# Patient Record
Sex: FEMALE | Race: BLACK OR AFRICAN AMERICAN | NOT HISPANIC OR LATINO
[De-identification: names, ages, dates, MRNs, and addresses within clinical notes are randomized per-mention and may not be internally consistent; named-entity substitution may affect disease eponyms.]

---

## 2017-05-07 ENCOUNTER — FORM ENCOUNTER (OUTPATIENT)
Age: 82
End: 2017-05-07

## 2017-05-08 ENCOUNTER — APPOINTMENT (OUTPATIENT)
Dept: ULTRASOUND IMAGING | Facility: IMAGING CENTER | Age: 82
End: 2017-05-08

## 2017-05-08 ENCOUNTER — OUTPATIENT (OUTPATIENT)
Dept: OUTPATIENT SERVICES | Facility: HOSPITAL | Age: 82
LOS: 1 days | End: 2017-05-08
Payer: MEDICARE

## 2017-05-08 DIAGNOSIS — Z00.8 ENCOUNTER FOR OTHER GENERAL EXAMINATION: ICD-10-CM

## 2017-05-08 DIAGNOSIS — I70.209 UNSPECIFIED ATHEROSCLEROSIS OF NATIVE ARTERIES OF EXTREMITIES, UNSPECIFIED EXTREMITY: ICD-10-CM

## 2017-05-08 DIAGNOSIS — M19.90 UNSPECIFIED OSTEOARTHRITIS, UNSPECIFIED SITE: ICD-10-CM

## 2017-05-08 PROCEDURE — 93975 VASCULAR STUDY: CPT

## 2017-05-23 ENCOUNTER — APPOINTMENT (OUTPATIENT)
Dept: NEPHROLOGY | Facility: CLINIC | Age: 82
End: 2017-05-23

## 2017-05-23 ENCOUNTER — LABORATORY RESULT (OUTPATIENT)
Age: 82
End: 2017-05-23

## 2017-05-23 VITALS
HEIGHT: 65 IN | DIASTOLIC BLOOD PRESSURE: 89 MMHG | HEART RATE: 73 BPM | SYSTOLIC BLOOD PRESSURE: 165 MMHG | WEIGHT: 121.25 LBS | OXYGEN SATURATION: 99 % | BODY MASS INDEX: 20.2 KG/M2

## 2017-05-23 VITALS — SYSTOLIC BLOOD PRESSURE: 148 MMHG | DIASTOLIC BLOOD PRESSURE: 80 MMHG

## 2017-05-24 LAB
25(OH)D3 SERPL-MCNC: 62.8 NG/ML
ALBUMIN SERPL ELPH-MCNC: 4.2 G/DL
ALP BLD-CCNC: 116 U/L
ALT SERPL-CCNC: 12 U/L
ANION GAP SERPL CALC-SCNC: 12 MMOL/L
APPEARANCE: CLEAR
AST SERPL-CCNC: 15 U/L
BACTERIA: NEGATIVE
BASOPHILS # BLD AUTO: 0.05 K/UL
BASOPHILS NFR BLD AUTO: 0.9 %
BILIRUB SERPL-MCNC: 0.7 MG/DL
BILIRUBIN URINE: NEGATIVE
BLOOD URINE: NEGATIVE
BUN SERPL-MCNC: 17 MG/DL
CALCIUM SERPL-MCNC: 9.6 MG/DL
CHLORIDE SERPL-SCNC: 101 MMOL/L
CO2 SERPL-SCNC: 23 MMOL/L
COLOR: YELLOW
CREAT SERPL-MCNC: 1.06 MG/DL
CREAT SPEC-SCNC: 55 MG/DL
EOSINOPHIL # BLD AUTO: 0.05 K/UL
EOSINOPHIL NFR BLD AUTO: 0.9 %
GLUCOSE QUALITATIVE U: NORMAL MG/DL
GLUCOSE SERPL-MCNC: 87 MG/DL
HCT VFR BLD CALC: 39.2 %
HGB BLD-MCNC: 12.7 G/DL
HYALINE CASTS: 2 /LPF
KETONES URINE: NEGATIVE
LEUKOCYTE ESTERASE URINE: NEGATIVE
LYMPHOCYTES # BLD AUTO: 2.27 K/UL
LYMPHOCYTES NFR BLD AUTO: 44.6 %
MAGNESIUM SERPL-MCNC: 2.3 MG/DL
MAN DIFF?: NORMAL
MCHC RBC-ENTMCNC: 23.4 PG
MCHC RBC-ENTMCNC: 32.4 GM/DL
MCV RBC AUTO: 72.2 FL
MICROALBUMIN 24H UR DL<=1MG/L-MCNC: 0.3 MG/DL
MICROALBUMIN/CREAT 24H UR-RTO: 5
MICROSCOPIC-UA: NORMAL
MONOCYTES # BLD AUTO: 0.18 K/UL
MONOCYTES NFR BLD AUTO: 3.6 %
NEUTROPHILS # BLD AUTO: 2.37 K/UL
NEUTROPHILS NFR BLD AUTO: 46.4 %
NITRITE URINE: NEGATIVE
PH URINE: 6.5
PHOSPHATE SERPL-MCNC: 3.2 MG/DL
PLATELET # BLD AUTO: 202 K/UL
POTASSIUM SERPL-SCNC: 3.9 MMOL/L
PROT SERPL-MCNC: 7.3 G/DL
PROTEIN URINE: NEGATIVE MG/DL
RBC # BLD: 5.43 M/UL
RBC # FLD: 16 %
RED BLOOD CELLS URINE: 2 /HPF
SODIUM SERPL-SCNC: 136 MMOL/L
SPECIFIC GRAVITY URINE: 1.01
SQUAMOUS EPITHELIAL CELLS: 1 /HPF
TSH SERPL-ACNC: 1.59 UIU/ML
URATE SERPL-MCNC: 4.3 MG/DL
UROBILINOGEN URINE: NORMAL MG/DL
WBC # FLD AUTO: 5.1 K/UL
WHITE BLOOD CELLS URINE: 0 /HPF

## 2017-07-04 ENCOUNTER — EMERGENCY (EMERGENCY)
Facility: HOSPITAL | Age: 82
LOS: 1 days | Discharge: HOME | End: 2017-07-04
Attending: EMERGENCY MEDICINE
Payer: MEDICARE

## 2017-07-04 VITALS
HEART RATE: 87 BPM | SYSTOLIC BLOOD PRESSURE: 126 MMHG | DIASTOLIC BLOOD PRESSURE: 69 MMHG | WEIGHT: 123.9 LBS | TEMPERATURE: 208 F | HEIGHT: 63 IN | OXYGEN SATURATION: 97 % | RESPIRATION RATE: 16 BRPM

## 2017-07-04 VITALS
TEMPERATURE: 98 F | RESPIRATION RATE: 18 BRPM | OXYGEN SATURATION: 98 % | HEART RATE: 78 BPM | DIASTOLIC BLOOD PRESSURE: 87 MMHG | SYSTOLIC BLOOD PRESSURE: 142 MMHG

## 2017-07-04 DIAGNOSIS — R07.9 CHEST PAIN, UNSPECIFIED: ICD-10-CM

## 2017-07-04 DIAGNOSIS — R53.1 WEAKNESS: ICD-10-CM

## 2017-07-04 DIAGNOSIS — N18.9 CHRONIC KIDNEY DISEASE, UNSPECIFIED: ICD-10-CM

## 2017-07-04 DIAGNOSIS — Z88.2 ALLERGY STATUS TO SULFONAMIDES: ICD-10-CM

## 2017-07-04 DIAGNOSIS — I12.9 HYPERTENSIVE CHRONIC KIDNEY DISEASE WITH STAGE 1 THROUGH STAGE 4 CHRONIC KIDNEY DISEASE, OR UNSPECIFIED CHRONIC KIDNEY DISEASE: ICD-10-CM

## 2017-07-04 LAB
ALBUMIN SERPL ELPH-MCNC: 3.2 G/DL — LOW (ref 3.5–5)
ALP SERPL-CCNC: 122 U/L — HIGH (ref 40–120)
ALT FLD-CCNC: 17 U/L DA — SIGNIFICANT CHANGE UP (ref 10–60)
ANION GAP SERPL CALC-SCNC: 7 MMOL/L — SIGNIFICANT CHANGE UP (ref 5–17)
AST SERPL-CCNC: 16 U/L — SIGNIFICANT CHANGE UP (ref 10–40)
BASOPHILS # BLD AUTO: 0.1 K/UL — SIGNIFICANT CHANGE UP (ref 0–0.2)
BASOPHILS NFR BLD AUTO: 1.3 % — SIGNIFICANT CHANGE UP (ref 0–2)
BILIRUB SERPL-MCNC: 0.7 MG/DL — SIGNIFICANT CHANGE UP (ref 0.2–1.2)
BUN SERPL-MCNC: 16 MG/DL — SIGNIFICANT CHANGE UP (ref 7–18)
CALCIUM SERPL-MCNC: 8.5 MG/DL — SIGNIFICANT CHANGE UP (ref 8.4–10.5)
CHLORIDE SERPL-SCNC: 108 MMOL/L — SIGNIFICANT CHANGE UP (ref 96–108)
CO2 SERPL-SCNC: 25 MMOL/L — SIGNIFICANT CHANGE UP (ref 22–31)
CREAT SERPL-MCNC: 0.91 MG/DL — SIGNIFICANT CHANGE UP (ref 0.5–1.3)
EOSINOPHIL # BLD AUTO: 0.1 K/UL — SIGNIFICANT CHANGE UP (ref 0–0.5)
EOSINOPHIL NFR BLD AUTO: 1.6 % — SIGNIFICANT CHANGE UP (ref 0–6)
GLUCOSE SERPL-MCNC: 90 MG/DL — SIGNIFICANT CHANGE UP (ref 70–99)
HCT VFR BLD CALC: 43.1 % — SIGNIFICANT CHANGE UP (ref 34.5–45)
HGB BLD-MCNC: 13.3 G/DL — SIGNIFICANT CHANGE UP (ref 11.5–15.5)
LACTATE SERPL-SCNC: 0.9 MMOL/L — SIGNIFICANT CHANGE UP (ref 0.7–2)
LYMPHOCYTES # BLD AUTO: 1.5 K/UL — SIGNIFICANT CHANGE UP (ref 1–3.3)
LYMPHOCYTES # BLD AUTO: 31.2 % — SIGNIFICANT CHANGE UP (ref 13–44)
MCHC RBC-ENTMCNC: 23.5 PG — LOW (ref 27–34)
MCHC RBC-ENTMCNC: 30.9 GM/DL — LOW (ref 32–36)
MCV RBC AUTO: 76 FL — LOW (ref 80–100)
MONOCYTES # BLD AUTO: 0.5 K/UL — SIGNIFICANT CHANGE UP (ref 0–0.9)
MONOCYTES NFR BLD AUTO: 9.7 % — SIGNIFICANT CHANGE UP (ref 2–14)
NEUTROPHILS # BLD AUTO: 2.7 K/UL — SIGNIFICANT CHANGE UP (ref 1.8–7.4)
NEUTROPHILS NFR BLD AUTO: 56.2 % — SIGNIFICANT CHANGE UP (ref 43–77)
NT-PROBNP SERPL-SCNC: 211 PG/ML — SIGNIFICANT CHANGE UP (ref 0–450)
PLATELET # BLD AUTO: 173 K/UL — SIGNIFICANT CHANGE UP (ref 150–400)
POTASSIUM SERPL-MCNC: 4 MMOL/L — SIGNIFICANT CHANGE UP (ref 3.5–5.3)
POTASSIUM SERPL-SCNC: 4 MMOL/L — SIGNIFICANT CHANGE UP (ref 3.5–5.3)
PROT SERPL-MCNC: 7.8 G/DL — SIGNIFICANT CHANGE UP (ref 6–8.3)
RBC # BLD: 5.67 M/UL — HIGH (ref 3.8–5.2)
RBC # FLD: 15 % — HIGH (ref 10.3–14.5)
SODIUM SERPL-SCNC: 140 MMOL/L — SIGNIFICANT CHANGE UP (ref 135–145)
TROPONIN I SERPL-MCNC: <0.015 NG/ML — SIGNIFICANT CHANGE UP (ref 0–0.04)
WBC # BLD: 4.9 K/UL — SIGNIFICANT CHANGE UP (ref 3.8–10.5)
WBC # FLD AUTO: 4.9 K/UL — SIGNIFICANT CHANGE UP (ref 3.8–10.5)

## 2017-07-04 PROCEDURE — 80053 COMPREHEN METABOLIC PANEL: CPT

## 2017-07-04 PROCEDURE — 84484 ASSAY OF TROPONIN QUANT: CPT

## 2017-07-04 PROCEDURE — 83880 ASSAY OF NATRIURETIC PEPTIDE: CPT

## 2017-07-04 PROCEDURE — 71046 X-RAY EXAM CHEST 2 VIEWS: CPT

## 2017-07-04 PROCEDURE — 36000 PLACE NEEDLE IN VEIN: CPT

## 2017-07-04 PROCEDURE — 71020: CPT | Mod: 26

## 2017-07-04 PROCEDURE — 99283 EMERGENCY DEPT VISIT LOW MDM: CPT

## 2017-07-04 PROCEDURE — 93005 ELECTROCARDIOGRAM TRACING: CPT

## 2017-07-04 PROCEDURE — 85027 COMPLETE CBC AUTOMATED: CPT

## 2017-07-04 PROCEDURE — 99285 EMERGENCY DEPT VISIT HI MDM: CPT

## 2017-07-04 PROCEDURE — 83605 ASSAY OF LACTIC ACID: CPT

## 2017-07-04 RX ORDER — SODIUM CHLORIDE 9 MG/ML
1000 INJECTION INTRAMUSCULAR; INTRAVENOUS; SUBCUTANEOUS ONCE
Qty: 0 | Refills: 0 | Status: COMPLETED | OUTPATIENT
Start: 2017-07-04 | End: 2017-07-04

## 2017-07-04 RX ORDER — SODIUM CHLORIDE 9 MG/ML
3 INJECTION INTRAMUSCULAR; INTRAVENOUS; SUBCUTANEOUS ONCE
Qty: 0 | Refills: 0 | Status: COMPLETED | OUTPATIENT
Start: 2017-07-04 | End: 2017-07-04

## 2017-07-04 RX ADMIN — SODIUM CHLORIDE 4000 MILLILITER(S): 9 INJECTION INTRAMUSCULAR; INTRAVENOUS; SUBCUTANEOUS at 14:03

## 2017-07-04 RX ADMIN — SODIUM CHLORIDE 3 MILLILITER(S): 9 INJECTION INTRAMUSCULAR; INTRAVENOUS; SUBCUTANEOUS at 12:31

## 2017-07-04 NOTE — ED PROVIDER NOTE - MEDICAL DECISION MAKING DETAILS
chest pain onset 9 hrs ago, pt feels asymptomatic. normal exam. labs, cxr and ecg reassuring. discussed anticipatory guidance. gave copy of results. pt wishes to follow up as outpt and not be admitted. has cardiologist who can see her this week

## 2017-07-04 NOTE — ED PROVIDER NOTE - PMH
CKD (chronic kidney disease)    HTN (hypertension)    Osteoporosis    Poor circulation    RA (rheumatoid arthritis)

## 2017-07-04 NOTE — ED PROVIDER NOTE - OBJECTIVE STATEMENT
82 y/o F pt with PMHx of HTN, CKD, RA, presents to ED c/o generalized weakness x today. Pt reports developing intermittent L sided CP this morning upon awaking up (0600). In the ED, pt states being asymptomatic of her chest pain, however, feels generalized weakness. Pt denies fever, chills, nausea, vomiting, diarrhea, abd pain, SOB, cough, or any other complaints. ALLERGIES: Sulfa 10% (Rash)

## 2017-10-30 ENCOUNTER — EMERGENCY (EMERGENCY)
Facility: HOSPITAL | Age: 82
LOS: 1 days | Discharge: HOME | End: 2017-10-30
Attending: EMERGENCY MEDICINE
Payer: MEDICARE

## 2017-10-30 VITALS
HEART RATE: 94 BPM | HEIGHT: 63 IN | WEIGHT: 119.93 LBS | SYSTOLIC BLOOD PRESSURE: 220 MMHG | RESPIRATION RATE: 18 BRPM | TEMPERATURE: 98 F | OXYGEN SATURATION: 96 % | DIASTOLIC BLOOD PRESSURE: 120 MMHG

## 2017-10-30 VITALS
HEART RATE: 72 BPM | OXYGEN SATURATION: 100 % | SYSTOLIC BLOOD PRESSURE: 148 MMHG | RESPIRATION RATE: 18 BRPM | DIASTOLIC BLOOD PRESSURE: 94 MMHG | TEMPERATURE: 98 F

## 2017-10-30 PROCEDURE — 99284 EMERGENCY DEPT VISIT MOD MDM: CPT

## 2017-10-30 PROCEDURE — 93005 ELECTROCARDIOGRAM TRACING: CPT

## 2017-10-30 RX ORDER — ACETAMINOPHEN 500 MG
650 TABLET ORAL ONCE
Qty: 0 | Refills: 0 | Status: COMPLETED | OUTPATIENT
Start: 2017-10-30 | End: 2017-10-30

## 2017-10-30 RX ORDER — VALSARTAN 80 MG/1
160 TABLET ORAL DAILY
Qty: 0 | Refills: 0 | Status: DISCONTINUED | OUTPATIENT
Start: 2017-10-30 | End: 2017-11-11

## 2017-10-30 RX ORDER — METOPROLOL TARTRATE 50 MG
1 TABLET ORAL
Qty: 30 | Refills: 0 | OUTPATIENT
Start: 2017-10-30 | End: 2017-11-29

## 2017-10-30 RX ORDER — METOPROLOL TARTRATE 50 MG
50 TABLET ORAL DAILY
Qty: 0 | Refills: 0 | Status: DISCONTINUED | OUTPATIENT
Start: 2017-10-30 | End: 2017-11-11

## 2017-10-30 RX ADMIN — Medication 50 MILLIGRAM(S): at 18:50

## 2017-10-30 RX ADMIN — Medication 0.2 MILLIGRAM(S): at 20:48

## 2017-10-30 RX ADMIN — VALSARTAN 160 MILLIGRAM(S): 80 TABLET ORAL at 18:49

## 2017-10-30 RX ADMIN — Medication 650 MILLIGRAM(S): at 19:01

## 2017-10-30 RX ADMIN — Medication 650 MILLIGRAM(S): at 20:00

## 2017-10-30 NOTE — ED ADULT NURSE NOTE - OBJECTIVE STATEMENT
Pt AOx3, ambulatory, c/o elevated blood pressure, headache and dizziness. Pt denies N/V. Denies changes in vision . No neuro deficit noted.

## 2017-10-30 NOTE — ED PROVIDER NOTE - OBJECTIVE STATEMENT
Dr. Fields Note: 81F with intermittent elevated BP for past 1-2 weeks, recent adjustment of BP meds from metoprolol to hydralazine, here for eval.  no vision changes, cp, sob, ab pain, vomiting, urinary sxs.  Had recent blood work by PCP last week that was normal.  Does not want to take hydralazine anymore.

## 2017-10-30 NOTE — ED PROVIDER NOTE - MEDICAL DECISION MAKING DETAILS
Dr. Fields Note: pt with recent blood work with intermittent HTN to 200s, will adjust meds and outpt.

## 2017-10-30 NOTE — ED PROVIDER NOTE - PROGRESS NOTE DETAILS
Dr. Fields Note: pt forgot to report, but after further review of records, pt has renal artery stenosis; will have to avoid ARBs/ACEi.  will resume metoprolol and add clonidine instead.

## 2017-11-03 ENCOUNTER — APPOINTMENT (OUTPATIENT)
Dept: ULTRASOUND IMAGING | Facility: IMAGING CENTER | Age: 82
End: 2017-11-03

## 2017-11-04 ENCOUNTER — INPATIENT (INPATIENT)
Facility: HOSPITAL | Age: 82
LOS: 3 days | Discharge: ANOTHER HOSPITAL | DRG: 305 | End: 2017-11-08
Attending: INTERNAL MEDICINE | Admitting: INTERNAL MEDICINE
Payer: MEDICARE

## 2017-11-04 VITALS
RESPIRATION RATE: 16 BRPM | HEIGHT: 63 IN | DIASTOLIC BLOOD PRESSURE: 86 MMHG | SYSTOLIC BLOOD PRESSURE: 200 MMHG | OXYGEN SATURATION: 100 % | WEIGHT: 125 LBS | TEMPERATURE: 98 F | HEART RATE: 62 BPM

## 2017-11-04 DIAGNOSIS — I16.0 HYPERTENSIVE URGENCY: ICD-10-CM

## 2017-11-04 DIAGNOSIS — Z29.9 ENCOUNTER FOR PROPHYLACTIC MEASURES, UNSPECIFIED: ICD-10-CM

## 2017-11-04 DIAGNOSIS — I21.4 NON-ST ELEVATION (NSTEMI) MYOCARDIAL INFARCTION: ICD-10-CM

## 2017-11-04 DIAGNOSIS — N39.0 URINARY TRACT INFECTION, SITE NOT SPECIFIED: ICD-10-CM

## 2017-11-04 LAB
ALBUMIN SERPL ELPH-MCNC: 3.6 G/DL — SIGNIFICANT CHANGE UP (ref 3.5–5)
ALP SERPL-CCNC: 135 U/L — HIGH (ref 40–120)
ALT FLD-CCNC: 54 U/L DA — SIGNIFICANT CHANGE UP (ref 10–60)
ANION GAP SERPL CALC-SCNC: 5 MMOL/L — SIGNIFICANT CHANGE UP (ref 5–17)
APPEARANCE UR: CLEAR — SIGNIFICANT CHANGE UP
APTT BLD: 33.5 SEC — SIGNIFICANT CHANGE UP (ref 27.5–37.4)
AST SERPL-CCNC: 45 U/L — HIGH (ref 10–40)
BILIRUB SERPL-MCNC: 0.5 MG/DL — SIGNIFICANT CHANGE UP (ref 0.2–1.2)
BILIRUB UR-MCNC: NEGATIVE — SIGNIFICANT CHANGE UP
BUN SERPL-MCNC: 16 MG/DL — SIGNIFICANT CHANGE UP (ref 7–18)
CALCIUM SERPL-MCNC: 9.3 MG/DL — SIGNIFICANT CHANGE UP (ref 8.4–10.5)
CHLORIDE SERPL-SCNC: 107 MMOL/L — SIGNIFICANT CHANGE UP (ref 96–108)
CK MB BLD-MCNC: 7 % — HIGH (ref 0–3.5)
CK MB BLD-MCNC: 8.6 % — HIGH (ref 0–3.5)
CK MB BLD-MCNC: <1.9 % — SIGNIFICANT CHANGE UP (ref 0–3.5)
CK MB CFR SERPL CALC: 10.7 NG/ML — HIGH (ref 0–3.6)
CK MB CFR SERPL CALC: 8.7 NG/ML — HIGH (ref 0–3.6)
CK MB CFR SERPL CALC: <1 NG/ML — SIGNIFICANT CHANGE UP (ref 0–3.6)
CK SERPL-CCNC: 124 U/L — SIGNIFICANT CHANGE UP (ref 21–215)
CK SERPL-CCNC: 124 U/L — SIGNIFICANT CHANGE UP (ref 21–215)
CK SERPL-CCNC: 54 U/L — SIGNIFICANT CHANGE UP (ref 21–215)
CO2 SERPL-SCNC: 30 MMOL/L — SIGNIFICANT CHANGE UP (ref 22–31)
COLOR SPEC: YELLOW — SIGNIFICANT CHANGE UP
CREAT SERPL-MCNC: 1.03 MG/DL — SIGNIFICANT CHANGE UP (ref 0.5–1.3)
DIFF PNL FLD: NEGATIVE — SIGNIFICANT CHANGE UP
GLUCOSE SERPL-MCNC: 112 MG/DL — HIGH (ref 70–99)
GLUCOSE UR QL: NEGATIVE — SIGNIFICANT CHANGE UP
HCT VFR BLD CALC: 43.8 % — SIGNIFICANT CHANGE UP (ref 34.5–45)
HGB BLD-MCNC: 13.5 G/DL — SIGNIFICANT CHANGE UP (ref 11.5–15.5)
INR BLD: 1.01 RATIO — SIGNIFICANT CHANGE UP (ref 0.88–1.16)
KETONES UR-MCNC: NEGATIVE — SIGNIFICANT CHANGE UP
LEUKOCYTE ESTERASE UR-ACNC: ABNORMAL
MCHC RBC-ENTMCNC: 24 PG — LOW (ref 27–34)
MCHC RBC-ENTMCNC: 30.8 GM/DL — LOW (ref 32–36)
MCV RBC AUTO: 77.7 FL — LOW (ref 80–100)
NITRITE UR-MCNC: NEGATIVE — SIGNIFICANT CHANGE UP
PH UR: 6.5 — SIGNIFICANT CHANGE UP (ref 5–8)
PLATELET # BLD AUTO: 172 K/UL — SIGNIFICANT CHANGE UP (ref 150–400)
POTASSIUM SERPL-MCNC: 4.1 MMOL/L — SIGNIFICANT CHANGE UP (ref 3.5–5.3)
POTASSIUM SERPL-SCNC: 4.1 MMOL/L — SIGNIFICANT CHANGE UP (ref 3.5–5.3)
PROT SERPL-MCNC: 8.1 G/DL — SIGNIFICANT CHANGE UP (ref 6–8.3)
PROT UR-MCNC: NEGATIVE — SIGNIFICANT CHANGE UP
PROTHROM AB SERPL-ACNC: 11 SEC — SIGNIFICANT CHANGE UP (ref 9.8–12.7)
RBC # BLD: 5.64 M/UL — HIGH (ref 3.8–5.2)
RBC # FLD: 14.3 % — SIGNIFICANT CHANGE UP (ref 10.3–14.5)
SODIUM SERPL-SCNC: 142 MMOL/L — SIGNIFICANT CHANGE UP (ref 135–145)
SP GR SPEC: 1.01 — SIGNIFICANT CHANGE UP (ref 1.01–1.02)
TROPONIN I SERPL-MCNC: 2.71 NG/ML — HIGH (ref 0–0.04)
TROPONIN I SERPL-MCNC: 3.01 NG/ML — HIGH (ref 0–0.04)
TROPONIN I SERPL-MCNC: <0.015 NG/ML — SIGNIFICANT CHANGE UP (ref 0–0.04)
UROBILINOGEN FLD QL: NEGATIVE — SIGNIFICANT CHANGE UP
WBC # BLD: 4 K/UL — SIGNIFICANT CHANGE UP (ref 3.8–10.5)
WBC # FLD AUTO: 4 K/UL — SIGNIFICANT CHANGE UP (ref 3.8–10.5)

## 2017-11-04 PROCEDURE — 99285 EMERGENCY DEPT VISIT HI MDM: CPT | Mod: 25

## 2017-11-04 PROCEDURE — 70450 CT HEAD/BRAIN W/O DYE: CPT | Mod: 26

## 2017-11-04 RX ORDER — CEFTRIAXONE 500 MG/1
INJECTION, POWDER, FOR SOLUTION INTRAMUSCULAR; INTRAVENOUS
Qty: 0 | Refills: 0 | Status: DISCONTINUED | OUTPATIENT
Start: 2017-11-04 | End: 2017-11-04

## 2017-11-04 RX ORDER — ASPIRIN/CALCIUM CARB/MAGNESIUM 324 MG
325 TABLET ORAL DAILY
Qty: 0 | Refills: 0 | Status: DISCONTINUED | OUTPATIENT
Start: 2017-11-04 | End: 2017-11-08

## 2017-11-04 RX ORDER — ATORVASTATIN CALCIUM 80 MG/1
40 TABLET, FILM COATED ORAL AT BEDTIME
Qty: 0 | Refills: 0 | Status: DISCONTINUED | OUTPATIENT
Start: 2017-11-04 | End: 2017-11-05

## 2017-11-04 RX ORDER — LABETALOL HCL 100 MG
10 TABLET ORAL ONCE
Qty: 0 | Refills: 0 | Status: COMPLETED | OUTPATIENT
Start: 2017-11-04 | End: 2017-11-04

## 2017-11-04 RX ORDER — PANTOPRAZOLE SODIUM 20 MG/1
40 TABLET, DELAYED RELEASE ORAL
Qty: 0 | Refills: 0 | Status: DISCONTINUED | OUTPATIENT
Start: 2017-11-04 | End: 2017-11-05

## 2017-11-04 RX ORDER — METOPROLOL TARTRATE 50 MG
25 TABLET ORAL
Qty: 0 | Refills: 0 | Status: DISCONTINUED | OUTPATIENT
Start: 2017-11-04 | End: 2017-11-08

## 2017-11-04 RX ORDER — NITROGLYCERIN 6.5 MG
0.4 CAPSULE, EXTENDED RELEASE ORAL ONCE
Qty: 0 | Refills: 0 | Status: COMPLETED | OUTPATIENT
Start: 2017-11-04 | End: 2017-11-04

## 2017-11-04 RX ORDER — CEFTRIAXONE 500 MG/1
1 INJECTION, POWDER, FOR SOLUTION INTRAMUSCULAR; INTRAVENOUS ONCE
Qty: 0 | Refills: 0 | Status: COMPLETED | OUTPATIENT
Start: 2017-11-04 | End: 2017-11-04

## 2017-11-04 RX ORDER — NIFEDIPINE 30 MG
30 TABLET, EXTENDED RELEASE 24 HR ORAL DAILY
Qty: 0 | Refills: 0 | Status: DISCONTINUED | OUTPATIENT
Start: 2017-11-04 | End: 2017-11-08

## 2017-11-04 RX ADMIN — Medication 0.4 MILLIGRAM(S): at 09:03

## 2017-11-04 RX ADMIN — Medication 25 MILLIGRAM(S): at 18:01

## 2017-11-04 RX ADMIN — PANTOPRAZOLE SODIUM 40 MILLIGRAM(S): 20 TABLET, DELAYED RELEASE ORAL at 18:01

## 2017-11-04 RX ADMIN — Medication 10 MILLIGRAM(S): at 08:38

## 2017-11-04 RX ADMIN — Medication 0.1 MILLIGRAM(S): at 18:01

## 2017-11-04 RX ADMIN — CEFTRIAXONE 100 GRAM(S): 500 INJECTION, POWDER, FOR SOLUTION INTRAMUSCULAR; INTRAVENOUS at 13:34

## 2017-11-04 RX ADMIN — Medication 30 MILLIGRAM(S): at 14:34

## 2017-11-04 NOTE — ED ADULT TRIAGE NOTE - CHIEF COMPLAINT QUOTE
c/o does not feel good when started to take a new blood pressure medicine of clonidine and metoprolol as per patient

## 2017-11-04 NOTE — H&P ADULT - NSHPREVIEWOFSYSTEMS_GEN_ALL_CORE
REVIEW OF SYSTEMS:    CONSTITUTIONAL: No weakness, fevers or chills    RESPIRATORY: No cough, wheezing, hemoptysis; No shortness of breath    CARDIOVASCULAR: No chest pain or palpitations    GASTROINTESTINAL: No abdominal or epigastric pain.   No nausea, vomiting, or hematemesis; No diarrhea or constipation. No melena or hematochezia.    NEUROLOGICAL: No numbness or weakness    All other review of systems is negative unless indicated above.

## 2017-11-04 NOTE — H&P ADULT - PROBLEM SELECTOR PLAN 2
Started on Rocephin and f/u UCx.   WBC WNL and no fever. Troponin elevated from 0.015 to 3.   EKG repeated with no acute changes.   No need to start anticoagulation for now as per cardio.   Will trend cardiac enzyme 3 sets.   Started on aspirin 325mg daily and statin and metoprolol.   f/u Echo and stress test.

## 2017-11-04 NOTE — ED PROVIDER NOTE - MEDICAL DECISION MAKING DETAILS
82 y/o f pt w/ HTN. Admit for hypertensive urgency requiring immediate bp control. Will check labs, CT head, EKG, urine, admit.

## 2017-11-04 NOTE — H&P ADULT - NSHPPHYSICALEXAM_GEN_ALL_CORE
Physical Exam    General: WN/WD NAD    Neurology: A&Ox3, no focal neurological deficit.     Respiratory: CTA B/L, no wheezes, no rhonchi, no rales    CV: RRR, S1S2, no murmur    Abdominal: Soft, NT, ND no palpable mass, bowel sounds positive    MSK: No edema, + peripheral pulses    No other pertinent positive finding except mentioned above.

## 2017-11-04 NOTE — ED ADULT NURSE NOTE - ED STAT RN HANDOFF DETAILS
Patient endorsed to KYLAH Thornton ,patient transferred to [facility]

## 2017-11-04 NOTE — H&P ADULT - ASSESSMENT
81 year old female from home ambulate independently and AAO x3 with PMH of HTN on hydralazine, then switched to metoprolol and clonidine which she is not complaint again presented with high blood pressure.

## 2017-11-04 NOTE — H&P ADULT - PROBLEM SELECTOR PLAN 3
Improve score 1. No need for DVT PPx as patient is very active and walking around in ED.   Reassess as needed. Started on Rocephin and f/u UCx.   WBC WNL and no fever. asymptomatic UTI  WBC WNL and no fever.  No need to tx

## 2017-11-04 NOTE — H&P ADULT - PROBLEM SELECTOR PLAN 1
-Clonidine slow taper as patient won't be compliance anyway. 0.1mg BID then 0.1mg daily and then stop.   -Will start on amlodipine 30mg daily.   Monitor BP closely. -Clonidine slow taper as patient won't be compliance anyway. 0.1mg BID then 0.1mg daily and then stop.   -Will start on amlodipine 30mg daily.   Monitor BP closely. Cardio Dr Ellis -Clonidine slow taper as patient won't be compliance anyway. 0.1mg BID then 0.1mg daily and then stop.   -Will start on Nifedipine EF 30mg daily.   Monitor BP closely. Cardio Dr Ellis

## 2017-11-04 NOTE — ED PROVIDER NOTE - OBJECTIVE STATEMENT
80 y/o F pt w/ PMHx HTN on various meds for the last month which she takes intermittently, noncompliant due to side effects such as lightheadedness or tiredness, presents to ED for HTN. Pt seen 10/31 and given metoprolol and clonidine which again she didn't like due to side effects so she is only taking sporadically; pt doesn't have PMD appointment until 11/22. Pt denies pain, SOB, or any other complaints. /120 here. Pt is allergic to Sulfac 10% (Rash).

## 2017-11-04 NOTE — H&P ADULT - ATTENDING COMMENTS
Patient seen and examined. Agree with plan above.  Note edited as necessary.  80yo F with Rt LUIS FERNANDO a/w Hypertensive urgency (/120) due to medication non-compliance/ rebounding. Pt c/o lethargy with Clonidine and dizziness with Metoprolol. CT Head neg. Discussed risk of CVA/ MI with elevated BP. Pt understands.   1. HTN urgency- Will avoid rapidly declining BP. SBP goal 180-200s. Will avoid Clonidine in the elderly and due to medication non-compliance. Pt refusing Metoprolol.   Will plan to taper Clonidine and start Labetalol. Will start Nifedipine ER 30mg PO daily and titrate as needed. Pt refusing diuretic due to polyuria.   Pt with h/o Rt LUIS FERNANDO but refusing any admission during this hospitalization. Will try to obtain previous imaging results from PMD on Monday  Avoid ACEi/ARB for now.   2. Elevated troponins- pt denies any c/p with no EKG changes. Will trend. Cardiology (Dr. Navas) following. Pt for TTE.   3. Asymptomatic UTI- no need to treat.

## 2017-11-04 NOTE — H&P ADULT - NSHPLABSRESULTS_GEN_ALL_CORE
Vital Signs Last 24 Hrs      T(C): 36.4 (04 Nov 2017 10:58), Max: 36.6 (04 Nov 2017 05:01)  T(F): 97.5 (04 Nov 2017 10:58), Max: 97.8 (04 Nov 2017 05:01)  HR: 66 (04 Nov 2017 10:58) (62 - 66)  BP: 197/101 (04 Nov 2017 10:58) (197/101 - 238/120)  BP(mean): --  RR: 16 (04 Nov 2017 10:58) (16 - 16)  SpO2: 97% (04 Nov 2017 10:58) (97% - 100%)

## 2017-11-04 NOTE — CHART NOTE - NSCHARTNOTEFT_GEN_A_CORE
Pt had elevated Troponin of 3  repeat EKG insignificant  f/u T3  Eloise aware-recommends telemetry, aspirin 325, echo and stress test, no heparin for now

## 2017-11-04 NOTE — H&P ADULT - PROBLEM SELECTOR PLAN 4
Improve score 1. No need for DVT PPx as patient is very active and walking around in ED.   Reassess as needed.

## 2017-11-04 NOTE — CONSULT NOTE ADULT - SUBJECTIVE AND OBJECTIVE BOX
CARDIOLOGY ATTENDING      HISTORY OF PRESENT ILLNESS: She is a pleasant 80 y/o female PMH HTN who has a diagnosis of "renal artery stenosis." She is now admitted with hypertensive urgency. She denies syncope nor angina. No previous cardiac workup on file.    PAST MEDICAL & SURGICAL HISTORY:  RA (rheumatoid arthritis)  Osteoporosis  HTN (hypertension)    No significant past surgical history    MEDICATIONS  (STANDING):  cefTRIAXone   IVPB      cefTRIAXone   IVPB 1 Gram(s) IV Intermittent once  cloNIDine 0.1 milliGRAM(s) Oral every 12 hours  NIFEdipine XL 30 milliGRAM(s) Oral daily    Allergies  Sulfac 10% (Rash)    FAMILY HISTORY:  No pertinent family history in first degree relatives  Non-contributary for premature coronary disease or sudden cardiac death    SOCIAL HISTORY:    [x ] Non-smoker  [ ] Smoker  [ ] Alcohol      REVIEW OF SYSTEMS:  [ ]chest pain  [  ]shortness of breath  [  ]palpitations  [  ]syncope  [ ]near syncope [ ]upper extremity weakness   [ ] lower extremity weakness  [  ]diplopia  [  ]altered mental status   [  ]fevers  [ ]chills [ ]nausea  [ ]vomitting  [  ]dysphagia    [ ]abdominal pain  [ ]melena  [ ]BRBPR    [  ]epistaxis  [  ]rash    [ ]lower extremity edema        [x ] All others negative	  [ ] Unable to obtain    PHYSICAL EXAM:  T(C): 36.4 (11-04-17 @ 10:58), Max: 36.6 (11-04-17 @ 05:01)  HR: 66 (11-04-17 @ 10:58) (62 - 66)  BP: 197/101 (11-04-17 @ 10:58) (197/101 - 238/120)  RR: 16 (11-04-17 @ 10:58) (16 - 16)  SpO2: 97% (11-04-17 @ 10:58) (97% - 100%)  Wt(kg): --    no JVD  RRR, no murmurs  CTAB  soft nt/nd  no c/c/e    TELEMETRY: 	  n/a  ECG:  	NSR, normal EKG    Echo: pending  NST: n/a  Cath: n/a  	  	  LABS:	 	                          13.5   4.0   )-----------( 172      ( 04 Nov 2017 08:37 )             43.8     11-04    142  |  107  |  16  ----------------------------<  112<H>  4.1   |  30  |  1.03    Ca    9.3      04 Nov 2017 08:37    TPro  8.1  /  Alb  3.6  /  TBili  0.5  /  DBili  x   /  AST  45<H>  /  ALT  54  /  AlkPhos  135<H>  11-04    proBNP:   Lipid Profile:   HgA1c:   TSH:     ASSESSMENT/PLAN: She is a pleasant 80 y/o female PMH HTN who has a diagnosis of "renal artery stenosis." She is now admitted with hypertensive urgency. She denies syncope nor angina. No previous cardiac workup on file.    -continue current medical therapy of clonidine and hydralazine  -hold on ACEi until diagnosis of "renal artery stenosis" is clarified  -get baseline echo given HTN  -renal imaging as per renal  -final cardiology reccs pending echo and renal imaging r/o LUIS FERNANDO Navas M.D., [facility]  [phone] CARDIOLOGY ATTENDING      HISTORY OF PRESENT ILLNESS: She is a pleasant 80 y/o female PMH HTN who has a diagnosis of "renal artery stenosis." She is now admitted with hypertensive urgency. She denies syncope nor angina. No previous cardiac workup on file. EKG normal, but has asymptomatic troponin elevation with normal CPK.    PAST MEDICAL & SURGICAL HISTORY:  RA (rheumatoid arthritis)  Osteoporosis  HTN (hypertension)    No significant past surgical history    MEDICATIONS  (STANDING):  cefTRIAXone   IVPB      cefTRIAXone   IVPB 1 Gram(s) IV Intermittent once  cloNIDine 0.1 milliGRAM(s) Oral every 12 hours  NIFEdipine XL 30 milliGRAM(s) Oral daily    Allergies  Sulfac 10% (Rash)    FAMILY HISTORY:  No pertinent family history in first degree relatives  Non-contributary for premature coronary disease or sudden cardiac death    SOCIAL HISTORY:    [x ] Non-smoker  [ ] Smoker  [ ] Alcohol      REVIEW OF SYSTEMS:  [ ]chest pain  [  ]shortness of breath  [  ]palpitations  [  ]syncope  [ ]near syncope [ ]upper extremity weakness   [ ] lower extremity weakness  [  ]diplopia  [  ]altered mental status   [  ]fevers  [ ]chills [ ]nausea  [ ]vomitting  [  ]dysphagia    [ ]abdominal pain  [ ]melena  [ ]BRBPR    [  ]epistaxis  [  ]rash    [ ]lower extremity edema        [x ] All others negative	  [ ] Unable to obtain    PHYSICAL EXAM:  T(C): 36.4 (11-04-17 @ 10:58), Max: 36.6 (11-04-17 @ 05:01)  HR: 66 (11-04-17 @ 10:58) (62 - 66)  BP: 197/101 (11-04-17 @ 10:58) (197/101 - 238/120)  RR: 16 (11-04-17 @ 10:58) (16 - 16)  SpO2: 97% (11-04-17 @ 10:58) (97% - 100%)  Wt(kg): --    no JVD  RRR, no murmurs  CTAB  soft nt/nd  no c/c/e    TELEMETRY: 	  n/a  ECG:  	NSR, normal EKG    Echo: pending  NST: n/a  Cath: n/a  	  	  LABS:	 	                          13.5   4.0   )-----------( 172      ( 04 Nov 2017 08:37 )             43.8     11-04    142  |  107  |  16  ----------------------------<  112<H>  4.1   |  30  |  1.03    Ca    9.3      04 Nov 2017 08:37    TPro  8.1  /  Alb  3.6  /  TBili  0.5  /  DBili  x   /  AST  45<H>  /  ALT  54  /  AlkPhos  135<H>  11-04    proBNP:   Lipid Profile:   HgA1c:   TSH:     ASSESSMENT/PLAN: She is a pleasant 80 y/o female PMH HTN who has a diagnosis of "renal artery stenosis." She is now admitted with hypertensive urgency. She denies syncope nor angina. No previous cardiac workup on file. EKG normal, but has asymptomatic troponin elevation with normal CPK.    -continue current medical therapy of clonidine and hydralazine  -hold on ACEi until diagnosis of "renal artery stenosis" is clarified  -get baseline echo   -start ASA 325mg daily  -no need for heparin drip given no chest pain and normal EKG  -renal imaging as per renal  -will require NST if echo normal  -no indication for urgent cath      Elli Navas M.D., [facility]  [phone]

## 2017-11-04 NOTE — H&P ADULT - HISTORY OF PRESENT ILLNESS
81 year old female from home ambulate independently and AAO x3 with PMH of RA, osteoporosis and renal artery stenosis, HTN on hydralazine, then switched to metoprolol and clonidine which she is not complaint again presented with high blood pressure. Patient  stated that she had mild headache which is resolved when seen by admitting resident. Patient  believed that hydralazine makes her pee more often and metoprolol and clonidine cause vertigo. Counselling was done explaining her that she was having UTI which might be causing her frequency, not the medication side effect. Patient has her home BP diary with frequent uncontrolled BP episode from medication non compliance. Denied current headache, vision problem, tingling, numbness chest pain, sob and burning sensation during urination. Patient  admit urinary frequency though.

## 2017-11-04 NOTE — ED ADULT NURSE NOTE - OBJECTIVE STATEMENT
pt. is aox3 came to er accompanied by her daughter c/o "not feeling well" after taking new blood pressure medication. Pt appears calm not in any distress, ambulates. skin is intact

## 2017-11-05 ENCOUNTER — TRANSCRIPTION ENCOUNTER (OUTPATIENT)
Age: 82
End: 2017-11-05

## 2017-11-05 DIAGNOSIS — E78.4 OTHER HYPERLIPIDEMIA: ICD-10-CM

## 2017-11-05 DIAGNOSIS — I70.1 ATHEROSCLEROSIS OF RENAL ARTERY: ICD-10-CM

## 2017-11-05 DIAGNOSIS — N39.0 URINARY TRACT INFECTION, SITE NOT SPECIFIED: ICD-10-CM

## 2017-11-05 DIAGNOSIS — R74.8 ABNORMAL LEVELS OF OTHER SERUM ENZYMES: ICD-10-CM

## 2017-11-05 LAB
ANION GAP SERPL CALC-SCNC: 6 MMOL/L — SIGNIFICANT CHANGE UP (ref 5–17)
BUN SERPL-MCNC: 13 MG/DL — SIGNIFICANT CHANGE UP (ref 7–18)
CALCIUM SERPL-MCNC: 9.6 MG/DL — SIGNIFICANT CHANGE UP (ref 8.4–10.5)
CHLORIDE SERPL-SCNC: 104 MMOL/L — SIGNIFICANT CHANGE UP (ref 96–108)
CHOLEST SERPL-MCNC: 180 MG/DL — SIGNIFICANT CHANGE UP (ref 10–199)
CO2 SERPL-SCNC: 29 MMOL/L — SIGNIFICANT CHANGE UP (ref 22–31)
CREAT SERPL-MCNC: 0.95 MG/DL — SIGNIFICANT CHANGE UP (ref 0.5–1.3)
CULTURE RESULTS: SIGNIFICANT CHANGE UP
GLUCOSE SERPL-MCNC: 86 MG/DL — SIGNIFICANT CHANGE UP (ref 70–99)
HBA1C BLD-MCNC: 5.6 % — SIGNIFICANT CHANGE UP (ref 4–5.6)
HDLC SERPL-MCNC: 79 MG/DL — SIGNIFICANT CHANGE UP (ref 40–125)
LIPID PNL WITH DIRECT LDL SERPL: 93 MG/DL — SIGNIFICANT CHANGE UP
MAGNESIUM SERPL-MCNC: 2.3 MG/DL — SIGNIFICANT CHANGE UP (ref 1.6–2.6)
PHOSPHATE SERPL-MCNC: 2.7 MG/DL — SIGNIFICANT CHANGE UP (ref 2.5–4.5)
POTASSIUM SERPL-MCNC: 3.8 MMOL/L — SIGNIFICANT CHANGE UP (ref 3.5–5.3)
POTASSIUM SERPL-SCNC: 3.8 MMOL/L — SIGNIFICANT CHANGE UP (ref 3.5–5.3)
SODIUM SERPL-SCNC: 139 MMOL/L — SIGNIFICANT CHANGE UP (ref 135–145)
SPECIMEN SOURCE: SIGNIFICANT CHANGE UP
TOTAL CHOLESTEROL/HDL RATIO MEASUREMENT: 2.3 RATIO — LOW (ref 3.3–7.1)
TRIGL SERPL-MCNC: 38 MG/DL — SIGNIFICANT CHANGE UP (ref 10–149)
TSH SERPL-MCNC: 3.34 UU/ML — SIGNIFICANT CHANGE UP (ref 0.34–4.82)

## 2017-11-05 RX ORDER — ROSUVASTATIN CALCIUM 5 MG/1
10 TABLET ORAL AT BEDTIME
Qty: 0 | Refills: 0 | Status: DISCONTINUED | OUTPATIENT
Start: 2017-11-05 | End: 2017-11-08

## 2017-11-05 RX ORDER — ACETAMINOPHEN 500 MG
325 TABLET ORAL EVERY 4 HOURS
Qty: 0 | Refills: 0 | Status: DISCONTINUED | OUTPATIENT
Start: 2017-11-05 | End: 2017-11-08

## 2017-11-05 RX ORDER — INFLUENZA VIRUS VACCINE 15; 15; 15; 15 UG/.5ML; UG/.5ML; UG/.5ML; UG/.5ML
0.5 SUSPENSION INTRAMUSCULAR ONCE
Qty: 0 | Refills: 0 | Status: COMPLETED | OUTPATIENT
Start: 2017-11-05 | End: 2017-11-05

## 2017-11-05 RX ADMIN — Medication 0.1 MILLIGRAM(S): at 06:42

## 2017-11-05 RX ADMIN — PANTOPRAZOLE SODIUM 40 MILLIGRAM(S): 20 TABLET, DELAYED RELEASE ORAL at 06:42

## 2017-11-05 RX ADMIN — Medication 25 MILLIGRAM(S): at 17:27

## 2017-11-05 RX ADMIN — ROSUVASTATIN CALCIUM 10 MILLIGRAM(S): 5 TABLET ORAL at 21:30

## 2017-11-05 RX ADMIN — Medication 30 MILLIGRAM(S): at 06:44

## 2017-11-05 RX ADMIN — Medication 25 MILLIGRAM(S): at 06:42

## 2017-11-05 RX ADMIN — Medication 0.1 MILLIGRAM(S): at 17:27

## 2017-11-05 RX ADMIN — Medication 325 MILLIGRAM(S): at 12:45

## 2017-11-05 NOTE — PROGRESS NOTE ADULT - PROBLEM SELECTOR PLAN 2
No EKG changes. Denies any chest pain.   ?Demand ischemia due to elevated BP  Pt for TTE and possible NST depending on TTE results.   c/w ASA & BB & statin  Cardiology Dr. Navas following.

## 2017-11-05 NOTE — DISCHARGE NOTE ADULT - PATIENT PORTAL LINK FT
“You can access the [employer] Patient Portal, offered by [facility], by registering with the following website: [email or web address]”

## 2017-11-05 NOTE — DISCHARGE NOTE ADULT - HOSPITAL COURSE
81 year old female from home ambulate independently and AAO x3 with PMH of RA, osteoporosis and renal artery stenosis, HTN on hydralazine, then switched to metoprolol and clonidine which she is not complaint again presented with high blood pressure. Patient  stated that she had mild headache which is resolved when seen by admitting resident. Patient  believed that hydralazine makes her pee more often and metoprolol and clonidine cause vertigo. Counselling was done explaining her that she was having UTI which might be causing her frequency, not the medication side effect. Patient has her home BP daily with frequent uncontrolled BP episode from medication non compliance. Denied current headache, vision problem, tingling, numbness chest pain, sob and burning sensation during urination.    On ED vitals were: T:97.7 F, HR: 64 bpm, BP: 238/120 mmHg, RR: 16 rpm, SpO2: 100% on room air.  EKG: NSP VR 68 BPM, no acute ST-T wave changes. Troponin 1: negative  Patient was admitted for NSTEMI and hypertensive urgency, further workup was done. CT of the head was unremarkable. Patient was started on ASA, Lopressor, Procardia and Crestor. Cardiac enzymes: T2: 3.010   T3: 2.710  TTE revealed: G2DD pEF, NST revealed  medium sized, moderate defect in the mid to  distal anterior wall that is predominantly reversible suggestive of ischemia  with a small area of scar.    Patient will be transferred today to [place] for cardiac cath, arranged by Dr Ellis.  Patient stable and ready to be discharged as per attending. 81 year old female from home ambulate independently and AAO x3 with PMH of RA, osteoporosis and renal artery stenosis, HTN on hydralazine, then switched to metoprolol and clonidine which she is not complaint again presented with high blood pressure. Patient  stated that she had mild headache which is resolved when seen by admitting resident. Patient  believed that hydralazine makes her pee more often and metoprolol and clonidine cause vertigo. Counselling was done explaining her that she was having UTI which might be causing her frequency, not the medication side effect. Patient has her home BP daily with frequent uncontrolled BP episode from medication non compliance. Denied current headache, vision problem, tingling, numbness chest pain, sob and burning sensation during urination.    On ED vitals were: T:97.7 F, HR: 64 bpm, BP: 238/120 mmHg, RR: 16 rpm, SpO2: 100% on room air.  EKG: NSP VR 68 BPM, no acute ST-T wave changes. Troponin 1: negative  Patient was admitted for NSTEMI and hypertensive urgency, further workup was done. CT of the head was unremarkable. Tapered Clonidine off. Patient was started on ASA, Lopressor, Procardia and Crestor. Cardiac enzymes: T2: 3.010   T3: 2.710  TTE revealed: G2DD pEF, NST revealed  medium sized, moderate defect in the mid to  distal anterior wall that is predominantly reversible suggestive of ischemia  with a small area of scar.    Patient will be transferred today to [place] for cardiac cath, arranged by Dr Ellis.  Patient stable and ready to be discharged as per attending.

## 2017-11-05 NOTE — DISCHARGE NOTE ADULT - CARE PLAN
Principal Discharge DX:	NSTEMI (non-ST elevated myocardial infarction)  Goal:	cardiac cath  Instructions for follow-up, activity and diet:	You were diagnosed with non-ST elevation myocardial infarction, you will be transferred to [facility] for cardiac cath.  Continue with medications as prescribed  Secondary Diagnosis:	CKD (chronic kidney disease)  Goal:	BP <130/90  Instructions for follow-up, activity and diet:	Continue with medications as prescribed  Monitor BP  Secondary Diagnosis:	HTN (hypertension)  Goal:	BP <130/90  Instructions for follow-up, activity and diet:	You were admitted with hypertensive urgency  Continue with medications as prescribed  Secondary Diagnosis:	RA (rheumatoid arthritis)  Goal:	Control of symptoms  Instructions for follow-up, activity and diet:	Continue with home medications

## 2017-11-05 NOTE — DISCHARGE NOTE ADULT - PLAN OF CARE
cardiac cath You were diagnosed with non-ST elevation myocardial infarction, you will be transferred to [facility] for cardiac cath.  Continue with medications as prescribed BP <130/90 Continue with medications as prescribed  Monitor BP You were admitted with hypertensive urgency  Continue with medications as prescribed Control of symptoms Continue with home medications

## 2017-11-05 NOTE — DISCHARGE NOTE ADULT - MEDICATION SUMMARY - MEDICATIONS TO STOP TAKING
I will STOP taking the medications listed below when I get home from the hospital:  None I will STOP taking the medications listed below when I get home from the hospital:    Catapres 0.2 mg oral tablet  -- 1 tab(s) by mouth 2 times a day   -- It is very important that you take or use this exactly as directed.  Do not skip doses or discontinue unless directed by your doctor.  May cause drowsiness.  Alcohol may intensify this effect.  Use care when operating dangerous machinery.  Some non-prescription drugs may aggravate your condition.  Read all labels carefully.  If a warning appears, check with your doctor before taking.

## 2017-11-06 LAB
ANION GAP SERPL CALC-SCNC: 7 MMOL/L — SIGNIFICANT CHANGE UP (ref 5–17)
BUN SERPL-MCNC: 14 MG/DL — SIGNIFICANT CHANGE UP (ref 7–18)
CALCIUM SERPL-MCNC: 8.9 MG/DL — SIGNIFICANT CHANGE UP (ref 8.4–10.5)
CHLORIDE SERPL-SCNC: 106 MMOL/L — SIGNIFICANT CHANGE UP (ref 96–108)
CO2 SERPL-SCNC: 27 MMOL/L — SIGNIFICANT CHANGE UP (ref 22–31)
CREAT SERPL-MCNC: 1.02 MG/DL — SIGNIFICANT CHANGE UP (ref 0.5–1.3)
FOLATE SERPL-MCNC: 8.8 NG/ML — SIGNIFICANT CHANGE UP (ref 4.8–24.2)
GLUCOSE SERPL-MCNC: 87 MG/DL — SIGNIFICANT CHANGE UP (ref 70–99)
MAGNESIUM SERPL-MCNC: 2.4 MG/DL — SIGNIFICANT CHANGE UP (ref 1.6–2.6)
PHOSPHATE SERPL-MCNC: 3 MG/DL — SIGNIFICANT CHANGE UP (ref 2.5–4.5)
POTASSIUM SERPL-MCNC: 4 MMOL/L — SIGNIFICANT CHANGE UP (ref 3.5–5.3)
POTASSIUM SERPL-SCNC: 4 MMOL/L — SIGNIFICANT CHANGE UP (ref 3.5–5.3)
SODIUM SERPL-SCNC: 140 MMOL/L — SIGNIFICANT CHANGE UP (ref 135–145)
VIT B12 SERPL-MCNC: 446 PG/ML — SIGNIFICANT CHANGE UP (ref 243–894)

## 2017-11-06 RX ADMIN — Medication 0.1 MILLIGRAM(S): at 06:38

## 2017-11-06 RX ADMIN — Medication 25 MILLIGRAM(S): at 18:11

## 2017-11-06 RX ADMIN — Medication 25 MILLIGRAM(S): at 06:38

## 2017-11-06 RX ADMIN — Medication 30 MILLIGRAM(S): at 06:38

## 2017-11-06 RX ADMIN — Medication 325 MILLIGRAM(S): at 11:57

## 2017-11-06 NOTE — PROGRESS NOTE ADULT - PROBLEM SELECTOR PLAN 4
No leukocytosis or fevers. Denies dysuria.   Asymptomatic UTI- no need to tx. No leukocytosis or fevers. Denies dysuria.   Asymptomatic UTI- no need to tx.  UC- <10K normal katie

## 2017-11-06 NOTE — PROGRESS NOTE ADULT - PROBLEM SELECTOR PLAN 2
No EKG changes. Denies any chest pain.   Likely 2/2 demand ischemia due to elevated BP  NST: positive; medium sized, moderate defect in the mid to  distal anterior wall that is predominantly reversible  suggestive of ischemia with a small area of scar.  c/w ASA & BB & statin  Cardiology Dr. Ellis. No EKG changes. Denies any chest pain.   Likely 2/2 demand ischemia due to elevated BP  NST: positive; medium sized, moderate defect in the mid to  distal anterior wall that is predominantly reversible  suggestive of ischemia with a small area of scar.  TTE: G2DD pEF  c/w ASA & BB & statin  Cardiology Dr. Ellis.

## 2017-11-07 DIAGNOSIS — I12.9 HYPERTENSIVE CHRONIC KIDNEY DISEASE WITH STAGE 1 THROUGH STAGE 4 CHRONIC KIDNEY DISEASE, OR UNSPECIFIED CHRONIC KIDNEY DISEASE: ICD-10-CM

## 2017-11-07 DIAGNOSIS — N18.9 CHRONIC KIDNEY DISEASE, UNSPECIFIED: ICD-10-CM

## 2017-11-07 DIAGNOSIS — Z88.2 ALLERGY STATUS TO SULFONAMIDES: ICD-10-CM

## 2017-11-07 RX ADMIN — Medication 325 MILLIGRAM(S): at 12:46

## 2017-11-07 RX ADMIN — Medication 30 MILLIGRAM(S): at 05:24

## 2017-11-07 RX ADMIN — Medication 25 MILLIGRAM(S): at 05:24

## 2017-11-07 RX ADMIN — Medication 0.1 MILLIGRAM(S): at 05:24

## 2017-11-07 RX ADMIN — ROSUVASTATIN CALCIUM 10 MILLIGRAM(S): 5 TABLET ORAL at 22:14

## 2017-11-07 RX ADMIN — Medication 25 MILLIGRAM(S): at 17:00

## 2017-11-07 RX ADMIN — ROSUVASTATIN CALCIUM 10 MILLIGRAM(S): 5 TABLET ORAL at 00:16

## 2017-11-07 NOTE — ACUTE INTERFACILITY TRANSFER NOTE - PLAN OF CARE
cardiac catheterization patient will be transferred for cardiac catheterization Continue with blood pressure medication. Maintain a healthy diet that consist of low sugar, low fat, low sodium diet. Exercise frequently if possible.  Follow up with primary care physician in one week after discharge. Keep patient euvolemic and renal diet   Avoid Nephrotoxic Meds/ Agents such as (NSAIDs, IV contrast, Aminoglycosides such as gentamicin, Gadolinium contrast, Phosphate containing enemas, etc...)  Adjust Medications according to eGFR. continue with home medications

## 2017-11-07 NOTE — PROGRESS NOTE ADULT - PROBLEM SELECTOR PLAN 2
+ stress test.  Plan  for transfer to [facility] or [facility] for cardiac cath.    Likely 2/2 demand ischemia due to elevated BP  NST: positive; medium sized, moderate defect in the mid to  distal anterior wall that is predominantly reversible  suggestive of ischemia with a small area of scar.  TTE: G2DD pEF  c/w ASA & BB & statin  Cardiology Dr. Ellis.

## 2017-11-07 NOTE — PROGRESS NOTE ADULT - PROBLEM SELECTOR PLAN 4
Pt with h/o rt LUIS FERNANDO- refusing imaging during hospitalization.  Renal Doppler US results faxed from Dr Fay office, attached in chart (Rt renal artery stenosis)  Avoid ACEi/ ARB use.

## 2017-11-07 NOTE — PROGRESS NOTE ADULT - PROBLEM SELECTOR PLAN 2
+ stress test.  Plan  for transfer to [facility] or [facility] for cardiac cath.    Likely 2/2 demand ischemia due to elevated BP  NST: positive; medium sized, moderate defect in the mid to  distal anterior wall that is predominantly reversible  suggestive of ischemia with a small area of scar.  TTE: G2DD pEF  c/w ASA & BB & statin  Cardiology Dr. Ellis.

## 2017-11-07 NOTE — ACUTE INTERFACILITY TRANSFER NOTE - HOSPITAL COURSE
81 year old female from home ambulate independently and AAO x3 with PMH of RA, osteoporosis and renal artery stenosis, HTN on hydralazine, then switched to metoprolol and clonidine which she is not complaint again presented with high blood pressure. Patient  stated that she had mild headache which is resolved when seen by admitting resident. Patient  believed that hydralazine makes her pee more often and metoprolol and clonidine cause vertigo. Counselling was done explaining her that she was having UTI which might be causing her frequency, not the medication side effect. Patient has her home BP diary with frequent uncontrolled BP episode from medication non compliance. Denied current headache, vision problem, tingling, numbness chest pain, sob and burning sensation during urination. Patient  admit urinary frequency though. 81 year old female from home ambulate independently and AAO x3 with PMH of RA, osteoporosis and renal artery stenosis, HTN on hydralazine, then switched to metoprolol and clonidine which she is not complaint again presented with high blood pressure. Patient  stated that she had mild headache which is resolved when seen by admitting resident. Patient  believed that hydralazine makes her pee more often and metoprolol and clonidine cause vertigo. Counselling was done explaining her that she was having UTI which might be causing her frequency, not the medication side effect. Patient has her home BP diary with frequent uncontrolled BP episode from medication non compliance. Denied current headache, vision problem, tingling, numbness chest pain, sob and burning sensation during urination. 81 year old female from home ambulate independently and AAO x3 with PMH of RA, osteoporosis and renal artery stenosis, HTN on hydralazine, then switched to metoprolol and clonidine which she is not complaint again presented with high blood pressure. Patient  stated that she had mild headache which is resolved when seen by admitting resident. Patient  believed that hydralazine makes her pee more often and metoprolol and clonidine cause vertigo. Counselling was done explaining her that she was having UTI which might be causing her frequency, not the medication side effect. Patient has her home BP diary with frequent uncontrolled BP episode from medication non compliance. Denied current headache, vision problem, tingling, numbness chest pain, sob and burning sensation during urination.     Admitted for  Hypertensive urgency.  Plan: Due to medication non-compliance and rebound from missing Clonidine.   CT Head neg for acute CVA.    Managed with Lopressor 25mg PO bid and Nifedipine ER 30mg PO daily and titrated as needed. Pt refusing diuretic therapy due to polyuria.       Patient found to have Elevated troponin level. However No EKG changes. Denied any chest pain.   Likely 2/2 Demand ischemia due to elevated BP   TTE - grade 2 diastolic dysfunction, mild TR  +NST. Patient will be transferred to [facility] for cardiac cath  Treated with ASA & BB & statin    Given patient's improved clinical status and current hemodynamic stability, decision was made to discharge.  Please refer to patient's complete medical chart with documents for a full hospital course, for this is only a brief summary.

## 2017-11-08 ENCOUNTER — INPATIENT (INPATIENT)
Facility: HOSPITAL | Age: 82
LOS: 0 days | Discharge: HOME | DRG: 287 | End: 2017-11-09
Attending: INTERNAL MEDICINE | Admitting: INTERNAL MEDICINE
Payer: MEDICARE

## 2017-11-08 ENCOUNTER — TRANSCRIPTION ENCOUNTER (OUTPATIENT)
Age: 82
End: 2017-11-08

## 2017-11-08 VITALS
DIASTOLIC BLOOD PRESSURE: 88 MMHG | HEIGHT: 68 IN | OXYGEN SATURATION: 98 % | WEIGHT: 123.02 LBS | SYSTOLIC BLOOD PRESSURE: 170 MMHG | TEMPERATURE: 98 F | RESPIRATION RATE: 18 BRPM | HEART RATE: 66 BPM

## 2017-11-08 VITALS
HEART RATE: 63 BPM | OXYGEN SATURATION: 100 % | DIASTOLIC BLOOD PRESSURE: 80 MMHG | TEMPERATURE: 98 F | SYSTOLIC BLOOD PRESSURE: 146 MMHG | RESPIRATION RATE: 15 BRPM

## 2017-11-08 DIAGNOSIS — I16.0 HYPERTENSIVE URGENCY: ICD-10-CM

## 2017-11-08 LAB
ANION GAP SERPL CALC-SCNC: 6 MMOL/L — SIGNIFICANT CHANGE UP (ref 5–17)
APTT BLD: 31.7 SEC — SIGNIFICANT CHANGE UP (ref 27.5–37.4)
BUN SERPL-MCNC: 11 MG/DL — SIGNIFICANT CHANGE UP (ref 7–18)
CALCIUM SERPL-MCNC: 8.7 MG/DL — SIGNIFICANT CHANGE UP (ref 8.4–10.5)
CHLORIDE SERPL-SCNC: 107 MMOL/L — SIGNIFICANT CHANGE UP (ref 96–108)
CO2 SERPL-SCNC: 27 MMOL/L — SIGNIFICANT CHANGE UP (ref 22–31)
CREAT SERPL-MCNC: 0.96 MG/DL — SIGNIFICANT CHANGE UP (ref 0.5–1.3)
GLUCOSE SERPL-MCNC: 93 MG/DL — SIGNIFICANT CHANGE UP (ref 70–99)
HCT VFR BLD CALC: 43.4 % — SIGNIFICANT CHANGE UP (ref 34.5–45)
HGB BLD-MCNC: 13.4 G/DL — SIGNIFICANT CHANGE UP (ref 11.5–15.5)
INR BLD: 1.03 RATIO — SIGNIFICANT CHANGE UP (ref 0.88–1.16)
MAGNESIUM SERPL-MCNC: 2.3 MG/DL — SIGNIFICANT CHANGE UP (ref 1.6–2.6)
MCHC RBC-ENTMCNC: 24.3 PG — LOW (ref 27–34)
MCHC RBC-ENTMCNC: 30.8 GM/DL — LOW (ref 32–36)
MCV RBC AUTO: 78.8 FL — LOW (ref 80–100)
PHOSPHATE SERPL-MCNC: 2.4 MG/DL — LOW (ref 2.5–4.5)
PLATELET # BLD AUTO: 172 K/UL — SIGNIFICANT CHANGE UP (ref 150–400)
POTASSIUM SERPL-MCNC: 4.1 MMOL/L — SIGNIFICANT CHANGE UP (ref 3.5–5.3)
POTASSIUM SERPL-SCNC: 4.1 MMOL/L — SIGNIFICANT CHANGE UP (ref 3.5–5.3)
PROTHROM AB SERPL-ACNC: 11.2 SEC — SIGNIFICANT CHANGE UP (ref 9.8–12.7)
RBC # BLD: 5.51 M/UL — HIGH (ref 3.8–5.2)
RBC # FLD: 14.2 % — SIGNIFICANT CHANGE UP (ref 10.3–14.5)
SODIUM SERPL-SCNC: 140 MMOL/L — SIGNIFICANT CHANGE UP (ref 135–145)
WBC # BLD: 4.2 K/UL — SIGNIFICANT CHANGE UP (ref 3.8–10.5)
WBC # FLD AUTO: 4.2 K/UL — SIGNIFICANT CHANGE UP (ref 3.8–10.5)

## 2017-11-08 PROCEDURE — 93458 L HRT ARTERY/VENTRICLE ANGIO: CPT | Mod: 26

## 2017-11-08 PROCEDURE — 82550 ASSAY OF CK (CPK): CPT

## 2017-11-08 PROCEDURE — 99152 MOD SED SAME PHYS/QHP 5/>YRS: CPT

## 2017-11-08 PROCEDURE — 85610 PROTHROMBIN TIME: CPT

## 2017-11-08 PROCEDURE — 85730 THROMBOPLASTIN TIME PARTIAL: CPT

## 2017-11-08 PROCEDURE — 84484 ASSAY OF TROPONIN QUANT: CPT

## 2017-11-08 PROCEDURE — 70450 CT HEAD/BRAIN W/O DYE: CPT

## 2017-11-08 PROCEDURE — 84443 ASSAY THYROID STIM HORMONE: CPT

## 2017-11-08 PROCEDURE — 93017 CV STRESS TEST TRACING ONLY: CPT

## 2017-11-08 PROCEDURE — 83036 HEMOGLOBIN GLYCOSYLATED A1C: CPT

## 2017-11-08 PROCEDURE — 82746 ASSAY OF FOLIC ACID SERUM: CPT

## 2017-11-08 PROCEDURE — 84100 ASSAY OF PHOSPHORUS: CPT

## 2017-11-08 PROCEDURE — 82553 CREATINE MB FRACTION: CPT

## 2017-11-08 PROCEDURE — 93306 TTE W/DOPPLER COMPLETE: CPT

## 2017-11-08 PROCEDURE — 83735 ASSAY OF MAGNESIUM: CPT

## 2017-11-08 PROCEDURE — A9502: CPT

## 2017-11-08 PROCEDURE — 85027 COMPLETE CBC AUTOMATED: CPT

## 2017-11-08 PROCEDURE — 99285 EMERGENCY DEPT VISIT HI MDM: CPT | Mod: 25

## 2017-11-08 PROCEDURE — 80048 BASIC METABOLIC PNL TOTAL CA: CPT

## 2017-11-08 PROCEDURE — 81001 URINALYSIS AUTO W/SCOPE: CPT

## 2017-11-08 PROCEDURE — 93010 ELECTROCARDIOGRAM REPORT: CPT

## 2017-11-08 PROCEDURE — 80053 COMPREHEN METABOLIC PANEL: CPT

## 2017-11-08 PROCEDURE — 82607 VITAMIN B-12: CPT

## 2017-11-08 PROCEDURE — 93005 ELECTROCARDIOGRAM TRACING: CPT

## 2017-11-08 PROCEDURE — 87086 URINE CULTURE/COLONY COUNT: CPT

## 2017-11-08 PROCEDURE — 80061 LIPID PANEL: CPT

## 2017-11-08 PROCEDURE — 78452 HT MUSCLE IMAGE SPECT MULT: CPT

## 2017-11-08 RX ORDER — NIFEDIPINE 30 MG
30 TABLET, EXTENDED RELEASE 24 HR ORAL ONCE
Qty: 0 | Refills: 0 | Status: COMPLETED | OUTPATIENT
Start: 2017-11-08 | End: 2017-11-08

## 2017-11-08 RX ORDER — ACETAMINOPHEN 500 MG
1 TABLET ORAL
Qty: 0 | Refills: 0 | COMMUNITY
Start: 2017-11-08

## 2017-11-08 RX ORDER — ATORVASTATIN CALCIUM 80 MG/1
40 TABLET, FILM COATED ORAL AT BEDTIME
Qty: 0 | Refills: 0 | Status: DISCONTINUED | OUTPATIENT
Start: 2017-11-08 | End: 2017-11-09

## 2017-11-08 RX ORDER — METOPROLOL TARTRATE 50 MG
1 TABLET ORAL
Qty: 0 | Refills: 0 | COMMUNITY

## 2017-11-08 RX ORDER — NIFEDIPINE 30 MG
30 TABLET, EXTENDED RELEASE 24 HR ORAL
Qty: 0 | Refills: 0 | COMMUNITY

## 2017-11-08 RX ORDER — METOPROLOL TARTRATE 50 MG
25 TABLET ORAL
Qty: 0 | Refills: 0 | Status: DISCONTINUED | OUTPATIENT
Start: 2017-11-08 | End: 2017-11-09

## 2017-11-08 RX ORDER — NIFEDIPINE 30 MG
1 TABLET, EXTENDED RELEASE 24 HR ORAL
Qty: 0 | Refills: 0 | COMMUNITY
Start: 2017-11-08

## 2017-11-08 RX ORDER — NIFEDIPINE 30 MG
60 TABLET, EXTENDED RELEASE 24 HR ORAL DAILY
Qty: 0 | Refills: 0 | Status: DISCONTINUED | OUTPATIENT
Start: 2017-11-08 | End: 2017-11-09

## 2017-11-08 RX ORDER — ASPIRIN/CALCIUM CARB/MAGNESIUM 324 MG
325 TABLET ORAL DAILY
Qty: 0 | Refills: 0 | Status: DISCONTINUED | OUTPATIENT
Start: 2017-11-08 | End: 2017-11-09

## 2017-11-08 RX ORDER — ASPIRIN/CALCIUM CARB/MAGNESIUM 324 MG
1 TABLET ORAL
Qty: 0 | Refills: 0 | COMMUNITY
Start: 2017-11-08

## 2017-11-08 RX ADMIN — Medication 25 MILLIGRAM(S): at 18:33

## 2017-11-08 RX ADMIN — Medication 325 MILLIGRAM(S): at 18:33

## 2017-11-08 RX ADMIN — Medication 0.1 MILLIGRAM(S): at 18:35

## 2017-11-08 RX ADMIN — Medication 25 MILLIGRAM(S): at 05:54

## 2017-11-08 RX ADMIN — Medication 30 MILLIGRAM(S): at 22:00

## 2017-11-08 RX ADMIN — Medication 30 MILLIGRAM(S): at 05:54

## 2017-11-08 NOTE — H&P CARDIOLOGY - HISTORY OF PRESENT ILLNESS
81 year old female PMH of RA, osteoporosis and renal artery stenosis, HTN presents [facility] on 11/7 c/o mild headache and hypertensive urgency. Patient has her home BP diary with frequent uncontrolled BP episode from medication and non compliance. + Cardiac biomarkers, NST on 11/6 reveals a medium sized, moderate defect in the mid to distal anterior wall that is predominantly reversible, suggestive of ischemia with a small area of scar. Patient transferred to [facility] today for cardiac cath, NSTEMI/ r/o obstructive CAD.     off note: patient was on hydralazine, then switched to metoprolol and clonidine which she is not complaint. Patient  believed that hydralazine makes her pee more often and metoprolol and clonidine cause vertigo. Counselling was done explaining her that she was having UTI which might be causing her frequency, not the medication side effect. 81 year old female PMH of RA, osteoporosis and renal artery stenosis, HTN presents to [facility] ER on 11/7 c/o mild headache, hypertensive urgency and asymptomatic UTI. Patient has her home BP diary with frequent uncontrolled BP episode from medication and non compliance. + Cardiac biomarkers, NST on 11/6 reveals a medium sized, moderate defect in the mid to distal anterior wall that is predominantly reversible, suggestive of ischemia with a small area of scar. Patient transferred to [facility] today for cardiac cath, NSTEMI/ r/o obstructive CAD.     off note: patient was on hydralazine, then switched to metoprolol and clonidine which she is not complaint. Patient  believed that hydralazine makes her pee more often and metoprolol and clonidine cause vertigo. Counselling was done explaining her that she was having UTI which might be causing her frequency, not the medication side effect.   crds consult (Rhonda): Clonidine tapered and now off. c/w Lopressor 25mg PO bid and Nifedipine ER 30mg PO daily and titrate as needed. Pt refusing diuretic therapy due to polyuria. 81 year old female PMH of RA, osteoporosis and renal artery stenosis (managed by Dr. Stearns), HTN presents to [facility] ER on 11/7 c/o mild headache found to be in hypertensive urgency with asymptomatic UTI. Patient has her home BP diary with frequent uncontrolled BP episode from medication and non compliance. Negative head CT.  + Cardiac biomarkers, NST on 11/6 reveals a medium sized, moderate defect in the mid to distal anterior wall that is predominantly reversible, suggestive of ischemia with a small area of scar. Patient transferred to [facility] today for cardiac cath, NSTEMI/ r/o obstructive CAD. Upon arrival patient denies chest pain, palpations, SOB, PND, orthopnea    off note: patient was on hydralazine, then switched to metoprolol and clonidine which she is not complaint. Patient  believed that hydralazine makes her pee more often and metoprolol and clonidine cause vertigo. Counselling was done explaining her that she was having UTI which might be causing her frequency, not the medication side effect.   crds consult (Rhonda): Clonidine tapered and now off. c/w Lopressor 25mg PO bid and Nifedipine ER 30mg PO daily and titrate as needed. Pt refusing diuretic therapy due to polyuria. 81 year old female PMH of RA, osteoporosis and renal artery stenosis (managed by Dr. Stearns), HTN presents to [facility] ER on 11/7 c/o mild headache found to be in hypertensive urgency. Patient has her home BP diary with frequent uncontrolled BP episode from medication and non compliance. Negative head CT.  + Cardiac biomarkers, NST on 11/6 reveals a medium sized, moderate defect in the mid to distal anterior wall that is predominantly reversible, suggestive of ischemia with a small area of scar. Patient transferred to [facility] today for cardiac cath, NSTEMI/ r/o obstructive CAD. Upon arrival patient denies chest pain, palpations, SOB, PND, orthopnea    off note: patient was on hydralazine, then switched to metoprolol and clonidine which she is not complaint. Patient  believed that hydralazine makes her pee more often and metoprolol and clonidine cause vertigo.   cards consult (Rhonda): Clonidine tapered and now off. c/w Lopressor 25mg PO bid and Nifedipine ER 30mg PO daily and titrate as needed. Pt refusing diuretic therapy due to polyuria. 81 year old female PMH RA, osteoporosis, former smoker and renal artery stenosis (managed by Dr. Stearns), HTN presents to [facility] ER on 11/7 c/o mild headache found to be in hypertensive urgency. Patient has BP diary with frequent uncontrolled BP episode from medication and non-compliance. Negative head CT.  + Cardiac biomarkers, NST on 11/6 reveals a medium sized, moderate defect in the mid to distal anterior wall that is predominantly reversible, suggestive of ischemia with a small area of scar. Patient transferred to [facility] today for cardiac cath, NSTEMI/ r/o obstructive CAD. Upon arrival patient denies chest pain, palpations, SOB, PND, orthopnea    off note: patient was on hydralazine, then switched to metoprolol and clonidine which she is not complaint. Patient  believed that hydralazine makes her pee more often and metoprolol and clonidine cause vertigo.   cards consult (Rhonda): Clonidine tapered and now off. c/w Lopressor 25mg PO bid and Nifedipine ER 30mg PO daily and titrate as needed. Pt refusing diuretic therapy due to polyuria.

## 2017-11-08 NOTE — DISCHARGE NOTE ADULT - HOSPITAL COURSE
81 year old female PMH RA, osteoporosis, former smoker and renal artery stenosis (managed by Dr. Stearns), HTN presents to [facility] ER on 11/7 c/o mild headache found to be in hypertensive urgency. Patient has BP diary with frequent uncontrolled BP episode from medication and non-compliance. Negative head CT.  + Cardiac biomarkers, NST on 11/6 reveals a medium sized, moderate defect in the mid to distal anterior wall that is predominantly reversible, suggestive of ischemia with a small area of scar. Patient transferred to [facility] today for cardiac cath, NSTEMI/ r/o obstructive CAD. Upon arrival patient denies chest pain, palpations, SOB, PND, orthopnea    off note: patient was on hydralazine, then switched to metoprolol and clonidine which she is not complaint. Patient  believed that hydralazine makes her pee more often and metoprolol and clonidine cause vertigo.   cards consult (Rhonda): Clonidine tapered and now off. c/w Lopressor 25mg PO bid and Nifedipine ER 30mg PO daily and titrate as needed. Pt refusing diuretic therapy due to polyuria. (08 Nov 2017 13:06).    11/8 diagnostic cardiac cath, no intervention. Right radial access site without swelling, bleeding. 81 year old female PMH RA, osteoporosis, former smoker and renal artery stenosis (managed by Dr. Stearns), HTN presents to [facility] ER on 11/7 c/o mild headache found to be in hypertensive urgency. Patient has BP diary with frequent uncontrolled BP episode from medication and non-compliance. Negative head CT.  + Cardiac biomarkers, NST on 11/6 reveals a medium sized, moderate defect in the mid to distal anterior wall that is predominantly reversible, suggestive of ischemia with a small area of scar. Patient transferred to [facility] today for cardiac cath, NSTEMI/ r/o obstructive CAD. Upon arrival patient denies chest pain, palpations, SOB, PND, orthopnea    off note: patient was on hydralazine, then switched to metoprolol and clonidine which she is not complaint. Patient  believed that hydralazine makes her pee more often and metoprolol and clonidine cause vertigo.   cards consult (Rhonda): Clonidine tapered and now off. c/w Lopressor 25mg PO bid and Nifedipine ER 30mg PO daily and titrate as needed. Pt refusing diuretic therapy due to polyuria. (08 Nov 2017 13:06).    11/8 diagnostic cardiac cath, no intervention. Right radial access site without swelling, bleeding.  11/9 RANDY Villasenor evaluated patient in am, and conferred with Dr. Ellis. Patient can go home on lopressor 25 BID, and nifedipine 60 mg daily. Clonidine will be discontinued

## 2017-11-08 NOTE — PROGRESS NOTE ADULT - PROBLEM SELECTOR PROBLEM 3
Other hyperlipidemia

## 2017-11-08 NOTE — DISCHARGE NOTE ADULT - ADDITIONAL INSTRUCTIONS
Follow up with your cardiologist and primary care provider in 1-2 weeks. Follow up with your cardiologist Dr. Bustamante on  Nov 16th, 2017. Dr. Bustamante will call you with an exact time. Please follow up with your primary care provider in 1-2 weeks. Follow up with your cardiologist Dr. Bustamante on  Nov 16th, 2017. Dr. Butsamante's office will call you with an exact time. Please follow up with your primary care provider in 1-2 weeks.

## 2017-11-08 NOTE — DISCHARGE NOTE ADULT - CARE PLAN
Principal Discharge DX:	S/P cardiac catheterization  Goal:	Pt remains chest pain free and understands post cath discharge instructions  Instructions for follow-up, activity and diet:	No heavy lifting or pushing/pulling with procedure arm for 2 weeks. No driving for 2 days. You may shower 24 hours following the procedure but avoid baths/swimming for 1 week. Check your wrist site for bleeding and/or swelling daily following procedure and call your doctor immediately if it occurs or if you experience increased pain at the site. Follow up with your cardiologist in 1-2 weeks. You may call [facility] if you have any questions/concerns regarding your procedure [phone].  Secondary Diagnosis:	Hypertension, unspecified type  Goal:	Your blood pressure will be controlled.  Instructions for follow-up, activity and diet:	Continue with your blood pressure medications; eat a heart healthy diet with low salt diet; exercise regularly (consult with your physician or cardiologist first); maintain a heart healthy weight; if you smoke - quit (A resource to help you stop smoking is the [facility] – phone number 572-510-9249.); include healthy ways to manage stress. Continue to follow with your primary care physician or cardiologist.

## 2017-11-08 NOTE — PROGRESS NOTE ADULT - PROBLEM SELECTOR PLAN 3
Pt refusing Lipitor. Pt can use her own medication  Rosuvastatin 10mg PO qd.
Pt refusing Lipitor. Pt can use her own medication  c.w Rosuvastatin 10mg PO qd.
Pt refusing Lipitor. Pt can use her own medication  Rosuvastatin 10mg PO qd.

## 2017-11-08 NOTE — PROGRESS NOTE ADULT - ATTENDING COMMENTS
Agree with above. Admitted with hypertensive emergency. In this setting had elevated troponin with normal CPK. EKG normal. This is inconsistent with acute MI. No angina and BP now controlled.    Plan  -get echo  -get NST if echo normal  -continue asa, beta blockers  -no need for heparin nor plavix as presentation inconsistent with ACS
Patient seen and examined, agree with above assessment and plan as transcribed above.    - Pt agrees to cath.  Plan for cath at [place] today    Holland Ellis MD, [facility]  [facility], [facility]  [address] [place] [address]  PHONE:  [phone]  BEEPER : [phone]
[facility] NEPHROLOGY  Chris Gee M.D.  William Encarnacion D.O.  Christine Gutierrez M.D.  Claudia Piña, MSN, ANP-C  [phone]    [address] [place] [address]
Patient seen and examined. Agree with plan above.  Note edited as necessary.  BP improving. Will avoid Clonidine use in the elderly. c/w Clonidine taper, will d/c after dose tomorrow.   +NST will f/u Dr. Ellis regarding possible need for cath.  [facility] NEPHROLOGY  Chris Gee M.D.  William Encarnacion D.O.  Christine Gutierrez M.D.  Claudia Piña, MSN, ANP-C  [phone]    [address] [place], [place] [id number]
[facility] NEPHROLOGY  Chris Gee M.D.  William Encarnacion D.O.  Christine Gutierrez M.D.  Claudia Piña, MSN, ANP-C  [phone]    [address] [place] [address]
Patient seen and examined. Agree with plan above.  Note edited as necessary.  BP improving. Will avoid Clonidine use in the elderly. c/w Clonidine taper, will d/c after dose tomorrow.   +NST will f/u Dr. Ellis regarding possible need for cath.  [facility] NEPHROLOGY  Chris Gee M.D.  William Encarnacion D.O.  Christine Gutierrez M.D.  Claudia Piña, MSN, ANP-C  [phone]    [address] [place], [place] [id number]

## 2017-11-08 NOTE — PROGRESS NOTE ADULT - PROBLEM SELECTOR PROBLEM 4
Renal artery stenosis
Renal artery stenosis
Urinary tract infection without hematuria, site unspecified

## 2017-11-08 NOTE — DISCHARGE NOTE ADULT - PATIENT PORTAL LINK FT
“You can access the Follow[facility] Patient Portal, offered by [facility], by registering with the following website: [email or web address]”

## 2017-11-08 NOTE — DISCHARGE NOTE ADULT - CARE PROVIDER_API CALL
Holland Ellis (MD), Cardiovascular Disease  [address] [facility] [address]  [place] [address]  Phone: [phone]  Fax: [phone] Holland Ellis), Cardiovascular Disease  [address] [place] [address]  Phone: [phone]  Fax: [phone]    Cierra Bustamante), Cardiovascular Disease; Internal Medicine  [address] [address]  Phone: [phone]  Fax: [phone]

## 2017-11-08 NOTE — DISCHARGE NOTE ADULT - PLAN OF CARE
Your blood pressure will be controlled. Continue with your blood pressure medications; eat a heart healthy diet with low salt diet; exercise regularly (consult with your physician or cardiologist first); maintain a heart healthy weight; if you smoke - quit (A resource to help you stop smoking is the [facility] – phone number 376-921-1453.); include healthy ways to manage stress. Continue to follow with your primary care physician or cardiologist. Pt remains chest pain free and understands post cath discharge instructions No heavy lifting or pushing/pulling with procedure arm for 2 weeks. No driving for 2 days. You may shower 24 hours following the procedure but avoid baths/swimming for 1 week. Check your wrist site for bleeding and/or swelling daily following procedure and call your doctor immediately if it occurs or if you experience increased pain at the site. Follow up with your cardiologist in 1-2 weeks. You may call [facility] if you have any questions/concerns regarding your procedure [phone].

## 2017-11-08 NOTE — DISCHARGE NOTE ADULT - MEDICATION SUMMARY - MEDICATIONS TO STOP TAKING
I will STOP taking the medications listed below when I get home from the hospital:    Metoprolol Succinate ER 50 mg oral tablet, extended release  -- 1 tab(s) by mouth once a day  home I will STOP taking the medications listed below when I get home from the hospital:    aspirin 325 mg oral tablet  -- 1 tab(s) by mouth once a day  hospital    Metoprolol Succinate ER 50 mg oral tablet, extended release  -- 1 tab(s) by mouth once a day  home I will STOP taking the medications listed below when I get home from the hospital:    aspirin 325 mg oral tablet  -- 1 tab(s) by mouth once a day  hospital    Catapres 0.1 mg oral tablet  -- 1 tab(s) by mouth 2 times a day  [facility]    Metoprolol Succinate ER 50 mg oral tablet, extended release  -- 1 tab(s) by mouth once a day  home I will STOP taking the medications listed below when I get home from the hospital:    aspirin 325 mg oral tablet  -- 1 tab(s) by mouth once a day  hospital    Catapres 0.1 mg oral tablet  -- 1 tab(s) by mouth 2 times a day  hospital    cloNIDine 0.2 mg oral tablet  -- 1 tab(s) by mouth 2 times a day  home    Metoprolol Succinate ER 50 mg oral tablet, extended release  -- 1 tab(s) by mouth once a day  home

## 2017-11-08 NOTE — PROGRESS NOTE ADULT - PROBLEM SELECTOR PROBLEM 1
Hypertensive urgency

## 2017-11-08 NOTE — PROGRESS NOTE ADULT - PROBLEM SELECTOR PROBLEM 2
Elevated troponin level

## 2017-11-08 NOTE — PROGRESS NOTE ADULT - ASSESSMENT
80 yo AA F with RA, osteoporosis, Rt renal artery stenosis, uncontrolled HTN due to medication non-compliance a/w Hypertensive urgency, elevated troponins and asymptomatic UTI. CT Head neg.  Nuclear stress test positive and suggestive of coronary lesion.
80 yo AA F with RA, osteoporosis, Rt renal artery stenosis, uncontrolled HTN due to medication non-compliance a/w Hypertensive urgency, elevated troponins and asymptomatic UTI. CT Head neg.  Nuclear stress test positive and suggestive of coronary lesion. Patient to be transferred for cardiac cath.
82 yo AA F with RA, osteoporosis, Rt renal artery stenosis, uncontrolled HTN due to medication non-compliance a/w Hypertensive urgency, elevated troponins and asymptomatic UTI. CT Head neg.

## 2017-11-08 NOTE — PROGRESS NOTE ADULT - PROBLEM SELECTOR PLAN 1
Due to medication non-compliance and rebound from missing Clonidine.   CT Head neg for acute CVA.   BP acceptable. Will avoid Clonidine in the elderly and landry due to medication non-compliance.  Clonidine tapered and now off.     c/w Lopressor 25mg PO bid and Nifedipine ER 30mg PO daily and titrate as needed. Pt refusing diuretic therapy due to polyuria.   Monitor BP.
Due to medication non-compliance and rebound from missing Clonidine.   CT Head neg for acute CVA.   BP improving.  Will avoid declining BP rapidly .   D/c clonidine. Will titrate up other medicaitons as necessary   c/w Lopressor   c/w Nifedipine ER 30mg PO daily and titrate as needed.   Pt refusing diuretic therapy due to polyuria.   Monitor BP.
Due to medication non-compliance and rebound from missing Clonidine.   CT Head neg for acute CVA.   BP improving.  Will avoid declining BP rapidly .   D/c clonidine. Will titrate up other medications as necessary   c/w Lopressor   c/w Nifedipine ER 30mg PO daily and titrate as needed.   Pt refusing diuretic therapy due to polyuria.   Monitor BP.
Due to medication non-compliance and rebound from missing Clonidine.   CT Head neg for acute CVA.   BP improving.  Will avoid declining BP rapidly .   c/w Clonidine 0.1mg PO bid, will be dc after tomorrow    c/w Lopressor   c/w Nifedipine ER 30mg PO daily and titrate as needed.   Pt refusing diuretic therapy due to polyuria.   Monitor BP.
Due to medication non-compliance and rebound from missing Clonidine.   CT Head neg for acute CVA.   BP improving.  Will avoid declining BP rapidly .   Will avoid Clonidine in the elderly and landry due to medication non-compliance. c/w Clonidine 0.1mg PO bid, will change to once a day 11/6 and then taper off.    c/w Lopressor   c/w Nifedipine ER 30mg PO daily and titrate as needed. Pt refusing diuretic therapy due to polyuria.   Monitor BP.

## 2017-11-08 NOTE — PROGRESS NOTE ADULT - SUBJECTIVE AND OBJECTIVE BOX
INTERNAL MEDICINE- PROGRESS NOTE    80 yo AA F with RA, osteoporosis, Rt renal artery stenosis, uncontrolled HTN due to medication non-compliance a/w Hypertensive urgency, elevated troponins and asymptomatic UTI. CT Head neg. +NST  Plan to transfer to [facility] for cardiac cath    Hospital Medications: Medications reviewed.  REVIEW OF SYSTEMS:   CONSTITUTIONAL: No fevers or chills. No dizziness  RESPIRATORY: No shortness of breath  CARDIOVASCULAR: No chest pain.  GASTROINTESTINAL: No nausea, vomiting, diarrhea or abdominal pain.   VASCULAR: No bilateral lower extremity edema.     VITALS:  T(F): 98.2 (17 @ 08:20), Max: 98.6 (17 @ 15:11)  HR: 57 (17 @ 08:20)  BP: 155/77 (17 @ 08:20)  RR: 17 (17 @ 08:20)  SpO2: 100% (17 @ 08:20)  Wt(kg): --     @ 07:01  -   @ 07:00  --------------------------------------------------------  IN: 426 mL / OUT: 0 mL / NET: 426 mL     @ 07:01  -   @ 11:08  --------------------------------------------------------  IN: 118 mL / OUT: 0 mL / NET: 118 mL      PHYSICAL EXAM:  Constitutional: NAD  Neurological: Awake and Alert  HEENT: anicteric sclera,   Respiratory: CTAB, no wheezes, rales or rhonchi  Cardiovascular: S1, S2, RRR  Gastrointestinal: BS+, soft, NT/ND  : No amaral.  Extremities: No peripheral edema      LABS:      140  |  107  |  11  ----------------------------<  93  4.1   |  27  |  0.96    Ca    8.7      2017 10:16  Phos  2.4     11-  Mg     2.3     11-08      Creatinine Trend: 0.96 <--, 1.02 <--, 0.95 <--, 1.03 <--                        13.4   4.2   )-----------( 172      ( 2017 10:16 )             43.4     Urine Studies:  Urinalysis Basic - ( 2017 08:37 )    Color: Yellow / Appearance: Clear / S.010 / pH:   Gluc:  / Ketone: Negative  / Bili: Negative / Urobili: Negative   Blood:  / Protein: Negative / Nitrite: Negative   Leuk Esterase: Moderate / RBC: 0-2 /HPF / WBC 11-25 /HPF   Sq Epi:  / Non Sq Epi: Occasional /HPF / Bacteria: Few /HPF
PGY 1 Note discussed with supervising resident and primary attending    Patient is a 81y old  Female who presents with a chief complaint of high blood pressure (05 Nov 2017 15:12)      INTERVAL HPI/OVERNIGHT EVENTS: Patient seen and examined at bedside    MEDICATIONS  (STANDING):  aspirin 325 milliGRAM(s) Oral daily  metoprolol     tartrate 25 milliGRAM(s) Oral two times a day  NIFEdipine XL 30 milliGRAM(s) Oral daily  rosuvastatin 10 milliGRAM(s) Oral at bedtime    MEDICATIONS  (PRN):  acetaminophen   Tablet. 325 milliGRAM(s) Oral every 4 hours PRN Moderate Pain (4 - 6)      __________________________________________________  REVIEW OF SYSTEMS:    CONSTITUTIONAL: No fever,   EYES: no acute visual disturbances  NECK: No pain or stiffness  RESPIRATORY: No cough; No shortness of breath  CARDIOVASCULAR: No chest pain, no palpitations  GASTROINTESTINAL: No pain. No nausea or vomiting; No diarrhea   NEUROLOGICAL: No headache or numbness, no tremors  MUSCULOSKELETAL: No joint pain, no muscle pain  GENITOURINARY: no dysuria, no frequency, no hesitancy  PSYCHIATRY: no depression , no anxiety  ALL OTHER  ROS negative        Vital Signs Last 24 Hrs  T(C): 36.4 (06 Nov 2017 11:51), Max: 36.7 (06 Nov 2017 00:19)  T(F): 97.6 (06 Nov 2017 11:51), Max: 98 (06 Nov 2017 00:19)  HR: 68 (06 Nov 2017 11:51) (54 - 72)  BP: 126/67 (06 Nov 2017 11:51) (97/73 - 147/79)  BP(mean): --  RR: 16 (06 Nov 2017 11:51) (16 - 17)  SpO2: 99% (06 Nov 2017 11:51) (98% - 100%)    ________________________________________________  PHYSICAL EXAM:  GENERAL: NAD  HEENT: Normocephalic;  conjunctivae and sclerae clear; moist mucous membranes;   NECK : supple  CHEST/LUNG: Clear to auscultation bilaterally with good air entry   HEART: S1 S2  regular; no murmurs, gallops or rubs  ABDOMEN: Soft, Nontender, Nondistended; Bowel sounds present  EXTREMITIES: no cyanosis; no edema; no calf tenderness  SKIN: warm and dry; no rash  NERVOUS SYSTEM:  Awake and alert; Oriented  to place, person and time ; no new deficits    _________________________________________________  LABS:    11-06    140  |  106  |  14  ----------------------------<  87  4.0   |  27  |  1.02    Ca    8.9      06 Nov 2017 07:17  Phos  3.0     11-06  Mg     2.4     11-06          CAPILLARY BLOOD GLUCOSE            RADIOLOGY & ADDITIONAL TESTS:    Imaging Personally Reviewed:  YES/NO    Consultant(s) Notes Reviewed:   YES/ No    Care Discussed with Consultants :     Plan of care was discussed with patient and /or primary care giver; all questions and concerns were addressed and care was aligned with patient's wishes.
PGY 1 Note discussed with supervising resident and primary attending    Patient is a 81y old  Female who presents with a chief complaint of high blood pressure (05 Nov 2017 15:12)      INTERVAL HPI/OVERNIGHT EVENTS: Patient seen and examined at bedside, no overnight events. No complaints.  Called Dr Fay (nephrologist) office for Renal Doppler US results to be faxed. Patient is refusing MRA to be performed in the hospital.  Will discontinue Clonidine from tomorrow on. Stress test was positive. Will discuss with Dr Ellis for possible catheterization.      MEDICATIONS  (STANDING):  aspirin 325 milliGRAM(s) Oral daily  metoprolol     tartrate 25 milliGRAM(s) Oral two times a day  NIFEdipine XL 30 milliGRAM(s) Oral daily  rosuvastatin 10 milliGRAM(s) Oral at bedtime    MEDICATIONS  (PRN):  acetaminophen   Tablet. 325 milliGRAM(s) Oral every 4 hours PRN Moderate Pain (4 - 6)      __________________________________________________  REVIEW OF SYSTEMS:    CONSTITUTIONAL: No fever,   EYES: no acute visual disturbances  NECK: No pain or stiffness  RESPIRATORY: No cough; No shortness of breath  CARDIOVASCULAR: No chest pain, no palpitations  GASTROINTESTINAL: No pain. No nausea or vomiting; No diarrhea   NEUROLOGICAL: No headache or numbness, no tremors  MUSCULOSKELETAL: No joint pain, no muscle pain  GENITOURINARY: no dysuria, no frequency, no hesitancy  PSYCHIATRY: no depression , no anxiety  ALL OTHER  ROS negative        Vital Signs Last 24 Hrs  T(C): 36.4 (06 Nov 2017 11:51), Max: 36.7 (06 Nov 2017 00:19)  T(F): 97.6 (06 Nov 2017 11:51), Max: 98 (06 Nov 2017 00:19)  HR: 68 (06 Nov 2017 11:51) (54 - 72)  BP: 126/67 (06 Nov 2017 11:51) (97/73 - 147/79)  BP(mean): --  RR: 16 (06 Nov 2017 11:51) (16 - 17)  SpO2: 99% (06 Nov 2017 11:51) (98% - 100%)    ________________________________________________  PHYSICAL EXAM:  GENERAL: NAD  HEENT: Normocephalic;  conjunctivae and sclerae clear; moist mucous membranes;   NECK : supple  CHEST/LUNG: Clear to auscultation bilaterally with good air entry   HEART: S1 S2  regular; no murmurs, gallops or rubs  ABDOMEN: Soft, Nontender, Nondistended; Bowel sounds present  EXTREMITIES: no cyanosis; no edema; no calf tenderness  SKIN: warm and dry; no rash  NERVOUS SYSTEM:  Awake and alert; Oriented  to place, person and time.    _________________________________________________  LABS:    11-06    140  |  106  |  14  ----------------------------<  87  4.0   |  27  |  1.02    Ca    8.9      06 Nov 2017 07:17  Phos  3.0     11-06  Mg     2.4     11-06          CAPILLARY BLOOD GLUCOSE            RADIOLOGY & ADDITIONAL TESTS:    Imaging Personally Reviewed:  YES    Consultant(s) Notes Reviewed:   YES    Care Discussed with Consultants : YES    Plan of care was discussed with patient and /or primary care giver; all questions and concerns were addressed and care was aligned with patient's wishes. YES
PGY 1 Note discussed with supervising resident and primary attending    Patient is a 81y old  Female who presents with a chief complaint of high blood pressure (2017 15:12)      INTERVAL HPI/OVERNIGHT EVENTS: Patient seen and examined at bedside, no overnight events. No complaints.  Called Dr Fay (nephrologist) office for Renal Doppler US results to be faxed. Patient is refusing MRA to be performed in the hospital.  Will discontinue Clonidine from tomorrow on. Stress test was positive, pt to go for cardiac catheterization.      MEDICATIONS:  Reviewed    REVIEW OF SYSTEMS:  CONSTITUTIONAL: No fever,   EYES: no acute visual disturbances  NECK: No pain or stiffness  RESPIRATORY: No cough; No shortness of breath  CARDIOVASCULAR: No chest pain, no palpitations  GASTROINTESTINAL: No pain. No nausea or vomiting; No diarrhea   NEUROLOGICAL: No headache or numbness, no tremors  MUSCULOSKELETAL: No joint pain, no muscle pain  GENITOURINARY: no dysuria, no frequency, no hesitancy  PSYCHIATRY: no depression , no anxiety  ALL OTHER  ROS negative        VITALS:  T(F): 97.6 (17 @ 07:34), Max: 98.1 (17 @ 04:30)  HR: 51 (17 @ 07:34)  BP: 121/64 (17 @ 07:34)  RR: 16 (17 @ 07:34)  SpO2: 99% (17 @ 07:34)  Wt(kg): --     @ 07:01  -   @ 07:00  --------------------------------------------------------  IN: 180 mL / OUT: 0 mL / NET: 180 mL      _______________________________________________  PHYSICAL EXAM:  GENERAL: NAD  HEENT: Normocephalic;  conjunctivae and sclerae clear; moist mucous membranes;   NECK : supple  CHEST/LUNG: Clear to auscultation bilaterally with good air entry   HEART: S1 S2  regular; no murmurs, gallops or rubs  ABDOMEN: Soft, Nontender, Nondistended; Bowel sounds present  EXTREMITIES: no cyanosis; no edema; no calf tenderness  SKIN: warm and dry; no rash  NERVOUS SYSTEM:  Awake and alert; Oriented  to place, person and time.    __      LABS:      140  |  106  |  14  ----------------------------<  87  4.0   |  27  |  1.02    Ca    8.9      2017 07:17  Phos  3.0       Mg     2.4           Creatinine Trend: 1.02 <--, 0.95 <--, 1.03 <--    Urine Studies:  Urinalysis Basic - ( 2017 08:37 )    Color: Yellow / Appearance: Clear / S.010 / pH:   Gluc:  / Ketone: Negative  / Bili: Negative / Urobili: Negative   Blood:  / Protein: Negative / Nitrite: Negative   Leuk Esterase: Moderate / RBC: 0-2 /HPF / WBC 11-25 /HPF   Sq Epi:  / Non Sq Epi: Occasional /HPF / Bacteria: Few /HPF      < from: Nuclear Stress Test-Pharmacologic (17 @ 09:04) >    PATIENT: PHYLLIS GALLOWAY  : 1935   AGE: 81 (F)   MR#: [id number]  STUDY DATE: 2017  LOCATION: [facility]. PHYSICIAN(S):  KAREN JUÁREZ MD  FELLOW:  ------------------------------------------------------------------------    TYPE OF TEST: Rest/Stress Pharmacologic  INDICATION: Non-ST elevation (NSTEMI) myocardial  infarction (I21.4)  ------------------------------------------------------------------------  HISTORY:  CARDIAC HISTORY: 81 years old female with  OTHER HISTORY: RA,CKD,HTN  RISK FACTORS: Hypertension, Post Menopausal  MEDICATIONS: Aspirin,Catapres,Crestor,Lopressor,Procardia  XL,  ------------------------------------------------------------------------    BASELINE ELECTROCARDIOGRAM:  Rhythm: Normal Sinus Rhythm - 59 BPM  ST: Nonspecific ST-T wave abnormality.    ------------------------------------------------------------------------    HEMODYNAMIC PARAMETERS:                                      HR      BP  Baseline  Pre-Injection              0  00:00     Inject Regadenoson        59  124/75  00:30     Post Injection             0  01:00     Post Injection            75  154/68  02:00     Post Injection            79  123/69  05:00     Recovery                  73  117/72  06:00     Recovery      75  111/52  07:00     Recovery                  74  116/58  10:00     Recovery                  71  122/54  ------------------------------------------------------------------------    Agent: Regadenoson 0.4 mg/5 ml NS. injected over 10 sec.  HR: Baseline HR: 0 bpm   Peak HR: 79 bpm (57% of MPHR)  MPHR: 139 bpm   85% of MPHR: 118 bpm  BP: Baseline BP:  mmHg   Peak BP: 154/68 mmHg   Peak RPP:  91521 (Rate Pressure Product)  HR Isotope Injected: 59 bpm (Tc 99m Tetrofosmin)  59 bpm (Tc 99m Tetrofosmin)  Last Caffeine intake: 12 hrs  Terminated: Completion of protocol  ------------------------------------------------------------------------    SYMPTOMS/FINDINGS:  Symptoms: Flushed  Chest pain: No chest pain with administration of  Regadenoson  ------------------------------------------------------------------------    ECG ABNORMALITIES DURING/AFTER STRESS:   Abnormalities: None  ------------------------------------------------------------------------    NEW ARRHYTHMIAS DEVELOPED DURING/AFTER STRESS:  None  ------------------------------------------------------------------------    STRESS TEST IMPRESSIONS:  Negative ECG evidence of ischemia after IV administartion  of Regadenoson.  ------------------------------------------------------------------------    PROCEDURE:  10.2 mCi of Tc 99m Tetrofosmin were injected intravenously  at rest. Approximately 45 minutes later, tomographic  images were obtained in a 180 degree arc from right  anterior oblique to left anterior oblique with 64stops.  At a separate time, 30.7 mCi of Tc 99m Tetrofosmin were  injected intravenously during stress protocol.  Approximately 45 minute(s) later, tomographic images were  obtained in a 180 degree arc from right anterior oblique  to left anterior oblique with 64 stops. The tomographic  slices were reconstructed in 3 orthogonal planes (short  axis, horizontal long axis and vertical long axis).  Interpretation was performed both by visual and  quantitative analysis.    ------------------------------------------------------------------------    NUCLEAR FINDINGS:  Review of raw data shows: The study is of good technical  quality.  The left ventricle was normal in size. There is a medium  sized, moderate defect in the mid to distal anterior wall  that is predominantly reversible, suggestive of ischemia  with a small area of scar.  ------------------------------------------------------------------------      GATED ANALYSIS:  Post-stress resting myocardial perfusion gated SPECT  imaging was performed (LVEF > 70%).  with no regional wall  motion abnormalities.  ------------------------------------------------------------------------    IMPRESSIONS:Abnormal Study  * Negative ECG evidence of ischemia after IV  administartion of Regadenoson.  * Myocardial Perfusion SPECT results are abnormal.  * There is a medium sized, moderate defect in the mid to  distal anterior wall that is predominantly reversible,  suggestive of ischemia with a small area of scar.  * Post-stress resting myocardial perfusion gated SPECT  imaging was performed (LVEF > 70%).  with no regional wall  motion abnormalities.    ------------------------------------------------------------------------      ------------------------------------------------------------------------    Confirmed on  2017 - 12:06:02 at [place] by  Holland Ellis MD  ------------------------------------------------------------------------    < end of copied text >
PGY 1 Note discussed with supervising resident and primary attending    Patient is a 81y old  Female who presents with a chief complaint of high blood pressure (2017 15:12)      INTERVAL HPI/OVERNIGHT EVENTS: Patient seen and examined at bedside. No overnight events. Patient will be transferred for cardiac cath anytime soon. Family aware as well as patient.    MEDICATIONS  (STANDING):  aspirin 325 milliGRAM(s) Oral daily  metoprolol     tartrate 25 milliGRAM(s) Oral two times a day  NIFEdipine XL 30 milliGRAM(s) Oral daily  rosuvastatin 10 milliGRAM(s) Oral at bedtime    MEDICATIONS  (PRN):  acetaminophen   Tablet. 325 milliGRAM(s) Oral every 4 hours PRN Moderate Pain (4 - 6)      __________________________________________________  REVIEW OF SYSTEMS:    CONSTITUTIONAL: No fever,   EYES: no acute visual disturbances  NECK: No pain or stiffness  RESPIRATORY: No cough; No shortness of breath  CARDIOVASCULAR: No chest pain, no palpitations  GASTROINTESTINAL: No pain. No nausea or vomiting; No diarrhea   NEUROLOGICAL: No headache or numbness, no tremors  MUSCULOSKELETAL: No joint pain, no muscle pain  GENITOURINARY: no dysuria, no frequency, no hesitancy  PSYCHIATRY: no depression , no anxiety  ALL OTHER  ROS negative        Vital Signs Last 24 Hrs  T(C): 36.5 (2017 11:50), Max: 36.7 (2017 23:38)  T(F): 97.7 (2017 11:50), Max: 98.1 (2017 04:30)  HR: 64 (2017 11:50) (51 - 76)  BP: 146/65 (2017 11:50) (106/83 - 146/65)  BP(mean): --  RR: 18 (2017 11:50) (16 - 18)  SpO2: 100% (2017 11:50) (99% - 100%)    ________________________________________________  PHYSICAL EXAM:  GENERAL: NAD  HEENT: Normocephalic;  conjunctivae and sclerae clear; moist mucous membranes;   NECK : supple  CHEST/LUNG: Clear to auscultation bilaterally with good air entry   HEART: S1 S2  regular; no murmurs, gallops or rubs  ABDOMEN: Soft, Nontender, Nondistended; Bowel sounds present  EXTREMITIES: no cyanosis; no edema; no calf tenderness  SKIN: warm and dry; no rash  NERVOUS SYSTEM:  Awake and alert; Oriented  to place, person and time ; no new deficits    _________________________________________________  LABS:        140  |  106  |  14  ----------------------------<  87  4.0   |  27  |  1.02    Ca    8.9      2017 07:17  Phos  3.0       Mg     2.4             RADIOLOGY & ADDITIONAL TESTS:    < from: Nuclear Stress Test-Pharmacologic (17 @ 09:04) >  PATIENT: PHYLLIS GALLOWAY  : 1935   AGE: 81 (F)   MR#: [id number]  STUDY DATE: 2017  LOCATION: [facility]. PHYSICIAN(S):  KAREN JUÁREZ MD  FELLOW:  ------------------------------------------------------------------------    TYPE OF TEST: Rest/Stress Pharmacologic  INDICATION: Non-ST elevation (NSTEMI) myocardial  infarction (I21.4)  ------------------------------------------------------------------------  HISTORY:  CARDIAC HISTORY: 81 years old female with  OTHER HISTORY: RA,CKD,HTN  RISK FACTORS: Hypertension, Post Menopausal  MEDICATIONS: Aspirin,Catapres,Crestor,Lopressor,Procardia  XL,  ------------------------------------------------------------------------    BASELINE ELECTROCARDIOGRAM:  Rhythm: Normal Sinus Rhythm - 59 BPM  ST: Nonspecific ST-T wave abnormality.    ------------------------------------------------------------------------    HEMODYNAMIC PARAMETERS:                                      HR      BP  Baseline  Pre-Injection              0  00:00     Inject Regadenoson        59  124/75  00:30     Post Injection             0  01:00     Post Injection            75  154/68  02:00     Post Injection            79  123/69  05:00     Recovery                  73  117/72  06:00     Recovery      75  111/52  07:00     Recovery                  74  116/58  10:00     Recovery                  71  122/54      ------------------------------------------------------------------------    Agent: Regadenoson 0.4 mg/5 ml NS. injected over 10 sec.  HR: Baseline HR: 0 bpm   Peak HR: 79 bpm (57% of MPHR)  MPHR: 139 bpm   85% of MPHR: 118 bpm  BP: Baseline BP:  mmHg   Peak BP: 154/68 mmHg   Peak RPP:  37029 (Rate Pressure Product)  HR Isotope Injected: 59 bpm (Tc 99m Tetrofosmin)  59 bpm (Tc 99m Tetrofosmin)  Last Caffeine intake: 12 hrs  Terminated: Completion of protocol  ------------------------------------------------------------------------    SYMPTOMS/FINDINGS:  Symptoms: Flushed  Chest pain: No chest pain with administration of  Regadenoson  ------------------------------------------------------------------------    ECG ABNORMALITIES DURING/AFTER STRESS:   Abnormalities: None  ------------------------------------------------------------------------    NEW ARRHYTHMIAS DEVELOPED DURING/AFTER STRESS:    < end of copied text >  < from: Nuclear Stress Test-Pharmacologic (17 @ 09:04) >  None  ------------------------------------------------------------------------    STRESS TEST IMPRESSIONS:  Negative ECG evidence of ischemia after IV administartion  of Regadenoson.  ------------------------------------------------------------------------    PROCEDURE:  10.2 mCi of Tc 99m Tetrofosmin were injected intravenously  at rest. Approximately 45 minutes later, tomographic  images were obtained in a 180 degree arc from right  anterior oblique to left anterior oblique with 64stops.  At a separate time, 30.7 mCi of Tc 99m Tetrofosmin were  injected intravenously during stress protocol.  Approximately 45 minute(s) later, tomographic images were  obtained in a 180 degree arc from right anterior oblique  to left anterior oblique with 64 stops. The tomographic  slices were reconstructed in 3 orthogonal planes (short  axis, horizontal long axis and vertical long axis).  Interpretation was performed both by visual and  quantitative analysis.    ------------------------------------------------------------------------    NUCLEAR FINDINGS:  Review of raw data shows: The study is of good technical  quality.  The left ventricle was normal in size. There is a medium  sized, moderate defect in the mid to distal anterior wall  that is predominantly reversible, suggestive of ischemia  with a small area of scar.  ------------------------------------------------------------------------      GATED ANALYSIS:  Post-stress resting myocardial perfusion gated SPECT  imaging was performed (LVEF > 70%).  with no regional wall    motion abnormalities.  ------------------------------------------------------------------------    IMPRESSIONS:Abnormal Study  * Negative ECG evidence of ischemia after IV  administartion of Regadenoson.  * Myocardial Perfusion SPECT results are abnormal.  * There is a medium sized, moderate defect in the mid to  distal anterior wall that is predominantly reversible,  suggestive of ischemia with a small area of scar.  * Post-stress resting myocardial perfusion gated SPECT  imaging was performed (LVEF > 70%).  with no regional wall  motion abnormalities.    ------------------------------------------------------------------------    Imaging Personally Reviewed:  YES    Consultant(s) Notes Reviewed:   YES    Care Discussed with Consultants : YES    Plan of care was discussed with patient and /or primary care giver; all questions and concerns were addressed and care was aligned with patient's wishes. YES
Patient denies cp, sob,. ros (-)    acetaminophen   Tablet. 325 milliGRAM(s) Oral every 4 hours PRN  aspirin 325 milliGRAM(s) Oral daily  metoprolol     tartrate 25 milliGRAM(s) Oral two times a day  NIFEdipine XL 30 milliGRAM(s) Oral daily  rosuvastatin 10 milliGRAM(s) Oral at bedtime          Hemoglobin: 13.5 g/dL (11-04 @ 08:37)      11-06    140  |  106  |  14  ----------------------------<  87  4.0   |  27  |  1.02    Ca    8.9      06 Nov 2017 07:17  Phos  3.0     11-06  Mg     2.4     11-06      Creatinine Trend: 1.02<--, 0.95<--, 1.03<--    COAGS:     CARDIAC MARKERS ( 04 Nov 2017 22:19 )  2.710 ng/mL / x     / 124 U/L / x     / 10.7 ng/mL  CARDIAC MARKERS ( 04 Nov 2017 15:05 )  3.010 ng/mL / x     / 124 U/L / x     / 8.7 ng/mL  CARDIAC MARKERS ( 04 Nov 2017 08:37 )  <0.015 ng/mL / x     / 54 U/L / x     / <1.0 ng/mL        T(C): 36.4 (11-06-17 @ 09:27), Max: 36.7 (11-06-17 @ 00:19)  HR: 61 (11-06-17 @ 09:27) (54 - 72)  BP: 126/77 (11-06-17 @ 09:27) (97/73 - 147/79)  RR: 16 (11-06-17 @ 09:27) (16 - 18)  SpO2: 100% (11-06-17 @ 09:27) (98% - 100%)  Wt(kg): --    I&O's Summary    05 Nov 2017 07:01  -  06 Nov 2017 07:00  --------------------------------------------------------  IN: 400 mL / OUT: 0 mL / NET: 400 mL        Appearance: Normal	  HEENT:   Normal oral mucosa, PERRL, EOMI	  Lymphatic: No lymphadenopathy , no edema  Cardiovascular: Normal S1 S2, No JVD, No murmurs , Peripheral pulses palpable 2+ bilaterally  Respiratory: Lungs clear to auscultation, normal effort 	  Gastrointestinal:  Soft, Non-tender, + BS	      TELEMETRY: 	  NSR   ekg : < from: 12 Lead ECG (10.30.17 @ 19:13) >   Normal sinus rhythm    < end of copied text >    -    ASSESSMENT/PLAN: 	81y Female: HX of htn, chol, renal artery stenosis, now with HTN crisis, elevated troponin.    - Abnormal nuc stress  - Needs Cath, d/w pt in detail, wants to discuss with daughter first  - Plan for cath wed, if pt agrees  - Cont asa, statin BB    Holland Ellis MD, FACC  [facility], [facility]  [address] [place] [address]  PHONE:  [phone]  BEEPER : [phone]
Patient denies cp, sob,. ros (-)    acetaminophen   Tablet. 325 milliGRAM(s) Oral every 4 hours PRN  aspirin 325 milliGRAM(s) Oral daily  metoprolol     tartrate 25 milliGRAM(s) Oral two times a day  NIFEdipine XL 30 milliGRAM(s) Oral daily  rosuvastatin 10 milliGRAM(s) Oral at bedtime          Hemoglobin: 13.5 g/dL (11-04 @ 08:37)      11-06    140  |  106  |  14  ----------------------------<  87  4.0   |  27  |  1.02    Ca    8.9      06 Nov 2017 07:17  Phos  3.0     11-06  Mg     2.4     11-06      Creatinine Trend: 1.02<--, 0.95<--, 1.03<--    COAGS:     CARDIAC MARKERS ( 04 Nov 2017 22:19 )  2.710 ng/mL / x     / 124 U/L / x     / 10.7 ng/mL  CARDIAC MARKERS ( 04 Nov 2017 15:05 )  3.010 ng/mL / x     / 124 U/L / x     / 8.7 ng/mL  CARDIAC MARKERS ( 04 Nov 2017 08:37 )  <0.015 ng/mL / x     / 54 U/L / x     / <1.0 ng/mL        T(C): 36.4 (11-06-17 @ 09:27), Max: 36.7 (11-06-17 @ 00:19)  HR: 61 (11-06-17 @ 09:27) (54 - 72)  BP: 126/77 (11-06-17 @ 09:27) (97/73 - 147/79)  RR: 16 (11-06-17 @ 09:27) (16 - 18)  SpO2: 100% (11-06-17 @ 09:27) (98% - 100%)  Wt(kg): --    I&O's Summary    05 Nov 2017 07:01  -  06 Nov 2017 07:00  --------------------------------------------------------  IN: 400 mL / OUT: 0 mL / NET: 400 mL        Appearance: Normal	  HEENT:   Normal oral mucosa, PERRL, EOMI	  Lymphatic: No lymphadenopathy , no edema  Cardiovascular: Normal S1 S2, No JVD, No murmurs , Peripheral pulses palpable 2+ bilaterally  Respiratory: Lungs clear to auscultation, normal effort 	  Gastrointestinal:  Soft, Non-tender, + BS	      TELEMETRY: 	  NSR   ekg : < from: 12 Lead ECG (10.30.17 @ 19:13) >   Normal sinus rhythm    < end of copied text >    -    ASSESSMENT/PLAN: 	81y Female: HX of htn, chol, renal artery stenosis, now with HTN crisis, elevated troponin.    - No segmental wall motion abnormalities on echo'  - F/u nuclear stress test  - Close BP monitoring..    I once again thank you for allowing me to participate in the care of your patient.  If you have any questions or concerns please do not hesitate to contact me.    Holland Ellis MD, FACC  [facility], [facility]  [address] [place] [address]  PHONE:  [phone]  BEEPER : [phone]
Subjective: pt seen and examined, no complaints on exam.   no chest pain or palpitation on exam,      acetaminophen   Tablet. 325 milliGRAM(s) Oral every 4 hours PRN  aspirin 325 milliGRAM(s) Oral daily  metoprolol     tartrate 25 milliGRAM(s) Oral two times a day  NIFEdipine XL 30 milliGRAM(s) Oral daily  rosuvastatin 10 milliGRAM(s) Oral at bedtime          Hemoglobin: 13.5 g/dL (11-04 @ 08:37)      11-06    140  |  106  |  14  ----------------------------<  87  4.0   |  27  |  1.02    Ca    8.9      06 Nov 2017 07:17  Phos  3.0     11-06  Mg     2.4     11-06      Creatinine Trend: 1.02<--, 0.95<--, 1.03<--    COAGS:     T(C): 36.5 (11-07-17 @ 23:36), Max: 37 (11-07-17 @ 15:11)  HR: 60 (11-07-17 @ 23:36) (51 - 70)  BP: 146/72 (11-07-17 @ 23:36) (121/64 - 146/72)  RR: 18 (11-07-17 @ 23:36) (16 - 19)  SpO2: 97% (11-07-17 @ 23:36) (97% - 100%)  Wt(kg): --    I&O's Summary    06 Nov 2017 07:01  -  07 Nov 2017 07:00  --------------------------------------------------------  IN: 180 mL / OUT: 0 mL / NET: 180 mL    07 Nov 2017 07:01  -  08 Nov 2017 05:13  --------------------------------------------------------  IN: 426 mL / OUT: 0 mL / NET: 426 mL      Appearance: Normal	  HEENT:   Normal oral mucosa, PERRL, EOMI	  Lymphatic: No lymphadenopathy , no edema  Cardiovascular: Normal S1 S2, No JVD, No murmurs , Peripheral pulses palpable 2+ bilaterally  Respiratory: Lungs clear to auscultation, normal effort 	  Gastrointestinal:  Soft, Non-tender, + BS	      TELEMETRY: 	  NSR     DIAGNOSTIC TESTING:  [ ] Echocardiogram:  < from: Transthoracic Echocardiogram (11.05.17 @ 13:14) >  ------------------------------------------------------------------------  CONCLUSIONS:  1. Normal mitral valve. Trace mitral regurgitation.  2. Normal trileaflet aortic valve.  3. Aortic Root: 3.1 cm.  4. Normal left atrium.  5. Normal left ventricular internal dimensions and wall  thicknesses.  6. Normal Left Ventricular Systolic Function,  (EF = 55 to  60%)  7. Grade II diastolic dysfunction.  8. Normal right atrium.  9. Normal right ventricular size and function.  10. RV systolic pressure is 39 mm Hg.  11. There is mild tricuspid regurgitation.  12. Normal pulmonic valve.  13. Normal pericardium with no pericardial effusion.    < end of copied text >    [ ]  Catheterization:  [ ] Stress Test:  < from: Nuclear Stress Test-Pharmacologic (11.06.17 @ 09:04) >  IMPRESSIONS:Abnormal Study  * Negative ECG evidence of ischemia after IV  administartion of Regadenoson.  * Myocardial Perfusion SPECT results are abnormal.  * There is a medium sized, moderate defect in the mid to  distal anterior wall that is predominantly reversible,  suggestive of ischemia with a small area of scar.  * Post-stress resting myocardial perfusion gated SPECT  imaging was performed (LVEF > 70%).  with no regional wall  motion abnormalities.    < end of copied text >    OTHER: 	    ASSESSMENT/PLAN: 	81y Female: HX of htn, chol, renal artery stenosis, now with HTN crisis, elevated troponin.  now with + ischemic on NST, for cardiac cath    BP is controlled with , BB, Procardia   pt for cardiac cath today .   ASA, statin   tele stable  renal follow up   GI / DVT prophylaxis.   keep K>4, mag >2.0   D/W Dr Ellis
Subjective: pt seen and examined, no complaints on exam.   no chest pain or palpitation on exam,  NAD noted. ROS -     acetaminophen   Tablet. 325 milliGRAM(s) Oral every 4 hours PRN  aspirin 325 milliGRAM(s) Oral daily  cloNIDine 0.1 milliGRAM(s) Oral every 12 hours  metoprolol     tartrate 25 milliGRAM(s) Oral two times a day  NIFEdipine XL 30 milliGRAM(s) Oral daily  pantoprazole    Tablet 40 milliGRAM(s) Oral before breakfast                            13.5   4.0   )-----------( 172      ( 04 Nov 2017 08:37 )             43.8       Hemoglobin: 13.5 g/dL (11-04 @ 08:37)      11-04    142  |  107  |  16  ----------------------------<  112<H>  4.1   |  30  |  1.03    Ca    9.3      04 Nov 2017 08:37    TPro  8.1  /  Alb  3.6  /  TBili  0.5  /  DBili  x   /  AST  45<H>  /  ALT  54  /  AlkPhos  135<H>  11-04    Creatinine Trend: 1.03<--    COAGS:     CARDIAC MARKERS ( 04 Nov 2017 22:19 )  2.710 ng/mL / x     / 124 U/L / x     / 10.7 ng/mL  CARDIAC MARKERS ( 04 Nov 2017 15:05 )  3.010 ng/mL / x     / 124 U/L / x     / 8.7 ng/mL  CARDIAC MARKERS ( 04 Nov 2017 08:37 )  <0.015 ng/mL / x     / 54 U/L / x     / <1.0 ng/mL        T(C): 36.7 (11-05-17 @ 05:57), Max: 36.8 (11-04-17 @ 23:33)  HR: 66 (11-05-17 @ 05:57) (64 - 84)  BP: 139/65 (11-05-17 @ 05:57) (117/78 - 238/120)  RR: 16 (11-05-17 @ 05:57) (16 - 20)  SpO2: 99% (11-05-17 @ 05:57) (96% - 100%)  Wt(kg): --    I&O's Summary    Appearance: Normal	  HEENT:   Normal oral mucosa, PERRL, EOMI	  Lymphatic: No lymphadenopathy , no edema  Cardiovascular: Normal S1 S2, No JVD, No murmurs , Peripheral pulses palpable 2+ bilaterally  Respiratory: Lungs clear to auscultation, normal effort 	  Gastrointestinal:  Soft, Non-tender, + BS	      TELEMETRY: 	  NSR   ekg : < from: 12 Lead ECG (10.30.17 @ 19:13) >   Normal sinus rhythm    < end of copied text >    DIAGNOSTIC TESTING:  [ ] Echocardiogram:   [ ]  Catheterization:  [ ] Stress Test:    OTHER: 	    ASSESSMENT/PLAN: 	81y Female: HX of htn, chol, renal artery stenosis, now with HTN crisis, elevated troponin.    BP is controlled this AM,- cont clonidine, BB, Procardia   ASA, statin   tele stable  Echo : pending- check LV fx  NST if echo is normal.  renal follow up , renal ultrasound pending   GI / DVT prophylaxis.   keep K>4, mag >2.0   no anginal s/s overnight   D/W Dr Navas
INTERNAL MEDICINE- PROGRESS NOTE    80 yo AA F with RA, osteoporosis, Rt renal artery stenosis, uncontrolled HTN due to medication non-compliance a/w Hypertensive urgency, elevated troponins and asymptomatic UTI. CT Head neg.     Hospital Medications: Medications reviewed.  REVIEW OF SYSTEMS: Pt c/o mild HA this am which resolved on its own.   CONSTITUTIONAL: No fevers or chills. No dizziness  RESPIRATORY: No shortness of breath  CARDIOVASCULAR: No chest pain.  GASTROINTESTINAL: No nausea, vomiting, diarrhea or abdominal pain.   VASCULAR: No bilateral lower extremity edema.     VITALS:  T(F): 97 (17 @ 07:49), Max: 98.3 (17 @ 01:23)  HR: 67 (17:49)  BP: 144/101 (17:49)  RR: 17 (17:49)  SpO2: 100% (17:49)  Wt(kg): --  Height (cm): 160.02 (:), 160.02 (10-30 @ 17:29)  Weight (kg): 56.7 (:), 54.4 (10-30 @ 17:29)  BMI (kg/m2): 22.1 (:), 21.2 (10-30 @ 17:29)  BSA (m2): 1.58 (:), 1.56 (10-30 @ 17:29)    PHYSICAL EXAM:  Constitutional: NAD  Neurological: Awake and Alert  HEENT: anicteric sclera,   Respiratory: CTAB, no wheezes, rales or rhonchi  Cardiovascular: S1, S2, RRR  Gastrointestinal: BS+, soft, NT/ND  : No amaral.  Extremities: No peripheral edema    LABS:      139  |  104  |  13  ----------------------------<  86  3.8   |  29  |  0.95    Ca    9.6      2017 07:53  Phos  2.7       Mg     2.3         TPro  8.1  /  Alb  3.6  /  TBili  0.5  /  DBili      /  AST  45<H>  /  ALT  54  /  AlkPhos  135<H>  11-04    Creatinine Trend: 0.95 <--, 1.03 <--                        13.5   4.0   )-----------( 172      ( 2017 08:37 )             43.8     Urine Studies:  Urinalysis Basic - ( 2017 08:37 )    Color: Yellow / Appearance: Clear / S.010 / pH:   Gluc:  / Ketone: Negative  / Bili: Negative / Urobili: Negative   Blood:  / Protein: Negative / Nitrite: Negative   Leuk Esterase: Moderate / RBC: 0-2 /HPF / WBC 11-25 /HPF   Sq Epi:  / Non Sq Epi: Occasional /HPF / Bacteria: Few /HPF        RADIOLOGY & ADDITIONAL STUDIES:

## 2017-11-08 NOTE — PROGRESS NOTE ADULT - PROVIDER SPECIALTY LIST ADULT
Cardiology
Internal Medicine

## 2017-11-08 NOTE — PROGRESS NOTE ADULT - PROBLEM SELECTOR PLAN 5
Pt with h/o rt LUIS FERNANDO- refusing imaging during hospitalization.  Will attempt to obtain records/ imaging from Dr. Stearns (pt's Nephrologist) on 11/6.   Avoid ACEi/ ARB use.
Pt with h/o rt LUIS FERNANDO- refusing imaging during hospitalization.  Records obtains from pt's Nephrologist, Dr. Stearns.    Avoid ACEi/ ARB use.
Pt with h/o rt LUIS FERNANDO- refusing imaging during hospitalization.  Renal Doppler US results faxed from Dr Fay office, attached in chart (Rt renal artery stenosis)  Avoid ACEi/ ARB use.

## 2017-11-08 NOTE — DISCHARGE NOTE ADULT - MEDICATION SUMMARY - MEDICATIONS TO TAKE
I will START or STAY ON the medications listed below when I get home from the hospital:    acetaminophen 325 mg oral tablet  -- 1 tab(s) by mouth every 4 hours, As needed, Moderate Pain  -- Indication: For mild pain    Catapres 0.1 mg oral tablet  -- 1 tab(s) by mouth 2 times a day  hospital  -- Indication: For Hypertension    Crestor 10 mg oral tablet  -- 1 tab(s) by mouth once a day (at bedtime)  hospital and home  -- Indication: For Hyperlipidemia    Metoprolol Tartrate 25 mg oral tablet  -- 1 tab(s) by mouth 2 times a day  hospital  -- Indication: For Hypertension    NIFEdipine 60 mg oral tablet, extended release  -- 1 tab(s) by mouth once a day  -- Indication: For Hypertension I will START or STAY ON the medications listed below when I get home from the hospital:    acetaminophen 325 mg oral tablet  -- 1 tab(s) by mouth every 4 hours, As needed, Moderate Pain  -- Indication: For mild pain    Aspirin Enteric Coated 81 mg oral delayed release tablet  -- 1 tab(s) by mouth once a day  -- Indication: For Heart protection    Catapres 0.1 mg oral tablet  -- 1 tab(s) by mouth 2 times a day  hospital  -- Indication: For Hypertension    Crestor 10 mg oral tablet  -- 1 tab(s) by mouth once a day (at bedtime)  hospital and home  -- Indication: For Hyperlipidemia    Metoprolol Tartrate 25 mg oral tablet  -- 1 tab(s) by mouth 2 times a day  hospital  -- Indication: For Hypertension    NIFEdipine 60 mg oral tablet, extended release  -- 1 tab(s) by mouth once a day  -- Indication: For Hypertension I will START or STAY ON the medications listed below when I get home from the hospital:    acetaminophen 325 mg oral tablet  -- 1 tab(s) by mouth every 4 hours, As needed, Moderate Pain  -- Indication: For mild pain    Aspirin Enteric Coated 81 mg oral delayed release tablet  -- 1 tab(s) by mouth once a day  -- Indication: For Heart protection    Crestor 10 mg oral tablet  -- 1 tab(s) by mouth once a day (at bedtime)  hospital and home  -- Indication: For Hyperlipidemia    Metoprolol Tartrate 25 mg oral tablet  -- 1 tab(s) by mouth 2 times a day  hospital  -- Indication: For Hypertension    NIFEdipine 60 mg oral tablet, extended release  -- 1 tab(s) by mouth once a day  -- Indication: For Hypertension I will START or STAY ON the medications listed below when I get home from the hospital:    acetaminophen 325 mg oral tablet  -- 1 tab(s) by mouth every 4 hours, As needed, Moderate Pain  -- Indication: For mild pain    Aspirin Enteric Coated 81 mg oral delayed release tablet  -- 1 tab(s) by mouth once a day  -- Indication: For Heart protection    cloNIDine 0.1 mg oral tablet  -- 1 tab(s) by mouth once a day  -- Indication: For Hypertension, unspecified type    Crestor 10 mg oral tablet  -- 1 tab(s) by mouth once a day (at bedtime)  hospital and home  -- Indication: For Hyperlipidemia    Metoprolol Tartrate 25 mg oral tablet  -- 1 tab(s) by mouth 2 times a day  hospital  -- Indication: For Hypertension    NIFEdipine 60 mg oral tablet, extended release  -- 1 tab(s) by mouth once a day  -- Indication: For Hypertension I will START or STAY ON the medications listed below when I get home from the hospital:    acetaminophen 325 mg oral tablet  -- 1 tab(s) by mouth every 4 hours, As needed, Moderate Pain  -- Indication: For mild pain    Aspirin Enteric Coated 81 mg oral delayed release tablet  -- 1 tab(s) by mouth once a day  -- Indication: For Heart protection    Crestor 10 mg oral tablet  -- 1 tab(s) by mouth once a day (at bedtime)  hospital and home  -- Indication: For Hyperlipidemia    Metoprolol Tartrate 25 mg oral tablet  -- 1 tab(s) by mouth 2 times a day  hospital  -- Indication: For High blood pressure    NIFEdipine 60 mg oral tablet, extended release  -- 1 tab(s) by mouth once a day  -- Indication: For High blood pressure

## 2017-11-09 VITALS
RESPIRATION RATE: 18 BRPM | SYSTOLIC BLOOD PRESSURE: 129 MMHG | HEART RATE: 66 BPM | TEMPERATURE: 97 F | DIASTOLIC BLOOD PRESSURE: 89 MMHG | OXYGEN SATURATION: 100 %

## 2017-11-09 LAB
ANION GAP SERPL CALC-SCNC: 10 MMOL/L — SIGNIFICANT CHANGE UP (ref 5–17)
BASOPHILS # BLD AUTO: 0 K/UL — SIGNIFICANT CHANGE UP (ref 0–0.2)
BASOPHILS NFR BLD AUTO: 1.2 % — SIGNIFICANT CHANGE UP (ref 0–2)
BUN SERPL-MCNC: 18 MG/DL — SIGNIFICANT CHANGE UP (ref 7–23)
CALCIUM SERPL-MCNC: 8.7 MG/DL — SIGNIFICANT CHANGE UP (ref 8.4–10.5)
CHLORIDE SERPL-SCNC: 106 MMOL/L — SIGNIFICANT CHANGE UP (ref 96–108)
CO2 SERPL-SCNC: 25 MMOL/L — SIGNIFICANT CHANGE UP (ref 22–31)
CREAT SERPL-MCNC: 1.06 MG/DL — SIGNIFICANT CHANGE UP (ref 0.5–1.3)
EOSINOPHIL # BLD AUTO: 0.1 K/UL — SIGNIFICANT CHANGE UP (ref 0–0.5)
EOSINOPHIL NFR BLD AUTO: 2 % — SIGNIFICANT CHANGE UP (ref 0–6)
GLUCOSE SERPL-MCNC: 89 MG/DL — SIGNIFICANT CHANGE UP (ref 70–99)
HCT VFR BLD CALC: 37.7 % — SIGNIFICANT CHANGE UP (ref 34.5–45)
HGB BLD-MCNC: 12.3 G/DL — SIGNIFICANT CHANGE UP (ref 11.5–15.5)
LYMPHOCYTES # BLD AUTO: 1.8 K/UL — SIGNIFICANT CHANGE UP (ref 1–3.3)
LYMPHOCYTES # BLD AUTO: 42.2 % — SIGNIFICANT CHANGE UP (ref 13–44)
MCHC RBC-ENTMCNC: 25.5 PG — LOW (ref 27–34)
MCHC RBC-ENTMCNC: 32.6 GM/DL — SIGNIFICANT CHANGE UP (ref 32–36)
MCV RBC AUTO: 78.3 FL — LOW (ref 80–100)
MONOCYTES # BLD AUTO: 0.5 K/UL — SIGNIFICANT CHANGE UP (ref 0–0.9)
MONOCYTES NFR BLD AUTO: 11.5 % — SIGNIFICANT CHANGE UP (ref 2–14)
NEUTROPHILS # BLD AUTO: 1.8 K/UL — SIGNIFICANT CHANGE UP (ref 1.8–7.4)
NEUTROPHILS NFR BLD AUTO: 43.1 % — SIGNIFICANT CHANGE UP (ref 43–77)
PLATELET # BLD AUTO: 166 K/UL — SIGNIFICANT CHANGE UP (ref 150–400)
POTASSIUM SERPL-MCNC: 4.6 MMOL/L — SIGNIFICANT CHANGE UP (ref 3.5–5.3)
POTASSIUM SERPL-SCNC: 4.6 MMOL/L — SIGNIFICANT CHANGE UP (ref 3.5–5.3)
RBC # BLD: 4.82 M/UL — SIGNIFICANT CHANGE UP (ref 3.8–5.2)
RBC # FLD: 13.8 % — SIGNIFICANT CHANGE UP (ref 10.3–14.5)
SODIUM SERPL-SCNC: 141 MMOL/L — SIGNIFICANT CHANGE UP (ref 135–145)
WBC # BLD: 4.3 K/UL — SIGNIFICANT CHANGE UP (ref 3.8–10.5)
WBC # FLD AUTO: 4.3 K/UL — SIGNIFICANT CHANGE UP (ref 3.8–10.5)

## 2017-11-09 RX ORDER — ASPIRIN/CALCIUM CARB/MAGNESIUM 324 MG
1 TABLET ORAL
Qty: 30 | Refills: 3
Start: 2017-11-09 | End: 2018-03-08

## 2017-11-09 RX ORDER — ASPIRIN/CALCIUM CARB/MAGNESIUM 324 MG
81 TABLET ORAL DAILY
Qty: 0 | Refills: 0 | Status: DISCONTINUED | OUTPATIENT
Start: 2017-11-10 | End: 2017-11-09

## 2017-11-09 RX ORDER — NIFEDIPINE 30 MG
1 TABLET, EXTENDED RELEASE 24 HR ORAL
Qty: 30 | Refills: 0 | OUTPATIENT
Start: 2017-11-09 | End: 2017-11-10

## 2017-11-09 RX ORDER — METOPROLOL TARTRATE 50 MG
1 TABLET ORAL
Qty: 60 | Refills: 0 | OUTPATIENT
Start: 2017-11-09 | End: 2017-12-09

## 2017-11-09 RX ORDER — METOPROLOL TARTRATE 50 MG
1 TABLET ORAL
Qty: 0 | Refills: 0 | COMMUNITY

## 2017-11-09 RX ADMIN — Medication 0.1 MILLIGRAM(S): at 05:30

## 2017-11-09 RX ADMIN — Medication 60 MILLIGRAM(S): at 05:29

## 2017-11-09 RX ADMIN — Medication 325 MILLIGRAM(S): at 05:29

## 2017-11-09 RX ADMIN — Medication 25 MILLIGRAM(S): at 05:31

## 2017-11-09 NOTE — PROGRESS NOTE ADULT - SUBJECTIVE AND OBJECTIVE BOX
Patient with no anginal chest pain or shortness of breath  No palpitations  No dizziness      MEDICATIONS  (STANDING):  ASA 81 po qday  atorvastatin 40 milliGRAM(s) Oral at bedtime  cloNIDine 0.1 milliGRAM(s) Oral daily  metoprolol     tartrate 25 milliGRAM(s) Oral two times a day  NIFEdipine XL 60 milliGRAM(s) Oral daily    MEDICATIONS  (PRN):      LABS:                        12.3   4.3   )-----------( 166      ( 09 Nov 2017 05:10 )             37.7     Hemoglobin: 12.3 g/dL (11-09 @ 05:10)  Hemoglobin: 13.4 g/dL (11-08 @ 10:16)    11-09    141  |  106  |  18  ----------------------------<  89  4.6   |  25  |  1.06    Ca    8.7      09 Nov 2017 05:10  Phos  2.4     11-08  Mg     2.3     11-08      Creatinine Trend: 1.06<--, 0.96<--, 1.02<--, 0.95<--, 1.03<--           PHYSICAL EXAM  Vital Signs Last 24 Hrs  T(C): 36.4 (09 Nov 2017 04:40), Max: 36.9 (08 Nov 2017 11:14)  T(F): 97.5 (09 Nov 2017 04:40), Max: 98.5 (08 Nov 2017 11:14)  HR: 68 (09 Nov 2017 04:40) (63 - 82)  BP: 130/73 (09 Nov 2017 04:40) (129/87 - 175/89)  BP(mean): 115 (08 Nov 2017 13:06) (115 - 115)  RR: 17 (09 Nov 2017 04:40) (14 - 19)  SpO2: 98% (09 Nov 2017 04:40) (95% - 100%)      Appearance: Normal	  HEENT:   Normal oral mucosa, PERRL, EOMI	  Lymphatic: No lymphadenopathy , no edema - right wrist soft non tender + pulses   Cardiovascular: Normal S1 S2, No JVD, No murmurs , Peripheral pulses palpable 2+ bilaterally  Respiratory: Lungs clear to auscultation, normal effort 	  Gastrointestinal:  Soft, Non-tender, + BS	      TELEMETRY: 	  NSR , brief episode sinius clem to 44    DIAGNOSTIC TESTING:  [ ] Echocardiogram:  < from: Transthoracic Echocardiogram (11.05.17 @ 13:14) >  ------------------------------------------------------------------------  CONCLUSIONS:  1. Normal mitral valve. Trace mitral regurgitation.  2. Normal trileaflet aortic valve.  3. Aortic Root: 3.1 cm.  4. Normal left atrium.  5. Normal left ventricular internal dimensions and wall  thicknesses.  6. Normal Left Ventricular Systolic Function,  (EF = 55 to  60%)  7. Grade II diastolic dysfunction.  8. Normal right atrium.  9. Normal right ventricular size and function.  10. RV systolic pressure is 39 mm Hg.  11. There is mild tricuspid regurgitation.  12. Normal pulmonic valve.  13. Normal pericardium with no pericardial effusion.    < end of copied text >    [ ]  Catheterization:  [ ] Stress Test:  < from: Nuclear Stress Test-Pharmacologic (11.06.17 @ 09:04) >  IMPRESSIONS:Abnormal Study  * Negative ECG evidence of ischemia after IV  administartion of Regadenoson.  * Myocardial Perfusion SPECT results are abnormal.  * There is a medium sized, moderate defect in the mid to  distal anterior wall that is predominantly reversible,  suggestive of ischemia with a small area of scar.  * Post-stress resting myocardial perfusion gated SPECT  imaging was performed (LVEF > 70%).  with no regional wall  motion abnormalities.    < end of copied text >    OTHER: 	    ASSESSMENT/PLAN: 	81y Female: HX of htn, chol, renal artery stenosis, now with HTN crisis, elevated troponin.  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    - decrease ASA from 325 to 81   - c/w statin , BB  - c/w Procardia, Clonidine  - pt to f/u with Dr Bustamante at our [place] Office ([phone] ) next Thursday 11/16   - d/c home today   - D/W Dr Rhonda Rainey [facility]  O:  [phone]  P: [phone] Patient with no anginal chest pain or shortness of breath  No palpitations  No dizziness      MEDICATIONS  (STANDING):  ASA 81 po qday  atorvastatin 40 milliGRAM(s) Oral at bedtime  cloNIDine 0.1 milliGRAM(s) Oral daily  metoprolol     tartrate 25 milliGRAM(s) Oral two times a day  NIFEdipine XL 60 milliGRAM(s) Oral daily    MEDICATIONS  (PRN):      LABS:                        12.3   4.3   )-----------( 166      ( 09 Nov 2017 05:10 )             37.7     Hemoglobin: 12.3 g/dL (11-09 @ 05:10)  Hemoglobin: 13.4 g/dL (11-08 @ 10:16)    11-09    141  |  106  |  18  ----------------------------<  89  4.6   |  25  |  1.06    Ca    8.7      09 Nov 2017 05:10  Phos  2.4     11-08  Mg     2.3     11-08      Creatinine Trend: 1.06<--, 0.96<--, 1.02<--, 0.95<--, 1.03<--           PHYSICAL EXAM  Vital Signs Last 24 Hrs  T(C): 36.4 (09 Nov 2017 04:40), Max: 36.9 (08 Nov 2017 11:14)  T(F): 97.5 (09 Nov 2017 04:40), Max: 98.5 (08 Nov 2017 11:14)  HR: 68 (09 Nov 2017 04:40) (63 - 82)  BP: 130/73 (09 Nov 2017 04:40) (129/87 - 175/89)  BP(mean): 115 (08 Nov 2017 13:06) (115 - 115)  RR: 17 (09 Nov 2017 04:40) (14 - 19)  SpO2: 98% (09 Nov 2017 04:40) (95% - 100%)      Appearance: Normal	  HEENT:   Normal oral mucosa, PERRL, EOMI	  Lymphatic: No lymphadenopathy , no edema - right wrist soft non tender + pulses   Cardiovascular: Normal S1 S2, No JVD, No murmurs , Peripheral pulses palpable 2+ bilaterally  Respiratory: Lungs clear to auscultation, normal effort 	  Gastrointestinal:  Soft, Non-tender, + BS	      TELEMETRY: 	  NSR , brief episode sinius clem to 44    DIAGNOSTIC TESTING:  [ ] Echocardiogram:  < from: Transthoracic Echocardiogram (11.05.17 @ 13:14) >  ------------------------------------------------------------------------  CONCLUSIONS:  1. Normal mitral valve. Trace mitral regurgitation.  2. Normal trileaflet aortic valve.  3. Aortic Root: 3.1 cm.  4. Normal left atrium.  5. Normal left ventricular internal dimensions and wall  thicknesses.  6. Normal Left Ventricular Systolic Function,  (EF = 55 to  60%)  7. Grade II diastolic dysfunction.  8. Normal right atrium.  9. Normal right ventricular size and function.  10. RV systolic pressure is 39 mm Hg.  11. There is mild tricuspid regurgitation.  12. Normal pulmonic valve.  13. Normal pericardium with no pericardial effusion.    < end of copied text >    [ ]  Catheterization:  [ ] Stress Test:  < from: Nuclear Stress Test-Pharmacologic (11.06.17 @ 09:04) >  IMPRESSIONS:Abnormal Study  * Negative ECG evidence of ischemia after IV  administartion of Regadenoson.  * Myocardial Perfusion SPECT results are abnormal.  * There is a medium sized, moderate defect in the mid to  distal anterior wall that is predominantly reversible,  suggestive of ischemia with a small area of scar.  * Post-stress resting myocardial perfusion gated SPECT  imaging was performed (LVEF > 70%).  with no regional wall  motion abnormalities.    < end of copied text >    OTHER: 	    ASSESSMENT/PLAN: 	81y Female: HX of htn, chol, renal artery stenosis, now with HTN crisis, elevated troponin.  now with + ischemic on NST, transferred to [facility] for cardiac cath found to have non obstructive CAD    - decrease ASA from 325 to 81   - c/w statin , BB  - c/w Procardia, Clonidine  - pt to f/u with Dr Bustamante at our [place] Office ([phone] ) next Thursday 11/16  @1240  - d/c home today   - D/W Dr Rhonda Rainey [facility]  O:  [phone]  P: [phone] Patient with no anginal chest pain or shortness of breath  No palpitations  No dizziness      MEDICATIONS  (STANDING):  ASA 81 po qday  atorvastatin 40 milliGRAM(s) Oral at bedtime  Metoprolol     tartrate 25 milliGRAM(s) Oral two times a day  NIFEdipine XL 60 milliGRAM(s) Oral daily    MEDICATIONS  (PRN):      LABS:                        12.3   4.3   )-----------( 166      ( 09 Nov 2017 05:10 )             37.7     Hemoglobin: 12.3 g/dL (11-09 @ 05:10)  Hemoglobin: 13.4 g/dL (11-08 @ 10:16)    11-09    141  |  106  |  18  ----------------------------<  89  4.6   |  25  |  1.06    Ca    8.7      09 Nov 2017 05:10  Phos  2.4     11-08  Mg     2.3     11-08      Creatinine Trend: 1.06<--, 0.96<--, 1.02<--, 0.95<--, 1.03<--           PHYSICAL EXAM  Vital Signs Last 24 Hrs  T(C): 36.4 (09 Nov 2017 04:40), Max: 36.9 (08 Nov 2017 11:14)  T(F): 97.5 (09 Nov 2017 04:40), Max: 98.5 (08 Nov 2017 11:14)  HR: 68 (09 Nov 2017 04:40) (63 - 82)  BP: 130/73 (09 Nov 2017 04:40) (129/87 - 175/89)  BP(mean): 115 (08 Nov 2017 13:06) (115 - 115)  RR: 17 (09 Nov 2017 04:40) (14 - 19)  SpO2: 98% (09 Nov 2017 04:40) (95% - 100%)      Appearance: Normal	  HEENT:   Normal oral mucosa, PERRL, EOMI	  Lymphatic: No lymphadenopathy , no edema - right wrist soft non tender + pulses   Cardiovascular: Normal S1 S2, No JVD, No murmurs , Peripheral pulses palpable 2+ bilaterally  Respiratory: Lungs clear to auscultation, normal effort 	  Gastrointestinal:  Soft, Non-tender, + BS	      TELEMETRY: 	  NSR , brief episode sinius clem to 44    DIAGNOSTIC TESTING:  [ ] Echocardiogram:  < from: Transthoracic Echocardiogram (11.05.17 @ 13:14) >  ------------------------------------------------------------------------  CONCLUSIONS:  1. Normal mitral valve. Trace mitral regurgitation.  2. Normal trileaflet aortic valve.  3. Aortic Root: 3.1 cm.  4. Normal left atrium.  5. Normal left ventricular internal dimensions and wall  thicknesses.  6. Normal Left Ventricular Systolic Function,  (EF = 55 to  60%)  7. Grade II diastolic dysfunction.  8. Normal right atrium.  9. Normal right ventricular size and function.  10. RV systolic pressure is 39 mm Hg.  11. There is mild tricuspid regurgitation.  12. Normal pulmonic valve.  13. Normal pericardium with no pericardial effusion.    < end of copied text >    [ ]  Catheterization:  [ ] Stress Test:  < from: Nuclear Stress Test-Pharmacologic (11.06.17 @ 09:04) >  IMPRESSIONS:Abnormal Study  * Negative ECG evidence of ischemia after IV  administartion of Regadenoson.  * Myocardial Perfusion SPECT results are abnormal.  * There is a medium sized, moderate defect in the mid to  distal anterior wall that is predominantly reversible,  suggestive of ischemia with a small area of scar.  * Post-stress resting myocardial perfusion gated SPECT  imaging was performed (LVEF > 70%).  with no regional wall  motion abnormalities.    < end of copied text >    OTHER: 	    ASSESSMENT/PLAN: 	81y Female: HX of htn, chol, renal artery stenosis, now with HTN crisis, elevated troponin.  now with + ischemic on NST, transferred to [facility] for cardiac cath found to have non obstructive CAD    - decrease ASA from 325 to 81   - c/w statin , BB  - c/w Procardia  - Clonidine dced at [facility] 11/7 (per Dr Gee)   - pt to f/u with Dr Bustamante at our [place] Office ([phone] ) next Thursday 11/16  @1240  - d/c home today   - D/W Dr Rhonda Rainey [facility]  O:  [phone]  P: [phone]

## 2017-11-09 NOTE — PROGRESS NOTE ADULT - ATTENDING COMMENTS
Agree with above assessment and plan as outlined above.    - Non obstructive CAD on cath cont ASA, Statin  - BP improved  - F/u with us in 1-2 weeks    Holland Ellis MD, FACC  [facility], [facility]  [address] [place] [address]  PHONE:  [phone]  BEEPER : [phone]

## 2017-11-17 ENCOUNTER — APPOINTMENT (OUTPATIENT)
Dept: ULTRASOUND IMAGING | Facility: IMAGING CENTER | Age: 82
End: 2017-11-17

## 2017-11-22 ENCOUNTER — APPOINTMENT (OUTPATIENT)
Dept: NEPHROLOGY | Facility: CLINIC | Age: 82
End: 2017-11-22

## 2017-12-07 ENCOUNTER — FORM ENCOUNTER (OUTPATIENT)
Age: 82
End: 2017-12-07

## 2017-12-08 ENCOUNTER — APPOINTMENT (OUTPATIENT)
Dept: ULTRASOUND IMAGING | Facility: IMAGING CENTER | Age: 82
End: 2017-12-08
Payer: MEDICARE

## 2017-12-08 ENCOUNTER — OUTPATIENT (OUTPATIENT)
Dept: OUTPATIENT SERVICES | Facility: HOSPITAL | Age: 82
LOS: 1 days | End: 2017-12-08
Payer: MEDICARE

## 2017-12-08 DIAGNOSIS — I70.1 ATHEROSCLEROSIS OF RENAL ARTERY: ICD-10-CM

## 2017-12-08 DIAGNOSIS — I70.209 UNSPECIFIED ATHEROSCLEROSIS OF NATIVE ARTERIES OF EXTREMITIES, UNSPECIFIED EXTREMITY: ICD-10-CM

## 2017-12-08 PROCEDURE — 93975 VASCULAR STUDY: CPT | Mod: 26

## 2017-12-08 PROCEDURE — 93975 VASCULAR STUDY: CPT

## 2017-12-13 ENCOUNTER — APPOINTMENT (OUTPATIENT)
Dept: NEPHROLOGY | Facility: CLINIC | Age: 82
End: 2017-12-13

## 2017-12-19 ENCOUNTER — APPOINTMENT (OUTPATIENT)
Dept: NEPHROLOGY | Facility: CLINIC | Age: 82
End: 2017-12-19
Payer: MEDICARE

## 2017-12-19 VITALS
SYSTOLIC BLOOD PRESSURE: 134 MMHG | HEART RATE: 86 BPM | HEIGHT: 65 IN | DIASTOLIC BLOOD PRESSURE: 85 MMHG | OXYGEN SATURATION: 96 % | BODY MASS INDEX: 20.49 KG/M2 | WEIGHT: 123 LBS

## 2017-12-19 PROCEDURE — C1769: CPT

## 2017-12-19 PROCEDURE — 99152 MOD SED SAME PHYS/QHP 5/>YRS: CPT

## 2017-12-19 PROCEDURE — 99214 OFFICE O/P EST MOD 30 MIN: CPT

## 2017-12-19 PROCEDURE — C1894: CPT

## 2017-12-19 PROCEDURE — 93005 ELECTROCARDIOGRAM TRACING: CPT

## 2017-12-19 PROCEDURE — 85027 COMPLETE CBC AUTOMATED: CPT

## 2017-12-19 PROCEDURE — C1887: CPT

## 2017-12-19 PROCEDURE — 93458 L HRT ARTERY/VENTRICLE ANGIO: CPT

## 2017-12-19 PROCEDURE — 80048 BASIC METABOLIC PNL TOTAL CA: CPT

## 2018-05-29 ENCOUNTER — LABORATORY RESULT (OUTPATIENT)
Age: 83
End: 2018-05-29

## 2018-05-29 ENCOUNTER — APPOINTMENT (OUTPATIENT)
Dept: NEPHROLOGY | Facility: CLINIC | Age: 83
End: 2018-05-29
Payer: MEDICARE

## 2018-05-29 VITALS
DIASTOLIC BLOOD PRESSURE: 83 MMHG | HEIGHT: 65 IN | WEIGHT: 123.46 LBS | HEART RATE: 60 BPM | BODY MASS INDEX: 20.57 KG/M2 | SYSTOLIC BLOOD PRESSURE: 131 MMHG | OXYGEN SATURATION: 98 %

## 2018-05-29 VITALS — DIASTOLIC BLOOD PRESSURE: 80 MMHG | SYSTOLIC BLOOD PRESSURE: 128 MMHG

## 2018-05-29 DIAGNOSIS — M19.90 UNSPECIFIED OSTEOARTHRITIS, UNSPECIFIED SITE: ICD-10-CM

## 2018-05-29 PROCEDURE — 99214 OFFICE O/P EST MOD 30 MIN: CPT

## 2018-05-30 LAB
25(OH)D3 SERPL-MCNC: 37.5 NG/ML
ALBUMIN SERPL ELPH-MCNC: 4.2 G/DL
ALP BLD-CCNC: 118 U/L
ALT SERPL-CCNC: 9 U/L
ANION GAP SERPL CALC-SCNC: 16 MMOL/L
APPEARANCE: CLEAR
AST SERPL-CCNC: 18 U/L
BACTERIA: NEGATIVE
BASOPHILS # BLD AUTO: 0.02 K/UL
BASOPHILS NFR BLD AUTO: 0.4 %
BILIRUB SERPL-MCNC: 0.7 MG/DL
BILIRUBIN URINE: NEGATIVE
BLOOD URINE: NEGATIVE
BUN SERPL-MCNC: 18 MG/DL
CALCIUM SERPL-MCNC: 9.7 MG/DL
CHLORIDE SERPL-SCNC: 103 MMOL/L
CHOLEST SERPL-MCNC: 185 MG/DL
CHOLEST/HDLC SERPL: 2.7 RATIO
CO2 SERPL-SCNC: 23 MMOL/L
COLOR: YELLOW
CREAT SERPL-MCNC: 0.97 MG/DL
CREAT SPEC-SCNC: 31 MG/DL
CREAT/PROT UR: NORMAL
EOSINOPHIL # BLD AUTO: 0.14 K/UL
EOSINOPHIL NFR BLD AUTO: 3 %
GLUCOSE QUALITATIVE U: NEGATIVE MG/DL
GLUCOSE SERPL-MCNC: 92 MG/DL
HCT VFR BLD CALC: 39.9 %
HDLC SERPL-MCNC: 69 MG/DL
HGB BLD-MCNC: 12.8 G/DL
HYALINE CASTS: 0 /LPF
IMM GRANULOCYTES NFR BLD AUTO: 0.2 %
KETONES URINE: NEGATIVE
LDLC SERPL CALC-MCNC: 100 MG/DL
LEUKOCYTE ESTERASE URINE: ABNORMAL
LYMPHOCYTES # BLD AUTO: 1.9 K/UL
LYMPHOCYTES NFR BLD AUTO: 40.6 %
MAGNESIUM SERPL-MCNC: 2 MG/DL
MAN DIFF?: NORMAL
MCHC RBC-ENTMCNC: 23 PG
MCHC RBC-ENTMCNC: 32.1 GM/DL
MCV RBC AUTO: 71.8 FL
MICROSCOPIC-UA: NORMAL
MONOCYTES # BLD AUTO: 0.4 K/UL
MONOCYTES NFR BLD AUTO: 8.5 %
NEUTROPHILS # BLD AUTO: 2.21 K/UL
NEUTROPHILS NFR BLD AUTO: 47.3 %
NITRITE URINE: NEGATIVE
PH URINE: 7.5
PHOSPHATE SERPL-MCNC: 3.7 MG/DL
PLATELET # BLD AUTO: 221 K/UL
POTASSIUM SERPL-SCNC: 3.9 MMOL/L
PROT SERPL-MCNC: 7.6 G/DL
PROT UR-MCNC: <4 MG/DL
PROTEIN URINE: NEGATIVE MG/DL
RBC # BLD: 5.56 M/UL
RBC # FLD: 15.6 %
RED BLOOD CELLS URINE: 1 /HPF
SODIUM SERPL-SCNC: 142 MMOL/L
SPECIFIC GRAVITY URINE: 1.01
SQUAMOUS EPITHELIAL CELLS: 2 /HPF
TRIGL SERPL-MCNC: 79 MG/DL
TSH SERPL-ACNC: 2.01 UIU/ML
URATE SERPL-MCNC: 4.3 MG/DL
UROBILINOGEN URINE: NEGATIVE MG/DL
WBC # FLD AUTO: 4.68 K/UL
WHITE BLOOD CELLS URINE: 5 /HPF

## 2018-08-01 ENCOUNTER — INPATIENT (INPATIENT)
Facility: HOSPITAL | Age: 83
LOS: 13 days | Discharge: HOME HEALTH | End: 2018-08-15
Attending: INTERNAL MEDICINE | Admitting: INTERNAL MEDICINE
Payer: MEDICARE

## 2018-08-01 VITALS
DIASTOLIC BLOOD PRESSURE: 72 MMHG | HEART RATE: 110 BPM | SYSTOLIC BLOOD PRESSURE: 122 MMHG | OXYGEN SATURATION: 95 % | RESPIRATION RATE: 24 BRPM

## 2018-08-01 DIAGNOSIS — I46.9 CARDIAC ARREST, CAUSE UNSPECIFIED: ICD-10-CM

## 2018-08-01 LAB
ALBUMIN SERPL ELPH-MCNC: 3.4 G/DL — SIGNIFICANT CHANGE UP (ref 3.3–5)
ALP SERPL-CCNC: 121 U/L — HIGH (ref 40–120)
ALT FLD-CCNC: 560 U/L — HIGH (ref 4–33)
ANISOCYTOSIS BLD QL: SLIGHT — SIGNIFICANT CHANGE UP
APTT BLD: 30.3 SEC — SIGNIFICANT CHANGE UP (ref 27.5–37.4)
AST SERPL-CCNC: 589 U/L — HIGH (ref 4–32)
BASE EXCESS BLDA CALC-SCNC: -3.7 MMOL/L — SIGNIFICANT CHANGE UP
BASE EXCESS BLDV CALC-SCNC: -5 MMOL/L — SIGNIFICANT CHANGE UP
BASOPHILS # BLD AUTO: 0.03 K/UL — SIGNIFICANT CHANGE UP (ref 0–0.2)
BASOPHILS NFR BLD AUTO: 0.4 % — SIGNIFICANT CHANGE UP (ref 0–2)
BILIRUB SERPL-MCNC: 0.3 MG/DL — SIGNIFICANT CHANGE UP (ref 0.2–1.2)
BLOOD GAS VENOUS - CREATININE: 1.08 MG/DL — SIGNIFICANT CHANGE UP (ref 0.5–1.3)
BUN SERPL-MCNC: 14 MG/DL — SIGNIFICANT CHANGE UP (ref 7–23)
BURR CELLS BLD QL SMEAR: SLIGHT — SIGNIFICANT CHANGE UP
CALCIUM SERPL-MCNC: 8.4 MG/DL — SIGNIFICANT CHANGE UP (ref 8.4–10.5)
CHLORIDE BLDV-SCNC: 110 MMOL/L — HIGH (ref 96–108)
CHLORIDE SERPL-SCNC: 104 MMOL/L — SIGNIFICANT CHANGE UP (ref 98–107)
CO2 SERPL-SCNC: 18 MMOL/L — LOW (ref 22–31)
CREAT SERPL-MCNC: 1.07 MG/DL — SIGNIFICANT CHANGE UP (ref 0.5–1.3)
EOSINOPHIL # BLD AUTO: 0.06 K/UL — SIGNIFICANT CHANGE UP (ref 0–0.5)
EOSINOPHIL NFR BLD AUTO: 0.8 % — SIGNIFICANT CHANGE UP (ref 0–6)
GAS PNL BLDV: 135 MMOL/L — LOW (ref 136–146)
GLUCOSE BLDV-MCNC: 113 — HIGH (ref 70–99)
GLUCOSE SERPL-MCNC: 113 MG/DL — HIGH (ref 70–99)
HCO3 BLDA-SCNC: 22 MMOL/L — SIGNIFICANT CHANGE UP (ref 22–26)
HCO3 BLDV-SCNC: 19 MMOL/L — LOW (ref 20–27)
HCT VFR BLD CALC: 37.8 % — SIGNIFICANT CHANGE UP (ref 34.5–45)
HCT VFR BLDV CALC: 38.3 % — SIGNIFICANT CHANGE UP (ref 34.5–45)
HGB BLD-MCNC: 12.1 G/DL — SIGNIFICANT CHANGE UP (ref 11.5–15.5)
HGB BLDV-MCNC: 12.4 G/DL — SIGNIFICANT CHANGE UP (ref 11.5–15.5)
IMM GRANULOCYTES # BLD AUTO: 0.09 # — SIGNIFICANT CHANGE UP
IMM GRANULOCYTES NFR BLD AUTO: 1.2 % — SIGNIFICANT CHANGE UP (ref 0–1.5)
INR BLD: 1 — SIGNIFICANT CHANGE UP (ref 0.88–1.17)
LACTATE BLDV-MCNC: 4.8 MMOL/L — CRITICAL HIGH (ref 0.5–2)
LYMPHOCYTES # BLD AUTO: 3.12 K/UL — SIGNIFICANT CHANGE UP (ref 1–3.3)
LYMPHOCYTES # BLD AUTO: 41.3 % — SIGNIFICANT CHANGE UP (ref 13–44)
MCHC RBC-ENTMCNC: 23.1 PG — LOW (ref 27–34)
MCHC RBC-ENTMCNC: 32 % — SIGNIFICANT CHANGE UP (ref 32–36)
MCV RBC AUTO: 72.3 FL — LOW (ref 80–100)
MICROCYTES BLD QL: SLIGHT — SIGNIFICANT CHANGE UP
MONOCYTES # BLD AUTO: 0.35 K/UL — SIGNIFICANT CHANGE UP (ref 0–0.9)
MONOCYTES NFR BLD AUTO: 4.6 % — SIGNIFICANT CHANGE UP (ref 2–14)
NEUTROPHILS # BLD AUTO: 3.9 K/UL — SIGNIFICANT CHANGE UP (ref 1.8–7.4)
NEUTROPHILS NFR BLD AUTO: 51.7 % — SIGNIFICANT CHANGE UP (ref 43–77)
NRBC # FLD: 0.03 — SIGNIFICANT CHANGE UP
NT-PROBNP SERPL-SCNC: 374.8 PG/ML — SIGNIFICANT CHANGE UP
OVALOCYTES BLD QL SMEAR: SLIGHT — SIGNIFICANT CHANGE UP
PCO2 BLDA: 36 MMHG — SIGNIFICANT CHANGE UP (ref 32–48)
PCO2 BLDV: 46 MMHG — SIGNIFICANT CHANGE UP (ref 41–51)
PH BLDA: 7.38 PH — SIGNIFICANT CHANGE UP (ref 7.35–7.45)
PH BLDV: 7.28 PH — LOW (ref 7.32–7.43)
PLATELET # BLD AUTO: 185 K/UL — SIGNIFICANT CHANGE UP (ref 150–400)
PLATELET COUNT - ESTIMATE: NORMAL — SIGNIFICANT CHANGE UP
PMV BLD: 11.5 FL — SIGNIFICANT CHANGE UP (ref 7–13)
PO2 BLDA: 80 MMHG — LOW (ref 83–108)
PO2 BLDV: 44 MMHG — HIGH (ref 35–40)
POIKILOCYTOSIS BLD QL AUTO: SLIGHT — SIGNIFICANT CHANGE UP
POLYCHROMASIA BLD QL SMEAR: SLIGHT — SIGNIFICANT CHANGE UP
POTASSIUM BLDV-SCNC: 5.4 MMOL/L — HIGH (ref 3.4–4.5)
POTASSIUM SERPL-MCNC: 4.7 MMOL/L — SIGNIFICANT CHANGE UP (ref 3.5–5.3)
POTASSIUM SERPL-SCNC: 4.7 MMOL/L — SIGNIFICANT CHANGE UP (ref 3.5–5.3)
PROT SERPL-MCNC: 6.7 G/DL — SIGNIFICANT CHANGE UP (ref 6–8.3)
PROTHROM AB SERPL-ACNC: 11.5 SEC — SIGNIFICANT CHANGE UP (ref 9.8–13.1)
RBC # BLD: 5.23 M/UL — HIGH (ref 3.8–5.2)
RBC # FLD: 15.7 % — HIGH (ref 10.3–14.5)
SAO2 % BLDA: 96.2 % — SIGNIFICANT CHANGE UP (ref 95–99)
SAO2 % BLDV: 71.3 % — SIGNIFICANT CHANGE UP (ref 60–85)
SODIUM SERPL-SCNC: 138 MMOL/L — SIGNIFICANT CHANGE UP (ref 135–145)
TARGETS BLD QL SMEAR: SLIGHT — SIGNIFICANT CHANGE UP
TROPONIN T, HIGH SENSITIVITY: 46 NG/L — SIGNIFICANT CHANGE UP (ref ?–14)
WBC # BLD: 7.55 K/UL — SIGNIFICANT CHANGE UP (ref 3.8–10.5)
WBC # FLD AUTO: 7.55 K/UL — SIGNIFICANT CHANGE UP (ref 3.8–10.5)

## 2018-08-01 PROCEDURE — 74177 CT ABD & PELVIS W/CONTRAST: CPT | Mod: 26

## 2018-08-01 PROCEDURE — 99291 CRITICAL CARE FIRST HOUR: CPT

## 2018-08-01 PROCEDURE — 70450 CT HEAD/BRAIN W/O DYE: CPT | Mod: 26

## 2018-08-01 PROCEDURE — 71275 CT ANGIOGRAPHY CHEST: CPT | Mod: 26

## 2018-08-01 PROCEDURE — 71045 X-RAY EXAM CHEST 1 VIEW: CPT | Mod: 26

## 2018-08-01 RX ORDER — FENTANYL CITRATE 50 UG/ML
1 INJECTION INTRAVENOUS
Qty: 2500 | Refills: 0 | Status: DISCONTINUED | OUTPATIENT
Start: 2018-08-01 | End: 2018-08-03

## 2018-08-01 RX ORDER — VANCOMYCIN HCL 1 G
VIAL (EA) INTRAVENOUS
Qty: 0 | Refills: 0 | Status: DISCONTINUED | OUTPATIENT
Start: 2018-08-01 | End: 2018-08-01

## 2018-08-01 RX ORDER — FENTANYL CITRATE 50 UG/ML
1 INJECTION INTRAVENOUS
Qty: 5000 | Refills: 0 | Status: DISCONTINUED | OUTPATIENT
Start: 2018-08-01 | End: 2018-08-01

## 2018-08-01 RX ORDER — HEPARIN SODIUM 5000 [USP'U]/ML
5000 INJECTION INTRAVENOUS; SUBCUTANEOUS EVERY 12 HOURS
Qty: 0 | Refills: 0 | Status: DISCONTINUED | OUTPATIENT
Start: 2018-08-01 | End: 2018-08-02

## 2018-08-01 RX ORDER — VANCOMYCIN HCL 1 G
1000 VIAL (EA) INTRAVENOUS ONCE
Qty: 0 | Refills: 0 | Status: COMPLETED | OUTPATIENT
Start: 2018-08-01 | End: 2018-08-01

## 2018-08-01 RX ORDER — FENTANYL CITRATE 50 UG/ML
50 INJECTION INTRAVENOUS ONCE
Qty: 0 | Refills: 0 | Status: DISCONTINUED | OUTPATIENT
Start: 2018-08-01 | End: 2018-08-01

## 2018-08-01 RX ORDER — PIPERACILLIN AND TAZOBACTAM 4; .5 G/20ML; G/20ML
3.38 INJECTION, POWDER, LYOPHILIZED, FOR SOLUTION INTRAVENOUS EVERY 8 HOURS
Qty: 0 | Refills: 0 | Status: DISCONTINUED | OUTPATIENT
Start: 2018-08-01 | End: 2018-08-04

## 2018-08-01 RX ORDER — PROPOFOL 10 MG/ML
5 INJECTION, EMULSION INTRAVENOUS
Qty: 1000 | Refills: 0 | Status: DISCONTINUED | OUTPATIENT
Start: 2018-08-01 | End: 2018-08-03

## 2018-08-01 RX ORDER — PROPOFOL 10 MG/ML
30 INJECTION, EMULSION INTRAVENOUS
Qty: 500 | Refills: 0 | Status: DISCONTINUED | OUTPATIENT
Start: 2018-08-01 | End: 2018-08-01

## 2018-08-01 RX ADMIN — PIPERACILLIN AND TAZOBACTAM 25 GRAM(S): 4; .5 INJECTION, POWDER, LYOPHILIZED, FOR SOLUTION INTRAVENOUS at 23:03

## 2018-08-01 RX ADMIN — FENTANYL CITRATE 50 MICROGRAM(S): 50 INJECTION INTRAVENOUS at 19:05

## 2018-08-01 RX ADMIN — Medication 250 MILLIGRAM(S): at 21:41

## 2018-08-01 RX ADMIN — FENTANYL CITRATE 5.71 MICROGRAM(S)/KG/HR: 50 INJECTION INTRAVENOUS at 21:42

## 2018-08-01 RX ADMIN — FENTANYL CITRATE 50 MICROGRAM(S): 50 INJECTION INTRAVENOUS at 18:50

## 2018-08-01 RX ADMIN — PROPOFOL 1.71 MICROGRAM(S)/KG/MIN: 10 INJECTION, EMULSION INTRAVENOUS at 21:42

## 2018-08-01 NOTE — H&P ADULT - PMH
HTN (hypertension)    NSTEMI (non-ST elevation myocardial infarction)    Osteoporosis    PAD (peripheral artery disease)

## 2018-08-01 NOTE — ED ADULT NURSE NOTE - NSIMPLEMENTINTERV_GEN_ALL_ED
Implemented All Fall Risk Interventions:  [place] to call system. Call bell, personal items and telephone within reach. Instruct patient to call for assistance. Room bathroom lighting operational. Non-slip footwear when patient is off stretcher. Physically safe environment: no spills, clutter or unnecessary equipment. Stretcher in lowest position, wheels locked, appropriate side rails in place. Provide visual cue, wrist band, yellow gown, etc. Monitor gait and stability. Monitor for mental status changes and reorient to person, place, and time. Review medications for side effects contributing to fall risk. Reinforce activity limits and safety measures with patient and family.

## 2018-08-01 NOTE — ED PROVIDER NOTE - OBJECTIVE STATEMENT
81yo F pmhx HTN osteoporosis BIBEMS s/p cardiac arrest. Per family pt. hadn't been feeling well for past couple of days after returning home from trip to [place], family reports she became weak and lost consciousness, per EMS she was found unresponsive, in a shockable rhythm, received 1 shock, was intubated and achieved ROSC after 2nd round of CPR. Arrives to ED intubated. 81yo F pmhx HTN osteoporosis BIBEMS s/p cardiac arrest. Per family pt. hadn't been feeling well for past couple of days after returning home from trip to [place], family reports she became weak and lost consciousness, per EMS she was found unresponsive, in a shockable rhythm, received 1 shock, was intubated and achieved ROSC after 2nd round of CPR. Arrives to ED intubated.  Attending - 81 y/o F BIBA with notification for post arrest.  Reportedly returned from trip to [place] few days ago and "not feeling well" since.  Described as generally tired.  No fever, n/v/d.  Today, 911 called when patient had witnessed arrest.  CFR provided CPR with 1 AED defibrillation administered and subsequent ROSC.  Medics arrived and found patient pulseless, CPR re-initiated, but patient achieved ROSC again prior to any medications or defibrillation.  Initial ALS rhythm reportedly PEA.  Valium 10mg by EMS as patient slightly combative.  On arrival to ED, patient found to have + pulse with elevated BP and HR.  Intubated by EMS and position confirmed.  RR 22-24, above vent.  I spoke to granddaughter at bedside and son on phone.

## 2018-08-01 NOTE — ED ADULT NURSE NOTE - OBJECTIVE STATEMENT
Facilitator RN:  received pt in Trauma C as notification for post-cardiac arrest.  Pt arrives intubated with IV line in place.  Per family and EMS, pt was sitting with family when pt suddenly slumped over and became unresponsive.  Family reports that pt recently flew back from [place] after a  and has c/o "not feeling well" since returning.  Pt afebrile rectally, vital signs as noted.  Pt turned and positioned, no skin breakdown noted.  Pt placed on Zoll monitor.  Labs drawn and sent; additional IV access obtained.  MD Naidu and ICU RN Danii at bedside.  Awaiting further MD orders.

## 2018-08-01 NOTE — ED PROVIDER NOTE - CRITICAL CARE PROVIDED
telephone consultation with the patient's family/direct patient care (not related to procedure)/consult w/ pt's family directly relating to pts condition/additional history taking/documentation/interpretation of diagnostic studies/consultation with other physicians

## 2018-08-01 NOTE — H&P ADULT - HISTORY OF PRESENT ILLNESS
81yo F pmhx HTN osteoporosis BIBEMS s/p cardiac arrest. Per family pt. hadn't been feeling well for past couple of days after returning home from trip to [place], family reports she became weak and lost consciousness, per EMS she was found unresponsive, in a shockable rhythm, received 1 shock, was intubated and achieved ROSC after 2nd round of CPR. Arrives to ED intubated. 81 yo with h/o NSTEMI as per chart, PAD on ASA, Renal artery stenosis, HTN, good functional status, returned from [place] three days ago and has been not feeling well since. Per family pt. hadn't been feeling well for past couple of days after returning home from trip to [place], family reports pt was sitting in chair, became tachypneic, she became weak and lost consciousness. Down for 10 minutes prior to arrival of EMS.  Per EMS she was found unresponsive, in a shockable rhythm, received 1 shock, was intubated and achieved ROSC after 2nd round (approx 4 min) of CPR. Arrives to ED intubated.

## 2018-08-01 NOTE — ED PROVIDER NOTE - MEDICAL DECISION MAKING DETAILS
81yo F pmhx htn osteoporosis p/w CC cardiac arrest - no STEMI on ekg, labs sent, called for stat portable cxr, respiratory at bedside w/ vent, will call ICU 83yo F pmhx htn osteoporosis p/w CC cardiac arrest - no STEMI on ekg, labs sent, called for stat portable cxr, respiratory at bedside w/ vent, PE is on differential will CTPA and call micu

## 2018-08-01 NOTE — PATIENT PROFILE ADULT. - VISION (WITH CORRECTIVE LENSES IF THE PATIENT USUALLY WEARS THEM):
intubated sedated/Partially impaired: cannot see medication labels or newsprint, but can see obstacles in path, and the surrounding layout; can count fingers at arm's length

## 2018-08-01 NOTE — H&P ADULT - ASSESSMENT
Neuro:    Cardio 82 F PMH HTN, NSTEMI, PAD, osteoporosis BIBEMS s/p cardiac arrest, intubated and sedated for further management.     Neuro:  Intubated and sedated in the field. Previously unresponsive to pain off sedation.  Consider primary CNS process such as stroke though less likely as CT Head negative for acute pathology  As per family pt down for approx 10 min prior to CPR, consider repeat head imaging at later date    Cardio  Hx of NSTEMI, PAD, now w Vfib vs VTACH arrest-   Cont to monitor on tele  Repeat cardiac enzymes   Recc ECHO     Pulm  Aspiration PNA   Vancomycin and Zosyn  CT w b/l opacities     Gastro  Place OG tube- start feeds    Nephro  sCR 1.07, Unclear baseline  Nicole place draining clear urine  Send UA, UC    ID  Tx for aspiration PNA  F/u clx   Start abx     Heme  Hb 12.- No active issue

## 2018-08-01 NOTE — ED ADULT TRIAGE NOTE - CHIEF COMPLAINT QUOTE
patient came in as a notification by EMS as s/p cardiac arrest" patient was with family and collapsed FD at the scene performed CPR  and AED delivered one shock, patient was unresponsive and paramedics continued CPR and ROSC noted by EMS. patient intubated on the field with 7.5 ETT, left 18 gauge angio cath by EMS. patient received valium 10 mg by ems

## 2018-08-01 NOTE — ED PROVIDER NOTE - PHYSICAL EXAMINATION
ATTENDING PHYSICAL EXAM  Lying on stretcher.  NCAT.  right pupil surgical.  Left pupil 4mm reactive to 2mm with light.  No facial trauma.  ETT in place.  Small amount of bloody mucous in tube.  Neck with JVD b/l.  Lungs with scattered rales throughout.  Card - mild tachy, S1S2.  Abd - surg scar, ND, soft.  No pulsatile mass appreciated.  Ext with trace ankle edema.  Distal pulses intact.  No ext trauma noted. Nonresponsive to pain stimuli.

## 2018-08-01 NOTE — H&P ADULT - ATTENDING COMMENTS
83 yo with h/o NSTEMI as per chart, PAD, HTN, good functional status, returned from [place] three days ago and has been not feeling well since.  Today became suddenly unresponsive, EMS arrived, shock x1, ROSC after approx 4 min CPR (no meds).  Arrived to ED intubated no pressors.  CT suggestive of aspiration PNA/pneumonitis. except for sinus tach, pt has been hemodynamically stable, sedated on vent  POCUS A lines anteriorly B lines posteriorly no consolidation; no Pleff, L base static air bronchograms; LVF appears minimally reduced without focal wall motion abn although ventricle not fully visualized; IVC 1.8cm    - CA VT/VF arrest; ROSC 4 min, start ASA; avoid hyperthermia; sedate as needed with SAT daily for neuro eval; repeat trop, EKG  - Respiratory failure s/p CA: maybe aspiration; not sure if respiratory arrest precipitated CA or visaversa; will treat for asp PNA with Zosyn; maintain lung protective ventilation; now tolerating 50% FiO2; wean as tolerated; SBT    Addendum: 01:00 8/2/18  Trop 46 -->476; repeat EKG with minimal ST elevation in several leads; II, F, V2, V3  BP remains stable  sinus  - start heparin gtt; consult cardio; ASA

## 2018-08-01 NOTE — H&P ADULT - NSHPPHYSICALEXAM_GEN_ALL_CORE
General: Intubated and sedated   Neuro: Sedated. PERRLA. Not withdrawing to pain off sedation   HEENT: No deformity. No lymphadenopathy  Cardio: S1/S2. No murmurs, gallops  Resp: Intubated. Bronchial breath sounds   Abd: Soft, non-tender, non-distended  :  Nicole+  Extremities: Good pulses

## 2018-08-02 LAB
ALBUMIN SERPL ELPH-MCNC: 3.5 G/DL — SIGNIFICANT CHANGE UP (ref 3.3–5)
ALBUMIN SERPL ELPH-MCNC: 3.7 G/DL — SIGNIFICANT CHANGE UP (ref 3.3–5)
ALP SERPL-CCNC: 102 U/L — SIGNIFICANT CHANGE UP (ref 40–120)
ALP SERPL-CCNC: 119 U/L — SIGNIFICANT CHANGE UP (ref 40–120)
ALT FLD-CCNC: 485 U/L — HIGH (ref 4–33)
ALT FLD-CCNC: 563 U/L — HIGH (ref 4–33)
APTT BLD: > 200 SEC — CRITICAL HIGH (ref 27.5–37.4)
APTT BLD: > 200 SEC — CRITICAL HIGH (ref 27.5–37.4)
AST SERPL-CCNC: 464 U/L — HIGH (ref 4–32)
AST SERPL-CCNC: 572 U/L — HIGH (ref 4–32)
BASE EXCESS BLDA CALC-SCNC: -2.3 MMOL/L — SIGNIFICANT CHANGE UP
BASOPHILS # BLD AUTO: 0.02 K/UL — SIGNIFICANT CHANGE UP (ref 0–0.2)
BASOPHILS NFR BLD AUTO: 0.1 % — SIGNIFICANT CHANGE UP (ref 0–2)
BILIRUB SERPL-MCNC: 0.4 MG/DL — SIGNIFICANT CHANGE UP (ref 0.2–1.2)
BILIRUB SERPL-MCNC: 0.5 MG/DL — SIGNIFICANT CHANGE UP (ref 0.2–1.2)
BUN SERPL-MCNC: 13 MG/DL — SIGNIFICANT CHANGE UP (ref 7–23)
BUN SERPL-MCNC: 14 MG/DL — SIGNIFICANT CHANGE UP (ref 7–23)
CALCIUM SERPL-MCNC: 8.3 MG/DL — LOW (ref 8.4–10.5)
CALCIUM SERPL-MCNC: 8.7 MG/DL — SIGNIFICANT CHANGE UP (ref 8.4–10.5)
CHLORIDE SERPL-SCNC: 93 MMOL/L — LOW (ref 98–107)
CHLORIDE SERPL-SCNC: 99 MMOL/L — SIGNIFICANT CHANGE UP (ref 98–107)
CHOLEST SERPL-MCNC: 159 MG/DL — SIGNIFICANT CHANGE UP (ref 120–199)
CK MB BLD-MCNC: 19.93 NG/ML — HIGH (ref 1–4.7)
CK MB BLD-MCNC: 2.77 NG/ML — SIGNIFICANT CHANGE UP (ref 1–4.7)
CK MB BLD-MCNC: 20.03 NG/ML — HIGH (ref 1–4.7)
CK MB BLD-MCNC: 5.25 — HIGH (ref 0–2.5)
CK MB BLD-MCNC: SIGNIFICANT CHANGE UP (ref 0–2.5)
CK SERPL-CCNC: 131 U/L — SIGNIFICANT CHANGE UP (ref 25–170)
CK SERPL-CCNC: 381 U/L — HIGH (ref 25–170)
CK SERPL-CCNC: 452 U/L — HIGH (ref 25–170)
CO2 SERPL-SCNC: 18 MMOL/L — LOW (ref 22–31)
CO2 SERPL-SCNC: 20 MMOL/L — LOW (ref 22–31)
CORTIS SERPL-MCNC: 23.8 UG/DL — HIGH (ref 2.7–18.4)
CREAT SERPL-MCNC: 0.89 MG/DL — SIGNIFICANT CHANGE UP (ref 0.5–1.3)
CREAT SERPL-MCNC: 0.94 MG/DL — SIGNIFICANT CHANGE UP (ref 0.5–1.3)
EOSINOPHIL # BLD AUTO: 0 K/UL — SIGNIFICANT CHANGE UP (ref 0–0.5)
EOSINOPHIL NFR BLD AUTO: 0 % — SIGNIFICANT CHANGE UP (ref 0–6)
GLUCOSE SERPL-MCNC: 226 MG/DL — HIGH (ref 70–99)
GLUCOSE SERPL-MCNC: 349 MG/DL — HIGH (ref 70–99)
HBA1C BLD-MCNC: 5.4 % — SIGNIFICANT CHANGE UP (ref 4–5.6)
HCO3 BLDA-SCNC: 22 MMOL/L — SIGNIFICANT CHANGE UP (ref 22–26)
HCT VFR BLD CALC: 34.1 % — LOW (ref 34.5–45)
HCT VFR BLD CALC: 37.3 % — SIGNIFICANT CHANGE UP (ref 34.5–45)
HDLC SERPL-MCNC: 64 MG/DL — SIGNIFICANT CHANGE UP (ref 45–65)
HGB BLD-MCNC: 11.1 G/DL — LOW (ref 11.5–15.5)
HGB BLD-MCNC: 12 G/DL — SIGNIFICANT CHANGE UP (ref 11.5–15.5)
IMM GRANULOCYTES # BLD AUTO: 0.06 # — SIGNIFICANT CHANGE UP
IMM GRANULOCYTES NFR BLD AUTO: 0.3 % — SIGNIFICANT CHANGE UP (ref 0–1.5)
LACTATE SERPL-SCNC: 2.7 MMOL/L — HIGH (ref 0.5–2)
LIPID PNL WITH DIRECT LDL SERPL: 96 MG/DL — SIGNIFICANT CHANGE UP
LYMPHOCYTES # BLD AUTO: 0.8 K/UL — LOW (ref 1–3.3)
LYMPHOCYTES # BLD AUTO: 4.6 % — LOW (ref 13–44)
MAGNESIUM SERPL-MCNC: 1.9 MG/DL — SIGNIFICANT CHANGE UP (ref 1.6–2.6)
MAGNESIUM SERPL-MCNC: 2 MG/DL — SIGNIFICANT CHANGE UP (ref 1.6–2.6)
MCHC RBC-ENTMCNC: 23.4 PG — LOW (ref 27–34)
MCHC RBC-ENTMCNC: 23.5 PG — LOW (ref 27–34)
MCHC RBC-ENTMCNC: 32.2 % — SIGNIFICANT CHANGE UP (ref 32–36)
MCHC RBC-ENTMCNC: 32.6 % — SIGNIFICANT CHANGE UP (ref 32–36)
MCV RBC AUTO: 72.2 FL — LOW (ref 80–100)
MCV RBC AUTO: 72.7 FL — LOW (ref 80–100)
MONOCYTES # BLD AUTO: 0.7 K/UL — SIGNIFICANT CHANGE UP (ref 0–0.9)
MONOCYTES NFR BLD AUTO: 4 % — SIGNIFICANT CHANGE UP (ref 2–14)
NEUTROPHILS # BLD AUTO: 15.83 K/UL — HIGH (ref 1.8–7.4)
NEUTROPHILS NFR BLD AUTO: 91 % — HIGH (ref 43–77)
NRBC # FLD: 0 — SIGNIFICANT CHANGE UP
NRBC # FLD: 0 — SIGNIFICANT CHANGE UP
PCO2 BLDA: 38 MMHG — SIGNIFICANT CHANGE UP (ref 32–48)
PH BLDA: 7.38 PH — SIGNIFICANT CHANGE UP (ref 7.35–7.45)
PHOSPHATE SERPL-MCNC: 2.2 MG/DL — LOW (ref 2.5–4.5)
PHOSPHATE SERPL-MCNC: 3 MG/DL — SIGNIFICANT CHANGE UP (ref 2.5–4.5)
PLATELET # BLD AUTO: 179 K/UL — SIGNIFICANT CHANGE UP (ref 150–400)
PLATELET # BLD AUTO: 193 K/UL — SIGNIFICANT CHANGE UP (ref 150–400)
PMV BLD: 11 FL — SIGNIFICANT CHANGE UP (ref 7–13)
PMV BLD: 11.1 FL — SIGNIFICANT CHANGE UP (ref 7–13)
PO2 BLDA: 55 MMHG — LOW (ref 83–108)
POTASSIUM SERPL-MCNC: 3.8 MMOL/L — SIGNIFICANT CHANGE UP (ref 3.5–5.3)
POTASSIUM SERPL-MCNC: 4.1 MMOL/L — SIGNIFICANT CHANGE UP (ref 3.5–5.3)
POTASSIUM SERPL-SCNC: 3.8 MMOL/L — SIGNIFICANT CHANGE UP (ref 3.5–5.3)
POTASSIUM SERPL-SCNC: 4.1 MMOL/L — SIGNIFICANT CHANGE UP (ref 3.5–5.3)
PROT SERPL-MCNC: 6.5 G/DL — SIGNIFICANT CHANGE UP (ref 6–8.3)
PROT SERPL-MCNC: 7 G/DL — SIGNIFICANT CHANGE UP (ref 6–8.3)
RBC # BLD: 4.72 M/UL — SIGNIFICANT CHANGE UP (ref 3.8–5.2)
RBC # BLD: 5.13 M/UL — SIGNIFICANT CHANGE UP (ref 3.8–5.2)
RBC # FLD: 15.7 % — HIGH (ref 10.3–14.5)
RBC # FLD: 15.8 % — HIGH (ref 10.3–14.5)
SAO2 % BLDA: 87.4 % — LOW (ref 95–99)
SODIUM SERPL-SCNC: 132 MMOL/L — LOW (ref 135–145)
SODIUM SERPL-SCNC: 134 MMOL/L — LOW (ref 135–145)
TRIGL SERPL-MCNC: 41 MG/DL — SIGNIFICANT CHANGE UP (ref 10–149)
TROPONIN T, HIGH SENSITIVITY: 476 NG/L — CRITICAL HIGH (ref ?–14)
TROPONIN T, HIGH SENSITIVITY: 507 NG/L — CRITICAL HIGH (ref ?–14)
TROPONIN T, HIGH SENSITIVITY: 562 NG/L — CRITICAL HIGH (ref ?–14)
TSH SERPL-MCNC: 0.61 UIU/ML — SIGNIFICANT CHANGE UP (ref 0.27–4.2)
VANCOMYCIN FLD-MCNC: 11 UG/ML — SIGNIFICANT CHANGE UP
WBC # BLD: 15.79 K/UL — HIGH (ref 3.8–10.5)
WBC # BLD: 17.41 K/UL — HIGH (ref 3.8–10.5)
WBC # FLD AUTO: 15.79 K/UL — HIGH (ref 3.8–10.5)
WBC # FLD AUTO: 17.41 K/UL — HIGH (ref 3.8–10.5)

## 2018-08-02 PROCEDURE — 93308 TTE F-UP OR LMTD: CPT | Mod: 26,GC

## 2018-08-02 PROCEDURE — 76604 US EXAM CHEST: CPT | Mod: 26,GC

## 2018-08-02 PROCEDURE — 71045 X-RAY EXAM CHEST 1 VIEW: CPT | Mod: 26,76

## 2018-08-02 PROCEDURE — 93306 TTE W/DOPPLER COMPLETE: CPT | Mod: 26

## 2018-08-02 PROCEDURE — 99233 SBSQ HOSP IP/OBS HIGH 50: CPT | Mod: GC

## 2018-08-02 PROCEDURE — 99291 CRITICAL CARE FIRST HOUR: CPT | Mod: 25

## 2018-08-02 RX ORDER — ASPIRIN/CALCIUM CARB/MAGNESIUM 324 MG
81 TABLET ORAL DAILY
Qty: 0 | Refills: 0 | Status: DISCONTINUED | OUTPATIENT
Start: 2018-08-03 | End: 2018-08-15

## 2018-08-02 RX ORDER — HEPARIN SODIUM 5000 [USP'U]/ML
3500 INJECTION INTRAVENOUS; SUBCUTANEOUS EVERY 6 HOURS
Qty: 0 | Refills: 0 | Status: DISCONTINUED | OUTPATIENT
Start: 2018-08-02 | End: 2018-08-02

## 2018-08-02 RX ORDER — CLOPIDOGREL BISULFATE 75 MG/1
75 TABLET, FILM COATED ORAL DAILY
Qty: 0 | Refills: 0 | Status: DISCONTINUED | OUTPATIENT
Start: 2018-08-03 | End: 2018-08-15

## 2018-08-02 RX ORDER — HEPARIN SODIUM 5000 [USP'U]/ML
3500 INJECTION INTRAVENOUS; SUBCUTANEOUS ONCE
Qty: 0 | Refills: 0 | Status: COMPLETED | OUTPATIENT
Start: 2018-08-02 | End: 2018-08-02

## 2018-08-02 RX ORDER — ATORVASTATIN CALCIUM 80 MG/1
80 TABLET, FILM COATED ORAL AT BEDTIME
Qty: 0 | Refills: 0 | Status: DISCONTINUED | OUTPATIENT
Start: 2018-08-02 | End: 2018-08-15

## 2018-08-02 RX ORDER — CLOPIDOGREL BISULFATE 75 MG/1
600 TABLET, FILM COATED ORAL ONCE
Qty: 0 | Refills: 0 | Status: COMPLETED | OUTPATIENT
Start: 2018-08-02 | End: 2018-08-02

## 2018-08-02 RX ORDER — ASPIRIN/CALCIUM CARB/MAGNESIUM 324 MG
325 TABLET ORAL ONCE
Qty: 0 | Refills: 0 | Status: COMPLETED | OUTPATIENT
Start: 2018-08-02 | End: 2018-08-02

## 2018-08-02 RX ORDER — HEPARIN SODIUM 5000 [USP'U]/ML
INJECTION INTRAVENOUS; SUBCUTANEOUS
Qty: 25000 | Refills: 0 | Status: DISCONTINUED | OUTPATIENT
Start: 2018-08-02 | End: 2018-08-04

## 2018-08-02 RX ADMIN — HEPARIN SODIUM 3500 UNIT(S): 5000 INJECTION INTRAVENOUS; SUBCUTANEOUS at 02:24

## 2018-08-02 RX ADMIN — FENTANYL CITRATE 5.71 MICROGRAM(S)/KG/HR: 50 INJECTION INTRAVENOUS at 19:22

## 2018-08-02 RX ADMIN — PROPOFOL 1.71 MICROGRAM(S)/KG/MIN: 10 INJECTION, EMULSION INTRAVENOUS at 19:23

## 2018-08-02 RX ADMIN — CLOPIDOGREL BISULFATE 600 MILLIGRAM(S): 75 TABLET, FILM COATED ORAL at 02:57

## 2018-08-02 RX ADMIN — PIPERACILLIN AND TAZOBACTAM 25 GRAM(S): 4; .5 INJECTION, POWDER, LYOPHILIZED, FOR SOLUTION INTRAVENOUS at 22:25

## 2018-08-02 RX ADMIN — PIPERACILLIN AND TAZOBACTAM 25 GRAM(S): 4; .5 INJECTION, POWDER, LYOPHILIZED, FOR SOLUTION INTRAVENOUS at 13:50

## 2018-08-02 RX ADMIN — HEPARIN SODIUM 0 UNIT(S)/HR: 5000 INJECTION INTRAVENOUS; SUBCUTANEOUS at 18:16

## 2018-08-02 RX ADMIN — HEPARIN SODIUM 300 UNIT(S)/HR: 5000 INJECTION INTRAVENOUS; SUBCUTANEOUS at 19:22

## 2018-08-02 RX ADMIN — PROPOFOL 1.71 MICROGRAM(S)/KG/MIN: 10 INJECTION, EMULSION INTRAVENOUS at 10:26

## 2018-08-02 RX ADMIN — ATORVASTATIN CALCIUM 80 MILLIGRAM(S): 80 TABLET, FILM COATED ORAL at 22:25

## 2018-08-02 RX ADMIN — Medication 325 MILLIGRAM(S): at 02:57

## 2018-08-02 RX ADMIN — HEPARIN SODIUM 0 UNIT(S)/HR: 5000 INJECTION INTRAVENOUS; SUBCUTANEOUS at 09:11

## 2018-08-02 RX ADMIN — HEPARIN SODIUM 500 UNIT(S)/HR: 5000 INJECTION INTRAVENOUS; SUBCUTANEOUS at 10:22

## 2018-08-02 RX ADMIN — HEPARIN SODIUM 700 UNIT(S)/HR: 5000 INJECTION INTRAVENOUS; SUBCUTANEOUS at 02:28

## 2018-08-02 RX ADMIN — PIPERACILLIN AND TAZOBACTAM 25 GRAM(S): 4; .5 INJECTION, POWDER, LYOPHILIZED, FOR SOLUTION INTRAVENOUS at 06:14

## 2018-08-02 RX ADMIN — FENTANYL CITRATE 5.71 MICROGRAM(S)/KG/HR: 50 INJECTION INTRAVENOUS at 10:25

## 2018-08-02 NOTE — CONSULT NOTE ADULT - ATTENDING COMMENTS
Currently on heparin gtt with sedation and intubated  Severe LV dysfunction with LAD territory hypokinesis. On my review, Possible apical ballooning   ?? reported normal cath recently  If mental status returns to normal or patient starts to shows signs of cardiogenic shock, will consider cath  Currently she is hemodynamically stable with already peaking of CKMB at 20  Can start afterload reduction, low dose captopril tid

## 2018-08-02 NOTE — PROGRESS NOTE ADULT - SUBJECTIVE AND OBJECTIVE BOX
Jesse Soler, PGY1    CHIEF COMPLAINT:    Interval Events:    REVIEW OF SYSTEMS:  Constitutional: [ ] negative [ ] fevers [ ] chills [ ] weight loss [ ] weight gain  HEENT: [ ] negative [ ] dry eyes [ ] eye irritation [ ] postnasal drip [ ] nasal congestion  CV: [ ] negative  [ ] chest pain [ ] orthopnea [ ] palpitations [ ] murmur  Resp: [ ] negative [ ] cough [ ] shortness of breath [ ] dyspnea [ ] wheezing [ ] sputum [ ] hemoptysis  GI: [ ] negative [ ] nausea [ ] vomiting [ ] diarrhea [ ] constipation [ ] abd pain [ ] dysphagia   : [ ] negative [ ] dysuria [ ] nocturia [ ] hematuria [ ] increased urinary frequency  Musculoskeletal: [ ] negative [ ] back pain [ ] myalgias [ ] arthralgias [ ] fracture  Skin: [ ] negative [ ] rash [ ] itch  Neurological: [ ] negative [ ] headache [ ] dizziness [ ] syncope [ ] weakness [ ] numbness  Psychiatric: [ ] negative [ ] anxiety [ ] depression  Endocrine: [ ] negative [ ] diabetes [ ] thyroid problem  Hematologic/Lymphatic: [ ] negative [ ] anemia [ ] bleeding problem  Allergic/Immunologic: [ ] negative [ ] itchy eyes [ ] nasal discharge [ ] hives [ ] angioedema  [ ] All other systems negative  [ ] Unable to assess ROS because ________    OBJECTIVE:  ICU Vital Signs Last 24 Hrs  T(C): 37.4 (02 Aug 2018 04:00), Max: 37.4 (02 Aug 2018 04:00)  T(F): 99.3 (02 Aug 2018 04:00), Max: 99.3 (02 Aug 2018 04:00)  HR: 104 (02 Aug 2018 06:00) (98 - 124)  BP: 100/73 (02 Aug 2018 06:00) (98/79 - 165/96)  BP(mean): 79 (02 Aug 2018 06:00) (79 - 114)  ABP: --  ABP(mean): --  RR: 12 (02 Aug 2018 06:00) (12 - 29)  SpO2: 100% (02 Aug 2018 06:00) (94% - 100%)    Mode: AC/ CMV (Assist Control/ Continuous Mandatory Ventilation), RR (machine): 12, TV (machine): 400, FiO2: 40, PEEP: 5, MAP: 7, PIP: 13    08-01 @ 07:01  -  08-02 @ 07:00  --------------------------------------------------------  IN: 681 mL / OUT: 2490 mL / NET: -1809 mL      CAPILLARY BLOOD GLUCOSE          PHYSICAL EXAM:  General:   HEENT:   Lymph Nodes:  Neck:   Respiratory:   Cardiovascular:   Abdomen:   Extremities:   Skin:   Neurological:  Psychiatry:    LINES:    HOSPITAL MEDICATIONS:  heparin  Infusion.  Unit(s)/Hr IV Continuous <Continuous>    piperacillin/tazobactam IVPB. 3.375 Gram(s) IV Intermittent every 8 hours      atorvastatin 80 milliGRAM(s) Oral at bedtime      fentaNYL   Infusion. 1 MICROgram(s)/kG/Hr IV Continuous <Continuous>  propofol Infusion 5 MICROgram(s)/kG/Min IV Continuous <Continuous>                    LABS:                        12.0   17.41 )-----------( 193      ( 02 Aug 2018 02:34 )             37.3     Hgb Trend: 12.0<--, 12.1<--  08-02    132<L>  |  93<L>  |  14  ----------------------------<  349<H>  4.1   |  18<L>  |  0.94    Ca    8.3<L>      02 Aug 2018 02:34  Phos  3.0     08-02  Mg     2.0     08-02    TPro  6.5  /  Alb  3.5  /  TBili  0.4  /  DBili  x   /  AST  464<H>  /  ALT  485<H>  /  AlkPhos  102  08-02    Creatinine Trend: 0.94<--, 0.89<--, 1.07<--  PT/INR - ( 01 Aug 2018 17:01 )   PT: 11.5 SEC;   INR: 1.00          PTT - ( 01 Aug 2018 17:01 )  PTT:30.3 SEC    Arterial Blood Gas:  08-01 @ 23:46  7.38/38/55/22/87.4/-2.3  ABG lactate: --  Arterial Blood Gas:  08-01 @ 19:10  7.38/36/80/22/96.2/-3.7  ABG lactate: --    Venous Blood Gas:  08-01 @ 17:01  7.28/46/44/19/71.3  VBG Lactate: 4.8      MICROBIOLOGY:     RADIOLOGY:  [ ] Reviewed and interpreted by me    EKG:

## 2018-08-02 NOTE — PROGRESS NOTE ADULT - ATTENDING COMMENTS
Agree with above. Seen and examined with residents. S/p cardiac arrest with Takasubo's cardiomyopathy. Cardiology input appreciated. Supportive care, GOC discussion with family and will stop sedation to assess mental status. On heparin drip.

## 2018-08-02 NOTE — CHART NOTE - NSCHARTNOTEFT_GEN_A_CORE
: MD Charles    INDICATION: Respiratory failure requing mechanical ventilation s/p cardiac arrest    PROCEDURE:  [X] LIMITED ECHO  [X] LIMITED CHEST  [ ] LIMITED RETROPERITONEAL  [ ] LIMITED ABDOMINAL  [ ] LIMITED DVT  [ ] NEEDLE GUIDANCE VASCULAR  [ ] NEEDLE GUIDANCE THORACENTESIS  [ ] NEEDLE GUIDANCE PARACENTESIS  [ ] NEEDLE GUIDANCE PERICARDIOCENTESIS  [ ] OTHER    FINDINGS:  Mainly A line dominant anterior/ Bibasilar alveolar consolidation/ No PLEF  Enlarged LV with apico septal ballooning/ asymmetric wall motion    INTERPRETATION:  - Suggestive of ? aspiration PNA vs pneumonitis that correlates with recent cardiac arrest  - Abnormal LV systolic function suggestive of ? anterior wall MI vs takutsubo cardiomyopathy : MD Charles    INDICATION: Respiratory failure requing mechanical ventilation s/p cardiac arrest    PROCEDURE:  [X] LIMITED ECHO  [X] LIMITED CHEST  [ ] LIMITED RETROPERITONEAL  [ ] LIMITED ABDOMINAL  [ ] LIMITED DVT  [ ] NEEDLE GUIDANCE VASCULAR  [ ] NEEDLE GUIDANCE THORACENTESIS  [ ] NEEDLE GUIDANCE PARACENTESIS  [ ] NEEDLE GUIDANCE PERICARDIOCENTESIS  [ ] OTHER    FINDINGS:  Mainly A line dominant anterior/ Bibasilar alveolar consolidation/ No PLEF  Enlarged LV with apico septal ballooning/ asymmetric wall motion    INTERPRETATION:  - Suggestive of ? aspiration PNA vs pneumonitis that correlates with recent cardiac arrest  - Abnormal LV systolic function suggestive of ? anterior wall MI vs takutsubo cardiomyopathy    Attending Notes;  Agree with above. At bedside for procedure.

## 2018-08-02 NOTE — PROGRESS NOTE ADULT - ASSESSMENT
Assessment:  · Assessment		  82 F PMH HTN, NSTEMI, PAD, osteoporosis BIBEMS s/p cardiac arrest, intubated and sedated for further management.     Neuro:  Intubated and sedated in the field. Previously unresponsive to pain off sedation.  Consider primary CNS process such as stroke though less likely as CT Head negative for acute pathology  As per family pt down for approx 10 min prior to CPR, consider repeat head imaging at later date    Cardio  Hx of NSTEMI, PAD, now w Vfib vs VTACH arrest-   Cont to monitor on tele  Repeat cardiac enzymes   Recc ECHO     Pulm  Aspiration PNA   Vancomycin and Zosyn  CT w b/l opacities     Gastro  Place OG tube- start feeds    Nephro  sCR 1.07, Unclear baseline  Nicole place draining clear urine  Send UA, UC    ID  Tx for aspiration PNA  F/u clx   Start abx     Heme  Hb 12.- No active issue Assessment:  · Assessment		  82 F PMH HTN, NSTEMI, PAD, osteoporosis BIBEMS s/p cardiac arrest, intubated and sedated for further management.     Neuro:  Intubated and sedated in the field. Previously unresponsive to pain off sedation.  Consider primary CNS process such as stroke though less likely as CT Head negative for acute pathology  As per family pt down for approx 10 min prior to CPR, consider repeat head imaging at later date  - currently on propofol, fentanyl ggt    Cardio  Hx of NSTEMI, PAD, now w Vfib vs VTACH arrest-   Cont to monitor on tele  - ekg showed on obvious ST elevation  - initial trop 46, now uptrending, 476, 562   - will obtain TTE, consult cards for possible cath  - bedside sono showed LV ballooning  - asa, atorvastatin, heparin ggt    Pulm  Aspiration PNA   Zosyn  CT w b/l opacities     Gastro  Place OG tube- start feeds    Nephro  crt stable,. 94 today  Nicole place draining clear urine  Send UA, UC    ID  Tx for aspiration PNA, opacities seen on CT chest  F/u cx   -Zosyn    Heme  Hb 12.- No active issue  - vte ppx, heparin gtt    Jacob Soler PGY1 Assessment:  · Assessment		  82 F PMH HTN, NSTEMI, PAD, osteoporosis BIBEMS s/p cardiac arrest, intubated and sedated for further management.     Neuro:  Intubated and sedated in the field. Previously unresponsive to pain off sedation.  Consider primary CNS process such as stroke though less likely as CT Head negative for acute pathology  As per family pt down for approx 10 min prior to CPR, consider repeat head imaging at later date  - currently on propofol, fentanyl ggt    Cardio  Hx of NSTEMI, PAD, now w Vfib vs VTACH arrest-   Cont to monitor on tele  - ekg showed no obvious ST elevation  - initial trop 46, now uptrending, 476, 562   - will obtain TTE, consult cards for possible cath  - bedside sono showed LV ballooning  - asa, atorvastatin, heparin ggt    Pulm  Aspiration PNA   Zosyn  CT w b/l opacities     Gastro  Place OG tube- start feeds    Nephro  crt stable,. 94 today  Nicole place draining clear urine  UA, urine cx      ID  Tx for aspiration PNA, opacities seen on CT chest  F/u cx   -Zosyn    Heme  Hb 12.- No active issue  - vte ppx, heparin gtt    Jacob Soler PGY1

## 2018-08-02 NOTE — PROGRESS NOTE ADULT - SUBJECTIVE AND OBJECTIVE BOX
Jesse Soler, PGY1    CHIEF COMPLAINT: 81 yo with h/o NSTEMI as per chart, PAD on ASA, Renal artery stenosis, HTN, good functional status, returned from [place] three days ago and has been not feeling well since. Per family pt. hadn't been feeling well for past couple of days after returning home from trip to [place], family reports pt was sitting in chair, became tachypneic, she became weak and lost consciousness. Down for 10 minutes prior to arrival of EMS.  Per EMS she was found unresponsive, in a shockable rhythm, received 1 shock, was intubated and achieved ROSC after 2nd round (approx 4 min) of CPR. Arrives to ED intubated.      Interval Events: No overnight events. Intubated. VSS.     REVIEW OF SYSTEMS:  Constitutional: [ ] negative [ ] fevers [ ] chills [ ] weight loss [ ] weight gain  HEENT: [ ] negative [ ] dry eyes [ ] eye irritation [ ] postnasal drip [ ] nasal congestion  CV: [ ] negative  [ ] chest pain [ ] orthopnea [ ] palpitations [ ] murmur  Resp: [ ] negative [ ] cough [ ] shortness of breath [ ] dyspnea [ ] wheezing [ ] sputum [ ] hemoptysis  GI: [ ] negative [ ] nausea [ ] vomiting [ ] diarrhea [ ] constipation [ ] abd pain [ ] dysphagia   : [ ] negative [ ] dysuria [ ] nocturia [ ] hematuria [ ] increased urinary frequency  Musculoskeletal: [ ] negative [ ] back pain [ ] myalgias [ ] arthralgias [ ] fracture  Skin: [ ] negative [ ] rash [ ] itch  Neurological: [ ] negative [ ] headache [ ] dizziness [ ] syncope [ ] weakness [ ] numbness  Psychiatric: [ ] negative [ ] anxiety [ ] depression  Endocrine: [ ] negative [ ] diabetes [ ] thyroid problem  Hematologic/Lymphatic: [ ] negative [ ] anemia [ ] bleeding problem  Allergic/Immunologic: [ ] negative [ ] itchy eyes [ ] nasal discharge [ ] hives [ ] angioedema  [ ] All other systems negative  [ X] Unable to assess ROS because ________    OBJECTIVE:  ICU Vital Signs Last 24 Hrs  T(C): 36.2 (02 Aug 2018 12:00), Max: 37.4 (02 Aug 2018 04:00)  T(F): 97.2 (02 Aug 2018 12:00), Max: 99.3 (02 Aug 2018 04:00)  HR: 86 (02 Aug 2018 12:00) (84 - 124)  BP: 106/73 (02 Aug 2018 12:00) (98/79 - 165/96)  BP(mean): 81 (02 Aug 2018 12:00) (76 - 114)  ABP: --  ABP(mean): --  RR: 12 (02 Aug 2018 12:00) (12 - 42)  SpO2: 100% (02 Aug 2018 12:00) (94% - 100%)    Mode: AC/ CMV (Assist Control/ Continuous Mandatory Ventilation), RR (machine): 12, TV (machine): 400, FiO2: 40, PEEP: 5, MAP: 8, PIP: 19    08-01 @ 07:01  -  08-02 @ 07:00  --------------------------------------------------------  IN: 681 mL / OUT: 2490 mL / NET: -1809 mL    08-02 @ 07:01  -  08-02 @ 12:12  --------------------------------------------------------  IN: 114.8 mL / OUT: 160 mL / NET: -45.2 mL      CAPILLARY BLOOD GLUCOSE          Physical Exam: General: Intubated and sedated   	Neuro: Sedated. PERRLA. Not withdrawing to pain off sedation   	HEENT: No deformity. No lymphadenopathy  	Cardio: S1/S2. No murmurs, gallops  	Resp: Intubated. Bronchial breath sounds   	Abd: Soft, non-tender, non-distended  	:  Nicole+  Extremities: Good pulses    LINES: peripheral    HOSPITAL MEDICATIONS:  heparin  Infusion.  Unit(s)/Hr IV Continuous <Continuous>    piperacillin/tazobactam IVPB. 3.375 Gram(s) IV Intermittent every 8 hours      atorvastatin 80 milliGRAM(s) Oral at bedtime      fentaNYL   Infusion. 1 MICROgram(s)/kG/Hr IV Continuous <Continuous>  propofol Infusion 5 MICROgram(s)/kG/Min IV Continuous <Continuous>                    LABS:                        11.1   15.79 )-----------( 179      ( 02 Aug 2018 08:25 )             34.1     Hgb Trend: 11.1<--, 12.0<--, 12.1<--  08-02    132<L>  |  93<L>  |  14  ----------------------------<  349<H>  4.1   |  18<L>  |  0.94    Ca    8.3<L>      02 Aug 2018 02:34  Phos  3.0     08-02  Mg     2.0     08-02    TPro  6.5  /  Alb  3.5  /  TBili  0.4  /  DBili  x   /  AST  464<H>  /  ALT  485<H>  /  AlkPhos  102  08-02    Creatinine Trend: 0.94<--, 0.89<--, 1.07<--  PT/INR - ( 01 Aug 2018 17:01 )   PT: 11.5 SEC;   INR: 1.00          PTT - ( 02 Aug 2018 08:25 )  PTT:> 200.0 SEC    Arterial Blood Gas:  08-01 @ 23:46  7.38/38/55/22/87.4/-2.3  ABG lactate: --  Arterial Blood Gas:  08-01 @ 19:10  7.38/36/80/22/96.2/-3.7  ABG lactate: --    Venous Blood Gas:  08-01 @ 17:01  7.28/46/44/19/71.3  VBG Lactate: 4.8      MICROBIOLOGY:     RADIOLOGY:  [ ] Reviewed and interpreted by me    EKG: Jesse Soler, PGY1    CHIEF COMPLAINT: 83 yo with h/o NSTEMI as per chart, PAD on ASA, Renal artery stenosis, HTN, good functional status, returned from [place] three days ago and has been not feeling well since. Per family pt. hadn't been feeling well for past couple of days after returning home from trip to [place], family reports pt was sitting in chair, became tachypneic, she became weak and lost consciousness. Down for 10 minutes prior to arrival of EMS.  Per EMS she was found unresponsive, in a shockable rhythm, received 1 shock, was intubated and achieved ROSC after 2nd round (approx 4 min) of CPR. Arrives to ED intubated.      Interval Events: No overnight events. Intubated. VSS.     REVIEW OF SYSTEMS:  Constitutional: [ ] negative [ ] fevers [ ] chills [ ] weight loss [ ] weight gain  HEENT: [ ] negative [ ] dry eyes [ ] eye irritation [ ] postnasal drip [ ] nasal congestion  CV: [ ] negative  [ ] chest pain [ ] orthopnea [ ] palpitations [ ] murmur  Resp: [ ] negative [ ] cough [ ] shortness of breath [ ] dyspnea [ ] wheezing [ ] sputum [ ] hemoptysis  GI: [ ] negative [ ] nausea [ ] vomiting [ ] diarrhea [ ] constipation [ ] abd pain [ ] dysphagia   : [ ] negative [ ] dysuria [ ] nocturia [ ] hematuria [ ] increased urinary frequency  Musculoskeletal: [ ] negative [ ] back pain [ ] myalgias [ ] arthralgias [ ] fracture  Skin: [ ] negative [ ] rash [ ] itch  Neurological: [ ] negative [ ] headache [ ] dizziness [ ] syncope [ ] weakness [ ] numbness  Psychiatric: [ ] negative [ ] anxiety [ ] depression  Endocrine: [ ] negative [ ] diabetes [ ] thyroid problem  Hematologic/Lymphatic: [ ] negative [ ] anemia [ ] bleeding problem  Allergic/Immunologic: [ ] negative [ ] itchy eyes [ ] nasal discharge [ ] hives [ ] angioedema  [ ] All other systems negative  [ X] Unable to assess ROS because ________intubated    OBJECTIVE:  ICU Vital Signs Last 24 Hrs  T(C): 36.2 (02 Aug 2018 12:00), Max: 37.4 (02 Aug 2018 04:00)  T(F): 97.2 (02 Aug 2018 12:00), Max: 99.3 (02 Aug 2018 04:00)  HR: 86 (02 Aug 2018 12:00) (84 - 124)  BP: 106/73 (02 Aug 2018 12:00) (98/79 - 165/96)  BP(mean): 81 (02 Aug 2018 12:00) (76 - 114)  ABP: --  ABP(mean): --  RR: 12 (02 Aug 2018 12:00) (12 - 42)  SpO2: 100% (02 Aug 2018 12:00) (94% - 100%)    Mode: AC/ CMV (Assist Control/ Continuous Mandatory Ventilation), RR (machine): 12, TV (machine): 400, FiO2: 40, PEEP: 5, MAP: 8, PIP: 19    08-01 @ 07:01  -  08-02 @ 07:00  --------------------------------------------------------  IN: 681 mL / OUT: 2490 mL / NET: -1809 mL    08-02 @ 07:01  -  08-02 @ 12:12  --------------------------------------------------------  IN: 114.8 mL / OUT: 160 mL / NET: -45.2 mL      CAPILLARY BLOOD GLUCOSE          Physical Exam: General: Intubated and sedated   	Neuro: Sedated. PERRLA. Not withdrawing to pain off sedation   	HEENT: No deformity. No lymphadenopathy  	Cardio: S1/S2. No murmurs, gallops  	Resp: Intubated. Bronchial breath sounds   	Abd: Soft, non-tender, non-distended  	:  Nicole+  Extremities: Good pulses    LINES: peripheral    HOSPITAL MEDICATIONS:  heparin  Infusion.  Unit(s)/Hr IV Continuous <Continuous>    piperacillin/tazobactam IVPB. 3.375 Gram(s) IV Intermittent every 8 hours      atorvastatin 80 milliGRAM(s) Oral at bedtime      fentaNYL   Infusion. 1 MICROgram(s)/kG/Hr IV Continuous <Continuous>  propofol Infusion 5 MICROgram(s)/kG/Min IV Continuous <Continuous>                    LABS:                        11.1   15.79 )-----------( 179      ( 02 Aug 2018 08:25 )             34.1     Hgb Trend: 11.1<--, 12.0<--, 12.1<--  08-02    132<L>  |  93<L>  |  14  ----------------------------<  349<H>  4.1   |  18<L>  |  0.94    Ca    8.3<L>      02 Aug 2018 02:34  Phos  3.0     08-02  Mg     2.0     08-02    TPro  6.5  /  Alb  3.5  /  TBili  0.4  /  DBili  x   /  AST  464<H>  /  ALT  485<H>  /  AlkPhos  102  08-02    Creatinine Trend: 0.94<--, 0.89<--, 1.07<--  PT/INR - ( 01 Aug 2018 17:01 )   PT: 11.5 SEC;   INR: 1.00          PTT - ( 02 Aug 2018 08:25 )  PTT:> 200.0 SEC    Arterial Blood Gas:  08-01 @ 23:46  7.38/38/55/22/87.4/-2.3  ABG lactate: --  Arterial Blood Gas:  08-01 @ 19:10  7.38/36/80/22/96.2/-3.7  ABG lactate: --    Venous Blood Gas:  08-01 @ 17:01  7.28/46/44/19/71.3  VBG Lactate: 4.8      MICROBIOLOGY:     RADIOLOGY:  [ ] Reviewed and interpreted by me    EKG:

## 2018-08-02 NOTE — CONSULT NOTE ADULT - SUBJECTIVE AND OBJECTIVE BOX
HISTORY OF PRESENT ILLNESS:      Allergies    No Known Allergies    Intolerances    	    MEDICATIONS:  clopidogrel Tablet 600 milliGRAM(s) Oral once    piperacillin/tazobactam IVPB. 3.375 Gram(s) IV Intermittent every 8 hours      aspirin 325 milliGRAM(s) Oral once  fentaNYL   Infusion. 1 MICROgram(s)/kG/Hr IV Continuous <Continuous>  propofol Infusion 5 MICROgram(s)/kG/Min IV Continuous <Continuous>            PAST MEDICAL & SURGICAL HISTORY:  PAD (peripheral artery disease)  NSTEMI (non-ST elevation myocardial infarction)  Osteoporosis  HTN (hypertension)  No significant past surgical history      FAMILY HISTORY:  No pertinent family history in first degree relatives      SOCIAL HISTORY:    [ ] live with:  [ ] Non-smoker,[ ] smoker    [ ] no alcohol use [ ] alcohol use:      REVIEW OF SYSTEMS:  General: no fatigue/malaise, weight loss/gain.  Skin: no rashes.  Ophthalmologic: no blurred vision, no loss of vision. 	  ENT: no sore throat, rhinorrhea, sinus congestion.  Cardiovascular:no chest pain ,no palpitation,no dizziness,no diaphoresis,no edema  Respiratory: no SOB, cough or wheeze.  Gastrointestinal:  no N/V/D, no melena/hematemesis/hematochezia.  Genitourinary: no dysuria/hesitancy or hematuria.  Musculoskeletal: no myalgias or arthralgias.  Neurological: no changes in vision or hearing, no lightheadedness/dizziness, no syncope/near syncope	  Psychiatric: no unusual stress/anxiety.       PHYSICAL EXAM:  T(C): 37.1 (08-01-18 @ 20:37), Max: 37.1 (08-01-18 @ 20:37)  HR: 112 (08-02-18 @ 01:00) (106 - 124)  BP: 120/75 (08-02-18 @ 01:00) (98/79 - 165/96)  RR: 16 (08-02-18 @ 01:00) (16 - 29)  SpO2: 100% (08-02-18 @ 01:00) (94% - 100%)  Wt(kg): --  I&O's Summary    01 Aug 2018 07:01  -  02 Aug 2018 02:06  --------------------------------------------------------  IN: 407.2 mL / OUT: 2230 mL / NET: -1822.8 mL        Appearance: Normal	  HEENT:   Normal oral mucosa, PERRL, EOMI	  Lymphatic: No lymphadenopathy  Cardiovascular: Normal S1 S2, No JVD, No murmurs, No edema  Respiratory: Lungs clear to auscultation	  Psychiatry: A & O x 3, Mood & affect appropriate  Gastrointestinal:  Soft, Non-tender, + BS	  Skin: No rashes, No ecchymoses, No cyanosis	  Neurologic: Non-focal  Extremities: Normal range of motion, No clubbing, cyanosis or edema  Vascular: Peripheral pulses palpable 2+ bilaterally        LABS:	 	    CBC Full  -  ( 01 Aug 2018 17:01 )  WBC Count : 7.55 K/uL  Hemoglobin : 12.1 g/dL  Hematocrit : 37.8 %  Platelet Count - Automated : 185 K/uL  Mean Cell Volume : 72.3 fL  Mean Cell Hemoglobin : 23.1 pg  Mean Cell Hemoglobin Concentration : 32.0 %  Auto Neutrophil # : 3.90 K/uL  Auto Lymphocyte # : 3.12 K/uL  Auto Monocyte # : 0.35 K/uL  Auto Eosinophil # : 0.06 K/uL  Auto Basophil # : 0.03 K/uL  Auto Neutrophil % : 51.7 %  Auto Lymphocyte % : 41.3 %  Auto Monocyte % : 4.6 %  Auto Eosinophil % : 0.8 %  Auto Basophil % : 0.4 %    08-01    134<L>  |  99  |  13  ----------------------------<  226<H>  3.8   |  20<L>  |  0.89  08-01    138  |  104  |  14  ----------------------------<  113<H>  4.7   |  18<L>  |  1.07    Ca    8.7      01 Aug 2018 23:46  Ca    8.4      01 Aug 2018 17:00  Phos  2.2     08-01  Mg     1.9     08-01    TPro  7.0  /  Alb  3.7  /  TBili  0.5  /  DBili  x   /  AST  572<H>  /  ALT  563<H>  /  AlkPhos  119  08-01  TPro  6.7  /  Alb  3.4  /  TBili  0.3  /  DBili  x   /  AST  589<H>  /  ALT  560<H>  /  AlkPhos  121<H>  08-01      proBNP: Serum Pro-Brain Natriuretic Peptide: 374.8 pg/mL (08-01 @ 17:00)    Lipid Profile:   HgA1c:   TSH:       CARDIAC MARKERS:      CKMB: 20.03 ng/mL (08-01 @ 23:46)    CKMB Relative Index: 5.25 (08-01 @ 23:46)      TELEMETRY: 	    ECG:  	  RADIOLOGY:  OTHER: 	    PREVIOUS DIAGNOSTIC TESTING:    [ ] Echocardiogram:  [ ]  Catheterization:  [ ] Stress Test:  	  	  ASSESSMENT/PLAN: HISTORY OF PRESENT ILLNESS:81yo F pmhx HTN osteoporosis ,HLD,? NSTEMI ,no stent  BIBEMS s/p cardiac arrest. Per family pt. hadn't been feeling well for past couple of days after returning home from trip to [place], family reports she became weak and lost consciousness, per EMS she was found unresponsive, in a shockable rhythm, received 1 shock, CPR with  achieved ROSC and intubation .Medics arrived and found patient pulseless, CPR re-initiated,  patient achieved ROSC again prior to any medications or defibrillation.  Initial ALS rhythm reportedly PEA. On arrival to ED, patient found to have + pulse with elevated BP and HR. no STEMI on ekg. CTPA r/o for PE,1st trop 46.Admit to MICU. Currently sedated on fentanyl and propofol .Cardiology consulted for elevated CK/trop  Blood Pressure 117/70,, sat 98% on FiO2 40%,temp : 98.9 F      Allergies: No Known Allergies  	    MEDICATIONS:  Aspirin 81 oral delayed release tablet: 1 tab(s) orally once a day, Last Dose Taken:    · 	NIFEdipine 60 mg oral tablet, extended release: 1 tab(s) orally once a day, Last Dose Taken:    · 	rosuvastatin 10 mg oral tablet: 1 tab(s) orally once a day (at bedtime), Last Dose Taken:    · 	Boniva 150 mg oral tablet: 1 tab(s) orally once a month, Last Dose Taken:            PAST MEDICAL & SURGICAL HISTORY:  PAD (peripheral artery disease)  NSTEMI (non-ST elevation myocardial infarction)  Osteoporosis  HTN (hypertension)  No significant past surgical history      FAMILY HISTORY:  No pertinent family history in first degree relatives      SOCIAL HISTORY:    live with family    REVIEW OF SYSTEMS:  unable to obtain .pateint intubated /sedated    PHYSICAL EXAM:  T(C): 37.1 (08-01-18 @ 20:37), Max: 37.1 (08-01-18 @ 20:37)  HR: 112 (08-02-18 @ 01:00) (106 - 124)  BP: 120/75 (08-02-18 @ 01:00) (98/79 - 165/96)  RR: 16 (08-02-18 @ 01:00) (16 - 29)  SpO2: 100% (08-02-18 @ 01:00) (94% - 100%)      01 Aug 2018 07:01  -  02 Aug 2018 02:06  --------------------------------------------------------  IN: 407.2 mL / OUT: 2230 mL / NET: -1822.8 mL        Appearance: sedated  Cardiovascular: Normal S1 S2, No JVD, No murmurs, No edema  Respiratory: Intubated ,+ bronchial b/s b/l  Gastrointestinal:  Soft, Non-distended + BS  Neurologic: sedated, withdraw to pain, pupil 2+  Extremities: Normal range of motion, No clubbing, cyanosis or edema  Vascular: Peripheral pulses palpable 2+ bilaterally        LABS:	 	    CBC Full  -  ( 01 Aug 2018 17:01 )  WBC Count : 7.55 K/uL  Hemoglobin : 12.1 g/dL  Hematocrit : 37.8 %  Platelet Count - Automated : 185 K/uL  Mean Cell Volume : 72.3 fL  Mean Cell Hemoglobin : 23.1 pg  Mean Cell Hemoglobin Concentration : 32.0 %  Auto Neutrophil # : 3.90 K/uL  Auto Lymphocyte # : 3.12 K/uL  Auto Monocyte # : 0.35 K/uL  Auto Eosinophil # : 0.06 K/uL  Auto Basophil # : 0.03 K/uL  Auto Neutrophil % : 51.7 %  Auto Lymphocyte % : 41.3 %  Auto Monocyte % : 4.6 %  Auto Eosinophil % : 0.8 %  Auto Basophil % : 0.4 %    08-01    134<L>  |  99  |  13  ----------------------------<  226<H>  3.8   |  20<L>  |  0.89  08-01    138  |  104  |  14  ----------------------------<  113<H>  4.7   |  18<L>  |  1.07    Ca    8.7      01 Aug 2018 23:46  Ca    8.4      01 Aug 2018 17:00  Phos  2.2     08-01  Mg     1.9     08-01    TPro  7.0  /  Alb  3.7  /  TBili  0.5  /  DBili  x   /  AST  572<H>  /  ALT  563<H>  /  AlkPhos  119  08-01  TPro  6.7  /  Alb  3.4  /  TBili  0.3  /  DBili  x   /  AST  589<H>  /  ALT  560<H>  /  AlkPhos  121<H>  08-01      proBNP: Serum Pro-Brain Natriuretic Peptide: 374.8 pg/mL (08-01 @ 17:00)           CARDIAC MARKERS:  hsT [id number]    CKMB: 20.03 ng/mL (08-01 @ 23:46)  CKMB Relative Index: 5.25 (08-01 @ 23:46)      TELEMETRY: ST at 110	    ECG:  	  NSR with diffuse AK depression and down slopping  ST in  I,II,III,aVF,,V3 to V6 with AK elevation in aVR  RADIOLOGY:CTA chest /A/P:< from: CT Angio Chest w/ IV Cont (08.01.18 @ 18:01) >  No pulmonary embolism.Patchy bilateral parenchymal opacities in the dependent portions of the lungs suggestive of aspiration.  Severe atherosclerotic disease of the aorta and major branch vessels with partially imaged loss of the normal flow within the right common femoral artery.Indeterminate lesions within the left lobe of the liver which could be better characterized with contrast-enhanced MRI as clinically warranted.    CTH:< from: CT Head No Cont (08.01.18 @ 18:01) >  Volume loss, microvascular disease, no acute hemorrhage or midline shift.     < end of copied text >      < end of copied text >    	  	  ASSESSMENT/PLAN: 81yo F pmhx HTN osteoporosis ,HLD,? NSTEMI ,no stent  BIBEMS s/p cardiac arrest out side hospital  for unknown reason, shocked by AED ,CPR with ROSC found to have + CK/trop in the setting of NSTEMI vs demand ischemia    Plan:  Initiate ACS protocol and loaded with asa 325mg x1 Plavix 600mg PO and heparin drip for ACS   -c/w asa 81mg,plavix 75mg starting on 8/2  start on Lipitor 80mg daily   please check lipid profile with AM lab and trend CE/trop q6  echo in am  will revisit for ischemic evaluation in am    C/d with cardiology fellow Dr Black

## 2018-08-03 LAB
ALBUMIN SERPL ELPH-MCNC: 2.8 G/DL — LOW (ref 3.3–5)
ALP SERPL-CCNC: 67 U/L — SIGNIFICANT CHANGE UP (ref 40–120)
ALT FLD-CCNC: 257 U/L — HIGH (ref 4–33)
APPEARANCE UR: CLEAR — SIGNIFICANT CHANGE UP
APTT BLD: 38.8 SEC — HIGH (ref 27.5–37.4)
APTT BLD: 43.3 SEC — HIGH (ref 27.5–37.4)
AST SERPL-CCNC: 150 U/L — HIGH (ref 4–32)
BASOPHILS # BLD AUTO: 0.04 K/UL — SIGNIFICANT CHANGE UP (ref 0–0.2)
BASOPHILS NFR BLD AUTO: 0.3 % — SIGNIFICANT CHANGE UP (ref 0–2)
BILIRUB SERPL-MCNC: 0.8 MG/DL — SIGNIFICANT CHANGE UP (ref 0.2–1.2)
BILIRUB UR-MCNC: NEGATIVE — SIGNIFICANT CHANGE UP
BLOOD UR QL VISUAL: HIGH
BUN SERPL-MCNC: 11 MG/DL — SIGNIFICANT CHANGE UP (ref 7–23)
BUN SERPL-MCNC: 14 MG/DL — SIGNIFICANT CHANGE UP (ref 7–23)
CALCIUM SERPL-MCNC: 8.1 MG/DL — LOW (ref 8.4–10.5)
CALCIUM SERPL-MCNC: 8.4 MG/DL — SIGNIFICANT CHANGE UP (ref 8.4–10.5)
CHLORIDE SERPL-SCNC: 100 MMOL/L — SIGNIFICANT CHANGE UP (ref 98–107)
CHLORIDE SERPL-SCNC: 98 MMOL/L — SIGNIFICANT CHANGE UP (ref 98–107)
CO2 SERPL-SCNC: 23 MMOL/L — SIGNIFICANT CHANGE UP (ref 22–31)
CO2 SERPL-SCNC: 23 MMOL/L — SIGNIFICANT CHANGE UP (ref 22–31)
COLOR SPEC: YELLOW — SIGNIFICANT CHANGE UP
CREAT SERPL-MCNC: 0.92 MG/DL — SIGNIFICANT CHANGE UP (ref 0.5–1.3)
CREAT SERPL-MCNC: 1.05 MG/DL — SIGNIFICANT CHANGE UP (ref 0.5–1.3)
EOSINOPHIL # BLD AUTO: 0.11 K/UL — SIGNIFICANT CHANGE UP (ref 0–0.5)
EOSINOPHIL NFR BLD AUTO: 0.8 % — SIGNIFICANT CHANGE UP (ref 0–6)
GLUCOSE SERPL-MCNC: 104 MG/DL — HIGH (ref 70–99)
GLUCOSE SERPL-MCNC: 105 MG/DL — HIGH (ref 70–99)
GLUCOSE UR-MCNC: NEGATIVE — SIGNIFICANT CHANGE UP
HCT VFR BLD CALC: 31.5 % — LOW (ref 34.5–45)
HGB BLD-MCNC: 10.2 G/DL — LOW (ref 11.5–15.5)
IMM GRANULOCYTES # BLD AUTO: 0.08 # — SIGNIFICANT CHANGE UP
IMM GRANULOCYTES NFR BLD AUTO: 0.6 % — SIGNIFICANT CHANGE UP (ref 0–1.5)
KETONES UR-MCNC: NEGATIVE — SIGNIFICANT CHANGE UP
LEUKOCYTE ESTERASE UR-ACNC: HIGH
LYMPHOCYTES # BLD AUTO: 14.8 % — SIGNIFICANT CHANGE UP (ref 13–44)
LYMPHOCYTES # BLD AUTO: 2.1 K/UL — SIGNIFICANT CHANGE UP (ref 1–3.3)
MAGNESIUM SERPL-MCNC: 2 MG/DL — SIGNIFICANT CHANGE UP (ref 1.6–2.6)
MAGNESIUM SERPL-MCNC: 2.2 MG/DL — SIGNIFICANT CHANGE UP (ref 1.6–2.6)
MCHC RBC-ENTMCNC: 23 PG — LOW (ref 27–34)
MCHC RBC-ENTMCNC: 32.4 % — SIGNIFICANT CHANGE UP (ref 32–36)
MCV RBC AUTO: 71.1 FL — LOW (ref 80–100)
MONOCYTES # BLD AUTO: 1.39 K/UL — HIGH (ref 0–0.9)
MONOCYTES NFR BLD AUTO: 9.8 % — SIGNIFICANT CHANGE UP (ref 2–14)
MUCOUS THREADS # UR AUTO: SIGNIFICANT CHANGE UP
NEUTROPHILS # BLD AUTO: 10.43 K/UL — HIGH (ref 1.8–7.4)
NEUTROPHILS NFR BLD AUTO: 73.7 % — SIGNIFICANT CHANGE UP (ref 43–77)
NITRITE UR-MCNC: NEGATIVE — SIGNIFICANT CHANGE UP
NON-SQ EPI CELLS # UR AUTO: 1 — SIGNIFICANT CHANGE UP
NRBC # FLD: 0 — SIGNIFICANT CHANGE UP
PH UR: 6 — SIGNIFICANT CHANGE UP (ref 4.6–8)
PHOSPHATE SERPL-MCNC: 1.8 MG/DL — LOW (ref 2.5–4.5)
PHOSPHATE SERPL-MCNC: 2.9 MG/DL — SIGNIFICANT CHANGE UP (ref 2.5–4.5)
PLATELET # BLD AUTO: 151 K/UL — SIGNIFICANT CHANGE UP (ref 150–400)
PMV BLD: 10.7 FL — SIGNIFICANT CHANGE UP (ref 7–13)
POTASSIUM SERPL-MCNC: 4 MMOL/L — SIGNIFICANT CHANGE UP (ref 3.5–5.3)
POTASSIUM SERPL-MCNC: 4.3 MMOL/L — SIGNIFICANT CHANGE UP (ref 3.5–5.3)
POTASSIUM SERPL-SCNC: 4 MMOL/L — SIGNIFICANT CHANGE UP (ref 3.5–5.3)
POTASSIUM SERPL-SCNC: 4.3 MMOL/L — SIGNIFICANT CHANGE UP (ref 3.5–5.3)
PROT SERPL-MCNC: 5.8 G/DL — LOW (ref 6–8.3)
PROT UR-MCNC: 30 MG/DL — HIGH
RBC # BLD: 4.43 M/UL — SIGNIFICANT CHANGE UP (ref 3.8–5.2)
RBC # FLD: 15.7 % — HIGH (ref 10.3–14.5)
RBC CASTS # UR COMP ASSIST: SIGNIFICANT CHANGE UP (ref 0–?)
SODIUM SERPL-SCNC: 133 MMOL/L — LOW (ref 135–145)
SODIUM SERPL-SCNC: 137 MMOL/L — SIGNIFICANT CHANGE UP (ref 135–145)
SP GR SPEC: 1.02 — SIGNIFICANT CHANGE UP (ref 1–1.04)
SPECIMEN SOURCE: SIGNIFICANT CHANGE UP
SPECIMEN SOURCE: SIGNIFICANT CHANGE UP
SQUAMOUS # UR AUTO: SIGNIFICANT CHANGE UP
UROBILINOGEN FLD QL: NORMAL MG/DL — SIGNIFICANT CHANGE UP
WBC # BLD: 14.15 K/UL — HIGH (ref 3.8–10.5)
WBC # FLD AUTO: 14.15 K/UL — HIGH (ref 3.8–10.5)
WBC CLUMPS #/AREA URNS HPF: PRESENT — HIGH (ref 0–?)
WBC UR QL: SIGNIFICANT CHANGE UP (ref 0–?)

## 2018-08-03 PROCEDURE — 76604 US EXAM CHEST: CPT | Mod: 26,GC

## 2018-08-03 PROCEDURE — 99291 CRITICAL CARE FIRST HOUR: CPT | Mod: 25

## 2018-08-03 PROCEDURE — 99232 SBSQ HOSP IP/OBS MODERATE 35: CPT

## 2018-08-03 PROCEDURE — 93308 TTE F-UP OR LMTD: CPT | Mod: 26,GC

## 2018-08-03 RX ORDER — ADENOSINE 3 MG/ML
6 INJECTION INTRAVENOUS ONCE
Qty: 0 | Refills: 0 | Status: COMPLETED | OUTPATIENT
Start: 2018-08-03 | End: 2018-08-03

## 2018-08-03 RX ORDER — METOPROLOL TARTRATE 50 MG
5 TABLET ORAL ONCE
Qty: 0 | Refills: 0 | Status: COMPLETED | OUTPATIENT
Start: 2018-08-03 | End: 2018-08-03

## 2018-08-03 RX ADMIN — HEPARIN SODIUM 600 UNIT(S)/HR: 5000 INJECTION INTRAVENOUS; SUBCUTANEOUS at 20:57

## 2018-08-03 RX ADMIN — FENTANYL CITRATE 5.71 MICROGRAM(S)/KG/HR: 50 INJECTION INTRAVENOUS at 07:44

## 2018-08-03 RX ADMIN — PIPERACILLIN AND TAZOBACTAM 25 GRAM(S): 4; .5 INJECTION, POWDER, LYOPHILIZED, FOR SOLUTION INTRAVENOUS at 05:58

## 2018-08-03 RX ADMIN — PIPERACILLIN AND TAZOBACTAM 25 GRAM(S): 4; .5 INJECTION, POWDER, LYOPHILIZED, FOR SOLUTION INTRAVENOUS at 15:16

## 2018-08-03 RX ADMIN — HEPARIN SODIUM 400 UNIT(S)/HR: 5000 INJECTION INTRAVENOUS; SUBCUTANEOUS at 01:59

## 2018-08-03 RX ADMIN — ATORVASTATIN CALCIUM 80 MILLIGRAM(S): 80 TABLET, FILM COATED ORAL at 22:29

## 2018-08-03 RX ADMIN — PROPOFOL 1.71 MICROGRAM(S)/KG/MIN: 10 INJECTION, EMULSION INTRAVENOUS at 07:44

## 2018-08-03 RX ADMIN — Medication 81 MILLIGRAM(S): at 11:12

## 2018-08-03 RX ADMIN — PIPERACILLIN AND TAZOBACTAM 25 GRAM(S): 4; .5 INJECTION, POWDER, LYOPHILIZED, FOR SOLUTION INTRAVENOUS at 22:29

## 2018-08-03 RX ADMIN — ADENOSINE 6 MILLIGRAM(S): 3 INJECTION INTRAVENOUS at 13:56

## 2018-08-03 RX ADMIN — Medication 5 MILLIGRAM(S): at 14:00

## 2018-08-03 RX ADMIN — CLOPIDOGREL BISULFATE 75 MILLIGRAM(S): 75 TABLET, FILM COATED ORAL at 11:12

## 2018-08-03 NOTE — PROGRESS NOTE ADULT - ATTENDING COMMENTS
Agree with above. Seen and examined with residents. Cardiomyopathy with PEA arrest. Weaning trials. Supportive care.

## 2018-08-03 NOTE — PROGRESS NOTE ADULT - SUBJECTIVE AND OBJECTIVE BOX
Jesse Soler, PGY1    CHIEF COMPLAINT:    Interval Events:    REVIEW OF SYSTEMS:  Constitutional: [ ] negative [ ] fevers [ ] chills [ ] weight loss [ ] weight gain  HEENT: [ ] negative [ ] dry eyes [ ] eye irritation [ ] postnasal drip [ ] nasal congestion  CV: [ ] negative  [ ] chest pain [ ] orthopnea [ ] palpitations [ ] murmur  Resp: [ ] negative [ ] cough [ ] shortness of breath [ ] dyspnea [ ] wheezing [ ] sputum [ ] hemoptysis  GI: [ ] negative [ ] nausea [ ] vomiting [ ] diarrhea [ ] constipation [ ] abd pain [ ] dysphagia   : [ ] negative [ ] dysuria [ ] nocturia [ ] hematuria [ ] increased urinary frequency  Musculoskeletal: [ ] negative [ ] back pain [ ] myalgias [ ] arthralgias [ ] fracture  Skin: [ ] negative [ ] rash [ ] itch  Neurological: [ ] negative [ ] headache [ ] dizziness [ ] syncope [ ] weakness [ ] numbness  Psychiatric: [ ] negative [ ] anxiety [ ] depression  Endocrine: [ ] negative [ ] diabetes [ ] thyroid problem  Hematologic/Lymphatic: [ ] negative [ ] anemia [ ] bleeding problem  Allergic/Immunologic: [ ] negative [ ] itchy eyes [ ] nasal discharge [ ] hives [ ] angioedema  [ ] All other systems negative  [ ] Unable to assess ROS because ________    OBJECTIVE:  ICU Vital Signs Last 24 Hrs  T(C): 37.2 (03 Aug 2018 07:42), Max: 37.9 (02 Aug 2018 20:00)  T(F): 99 (03 Aug 2018 07:42), Max: 100.3 (02 Aug 2018 20:00)  HR: 145 (03 Aug 2018 07:42) (83 - 145)  BP: 141/95 (03 Aug 2018 07:42) (94/68 - 141/95)  BP(mean): 107 (03 Aug 2018 07:42) (70 - 107)  ABP: --  ABP(mean): --  RR: 15 (03 Aug 2018 07:42) (12 - 42)  SpO2: 100% (03 Aug 2018 07:42) (96% - 100%)    Mode: AC/ CMV (Assist Control/ Continuous Mandatory Ventilation), RR (machine): 12, TV (machine): 400, FiO2: 40, PEEP: 5, MAP: 8, PIP: 16    08-02 @ 07:01 - 08-03 @ 07:00  --------------------------------------------------------  IN: 725.9 mL / OUT: 765 mL / NET: -39.1 mL    08-03 @ 07:01 - 08-03 @ 07:50  --------------------------------------------------------  IN: 18.3 mL / OUT: 150 mL / NET: -131.7 mL      CAPILLARY BLOOD GLUCOSE          PHYSICAL EXAM:  General:   HEENT:   Lymph Nodes:  Neck:   Respiratory:   Cardiovascular:   Abdomen:   Extremities:   Skin:   Neurological:  Psychiatry:    LINES:    HOSPITAL MEDICATIONS:  aspirin  chewable 81 milliGRAM(s) Oral daily  clopidogrel Tablet 75 milliGRAM(s) Oral daily  heparin  Infusion.  Unit(s)/Hr IV Continuous <Continuous>    piperacillin/tazobactam IVPB. 3.375 Gram(s) IV Intermittent every 8 hours      atorvastatin 80 milliGRAM(s) Oral at bedtime      fentaNYL   Infusion. 1 MICROgram(s)/kG/Hr IV Continuous <Continuous>  propofol Infusion 5 MICROgram(s)/kG/Min IV Continuous <Continuous>                    LABS:                        10.2   14.15 )-----------( 151      ( 03 Aug 2018 01:15 )             31.5     Hgb Trend: 10.2<--, 11.1<--, 12.0<--, 12.1<--  08-03    133<L>  |  98  |  14  ----------------------------<  105<H>  4.3   |  23  |  1.05    Ca    8.1<L>      03 Aug 2018 01:15  Phos  2.9     08-03  Mg     2.0     08-03    TPro  5.8<L>  /  Alb  2.8<L>  /  TBili  0.8  /  DBili  x   /  AST  150<H>  /  ALT  257<H>  /  AlkPhos  67  08-03    Creatinine Trend: 1.05<--, 0.94<--, 0.89<--, 1.07<--  PT/INR - ( 01 Aug 2018 17:01 )   PT: 11.5 SEC;   INR: 1.00          PTT - ( 03 Aug 2018 01:15 )  PTT:43.3 SEC    Arterial Blood Gas:  08-01 @ 23:46  7.38/38/55/22/87.4/-2.3  ABG lactate: --  Arterial Blood Gas:  08-01 @ 19:10  7.38/36/80/22/96.2/-3.7  ABG lactate: --    Venous Blood Gas:  08-01 @ 17:01  7.28/46/44/19/71.3  VBG Lactate: 4.8      MICROBIOLOGY:     RADIOLOGY:  [ ] Reviewed and interpreted by me    EKG: Jesse Soler, PGY1    CHIEF COMPLAINT: 81 yo with h/o NSTEMI as per chart, PAD on ASA, Renal artery stenosis, HTN, good functional status, returned from [place] three days ago and has been not feeling well since. Per family pt. hadn't been feeling well for past couple of days after returning home from trip to [place], family reports pt was sitting in chair, became tachypneic, she became weak and lost consciousness. Down for 10 minutes prior to arrival of EMS.  Per EMS she was found unresponsive, in a shockable rhythm, received 1 shock, was intubated and achieved ROSC after 2nd round (approx 4 min) of CPR. Arrives to ED intubated.    Interval Events: No overnight events. VSS.    REVIEW OF SYSTEMS:  Constitutional: [ ] negative [ ] fevers [ ] chills [ ] weight loss [ ] weight gain  HEENT: [ ] negative [ ] dry eyes [ ] eye irritation [ ] postnasal drip [ ] nasal congestion  CV: [ ] negative  [ ] chest pain [ ] orthopnea [ ] palpitations [ ] murmur  Resp: [ ] negative [ ] cough [ ] shortness of breath [ ] dyspnea [ ] wheezing [ ] sputum [ ] hemoptysis  GI: [ ] negative [ ] nausea [ ] vomiting [ ] diarrhea [ ] constipation [ ] abd pain [ ] dysphagia   : [ ] negative [ ] dysuria [ ] nocturia [ ] hematuria [ ] increased urinary frequency  Musculoskeletal: [ ] negative [ ] back pain [ ] myalgias [ ] arthralgias [ ] fracture  Skin: [ ] negative [ ] rash [ ] itch  Neurological: [ ] negative [ ] headache [ ] dizziness [ ] syncope [ ] weakness [ ] numbness  Psychiatric: [ ] negative [ ] anxiety [ ] depression  Endocrine: [ ] negative [ ] diabetes [ ] thyroid problem  Hematologic/Lymphatic: [ ] negative [ ] anemia [ ] bleeding problem  Allergic/Immunologic: [ ] negative [ ] itchy eyes [ ] nasal discharge [ ] hives [ ] angioedema  [ ] All other systems negative  [X ] Unable to assess ROS because ________intubated    OBJECTIVE:  ICU Vital Signs Last 24 Hrs  T(C): 37.2 (03 Aug 2018 07:42), Max: 37.9 (02 Aug 2018 20:00)  T(F): 99 (03 Aug 2018 07:42), Max: 100.3 (02 Aug 2018 20:00)  HR: 145 (03 Aug 2018 07:42) (83 - 145)  BP: 141/95 (03 Aug 2018 07:42) (94/68 - 141/95)  BP(mean): 107 (03 Aug 2018 07:42) (70 - 107)  ABP: --  ABP(mean): --  RR: 15 (03 Aug 2018 07:42) (12 - 42)  SpO2: 100% (03 Aug 2018 07:42) (96% - 100%)    Mode: AC/ CMV (Assist Control/ Continuous Mandatory Ventilation), RR (machine): 12, TV (machine): 400, FiO2: 40, PEEP: 5, MAP: 8, PIP: 16    08-02 @ 07:01  -  08-03 @ 07:00  --------------------------------------------------------  IN: 725.9 mL / OUT: 765 mL / NET: -39.1 mL    08-03 @ 07:01  -  08-03 @ 07:50  --------------------------------------------------------  IN: 18.3 mL / OUT: 150 mL / NET: -131.7 mL      CAPILLARY BLOOD GLUCOSE          Physical Exam: General: Intubated and sedated   	Neuro: Sedated. Not withdrawing to pain off sedation, pupils constricted, sluggishly reactive  	HEENT: No deformity. No lymphadenopathy  	Cardio: S1/S2. No murmurs, gallops  	Resp: Intubated. Bronchial breath sounds   	Abd: Soft, non-tender, non-distended  	:  Nicole+  Extremities: Good pulses    LINES: peripheral    HOSPITAL MEDICATIONS:  aspirin  chewable 81 milliGRAM(s) Oral daily  clopidogrel Tablet 75 milliGRAM(s) Oral daily  heparin  Infusion.  Unit(s)/Hr IV Continuous <Continuous>    piperacillin/tazobactam IVPB. 3.375 Gram(s) IV Intermittent every 8 hours      atorvastatin 80 milliGRAM(s) Oral at bedtime      fentaNYL   Infusion. 1 MICROgram(s)/kG/Hr IV Continuous <Continuous>  propofol Infusion 5 MICROgram(s)/kG/Min IV Continuous <Continuous>                    LABS:                        10.2   14.15 )-----------( 151      ( 03 Aug 2018 01:15 )             31.5     Hgb Trend: 10.2<--, 11.1<--, 12.0<--, 12.1<--  08-03    133<L>  |  98  |  14  ----------------------------<  105<H>  4.3   |  23  |  1.05    Ca    8.1<L>      03 Aug 2018 01:15  Phos  2.9     08-03  Mg     2.0     08-03    TPro  5.8<L>  /  Alb  2.8<L>  /  TBili  0.8  /  DBili  x   /  AST  150<H>  /  ALT  257<H>  /  AlkPhos  67  08-03    Creatinine Trend: 1.05<--, 0.94<--, 0.89<--, 1.07<--  PT/INR - ( 01 Aug 2018 17:01 )   PT: 11.5 SEC;   INR: 1.00          PTT - ( 03 Aug 2018 01:15 )  PTT:43.3 SEC    Arterial Blood Gas:  08-01 @ 23:46  7.38/38/55/22/87.4/-2.3  ABG lactate: --  Arterial Blood Gas:  08-01 @ 19:10  7.38/36/80/22/96.2/-3.7  ABG lactate: --    Venous Blood Gas:  08-01 @ 17:01  7.28/46/44/19/71.3  VBG Lactate: 4.8      MICROBIOLOGY:     RADIOLOGY:  [ ] Reviewed and interpreted by me    EKG:

## 2018-08-03 NOTE — CHART NOTE - NSCHARTNOTEFT_GEN_A_CORE
16:00- Once sedation was removed patient was successfully extubated with no resp. issues. Shortly after patient was found to be in undifferentiated SVT in the rate of 140-190. BP stable. No mental status changes. Adenosine 6 mg IV given. Aftutter seen on rhythm strip. Given metoprolol 5 mg IV for rate control. Patient now in NSR . No symptoms at this time.

## 2018-08-03 NOTE — CHART NOTE - NSCHARTNOTEFT_GEN_A_CORE
: Thomas     INDICATION: Respiratory failure requiring mechanical ventilation s/p cardiac arrest.     PROCEDURE:  [x ] LIMITED ECHO  [x ] LIMITED CHEST  [ ] LIMITED RETROPERITONEAL  [ ] LIMITED ABDOMINAL  [ ] LIMITED DVT  [ ] NEEDLE GUIDANCE VASCULAR  [ ] NEEDLE GUIDANCE THORACENTESIS  [ ] NEEDLE GUIDANCE PARACENTESIS  [ ] NEEDLE GUIDANCE PERICARDIOCENTESIS  [ ] OTHER    FINDINGS:   Lung exam: Anterior A line predominant. Lung bases with no effusions or consolidations.   Cardiac exam: Asymmetric LV contraction, there is motion noted a the apex of the heart. Improved from yesterday.      INTERPRETATION: A line predominant anteriorly. No consolidation or effusions present at lung bases. 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 : Thomas     INDICATION: Respiratory failure requiring mechanical ventilation s/p cardiac arrest.     PROCEDURE:  [x ] LIMITED ECHO  [x ] LIMITED CHEST  [ ] LIMITED RETROPERITONEAL  [ ] LIMITED ABDOMINAL  [ ] LIMITED DVT  [ ] NEEDLE GUIDANCE VASCULAR  [ ] NEEDLE GUIDANCE THORACENTESIS  [ ] NEEDLE GUIDANCE PARACENTESIS  [ ] NEEDLE GUIDANCE PERICARDIOCENTESIS  [ ] OTHER    FINDINGS:   Lung exam: Anterior A line predominant. Lung bases with no effusions or consolidations.   Cardiac exam: Asymmetric LV contraction, there is motion noted a the apex of the heart. Improved from yesterday.      INTERPRETATION: A line predominant anteriorly. No consolidation or effusions present at lung bases. 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    Attending note:  Agree with above. At bedside for procedure.

## 2018-08-03 NOTE — PROGRESS NOTE ADULT - SUBJECTIVE AND OBJECTIVE BOX
CHIEF COMPLAINT:  Patient is a 82y old  Female who presents with a chief complaint of PEA Arrest (01 Aug 2018 20:24)    HPI:  83 yo with h/o NSTEMI as per chart, PAD on ASA, Renal artery stenosis, HTN, good functional status, returned from [place] three days ago and has been not feeling well since. Per family pt. hadn't been feeling well for past couple of days after returning home from trip to [place], family reports pt was sitting in chair, became tachypneic, she became weak and lost consciousness. Down for 10 minutes prior to arrival of EMS.  Per EMS she was found unresponsive, in a shockable rhythm, received 1 shock, was intubated and achieved ROSC after 2nd round (approx 4 min) of CPR. Arrives to ED intubated. (01 Aug 2018 20:24)    Interval Events: No acute overnight events. Patient remains sedated and intubated. Patient is currently overbreathing her vent         REVIEW OF SYSTEMS:  [ ] Unable to assess ROS because of overbreathing    OBJECTIVE:  ICU Vital Signs Last 24 Hrs  T(C): 37.2 (03 Aug 2018 07:42), Max: 37.9 (02 Aug 2018 20:00)  T(F): 99 (03 Aug 2018 07:42), Max: 100.3 (02 Aug 2018 20:00)  HR: 114 (03 Aug 2018 07:52) (83 - 145)  BP: 141/95 (03 Aug 2018 07:42) (94/68 - 141/95)  BP(mean): 107 (03 Aug 2018 07:42) (70 - 107)  ABP: --  ABP(mean): --  RR: 15 (03 Aug 2018 07:42) (12 - 42)  SpO2: 100% (03 Aug 2018 07:42) (96% - 100%)    Mode: AC/ CMV (Assist Control/ Continuous Mandatory Ventilation), RR (machine): 12, TV (machine): 400, FiO2: 40, PEEP: 5, ITime: 0.71, MAP: 8, PIP: 16    08-02 @ 07:01  -  08-03 @ 07:00  --------------------------------------------------------  IN: 725.9 mL / OUT: 765 mL / NET: -39.1 mL    08-03 @ 07:01  -  08-03 @ 08:08  --------------------------------------------------------  IN: 18.3 mL / OUT: 150 mL / NET: -131.7 mL      CAPILLARY BLOOD GLUCOSE          PHYSICAL EXAM:  General: NAD, well-developed  Eyes: EOMI  Neck: Supple, No JVD  Chest/Lung: Clear to auscultation bilaterally; No wheezes  Heart: Regular rate and rhythm; No murmurs, rubs, or gallops. Capillary refill WNL  Abdome: Soft, Nontender, Nondistended; Bowel sounds present  Extremities: No lower extremity edema.   Psych: AAOx3  Neurology: non-focal, strength and sensation grossly intact UE and LE BL. No spinal TTP  Skin: No rashes or lesions    LINES:    HOSPITAL MEDICATIONS:  aspirin  chewable 81 milliGRAM(s) Oral daily  clopidogrel Tablet 75 milliGRAM(s) Oral daily  heparin  Infusion.  Unit(s)/Hr IV Continuous <Continuous>    piperacillin/tazobactam IVPB. 3.375 Gram(s) IV Intermittent every 8 hours      atorvastatin 80 milliGRAM(s) Oral at bedtime      fentaNYL   Infusion. 1 MICROgram(s)/kG/Hr IV Continuous <Continuous>  propofol Infusion 5 MICROgram(s)/kG/Min IV Continuous <Continuous>                    LABS:                        10.2   14.15 )-----------( 151      ( 03 Aug 2018 01:15 )             31.5     Hgb Trend: 10.2<--, 11.1<--, 12.0<--, 12.1<--  08-03    133<L>  |  98  |  14  ----------------------------<  105<H>  4.3   |  23  |  1.05    Ca    8.1<L>      03 Aug 2018 01:15  Phos  2.9     08-03  Mg     2.0     08-03    TPro  5.8<L>  /  Alb  2.8<L>  /  TBili  0.8  /  DBili  x   /  AST  150<H>  /  ALT  257<H>  /  AlkPhos  67  08-03    Creatinine Trend: 1.05<--, 0.94<--, 0.89<--, 1.07<--  PT/INR - ( 01 Aug 2018 17:01 )   PT: 11.5 SEC;   INR: 1.00          PTT - ( 03 Aug 2018 01:15 )  PTT:43.3 SEC    Arterial Blood Gas:  08-01 @ 23:46  7.38/38/55/22/87.4/-2.3  ABG lactate: --  Arterial Blood Gas:  08-01 @ 19:10  7.38/36/80/22/96.2/-3.7  ABG lactate: --    Venous Blood Gas:  08-01 @ 17:01  7.28/46/44/19/71.3  VBG Lactate: 4.8      MICROBIOLOGY:     RADIOLOGY:  [ ] Reviewed and interpreted by me    EKG: CHIEF COMPLAINT:  Patient is a 82y old  Female who presents with a chief complaint of PEA Arrest (01 Aug 2018 20:24)    HPI:  81 yo with h/o NSTEMI as per chart, PAD on ASA, Renal artery stenosis, HTN, good functional status, returned from [place] three days ago and has been not feeling well since. Per family pt. hadn't been feeling well for past couple of days after returning home from trip to [place], family reports pt was sitting in chair, became tachypneic, she became weak and lost consciousness. Down for 10 minutes prior to arrival of EMS.  Per EMS she was found unresponsive, in a shockable rhythm, received 1 shock, was intubated and achieved ROSC after 2nd round (approx 4 min) of CPR. Arrives to ED intubated. (01 Aug 2018 20:24)    Interval Events: No acute overnight events. Patient remains sedated and intubated. Patient is currently overbreathing her vent and is stable.         REVIEW OF SYSTEMS:  [X ] Unable to assess ROS because of intubation and sedation     OBJECTIVE:  ICU Vital Signs Last 24 Hrs  T(C): 37.2 (03 Aug 2018 07:42), Max: 37.9 (02 Aug 2018 20:00)  T(F): 99 (03 Aug 2018 07:42), Max: 100.3 (02 Aug 2018 20:00)  HR: 114 (03 Aug 2018 07:52) (83 - 145)  BP: 141/95 (03 Aug 2018 07:42) (94/68 - 141/95)  BP(mean): 107 (03 Aug 2018 07:42) (70 - 107)  ABP: --  ABP(mean): --  RR: 15 (03 Aug 2018 07:42) (12 - 42)  SpO2: 100% (03 Aug 2018 07:42) (96% - 100%)    Mode: AC/ CMV (Assist Control/ Continuous Mandatory Ventilation), RR (machine): 12, TV (machine): 400, FiO2: 40, PEEP: 5, ITime: 0.71, MAP: 8, PIP: 16    08-02 @ 07:01  -  08-03 @ 07:00  --------------------------------------------------------  IN: 725.9 mL / OUT: 765 mL / NET: -39.1 mL    08-03 @ 07:01  -  08-03 @ 08:08  --------------------------------------------------------  IN: 18.3 mL / OUT: 150 mL / NET: -131.7 mL      CAPILLARY BLOOD GLUCOSE          PHYSICAL EXAM:  General: NAD, well-developed  Eyes: EOMI  Neck: Supple, No JVD  Chest/Lung: Clear to auscultation bilaterally; No wheezes  Heart: Regular rate and rhythm; No murmurs, rubs, or gallops. Capillary refill WNL  Abdome: Soft, Nontender, Nondistended; Bowel sounds present  Extremities: No lower extremity edema.   Psych: AAOx3  Neurology: non-focal, strength and sensation grossly intact UE and LE BL. No spinal TTP  Skin: No rashes or lesions    LINES:    HOSPITAL MEDICATIONS:  aspirin  chewable 81 milliGRAM(s) Oral daily  clopidogrel Tablet 75 milliGRAM(s) Oral daily  heparin  Infusion.  Unit(s)/Hr IV Continuous <Continuous>    piperacillin/tazobactam IVPB. 3.375 Gram(s) IV Intermittent every 8 hours      atorvastatin 80 milliGRAM(s) Oral at bedtime      fentaNYL   Infusion. 1 MICROgram(s)/kG/Hr IV Continuous <Continuous>  propofol Infusion 5 MICROgram(s)/kG/Min IV Continuous <Continuous>                    LABS:                        10.2   14.15 )-----------( 151      ( 03 Aug 2018 01:15 )             31.5     Hgb Trend: 10.2<--, 11.1<--, 12.0<--, 12.1<--  08-03    133<L>  |  98  |  14  ----------------------------<  105<H>  4.3   |  23  |  1.05    Ca    8.1<L>      03 Aug 2018 01:15  Phos  2.9     08-03  Mg     2.0     08-03    TPro  5.8<L>  /  Alb  2.8<L>  /  TBili  0.8  /  DBili  x   /  AST  150<H>  /  ALT  257<H>  /  AlkPhos  67  08-03    Creatinine Trend: 1.05<--, 0.94<--, 0.89<--, 1.07<--  PT/INR - ( 01 Aug 2018 17:01 )   PT: 11.5 SEC;   INR: 1.00          PTT - ( 03 Aug 2018 01:15 )  PTT:43.3 SEC    Arterial Blood Gas:  08-01 @ 23:46  7.38/38/55/22/87.4/-2.3  ABG lactate: --  Arterial Blood Gas:  08-01 @ 19:10  7.38/36/80/22/96.2/-3.7  ABG lactate: --    Venous Blood Gas:  08-01 @ 17:01  7.28/46/44/19/71.3  VBG Lactate: 4.8      MICROBIOLOGY: Blood cultures negative at 24 hours     RADIOLOGY:  [ ] Reviewed and interpreted by me    EKG: CHIEF COMPLAINT:  Patient is a 82y old  Female who presents with a chief complaint of PEA Arrest (01 Aug 2018 20:24)    HPI:  81 yo with h/o NSTEMI as per chart, PAD on ASA, Renal artery stenosis, HTN, good functional status, returned from [place] three days ago and has been not feeling well since. Per family pt. hadn't been feeling well for past couple of days after returning home from trip to [place], family reports pt was sitting in chair, became tachypneic, she became weak and lost consciousness. Down for 10 minutes prior to arrival of EMS.  Per EMS she was found unresponsive, in a shockable rhythm, received 1 shock, was intubated and achieved ROSC after 2nd round (approx 4 min) of CPR. Arrives to ED intubated. (01 Aug 2018 20:24)    Interval Events: No acute overnight events. Patient remains sedated and intubated. Patient is currently overbreathing her vent and is stable.         REVIEW OF SYSTEMS:  [X ] Unable to assess ROS because of intubation and sedation     OBJECTIVE:  ICU Vital Signs Last 24 Hrs  T(C): 37.2 (03 Aug 2018 07:42), Max: 37.9 (02 Aug 2018 20:00)  T(F): 99 (03 Aug 2018 07:42), Max: 100.3 (02 Aug 2018 20:00)  HR: 114 (03 Aug 2018 07:52) (83 - 145)  BP: 141/95 (03 Aug 2018 07:42) (94/68 - 141/95)  BP(mean): 107 (03 Aug 2018 07:42) (70 - 107)  ABP: --  ABP(mean): --  RR: 15 (03 Aug 2018 07:42) (12 - 42)  SpO2: 100% (03 Aug 2018 07:42) (96% - 100%)    Mode: AC/ CMV (Assist Control/ Continuous Mandatory Ventilation), RR (machine): 12, TV (machine): 400, FiO2: 40, PEEP: 5, ITime: 0.71, MAP: 8, PIP: 16    08-02 @ 07:01  -  08-03 @ 07:00  --------------------------------------------------------  IN: 725.9 mL / OUT: 765 mL / NET: -39.1 mL    08-03 @ 07:01  -  08-03 @ 08:08  --------------------------------------------------------  IN: 18.3 mL / OUT: 150 mL / NET: -131.7 mL      CAPILLARY BLOOD GLUCOSE          PHYSICAL EXAM:  General: NAD, intubated  Eyes: EOMI, PERRLA  Neck: Supple, No JVD  Chest/Lung: Clear to auscultation bilaterally; No wheezes  Heart: Tachycardic and regular rhythm; No murmurs, rubs, or gallops. Capillary refill WNL  Abdome: Soft, Nontender, Nondistended; Bowel sounds present  Extremities: No lower extremity edema.   Neurology: Awake and alert  Skin: No rashes or lesions    LINES:    HOSPITAL MEDICATIONS:  aspirin  chewable 81 milliGRAM(s) Oral daily  clopidogrel Tablet 75 milliGRAM(s) Oral daily  heparin  Infusion.  Unit(s)/Hr IV Continuous <Continuous>    piperacillin/tazobactam IVPB. 3.375 Gram(s) IV Intermittent every 8 hours      atorvastatin 80 milliGRAM(s) Oral at bedtime      fentaNYL   Infusion. 1 MICROgram(s)/kG/Hr IV Continuous <Continuous>  propofol Infusion 5 MICROgram(s)/kG/Min IV Continuous <Continuous>                    LABS:                        10.2   14.15 )-----------( 151      ( 03 Aug 2018 01:15 )             31.5     Hgb Trend: 10.2<--, 11.1<--, 12.0<--, 12.1<--  08-03    133<L>  |  98  |  14  ----------------------------<  105<H>  4.3   |  23  |  1.05    Ca    8.1<L>      03 Aug 2018 01:15  Phos  2.9     08-03  Mg     2.0     08-03    TPro  5.8<L>  /  Alb  2.8<L>  /  TBili  0.8  /  DBili  x   /  AST  150<H>  /  ALT  257<H>  /  AlkPhos  67  08-03    Creatinine Trend: 1.05<--, 0.94<--, 0.89<--, 1.07<--  PT/INR - ( 01 Aug 2018 17:01 )   PT: 11.5 SEC;   INR: 1.00          PTT - ( 03 Aug 2018 01:15 )  PTT:43.3 SEC    Arterial Blood Gas:  08-01 @ 23:46  7.38/38/55/22/87.4/-2.3  ABG lactate: --  Arterial Blood Gas:  08-01 @ 19:10  7.38/36/80/22/96.2/-3.7  ABG lactate: --    Venous Blood Gas:  08-01 @ 17:01  7.28/46/44/19/71.3  VBG Lactate: 4.8      MICROBIOLOGY: Blood cultures negative at 24 hours     RADIOLOGY:  [ ] Reviewed and interpreted by me    EKG:

## 2018-08-03 NOTE — PROGRESS NOTE ADULT - ASSESSMENT
· Assessment		  82 F PMH HTN, NSTEMI, PAD, osteoporosis BIBEMS s/p cardiac arrest, intubated and sedated for further management.     Neuro:  Intubated and sedated in the field. Previously unresponsive to pain off sedation.  Consider primary CNS process such as stroke though less likely as CT Head negative for acute pathology  As per family pt down for approx 10 min prior to CPR, consider repeat head imaging at later date  - currently on propofol, fentanyl ggt    Cardio  Hx of NSTEMI, PAD, now w Vfib vs VTACH arrest-   Cont to monitor on tele  - ekg showed no obvious ST elevation  - initial trop 46, now uptrended to, 476, 562 , now downtrending to 507  - formal TTE confirms LV ballooning c/w stress cardiomyopathy per cards, no plan to cath at this time  - non occlusive cath nov 2017, echo at that time had normal EF, mild TR, grade 2 diastolic dysfunction  - asa, atorvastatin, heparin ggt    Pulm  Aspiration PNA   Zosyn  CT w b/l opacities     Gastro  Place OG tube- start feeds    Nephro  crt stable,. 94 today  Nicole place draining clear urine  UA, urine cx      ID  Tx for aspiration PNA, opacities seen on CT chest  F/u cx   -Zosyn    Heme  Hb 12.- No active issue  - vte ppx, heparin gtt    Jacob Soler PGY1 · Assessment		  82 F PMH HTN, NSTEMI, PAD, osteoporosis BIBEMS s/p cardiac arrest, intubated and sedated for further management.     Neuro:  Intubated and sedated in the field. Previously unresponsive to pain off sedation.  Consider primary CNS process such as stroke though less likely as CT Head negative for acute pathology  As per family pt down for approx 10 min prior to CPR, consider repeat head imaging at later date  - currently on propofol, fentanyl ggt    Cardio  Hx of NSTEMI, PAD, now w Vfib vs VTACH arrest-   Cont to monitor on tele  - ekg showed no obvious ST elevation  - initial trop 46, now uptrended to, 476, 562 , now downtrending to 507  - formal TTE confirms LV dysfunction segmentally, with apical ballooning. 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

## 2018-08-03 NOTE — PROGRESS NOTE ADULT - ASSESSMENT
Assessment:  · Assessment		  82 F PMH HTN, NSTEMI, PAD, osteoporosis BIBEMS s/p cardiac arrest, intubated and sedated for further management.     Neuro:  Intubated and sedated in the field. Previously unresponsive to pain off sedation.  - currently on propofol, fentanyl ggt  -Mild spontaneous leg movements   -Found down for 10 min prior to CPR   -Initial CT head negative     Cardio  Hx of NSTEMI, PAD, now w Vfib vs VTACH arrest-   - ekg showed no obvious ST elevation  -trop peaked at 562 now downtrending to 502  -TTE obtained showed apical ballooning and LV hypokinesis along with TR   -Cardiac consulted: previous cath a year prior showed no occlusion, possible takotsubo cardiomyopathy diagnosis, no cath planed   - bedside sono showed LV ballooning  - asa, atorvastatin, heparin ggt    Pulm  -Possible aspiration PNA white count downt  Aspiration PNA   Zosyn  CT w b/l opacities     Gastro  Place OG tube- start feeds    Nephro  crt stable,. 94 today  Nicole place draining clear urine  UA, urine cx      ID  Tx for aspiration PNA, opacities seen on CT chest  F/u cx   -Zosyn    Heme  Hb 12.- No active issue  - vte ppx, heparin gtt    Jacob Soler PGY1 Assessment:  · Assessment		  82 F PMH HTN, NSTEMI, PAD, osteoporosis BIBEMS s/p cardiac arrest, intubated and sedated for further management. Found in shockable rhythm followed by PEA resuscitated outside the hospital likely secondary to takatsabu cardiomyopathy with LV dysfunction.     Neuro:  -Off propofol and fentanyl, extubated   -Awake and allert  -Found down for 10 min prior to CPR   -Initial CT head negative     Cardio  Hx of NSTEMI, PAD, now w Vfib vs VTACH arrest-   - ekg showed no obvious ST elevation  -trop peaked at 562 now downtrending to 502  -TTE obtained showed apical ballooning and LV hypokinesis along with TR   -Cardiac consulted: previous cath a year prior showed no occlusion, possible takotsubo cardiomyopathy diagnosis, no cath planed   - bedside sono showed LV ballooning  - asa, captopril atorvastatin, heparin ggt      Pulm  -Possible aspiration pneumonitis white count downtredning   Zosyn day 3/5 for possible aspiration pneumonitis   CT w b/l opacities     Gastro  See if tolerating oral feeds     Nephro  crt stable,. 94 today  Nicole place draining clear urine  UA, urine cx clean       ID  Tx for aspiration pneumonitis, opacities seen on CT chest  24 hour blood culture is clean   -Zosyn day 3/5     Heme  Hb 12.- No active issue  - vte ppx, heparin gtt  -Recheck PTT    Jacob Soler PGY1

## 2018-08-03 NOTE — PROGRESS NOTE ADULT - ASSESSMENT
A: 81 yo female w h/o NSTEMI as per chart, PAD, Renal artery stenosis, HTN, good functional status, returned from [place] three days prior to admission, was not feeling well and had witnessed cardiac arrest. Was shocked in field for VF, then had PEA arrest and had ROSC.    1. Cardiac arrest/NSTEMI       - Unknown if cardiac arrest was due to cardiac event. No acute findings on EKG indicating STEMI       - Troponins are downtrending       - Echo shows Severe LV dysfunction with LAD territory hypokinesis w possible apical ballooning        - Pt had normal cath last year       - Continue heparin gtt to complete 48 hrs of A/C       - c/w ASA, Plavix, Lipitor       - If mental status returns to normal or pt develops signs of cardiogenic shock will consider angiogram.       - Consider starting Captopril 12.5 TID if BP tolerates

## 2018-08-03 NOTE — PROGRESS NOTE ADULT - SUBJECTIVE AND OBJECTIVE BOX
Patient seen and examined at bedside.    Overnight Events: Per RN pt was taken off sedation this morning. She awoke but was agitated and did not follow commands, she became tachycardic and was placed back on sedation.    Review Of Systems: Unable to assess as pt is sedated        Current Meds:  aspirin  chewable 81 milliGRAM(s) Oral daily  atorvastatin 80 milliGRAM(s) Oral at bedtime  clopidogrel Tablet 75 milliGRAM(s) Oral daily  heparin  Infusion.  Unit(s)/Hr IV Continuous <Continuous>  piperacillin/tazobactam IVPB. 3.375 Gram(s) IV Intermittent every 8 hours  propofol Infusion 5 MICROgram(s)/kG/Min IV Continuous <Continuous>      PAST MEDICAL & SURGICAL HISTORY:  PAD (peripheral artery disease)  NSTEMI (non-ST elevation myocardial infarction)  Osteoporosis  HTN (hypertension)  No significant past surgical history      Vitals:  T(F): 98 (08-03), Max: 100.3 (08-02)  HR: 93 (08-03) (79 - 145)  BP: 126/69 (08-03) (93/59 - 141/95)  RR: 12 (08-03)  SpO2: 100% (08-03)  I&O's Summary    02 Aug 2018 07:01  -  03 Aug 2018 07:00  --------------------------------------------------------  IN: 725.9 mL / OUT: 765 mL / NET: -39.1 mL    03 Aug 2018 07:01  -  03 Aug 2018 12:37  --------------------------------------------------------  IN: 71.8 mL / OUT: 350 mL / NET: -278.2 mL        Physical Exam:  Appearance: No acute distress, sedated  Eyes: PERRL,   HENT: Normal oral mucosa  Cardiovascular: RRR, S1, S2, no murmurs, rubs, or gallops; no edema; no JVD  Respiratory: Clear to auscultation bilaterally  Gastrointestinal: soft, non-tender, non-distended with normal bowel sounds  Musculoskeletal: No clubbing; no joint deformity   Neurologic: Sedated  Lymphatic: No lymphadenopathy  Skin: No rashes, ecchymoses, or cyanosis                          10.2   14.15 )-----------( 151      ( 03 Aug 2018 01:15 )             31.5     08-03    133<L>  |  98  |  14  ----------------------------<  105<H>  4.3   |  23  |  1.05    Ca    8.1<L>      03 Aug 2018 01:15  Phos  2.9     08-03  Mg     2.0     08-03    TPro  5.8<L>  /  Alb  2.8<L>  /  TBili  0.8  /  DBili  x   /  AST  150<H>  /  ALT  257<H>  /  AlkPhos  67  08-03    PT/INR - ( 01 Aug 2018 17:01 )   PT: 11.5 SEC;   INR: 1.00          PTT - ( 03 Aug 2018 01:15 )  PTT:43.3 SEC  CARDIAC MARKERS ( 02 Aug 2018 02:34 )  x     / x     / 452 u/L / 19.93 ng/mL / x      CARDIAC MARKERS ( 01 Aug 2018 23:46 )  x     / x     / 381 u/L / 20.03 ng/mL / x      CARDIAC MARKERS ( 01 Aug 2018 17:00 )  x     / x     / 131 u/L / 2.77 ng/mL / x          Serum Pro-Brain Natriuretic Peptide: 374.8 pg/mL (08-01 @ 17:00)          New ECG(s): Personally reviewed    Echo:    Stress Testing:     Cath:    Imaging:    Interpretation of Telemetry: Sinus/Sinus tach 90-140s

## 2018-08-04 DIAGNOSIS — Z29.9 ENCOUNTER FOR PROPHYLACTIC MEASURES, UNSPECIFIED: ICD-10-CM

## 2018-08-04 DIAGNOSIS — I48.92 UNSPECIFIED ATRIAL FLUTTER: ICD-10-CM

## 2018-08-04 DIAGNOSIS — I50.9 HEART FAILURE, UNSPECIFIED: ICD-10-CM

## 2018-08-04 LAB
ALBUMIN SERPL ELPH-MCNC: 2.8 G/DL — LOW (ref 3.3–5)
ALP SERPL-CCNC: 62 U/L — SIGNIFICANT CHANGE UP (ref 40–120)
ALT FLD-CCNC: 149 U/L — HIGH (ref 4–33)
APTT BLD: 50 SEC — HIGH (ref 27.5–37.4)
AST SERPL-CCNC: 68 U/L — HIGH (ref 4–32)
BASOPHILS # BLD AUTO: 0.04 K/UL — SIGNIFICANT CHANGE UP (ref 0–0.2)
BASOPHILS NFR BLD AUTO: 0.3 % — SIGNIFICANT CHANGE UP (ref 0–2)
BILIRUB SERPL-MCNC: 1.3 MG/DL — HIGH (ref 0.2–1.2)
BUN SERPL-MCNC: 10 MG/DL — SIGNIFICANT CHANGE UP (ref 7–23)
CALCIUM SERPL-MCNC: 7.6 MG/DL — LOW (ref 8.4–10.5)
CHLORIDE SERPL-SCNC: 99 MMOL/L — SIGNIFICANT CHANGE UP (ref 98–107)
CHOLEST SERPL-MCNC: 118 MG/DL — LOW (ref 120–199)
CO2 SERPL-SCNC: 22 MMOL/L — SIGNIFICANT CHANGE UP (ref 22–31)
CREAT SERPL-MCNC: 0.82 MG/DL — SIGNIFICANT CHANGE UP (ref 0.5–1.3)
EOSINOPHIL # BLD AUTO: 0.03 K/UL — SIGNIFICANT CHANGE UP (ref 0–0.5)
EOSINOPHIL NFR BLD AUTO: 0.2 % — SIGNIFICANT CHANGE UP (ref 0–6)
GLUCOSE SERPL-MCNC: 111 MG/DL — HIGH (ref 70–99)
HCT VFR BLD CALC: 27.2 % — LOW (ref 34.5–45)
HDLC SERPL-MCNC: 52 MG/DL — SIGNIFICANT CHANGE UP (ref 45–65)
HGB BLD-MCNC: 9.1 G/DL — LOW (ref 11.5–15.5)
IMM GRANULOCYTES # BLD AUTO: 0.06 # — SIGNIFICANT CHANGE UP
IMM GRANULOCYTES NFR BLD AUTO: 0.5 % — SIGNIFICANT CHANGE UP (ref 0–1.5)
LIPID PNL WITH DIRECT LDL SERPL: 67 MG/DL — SIGNIFICANT CHANGE UP
LYMPHOCYTES # BLD AUTO: 1.31 K/UL — SIGNIFICANT CHANGE UP (ref 1–3.3)
LYMPHOCYTES # BLD AUTO: 10.8 % — LOW (ref 13–44)
MAGNESIUM SERPL-MCNC: 2.1 MG/DL — SIGNIFICANT CHANGE UP (ref 1.6–2.6)
MCHC RBC-ENTMCNC: 23.6 PG — LOW (ref 27–34)
MCHC RBC-ENTMCNC: 33.5 % — SIGNIFICANT CHANGE UP (ref 32–36)
MCV RBC AUTO: 70.6 FL — LOW (ref 80–100)
MONOCYTES # BLD AUTO: 1.12 K/UL — HIGH (ref 0–0.9)
MONOCYTES NFR BLD AUTO: 9.2 % — SIGNIFICANT CHANGE UP (ref 2–14)
NEUTROPHILS # BLD AUTO: 9.56 K/UL — HIGH (ref 1.8–7.4)
NEUTROPHILS NFR BLD AUTO: 79 % — HIGH (ref 43–77)
NRBC # FLD: 0 — SIGNIFICANT CHANGE UP
PHOSPHATE SERPL-MCNC: 1.6 MG/DL — LOW (ref 2.5–4.5)
PLATELET # BLD AUTO: 154 K/UL — SIGNIFICANT CHANGE UP (ref 150–400)
PMV BLD: 12 FL — SIGNIFICANT CHANGE UP (ref 7–13)
POTASSIUM SERPL-MCNC: 3.6 MMOL/L — SIGNIFICANT CHANGE UP (ref 3.5–5.3)
POTASSIUM SERPL-SCNC: 3.6 MMOL/L — SIGNIFICANT CHANGE UP (ref 3.5–5.3)
PROT SERPL-MCNC: 5.7 G/DL — LOW (ref 6–8.3)
RBC # BLD: 3.85 M/UL — SIGNIFICANT CHANGE UP (ref 3.8–5.2)
RBC # FLD: 15.4 % — HIGH (ref 10.3–14.5)
SODIUM SERPL-SCNC: 132 MMOL/L — LOW (ref 135–145)
TRIGL SERPL-MCNC: 53 MG/DL — SIGNIFICANT CHANGE UP (ref 10–149)
WBC # BLD: 12.12 K/UL — HIGH (ref 3.8–10.5)
WBC # FLD AUTO: 12.12 K/UL — HIGH (ref 3.8–10.5)

## 2018-08-04 PROCEDURE — 99291 CRITICAL CARE FIRST HOUR: CPT

## 2018-08-04 RX ORDER — ACETAMINOPHEN 500 MG
1000 TABLET ORAL ONCE
Qty: 0 | Refills: 0 | Status: COMPLETED | OUTPATIENT
Start: 2018-08-04 | End: 2018-08-04

## 2018-08-04 RX ORDER — POTASSIUM PHOSPHATE, MONOBASIC POTASSIUM PHOSPHATE, DIBASIC 236; 224 MG/ML; MG/ML
15 INJECTION, SOLUTION INTRAVENOUS ONCE
Qty: 0 | Refills: 0 | Status: COMPLETED | OUTPATIENT
Start: 2018-08-04 | End: 2018-08-04

## 2018-08-04 RX ORDER — ACETAMINOPHEN 500 MG
650 TABLET ORAL ONCE
Qty: 0 | Refills: 0 | Status: COMPLETED | OUTPATIENT
Start: 2018-08-04 | End: 2018-08-04

## 2018-08-04 RX ORDER — METOPROLOL TARTRATE 50 MG
25 TABLET ORAL
Qty: 0 | Refills: 0 | Status: DISCONTINUED | OUTPATIENT
Start: 2018-08-04 | End: 2018-08-05

## 2018-08-04 RX ORDER — HEPARIN SODIUM 5000 [USP'U]/ML
5000 INJECTION INTRAVENOUS; SUBCUTANEOUS EVERY 8 HOURS
Qty: 0 | Refills: 0 | Status: DISCONTINUED | OUTPATIENT
Start: 2018-08-04 | End: 2018-08-14

## 2018-08-04 RX ADMIN — PIPERACILLIN AND TAZOBACTAM 25 GRAM(S): 4; .5 INJECTION, POWDER, LYOPHILIZED, FOR SOLUTION INTRAVENOUS at 07:32

## 2018-08-04 RX ADMIN — CLOPIDOGREL BISULFATE 75 MILLIGRAM(S): 75 TABLET, FILM COATED ORAL at 11:49

## 2018-08-04 RX ADMIN — Medication 400 MILLIGRAM(S): at 02:48

## 2018-08-04 RX ADMIN — HEPARIN SODIUM 5000 UNIT(S): 5000 INJECTION INTRAVENOUS; SUBCUTANEOUS at 13:16

## 2018-08-04 RX ADMIN — Medication 25 MILLIGRAM(S): at 13:16

## 2018-08-04 RX ADMIN — POTASSIUM PHOSPHATE, MONOBASIC POTASSIUM PHOSPHATE, DIBASIC 62.5 MILLIMOLE(S): 236; 224 INJECTION, SOLUTION INTRAVENOUS at 05:22

## 2018-08-04 RX ADMIN — HEPARIN SODIUM 5000 UNIT(S): 5000 INJECTION INTRAVENOUS; SUBCUTANEOUS at 21:10

## 2018-08-04 RX ADMIN — Medication 650 MILLIGRAM(S): at 21:11

## 2018-08-04 RX ADMIN — Medication 650 MILLIGRAM(S): at 22:11

## 2018-08-04 RX ADMIN — Medication 81 MILLIGRAM(S): at 11:49

## 2018-08-04 RX ADMIN — ATORVASTATIN CALCIUM 80 MILLIGRAM(S): 80 TABLET, FILM COATED ORAL at 21:10

## 2018-08-04 RX ADMIN — HEPARIN SODIUM 700 UNIT(S)/HR: 5000 INJECTION INTRAVENOUS; SUBCUTANEOUS at 03:55

## 2018-08-04 NOTE — CHART NOTE - NSCHARTNOTEFT_GEN_A_CORE
MICU Transfer Note    83 yo with h/o NSTEMI as per chart, PAD on ASA, Renal artery stenosis, HTN admitted to the MICU s/p vtach arrest and intubation in the setting of severely depressed LV function. Patient’s troponins found to be elevated at 562, CK, CK-MB elevated. Cardio consulted and patient was started on ACS protocol. Treated with aspirin, Plavix, and heparin drip. No cath needed. Bedside TTE concerning for Takotsubo. Pt with prior cath a year ago that was normal. Official TTE showed severe segmental Left ventricular systolic dysfunction with hypokinesis of the apex, mid to distal septum, and mid to distal anterior wall. EF 28%. Patient was extubated on 8/3. Patient had SVT, given adenosine for underlying atrial flutter. Patient converted back to NSR after 5 of metoprolol. Patient started on Lopressor 25 BID for Aflutter. Patient was started on zosyn for possible aspiration pneumonia, now discontinued.    Follow up:  -start on captopril when BP tolerated. Follow up cards shady dave discontinued, follow up trial of void  -wean off oxygen as tolerated    Ana Dobson, PGY3  MAR [id number] MICU Transfer Note    81 yo with h/o NSTEMI as per chart, PAD on ASA, Renal artery stenosis, HTN admitted to the MICU s/p vtach arrest and intubation in the setting of severely depressed LV function/suspected Takotsubo. Patient’s troponins found to be elevated at 562, CK, CK-MB elevated. Cardio consulted and patient was started on ACS protocol. Treated with aspirin, Plavix, and heparin drip. No cath needed. Bedside TTE concerning for Takotsubo. Pt with prior cath a year ago that was normal. Official TTE showed severe segmental Left ventricular systolic dysfunction with hypokinesis of the apex, mid to distal septum, and mid to distal anterior wall. EF 28%. Patient was extubated on 8/3. Patient had SVT, given adenosine for underlying atrial flutter. Patient converted back to NSR after 5 of metoprolol. Patient started on Lopressor 25 BID for Aflutter. Patient was started on zosyn for possible aspiration pneumonia, now discontinued.    Follow up:  -start on captopril when BP tolerated. Follow up cards recs  -amaral discontinued, follow up trial of void  -wean off oxygen as tolerated    Ana Dobson, PGY3  MAR [id number] MICU Transfer Note    83 yo with h/o NSTEMI as per chart, PAD on ASA, Renal artery stenosis, HTN admitted to the MICU s/p vtach arrest and intubation in the setting of severely depressed LV function/suspected Takotsubo. Patient’s troponins found to be elevated at 562, CK, CK-MB elevated. Cardio consulted and patient was started on ACS protocol. Treated with aspirin, Plavix, and heparin drip however did not cath as pt had clean cath only a year prior. Bedside TTE concerning for Takotsubo. Official TTE showed severe segmental Left ventricular systolic dysfunction with hypokinesis of the apex, mid to distal septum, and mid to distal anterior wall. EF 28%. Patient was extubated on 8/3. Patient had SVT, given adenosine for underlying atrial flutter. Patient converted back to NSR after 5 of metoprolol. Patient started on Lopressor 25 BID for Aflutter. Patient was started on zosyn for possible aspiration pneumonia, now discontinued.     Follow up:  -start on captopril when BP tolerated. Follow up cards recs  -amaral discontinued, follow up trial of void  -wean off oxygen as tolerated    Ana Dobson, PGY3  MAR [id number]

## 2018-08-04 NOTE — PROGRESS NOTE ADULT - SUBJECTIVE AND OBJECTIVE BOX
MEDICINE ACCEPT NOTE  Patient is a 82y old  Female who presents with a chief complaint of PEA Arrest (01 Aug 2018 20:24)      SUBJECTIVE / OVERNIGHT EVENTS:  The patient is a 82 year old woman with a history of HTN, Peripheral artery disease on ASA, renal artery stenosis presented to the hospital after arresting in the field twice. She was intubated in the field and was admitted to the MICU. She was worked up for ACS. TTE was concerning for takotsubo cardiomyopathy with an EF of 28%. She had an episode of SVT with underlying atrial flutter, she was given adenosine and metoprolol and went back into normal sinus rhythm.  The patient is on lopressor 25 BID for the aflutter and was on zosyn for possible aspiration pneumonia.      Patient seen and examined at bedside. She has no complaints. She has not urinated since her folley was removed at 16:45. She has no chest pain, no shortness of breath, no nausia, no vomiting. She has some minor hand pain.     Other Review of Systems Negative.    MEDICATIONS  (STANDING):  aspirin  chewable 81 milliGRAM(s) Oral daily  atorvastatin 80 milliGRAM(s) Oral at bedtime  clopidogrel Tablet 75 milliGRAM(s) Oral daily  heparin  Injectable 5000 Unit(s) SubCutaneous every 8 hours  metoprolol tartrate 25 milliGRAM(s) Oral two times a day    MEDICATIONS  (PRN):    OBJECTIVE:    Vital Signs Last 24 Hrs  T(C): 36.7 (04 Aug 2018 18:03), Max: 38.3 (04 Aug 2018 00:00)  T(F): 98.1 (04 Aug 2018 18:03), Max: 101 (04 Aug 2018 00:00)  HR: 98 (04 Aug 2018 18:03) (90 - 120)  BP: 118/59 (04 Aug 2018 18:03) (109/67 - 138/81)  BP(mean): 78 (04 Aug 2018 16:00) (72 - 102)  RR: 19 (04 Aug 2018 18:03) (16 - 27)  SpO2: 98% (04 Aug 2018 18:03) (95% - 100%)    CAPILLARY BLOOD GLUCOSE    I&O's Summary    03 Aug 2018 07:01  -  04 Aug 2018 07:00  --------------------------------------------------------  IN: 459.8 mL / OUT: 1272 mL / NET: -812.2 mL    04 Aug 2018 07:01  -  04 Aug 2018 19:02  --------------------------------------------------------  IN: 114 mL / OUT: 650 mL / NET: -536 mL    PHYSICAL EXAM:  GENERAL: NAD, well-developed  HEAD:  Atraumatic, Normocephalic  EYES: EOMI, PERRLA, conjunctiva and sclera clear  NECK: Supple, No JVD  CHEST/LUNG: crackles in the right lower lobe  HEART: Regular rate and rhythm; No murmurs, rubs, or gallops  ABDOMEN: Soft, Nontender, Nondistended; Bowel sounds present  EXTREMITIES:  1+ Peripheral Pulses, No clubbing, cyanosis, or edema  PSYCH: AAOx3  NEUROLOGY: non-focal  SKIN: No rashes or lesions    LABS:                        9.1    12.12 )-----------( 154      ( 04 Aug 2018 02:50 )             27.2     Auto Eosinophil # 0.03  / Auto Eosinophil % 0.2   / Auto Neutrophil # 9.56  / Auto Neutrophil % 79.0  / BANDS % x                            10.2   14.15 )-----------( 151      ( 03 Aug 2018 01:15 )             31.5     Auto Eosinophil # 0.11  / Auto Eosinophil % 0.8   / Auto Neutrophil # 10.43 / Auto Neutrophil % 73.7  / BANDS % x        08-04    132<L>  |  99  |  10  ----------------------------<  111<H>  3.6   |  22  |  0.82  08-03    137  |  100  |  11  ----------------------------<  104<H>  4.0   |  23  |  0.92  08-03    133<L>  |  98  |  14  ----------------------------<  105<H>  4.3   |  23  |  1.05    Ca    7.6<L>      04 Aug 2018 02:39  Mg     2.1     -  Phos  1.6       TPro  5.7<L>  /  Alb  2.8<L>  /  TBili  1.3<H>  /  DBili  x   /  AST  68<H>  /  ALT  149<H>  /  AlkPhos  62    TPro  5.8<L>  /  Alb  2.8<L>  /  TBili  0.8  /  DBili  x   /  AST  150<H>  /  ALT  257<H>  /  AlkPhos  67  08-03    PTT - ( 04 Aug 2018 02:39 )  PTT:50.0 SEC    Urinalysis Basic - ( 03 Aug 2018 16:44 )    Color: YELLOW / Appearance: CLEAR / S.017 / pH: 6.0  Gluc: NEGATIVE / Ketone: NEGATIVE  / Bili: NEGATIVE / Urobili: NORMAL mg/dL   Blood: TRACE / Protein: 30 mg/dL / Nitrite: NEGATIVE   Leuk Esterase: LARGE / RBC: 10-25 / WBC 25-50   Sq Epi: OCC / Non Sq Epi: x / Bacteria: x    Lactate, Blood: 2.7 mmol/L ( @ 23:46)    ABG: ( 01 Aug 2018 23:46 ) pH: 7.38  /  pCO2: 38    /  pO2: 55    / HCO3: 22    / Base Excess: -2.3  /  SaO2: 87.4    ( 01 Aug 2018 19:10 ) pH: 7.38  /  pCO2: 36    /  pO2: 80    / HCO3: 22    / Base Excess: -3.7  /  SaO2: 96.2      Transthoracic ECHO  CONCLUSIONS:  1. Mitral annular calcification, otherwise normal mitral  valve. Minimal mitral regurgitation.  2. Normal left ventricular internal dimensions and wall  thicknesses.  3. Severe segmental left ventricular systolic dysfunction.  Hypokinesis of the apex, mid to distal septum, and mid to  distal anterior wall.  4. Normal right ventricular size and function.  5. Estimated right ventricular systolic pressure equals 45  mm Hg, assuming right atrial pressure equals 10 mm Hg,  consistent with mild pulmonary hypertension.

## 2018-08-04 NOTE — PROGRESS NOTE ADULT - PROBLEM SELECTOR PLAN 2
- patient had episode of SVT with underlying atrial flutter. given adenosine and metoprolol.  - on lopressor 25 mg BID.

## 2018-08-04 NOTE — PHYSICAL THERAPY INITIAL EVALUATION ADULT - PATIENT PROFILE REVIEW, REHAB EVAL
yes/Pt. profile reviewed, consulted with KYLAH PRUETT prior to initial PT evaluation and tx, as per RN, Pt. is OK to participate in skilled therapy session, current activity orders; increase as tolerated

## 2018-08-04 NOTE — PROGRESS NOTE ADULT - ATTENDING COMMENTS
Agree with above. Seen and examined with residents. Much improved clinical status, awake and following commands, sitting in chair. HR under control with B blockers. Supportive care.

## 2018-08-04 NOTE — PHYSICAL THERAPY INITIAL EVALUATION ADULT - MANUAL MUSCLE TESTING RESULTS, REHAB EVAL
Bilateral UE muscle strength grossly 3-/5 Throughout; Bilateral LE muscle strength grossly 3-/5 Throughout.

## 2018-08-04 NOTE — CHART NOTE - NSCHARTNOTEFT_GEN_A_CORE
81 yo with h/o NSTEMI as per chart, PAD on ASA, Renal artery stenosis, HTN, good functional status, returned from [place] three days ago and has been not feeling well since. Per family pt. hadn't been feeling well for past couple of days after returning home from trip to [place], family reports pt was sitting in chair, became tachypneic, she became weak and lost consciousness. Down for 10 minutes prior to arrival of EMS.  Per EMS she was found unresponsive, in a shockable rhythm, received 1 shock, was intubated and achieved ROSC after 2nd round (approx 4 min) of CPR. Arrives to ED intubated.     MICU: Patient’s troponins found to be elevated at 562, CK, CK-MB elevated. Cardio consulted and patient was started on ACS protocol. Started on aspirin, Plavix, and heparin drip. No cath needed. Bedside TTE concerning for Takotsubo. Official TTE showed severe segmental Left ventricular systolic dysfunction with hypokinesis of the apex, mid to distal spetum, and mid to distal anterior wall. EF 28%. Patient was extubated on 8/3. Patient had NSVT, given adenosine for underlying atrial flutter. Patient converted back to NSR after 5 of metoprolol. Patient started on Lopressor 25 BID for Aflutter. Patient was started on zosyn for possible aspiration pneumonia, now discontinued. 81 yo with h/o NSTEMI as per chart, PAD on ASA, Renal artery stenosis, HTN, good functional status, returned from [place] three days ago and has been not feeling well since. Per family pt. hadn't been feeling well for past couple of days after returning home from trip to [place], family reports pt was sitting in chair, became tachypneic, she became weak and lost consciousness. Down for 10 minutes prior to arrival of EMS.  Per EMS she was found unresponsive, in a shockable rhythm, received 1 shock, was intubated and achieved ROSC after 2nd round (approx 4 min) of CPR. Arrives to ED intubated.     MICU: Patient’s troponins found to be elevated at 562, CK, CK-MB elevated. Cardio consulted and patient was started on ACS protocol. Started on aspirin, Plavix, and heparin drip. No cath needed. Bedside TTE concerning for Takotsubo. Official TTE showed severe segmental Left ventricular systolic dysfunction with hypokinesis of the apex, mid to distal spetum, and mid to distal anterior wall. EF 28%. Patient was extubated on 8/3. Patient had SVT, given adenosine for underlying atrial flutter. Patient converted back to NSR after 5 of metoprolol. Patient started on Lopressor 25 BID for Aflutter. Patient was started on zosyn for possible aspiration pneumonia, now discontinued.    Follow up:  -amaral discontinued, follow up trial of void  -wean off oxygen as tolerated

## 2018-08-04 NOTE — PROGRESS NOTE ADULT - ASSESSMENT
· Assessment		  82 F PMH HTN, NSTEMI, PAD, osteoporosis BIBEMS s/p cardiac arrest, intubated and successfully extubated.     Neuro:  Successfully extubated, alert and oriented, answering questions appropriately   Consider primary CNS process such as stroke though less likely as CT Head negative for acute pathology  As per family pt down for approx 10 min prior to CPR, consider repeat head imaging at later date    Cardio  Hx of NSTEMI, PAD, now w Vfib vs VTACH arrest-   Cont to monitor on tele  - ekg showed no obvious ST elevation  - initial trop 46, now uptrended to, 476, 562 , now downtrending to 507  - formal TTE confirms LV dysfunction segmentally, with apical ballooning. 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     Neuro:  Successfully extubated, alert and oriented, answering questions appropriately   Consider primary CNS process such as stroke though less likely as CT Head negative for acute pathology  As per family pt down for approx 10 min prior to CPR, consider repeat head imaging at later date    Cardio  Hx of NSTEMI, PAD, now w Vfib vs VTACH arrest-   Cont to monitor on tele  - ekg showed no obvious ST elevation  - initial trop 46, now uptrended to, 476, 562 , now downtrending to 507  - formal TTE confirms LV dysfunction segmentally, with apical ballooning. 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     Neuro:  Successfully extubated, alert and oriented, answering questions appropriately   Consider primary CNS process such as stroke though less likely as CT Head negative for acute pathology  As per family pt down for approx 10 min prior to CPR, consider repeat head imaging at later date    Cardio  Hx of NSTEMI, PAD, now w Vfib vs VTACH arrest-   Cont to monitor on tele  - ekg showed no obvious ST elevation  - initial trop 46, now uptrended to, 476, 562 , now downtrending to 507  - formal TTE confirms LV dysfunction segmentally, with apical ballooning. 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

## 2018-08-04 NOTE — PHYSICAL THERAPY INITIAL EVALUATION ADULT - PERSONAL SAFETY AND JUDGMENT, REHAB EVAL
pt. requires verbal/simon ques to maintain safety awareness and fall prevention strategies/techniques.

## 2018-08-04 NOTE — PROGRESS NOTE ADULT - PROBLEM SELECTOR PLAN 1
- TTE concerning for takotsubo cardiomyopathy, EF 28%  - Follow up with cardiology about AICD placement  - start captopril 12.5 mg BID when tolerated.

## 2018-08-04 NOTE — PHYSICAL THERAPY INITIAL EVALUATION ADULT - PERTINENT HX OF CURRENT PROBLEM, REHAB EVAL
Pt. is a 82 year old female admitted to [facility] secondary to cardiac arrest, PMH: HTN, PAD, Osteoporosis

## 2018-08-04 NOTE — PROGRESS NOTE ADULT - ASSESSMENT
The patient is a 82 year old woman with a history of HTN, Peripheral artery disease on ASA, renal artery stenosis presented to the hospital after arresting in the field twice. She was found to have possible takotsubo cardiomyopathy. On lopressor for Aflutter.

## 2018-08-04 NOTE — PROGRESS NOTE ADULT - SUBJECTIVE AND OBJECTIVE BOX
Virginia Melgar, PGY1    CHIEF COMPLAINT: 81 yo with h/o NSTEMI as per chart, PAD on ASA, Renal artery stenosis, HTN, good functional status, returned from [place] three days ago and has been not feeling well since. Per family pt. hadn't been feeling well for past couple of days after returning home from trip to [place], family reports pt was sitting in chair, became tachypneic, she became weak and lost consciousness. Down for 10 minutes prior to arrival of EMS.  Per EMS she was found unresponsive, in a shockable rhythm, received 1 shock, was intubated and achieved ROSC after 2nd round (approx 4 min) of CPR. Arrives to ED intubated.    Interval Events: No overnight events. VSS.    REVIEW OF SYSTEMS:  Constitutional: negative, no fever, chills   HEENT: no changes in vision  CV: no chest pain, no palpitations  Resp: no cough, no shortness of breath  GI: no nausea, vomiting, diarrhea  : no changes in urination   Musculoskeletal: negative   Skin: no rashes   Neurological: no headache or dizziness   Hematologic/Lymphatic: no bleeding proble    Physical Exam:   	General: awake, alert, answering questions appropriately   	Neuro: Not sedated, AOX3  	HEENT: No deformity. No lymphadenopathy  	Cardio: S1/S2. No murmurs, gallops  	Resp: CTAB, no crackles   	Abd: Soft, non-tender, non-distended  	:  Nicole+    Extremities: Good pulses, no edema of lower extremities     MEDICATIONS  (STANDING):  aspirin  chewable 81 milliGRAM(s) Oral daily  atorvastatin 80 milliGRAM(s) Oral at bedtime  clopidogrel Tablet 75 milliGRAM(s) Oral daily  heparin  Injectable 5000 Unit(s) SubCutaneous every 8 hours  metoprolol tartrate 25 milliGRAM(s) Oral two times a day    Vital Signs Last 24 Hrs  T(C): 37.2 (04 Aug 2018 12:00), Max: 38.3 (04 Aug 2018 00:00)  T(F): 99 (04 Aug 2018 12:00), Max: 101 (04 Aug 2018 00:00)  HR: 119 (04 Aug 2018 12:00) (86 - 120)  BP: 138/81 (04 Aug 2018 12:00) (107/62 - 142/79)  BP(mean): 95 (04 Aug 2018 12:00) (72 - 102)  RR: 23 (04 Aug 2018 12:00) (10 - 27)  SpO2: 97% (04 Aug 2018 12:00) (95% - 100%)  CAPILLARY BLOOD GLUCOSE      I&O's Summary    03 Aug 2018 07:01  -  04 Aug 2018 07:00  --------------------------------------------------------  IN: 459.8 mL / OUT: 1272 mL / NET: -812.2 mL    04 Aug 2018 07:01  -  04 Aug 2018 12:23  --------------------------------------------------------  IN: 114 mL / OUT: 500 mL / NET: -386 mL      LABS:                        9.1    12.12 )-----------( 154      ( 04 Aug 2018 02:50 )             27.2     08-04    132<L>  |  99  |  10  ----------------------------<  111<H>  3.6   |  22  |  0.82    Ca    7.6<L>      04 Aug 2018 02:39  Phos  1.6     08-04  Mg     2.1     08-04    TPro  5.7<L>  /  Alb  2.8<L>  /  TBili  1.3<H>  /  DBili  x   /  AST  68<H>  /  ALT  149<H>  /  AlkPhos  62  08-04    PTT - ( 04 Aug 2018 02:39 )  PTT:50.0 SEC      Urinalysis Basic - ( 03 Aug 2018 16:44 )    Color: YELLOW / Appearance: CLEAR / S.017 / pH: 6.0  Gluc: NEGATIVE / Ketone: NEGATIVE  / Bili: NEGATIVE / Urobili: NORMAL mg/dL   Blood: TRACE / Protein: 30 mg/dL / Nitrite: NEGATIVE   Leuk Esterase: LARGE / RBC: 10-25 / WBC 25-50   Sq Epi: OCC / Non Sq Epi: x / Bacteria: x

## 2018-08-04 NOTE — PHYSICAL THERAPY INITIAL EVALUATION ADULT - ADDITIONAL COMMENTS
(+) steps to negotiate at home with Hand rails x1. Pt. returned seated in chair with all tubes/lines intact, call bell in reach and in NAD. RN bedside

## 2018-08-05 DIAGNOSIS — R45.1 RESTLESSNESS AND AGITATION: ICD-10-CM

## 2018-08-05 LAB
ALBUMIN SERPL ELPH-MCNC: 3.1 G/DL — LOW (ref 3.3–5)
ALBUMIN SERPL ELPH-MCNC: 3.3 G/DL — SIGNIFICANT CHANGE UP (ref 3.3–5)
ALP SERPL-CCNC: 72 U/L — SIGNIFICANT CHANGE UP (ref 40–120)
ALP SERPL-CCNC: 94 U/L — SIGNIFICANT CHANGE UP (ref 40–120)
ALT FLD-CCNC: 113 U/L — HIGH (ref 4–33)
ALT FLD-CCNC: 125 U/L — HIGH (ref 4–33)
AST SERPL-CCNC: 53 U/L — HIGH (ref 4–32)
AST SERPL-CCNC: 94 U/L — HIGH (ref 4–32)
BACTERIA UR CULT: SIGNIFICANT CHANGE UP
BASOPHILS # BLD AUTO: 0.03 K/UL — SIGNIFICANT CHANGE UP (ref 0–0.2)
BASOPHILS NFR BLD AUTO: 0.3 % — SIGNIFICANT CHANGE UP (ref 0–2)
BILIRUB SERPL-MCNC: 1.1 MG/DL — SIGNIFICANT CHANGE UP (ref 0.2–1.2)
BILIRUB SERPL-MCNC: 1.2 MG/DL — SIGNIFICANT CHANGE UP (ref 0.2–1.2)
BUN SERPL-MCNC: 14 MG/DL — SIGNIFICANT CHANGE UP (ref 7–23)
BUN SERPL-MCNC: 17 MG/DL — SIGNIFICANT CHANGE UP (ref 7–23)
CALCIUM SERPL-MCNC: 8 MG/DL — LOW (ref 8.4–10.5)
CALCIUM SERPL-MCNC: 8.3 MG/DL — LOW (ref 8.4–10.5)
CHLORIDE SERPL-SCNC: 105 MMOL/L — SIGNIFICANT CHANGE UP (ref 98–107)
CHLORIDE SERPL-SCNC: 106 MMOL/L — SIGNIFICANT CHANGE UP (ref 98–107)
CO2 SERPL-SCNC: 22 MMOL/L — SIGNIFICANT CHANGE UP (ref 22–31)
CO2 SERPL-SCNC: 23 MMOL/L — SIGNIFICANT CHANGE UP (ref 22–31)
CREAT SERPL-MCNC: 0.79 MG/DL — SIGNIFICANT CHANGE UP (ref 0.5–1.3)
CREAT SERPL-MCNC: 0.83 MG/DL — SIGNIFICANT CHANGE UP (ref 0.5–1.3)
EOSINOPHIL # BLD AUTO: 0.03 K/UL — SIGNIFICANT CHANGE UP (ref 0–0.5)
EOSINOPHIL NFR BLD AUTO: 0.3 % — SIGNIFICANT CHANGE UP (ref 0–6)
GLUCOSE SERPL-MCNC: 106 MG/DL — HIGH (ref 70–99)
GLUCOSE SERPL-MCNC: 99 MG/DL — SIGNIFICANT CHANGE UP (ref 70–99)
HCT VFR BLD CALC: 27.5 % — LOW (ref 34.5–45)
HGB BLD-MCNC: 9.2 G/DL — LOW (ref 11.5–15.5)
IMM GRANULOCYTES # BLD AUTO: 0.04 # — SIGNIFICANT CHANGE UP
IMM GRANULOCYTES NFR BLD AUTO: 0.4 % — SIGNIFICANT CHANGE UP (ref 0–1.5)
LYMPHOCYTES # BLD AUTO: 1.17 K/UL — SIGNIFICANT CHANGE UP (ref 1–3.3)
LYMPHOCYTES # BLD AUTO: 10.3 % — LOW (ref 13–44)
MAGNESIUM SERPL-MCNC: 2.3 MG/DL — SIGNIFICANT CHANGE UP (ref 1.6–2.6)
MAGNESIUM SERPL-MCNC: 2.3 MG/DL — SIGNIFICANT CHANGE UP (ref 1.6–2.6)
MCHC RBC-ENTMCNC: 24.1 PG — LOW (ref 27–34)
MCHC RBC-ENTMCNC: 33.5 % — SIGNIFICANT CHANGE UP (ref 32–36)
MCV RBC AUTO: 72 FL — LOW (ref 80–100)
MONOCYTES # BLD AUTO: 1.15 K/UL — HIGH (ref 0–0.9)
MONOCYTES NFR BLD AUTO: 10.1 % — SIGNIFICANT CHANGE UP (ref 2–14)
NEUTROPHILS # BLD AUTO: 8.96 K/UL — HIGH (ref 1.8–7.4)
NEUTROPHILS NFR BLD AUTO: 78.6 % — HIGH (ref 43–77)
NRBC # FLD: 0 — SIGNIFICANT CHANGE UP
PHOSPHATE SERPL-MCNC: 0.9 MG/DL — CRITICAL LOW (ref 2.5–4.5)
PHOSPHATE SERPL-MCNC: 2.5 MG/DL — SIGNIFICANT CHANGE UP (ref 2.5–4.5)
PLATELET # BLD AUTO: 169 K/UL — SIGNIFICANT CHANGE UP (ref 150–400)
PMV BLD: 11.7 FL — SIGNIFICANT CHANGE UP (ref 7–13)
POTASSIUM SERPL-MCNC: 3.5 MMOL/L — SIGNIFICANT CHANGE UP (ref 3.5–5.3)
POTASSIUM SERPL-MCNC: 3.9 MMOL/L — SIGNIFICANT CHANGE UP (ref 3.5–5.3)
POTASSIUM SERPL-SCNC: 3.5 MMOL/L — SIGNIFICANT CHANGE UP (ref 3.5–5.3)
POTASSIUM SERPL-SCNC: 3.9 MMOL/L — SIGNIFICANT CHANGE UP (ref 3.5–5.3)
PROT SERPL-MCNC: 6.3 G/DL — SIGNIFICANT CHANGE UP (ref 6–8.3)
PROT SERPL-MCNC: 7.2 G/DL — SIGNIFICANT CHANGE UP (ref 6–8.3)
RBC # BLD: 3.82 M/UL — SIGNIFICANT CHANGE UP (ref 3.8–5.2)
RBC # FLD: 15.7 % — HIGH (ref 10.3–14.5)
SODIUM SERPL-SCNC: 141 MMOL/L — SIGNIFICANT CHANGE UP (ref 135–145)
SODIUM SERPL-SCNC: 142 MMOL/L — SIGNIFICANT CHANGE UP (ref 135–145)
SPECIMEN SOURCE: SIGNIFICANT CHANGE UP
TROPONIN T, HIGH SENSITIVITY: 311 NG/L — CRITICAL HIGH (ref ?–14)
WBC # BLD: 11.38 K/UL — HIGH (ref 3.8–10.5)
WBC # FLD AUTO: 11.38 K/UL — HIGH (ref 3.8–10.5)

## 2018-08-05 PROCEDURE — 93010 ELECTROCARDIOGRAM REPORT: CPT | Mod: 76

## 2018-08-05 PROCEDURE — 99233 SBSQ HOSP IP/OBS HIGH 50: CPT | Mod: GC

## 2018-08-05 RX ORDER — POTASSIUM PHOSPHATE, MONOBASIC POTASSIUM PHOSPHATE, DIBASIC 236; 224 MG/ML; MG/ML
15 INJECTION, SOLUTION INTRAVENOUS ONCE
Qty: 0 | Refills: 0 | Status: COMPLETED | OUTPATIENT
Start: 2018-08-05 | End: 2018-08-05

## 2018-08-05 RX ORDER — OLANZAPINE 15 MG/1
5 TABLET, FILM COATED ORAL ONCE
Qty: 0 | Refills: 0 | Status: COMPLETED | OUTPATIENT
Start: 2018-08-05 | End: 2018-08-05

## 2018-08-05 RX ORDER — METOPROLOL TARTRATE 50 MG
5 TABLET ORAL ONCE
Qty: 0 | Refills: 0 | Status: COMPLETED | OUTPATIENT
Start: 2018-08-05 | End: 2018-08-05

## 2018-08-05 RX ORDER — QUETIAPINE FUMARATE 200 MG/1
12.5 TABLET, FILM COATED ORAL ONCE
Qty: 0 | Refills: 0 | Status: COMPLETED | OUTPATIENT
Start: 2018-08-05 | End: 2018-08-05

## 2018-08-05 RX ORDER — METOPROLOL TARTRATE 50 MG
25 TABLET ORAL
Qty: 0 | Refills: 0 | Status: DISCONTINUED | OUTPATIENT
Start: 2018-08-05 | End: 2018-08-07

## 2018-08-05 RX ORDER — ACETAMINOPHEN 500 MG
650 TABLET ORAL EVERY 6 HOURS
Qty: 0 | Refills: 0 | Status: DISCONTINUED | OUTPATIENT
Start: 2018-08-05 | End: 2018-08-15

## 2018-08-05 RX ORDER — POTASSIUM PHOSPHATE, MONOBASIC POTASSIUM PHOSPHATE, DIBASIC 236; 224 MG/ML; MG/ML
30 INJECTION, SOLUTION INTRAVENOUS ONCE
Qty: 0 | Refills: 0 | Status: DISCONTINUED | OUTPATIENT
Start: 2018-08-05 | End: 2018-08-05

## 2018-08-05 RX ORDER — ACETAMINOPHEN 500 MG
650 TABLET ORAL EVERY 6 HOURS
Qty: 0 | Refills: 0 | Status: DISCONTINUED | OUTPATIENT
Start: 2018-08-05 | End: 2018-08-05

## 2018-08-05 RX ORDER — CAPTOPRIL 12.5 MG/1
12.5 TABLET ORAL THREE TIMES A DAY
Qty: 0 | Refills: 0 | Status: DISCONTINUED | OUTPATIENT
Start: 2018-08-05 | End: 2018-08-06

## 2018-08-05 RX ORDER — CAPTOPRIL 12.5 MG/1
12.5 TABLET ORAL
Qty: 0 | Refills: 0 | Status: DISCONTINUED | OUTPATIENT
Start: 2018-08-05 | End: 2018-08-05

## 2018-08-05 RX ADMIN — CLOPIDOGREL BISULFATE 75 MILLIGRAM(S): 75 TABLET, FILM COATED ORAL at 13:43

## 2018-08-05 RX ADMIN — POTASSIUM PHOSPHATE, MONOBASIC POTASSIUM PHOSPHATE, DIBASIC 62.5 MILLIMOLE(S): 236; 224 INJECTION, SOLUTION INTRAVENOUS at 10:09

## 2018-08-05 RX ADMIN — POTASSIUM PHOSPHATE, MONOBASIC POTASSIUM PHOSPHATE, DIBASIC 62.5 MILLIMOLE(S): 236; 224 INJECTION, SOLUTION INTRAVENOUS at 14:56

## 2018-08-05 RX ADMIN — Medication 5 MILLIGRAM(S): at 04:38

## 2018-08-05 RX ADMIN — Medication 25 MILLIGRAM(S): at 18:01

## 2018-08-05 RX ADMIN — ATORVASTATIN CALCIUM 80 MILLIGRAM(S): 80 TABLET, FILM COATED ORAL at 22:15

## 2018-08-05 RX ADMIN — CAPTOPRIL 12.5 MILLIGRAM(S): 12.5 TABLET ORAL at 13:43

## 2018-08-05 RX ADMIN — CAPTOPRIL 12.5 MILLIGRAM(S): 12.5 TABLET ORAL at 22:15

## 2018-08-05 RX ADMIN — Medication 25 MILLIGRAM(S): at 04:55

## 2018-08-05 RX ADMIN — Medication 650 MILLIGRAM(S): at 23:05

## 2018-08-05 RX ADMIN — Medication 650 MILLIGRAM(S): at 22:15

## 2018-08-05 RX ADMIN — HEPARIN SODIUM 5000 UNIT(S): 5000 INJECTION INTRAVENOUS; SUBCUTANEOUS at 22:15

## 2018-08-05 RX ADMIN — Medication 81 MILLIGRAM(S): at 13:43

## 2018-08-05 NOTE — PROGRESS NOTE ADULT - PROBLEM SELECTOR PLAN 1
- TTE concerning for takotsubo cardiomyopathy, EF 28%  - Follow up with cardiology about AICD placement on Monday  - started captopril 12.5 mg TID

## 2018-08-05 NOTE — PROGRESS NOTE ADULT - SUBJECTIVE AND OBJECTIVE BOX
Patient is a 82y old  Female who presents with a chief complaint of PEA Arrest (01 Aug 2018 20:24)      SUBJECTIVE / OVERNIGHT EVENTS:  Patient seen and examined at bedside. She was agitated starting at around 3 AM. She had not received her time overnight doase of metoprolol and was found to have a heart rate around 185. She was given an IV push of metoprolol which brought her back to a normal heart rate. When She was seen this morning she was agitated, trying to get out of bed. She was not redirectable. She was confabulating and had delusions about the nurse. She had no complaints of shortness of breath.     Other Review of Systems Negative.    MEDICATIONS  (STANDING):  aspirin  chewable 81 milliGRAM(s) Oral daily  atorvastatin 80 milliGRAM(s) Oral at bedtime  captopril 12.5 milliGRAM(s) Oral three times a day  clopidogrel Tablet 75 milliGRAM(s) Oral daily  heparin  Injectable 5000 Unit(s) SubCutaneous every 8 hours  metoprolol tartrate 25 milliGRAM(s) Oral two times a day  OLANZapine Injectable 5 milliGRAM(s) IntraMuscular once  potassium phosphate IVPB 15 milliMole(s) IV Intermittent once  potassium phosphate IVPB 15 milliMole(s) IV Intermittent once  QUEtiapine 12.5 milliGRAM(s) Oral once    OBJECTIVE:    Vital Signs Last 24 Hrs  T(C): 37.2 (05 Aug 2018 03:00), Max: 37.3 (04 Aug 2018 16:00)  T(F): 98.9 (05 Aug 2018 03:00), Max: 99.2 (04 Aug 2018 16:00)  HR: 97 (05 Aug 2018 06:33) (91 - 185)  BP: 135/83 (05 Aug 2018 04:35) (110/66 - 143/93)  BP(mean): 78 (04 Aug 2018 16:00) (73 - 98)  RR: 18 (05 Aug 2018 04:35) (18 - 26)  SpO2: 93% (05 Aug 2018 04:35) (93% - 100%)    I&O's Summary    04 Aug 2018 07:01  -  05 Aug 2018 07:00  --------------------------------------------------------  IN: 364 mL / OUT: 650 mL / NET: -286 mL    PHYSICAL EXAM:  GENERAL: agitated, attempting to get out of bed,   HEAD:  Atraumatic, Normocephalic  EYES: EOMI, PERRLA, conjunctiva and sclera clear  NECK: Supple, No JVD  CHEST/LUNG: Clear to auscultation bilaterally; No wheeze  HEART: Regular rate and rhythm; No murmurs, rubs, or gallops  ABDOMEN: Soft, Nontender, Nondistended; Bowel sounds present  EXTREMITIES:  2+ Peripheral Pulses, No clubbing, cyanosis, or edema  PSYCH: AAOx0  NEUROLOGY: confabulating confused.  SKIN: No rashes or lesions    LABS:                        9.2    11.38 )-----------( 169      ( 05 Aug 2018 03:44 )             27.5     Auto Eosinophil # 0.03  / Auto Eosinophil % 0.3   / Auto Neutrophil # 8.96  / Auto Neutrophil % 78.6  / BANDS % x                            9.1    12.12 )-----------( 154      ( 04 Aug 2018 02:50 )             27.2     Auto Eosinophil # 0.03  / Auto Eosinophil % 0.2   / Auto Neutrophil # 9.56  / Auto Neutrophil % 79.0  / BANDS % x        08-05    141  |  105  |  14  ----------------------------<  106<H>  3.5   |  23  |  0.79  08-04    132<L>  |  99  |  10  ----------------------------<  111<H>  3.6   |  22  |  0.82  08-03    137  |  100  |  11  ----------------------------<  104<H>  4.0   |  23  |  0.92    Ca    8.0<L>      05 Aug 2018 03:44  Mg     2.3     08-  Phos  0.9     -  TPro  6.3  /  Alb  3.1<L>  /  TBili  1.2  /  DBili  x   /  AST  53<H>  /  ALT  113<H>  /  AlkPhos  72  08-  TPro  5.7<L>  /  Alb  2.8<L>  /  TBili  1.3<H>  /  DBili  x   /  AST  68<H>  /  ALT  149<H>  /  AlkPhos  62  08-04    PTT - ( 04 Aug 2018 02:39 )  PTT:50.0 SEC      Urinalysis Basic - ( 03 Aug 2018 16:44 )    Color: YELLOW / Appearance: CLEAR / S.017 / pH: 6.0  Gluc: NEGATIVE / Ketone: NEGATIVE  / Bili: NEGATIVE / Urobili: NORMAL mg/dL   Blood: TRACE / Protein: 30 mg/dL / Nitrite: NEGATIVE   Leuk Esterase: LARGE / RBC: 10-25 / WBC 25-50   Sq Epi: OCC / Non Sq Epi: x / Bacteria: x      Lactate, Blood: 2.7 mmol/L ( @ 23:46)    ABG: ( 01 Aug 2018 23:46 ) pH: 7.38  /  pCO2: 38    /  pO2: 55    / HCO3: 22    / Base Excess: -2.3  /  SaO2: 87.4    ( 01 Aug 2018 19:10 ) pH: 7.38  /  pCO2: 36    /  pO2: 80    / HCO3: 22    / Base Excess: -3.7  /  SaO2: 96.2

## 2018-08-05 NOTE — PROGRESS NOTE ADULT - PROBLEM SELECTOR PLAN 3
-Patient was altered overnight into the morning requiring 5 mg of olanzapine.  - she is on enhanced observation  - consider transfer to

## 2018-08-05 NOTE — PROGRESS NOTE ADULT - ATTENDING COMMENTS
Pt seen and examined. Pt is awake, alert, but confused.   overnight event noted. Now heart rate is controlled.  await cardiology further rec

## 2018-08-05 NOTE — CHART NOTE - NSCHARTNOTEFT_GEN_A_CORE
At 11pm pt reported to have sinus tach to 152 lasting 1 second at a time or less. Pt seen and denies any complaints, she is AAOx2 (name and ), which is reportedly baseline. HR 92, /70, RR 18, 98% on NC.    At 3am pt reported to have tachycardia up to 170s and lasting up to 4 minutes. Pt seen and is unchanged from earlier in the night (still AAOx2 and denies any Sx). , /93, RR 18, 95% on NC. On telemetry appears to be SVT. EKG obtained, but pt was not in SVT at the time. AM labs drawn early as well as troponin as EKG showed new T wave inversions compared to admission (likely from recent cardiac arrest). If SVT persists will attempt vagal maneuvers then consider 6mg of atropine and call cardiology. At 11pm pt reported to have sinus tach to 152 lasting 1 second at a time or less. Pt seen and denies any complaints, she is AAOx2 (name and ), which is reportedly baseline. HR 92, /70, RR 18, 98% on NC.    At 3am pt reported to have tachycardia up to 170s and lasting up to 4 minutes. Pt seen and is unchanged from earlier in the night (still AAOx2 and denies any Sx). , /93, RR 18, 95% on NC. On telemetry appears to be SVT. EKG obtained, but pt was not in SVT at the time. AM labs drawn early as well as troponin as EKG showed new T wave inversions compared to admission (likely from recent cardiac arrest). If SVT persists will attempt vagal maneuvers then consider 6mg of adenosine and call cardiology.    Labs showed Trop 311 (down from 507 earlier in admission), Phos 0.9, Mg 2.3, K 3.5    At 4am paged for HR of 205. Pt was increasingly agitated and attempting to climb out of bed. , 135/83, RR 18, 93% on NC. She remains AAOx2 and denies any medical complaints, but repeatedly asks for "my mother and father." Cardiology was called and recommended metoprolol as pt did not receive her evening 25mg dose. EKGs obtained showed atrial tachycardia (aFlutter, aFib, and aTach w/ PACs). After 5-6minutes atach resolved to sinus tach in the 110s-120s, but she continued to rapidly convert between sinus and atrial tach. Cardiology saw patient at bedside and 5mg IVP metoprolol was given as well as 25mg PO metoprolol. Rate returned to sinus in the 110s. Due to her multiple atrial athymias and rapidly going in and out of arrythmia cardiology recommended no adenosine. If pt continues to be agitated (yelling, attempting to climb out of bed, and refusing meds/vitals) will consider haldol 2.5mg.

## 2018-08-06 DIAGNOSIS — I46.9 CARDIAC ARREST, CAUSE UNSPECIFIED: ICD-10-CM

## 2018-08-06 DIAGNOSIS — R74.0 NONSPECIFIC ELEVATION OF LEVELS OF TRANSAMINASE AND LACTIC ACID DEHYDROGENASE [LDH]: ICD-10-CM

## 2018-08-06 DIAGNOSIS — I50.21 ACUTE SYSTOLIC (CONGESTIVE) HEART FAILURE: ICD-10-CM

## 2018-08-06 DIAGNOSIS — G93.40 ENCEPHALOPATHY, UNSPECIFIED: ICD-10-CM

## 2018-08-06 LAB
ALBUMIN SERPL ELPH-MCNC: 2.7 G/DL — LOW (ref 3.3–5)
ALP SERPL-CCNC: 91 U/L — SIGNIFICANT CHANGE UP (ref 40–120)
ALT FLD-CCNC: 135 U/L — HIGH (ref 4–33)
AST SERPL-CCNC: 116 U/L — HIGH (ref 4–32)
BASOPHILS # BLD AUTO: 0.02 K/UL — SIGNIFICANT CHANGE UP (ref 0–0.2)
BASOPHILS NFR BLD AUTO: 0.2 % — SIGNIFICANT CHANGE UP (ref 0–2)
BILIRUB SERPL-MCNC: 1.3 MG/DL — HIGH (ref 0.2–1.2)
BUN SERPL-MCNC: 14 MG/DL — SIGNIFICANT CHANGE UP (ref 7–23)
BUN SERPL-MCNC: 16 MG/DL — SIGNIFICANT CHANGE UP (ref 7–23)
CALCIUM SERPL-MCNC: 7.8 MG/DL — LOW (ref 8.4–10.5)
CALCIUM SERPL-MCNC: 8.1 MG/DL — LOW (ref 8.4–10.5)
CHLORIDE SERPL-SCNC: 100 MMOL/L — SIGNIFICANT CHANGE UP (ref 98–107)
CHLORIDE SERPL-SCNC: 105 MMOL/L — SIGNIFICANT CHANGE UP (ref 98–107)
CO2 SERPL-SCNC: 19 MMOL/L — LOW (ref 22–31)
CO2 SERPL-SCNC: 19 MMOL/L — LOW (ref 22–31)
CREAT SERPL-MCNC: 0.75 MG/DL — SIGNIFICANT CHANGE UP (ref 0.5–1.3)
CREAT SERPL-MCNC: 0.83 MG/DL — SIGNIFICANT CHANGE UP (ref 0.5–1.3)
EOSINOPHIL # BLD AUTO: 0.06 K/UL — SIGNIFICANT CHANGE UP (ref 0–0.5)
EOSINOPHIL NFR BLD AUTO: 0.6 % — SIGNIFICANT CHANGE UP (ref 0–6)
GLUCOSE SERPL-MCNC: 106 MG/DL — HIGH (ref 70–99)
GLUCOSE SERPL-MCNC: 88 MG/DL — SIGNIFICANT CHANGE UP (ref 70–99)
HCT VFR BLD CALC: 29 % — LOW (ref 34.5–45)
HGB BLD-MCNC: 9.5 G/DL — LOW (ref 11.5–15.5)
IMM GRANULOCYTES # BLD AUTO: 0.04 # — SIGNIFICANT CHANGE UP
IMM GRANULOCYTES NFR BLD AUTO: 0.4 % — SIGNIFICANT CHANGE UP (ref 0–1.5)
LYMPHOCYTES # BLD AUTO: 1.27 K/UL — SIGNIFICANT CHANGE UP (ref 1–3.3)
LYMPHOCYTES # BLD AUTO: 13.6 % — SIGNIFICANT CHANGE UP (ref 13–44)
MAGNESIUM SERPL-MCNC: 2.1 MG/DL — SIGNIFICANT CHANGE UP (ref 1.6–2.6)
MAGNESIUM SERPL-MCNC: 2.1 MG/DL — SIGNIFICANT CHANGE UP (ref 1.6–2.6)
MCHC RBC-ENTMCNC: 22.9 PG — LOW (ref 27–34)
MCHC RBC-ENTMCNC: 32.8 % — SIGNIFICANT CHANGE UP (ref 32–36)
MCV RBC AUTO: 69.9 FL — LOW (ref 80–100)
MONOCYTES # BLD AUTO: 0.96 K/UL — HIGH (ref 0–0.9)
MONOCYTES NFR BLD AUTO: 10.3 % — SIGNIFICANT CHANGE UP (ref 2–14)
NEUTROPHILS # BLD AUTO: 6.99 K/UL — SIGNIFICANT CHANGE UP (ref 1.8–7.4)
NEUTROPHILS NFR BLD AUTO: 74.9 % — SIGNIFICANT CHANGE UP (ref 43–77)
NRBC # FLD: 0.02 — SIGNIFICANT CHANGE UP
PHOSPHATE SERPL-MCNC: 1.7 MG/DL — LOW (ref 2.5–4.5)
PHOSPHATE SERPL-MCNC: 2.2 MG/DL — LOW (ref 2.5–4.5)
PLATELET # BLD AUTO: 218 K/UL — SIGNIFICANT CHANGE UP (ref 150–400)
PMV BLD: 12.7 FL — SIGNIFICANT CHANGE UP (ref 7–13)
POTASSIUM SERPL-MCNC: 4.1 MMOL/L — SIGNIFICANT CHANGE UP (ref 3.5–5.3)
POTASSIUM SERPL-MCNC: 4.2 MMOL/L — SIGNIFICANT CHANGE UP (ref 3.5–5.3)
POTASSIUM SERPL-SCNC: 4.1 MMOL/L — SIGNIFICANT CHANGE UP (ref 3.5–5.3)
POTASSIUM SERPL-SCNC: 4.2 MMOL/L — SIGNIFICANT CHANGE UP (ref 3.5–5.3)
PROT SERPL-MCNC: 6.2 G/DL — SIGNIFICANT CHANGE UP (ref 6–8.3)
RBC # BLD: 4.15 M/UL — SIGNIFICANT CHANGE UP (ref 3.8–5.2)
RBC # FLD: 15.4 % — HIGH (ref 10.3–14.5)
SODIUM SERPL-SCNC: 136 MMOL/L — SIGNIFICANT CHANGE UP (ref 135–145)
SODIUM SERPL-SCNC: 138 MMOL/L — SIGNIFICANT CHANGE UP (ref 135–145)
WBC # BLD: 9.34 K/UL — SIGNIFICANT CHANGE UP (ref 3.8–10.5)
WBC # FLD AUTO: 9.34 K/UL — SIGNIFICANT CHANGE UP (ref 3.8–10.5)

## 2018-08-06 PROCEDURE — 99232 SBSQ HOSP IP/OBS MODERATE 35: CPT

## 2018-08-06 PROCEDURE — 99233 SBSQ HOSP IP/OBS HIGH 50: CPT | Mod: GC

## 2018-08-06 PROCEDURE — 70450 CT HEAD/BRAIN W/O DYE: CPT | Mod: 26

## 2018-08-06 PROCEDURE — 93010 ELECTROCARDIOGRAM REPORT: CPT

## 2018-08-06 RX ORDER — DOCUSATE SODIUM 100 MG
100 CAPSULE ORAL
Qty: 0 | Refills: 0 | Status: DISCONTINUED | OUTPATIENT
Start: 2018-08-06 | End: 2018-08-15

## 2018-08-06 RX ORDER — POLYETHYLENE GLYCOL 3350 17 G/17G
17 POWDER, FOR SOLUTION ORAL DAILY
Qty: 0 | Refills: 0 | Status: DISCONTINUED | OUTPATIENT
Start: 2018-08-06 | End: 2018-08-15

## 2018-08-06 RX ORDER — FUROSEMIDE 40 MG
40 TABLET ORAL
Qty: 0 | Refills: 0 | Status: DISCONTINUED | OUTPATIENT
Start: 2018-08-06 | End: 2018-08-07

## 2018-08-06 RX ORDER — METOPROLOL TARTRATE 50 MG
2.5 TABLET ORAL ONCE
Qty: 0 | Refills: 0 | Status: COMPLETED | OUTPATIENT
Start: 2018-08-06 | End: 2018-08-06

## 2018-08-06 RX ORDER — FUROSEMIDE 40 MG
40 TABLET ORAL ONCE
Qty: 0 | Refills: 0 | Status: COMPLETED | OUTPATIENT
Start: 2018-08-06 | End: 2018-08-06

## 2018-08-06 RX ORDER — LISINOPRIL 2.5 MG/1
5 TABLET ORAL DAILY
Qty: 0 | Refills: 0 | Status: DISCONTINUED | OUTPATIENT
Start: 2018-08-06 | End: 2018-08-15

## 2018-08-06 RX ORDER — SENNA PLUS 8.6 MG/1
2 TABLET ORAL AT BEDTIME
Qty: 0 | Refills: 0 | Status: DISCONTINUED | OUTPATIENT
Start: 2018-08-06 | End: 2018-08-15

## 2018-08-06 RX ORDER — POTASSIUM PHOSPHATE, MONOBASIC POTASSIUM PHOSPHATE, DIBASIC 236; 224 MG/ML; MG/ML
15 INJECTION, SOLUTION INTRAVENOUS ONCE
Qty: 0 | Refills: 0 | Status: COMPLETED | OUTPATIENT
Start: 2018-08-06 | End: 2018-08-06

## 2018-08-06 RX ADMIN — Medication 25 MILLIGRAM(S): at 16:55

## 2018-08-06 RX ADMIN — HEPARIN SODIUM 5000 UNIT(S): 5000 INJECTION INTRAVENOUS; SUBCUTANEOUS at 05:34

## 2018-08-06 RX ADMIN — Medication 2.5 MILLIGRAM(S): at 19:01

## 2018-08-06 RX ADMIN — Medication 40 MILLIGRAM(S): at 12:09

## 2018-08-06 RX ADMIN — Medication 100 MILLIGRAM(S): at 17:34

## 2018-08-06 RX ADMIN — HEPARIN SODIUM 5000 UNIT(S): 5000 INJECTION INTRAVENOUS; SUBCUTANEOUS at 21:20

## 2018-08-06 RX ADMIN — Medication 81 MILLIGRAM(S): at 14:23

## 2018-08-06 RX ADMIN — POTASSIUM PHOSPHATE, MONOBASIC POTASSIUM PHOSPHATE, DIBASIC 62.5 MILLIMOLE(S): 236; 224 INJECTION, SOLUTION INTRAVENOUS at 08:48

## 2018-08-06 RX ADMIN — CAPTOPRIL 12.5 MILLIGRAM(S): 12.5 TABLET ORAL at 05:34

## 2018-08-06 RX ADMIN — ATORVASTATIN CALCIUM 80 MILLIGRAM(S): 80 TABLET, FILM COATED ORAL at 21:20

## 2018-08-06 RX ADMIN — HEPARIN SODIUM 5000 UNIT(S): 5000 INJECTION INTRAVENOUS; SUBCUTANEOUS at 14:23

## 2018-08-06 RX ADMIN — CLOPIDOGREL BISULFATE 75 MILLIGRAM(S): 75 TABLET, FILM COATED ORAL at 14:23

## 2018-08-06 RX ADMIN — Medication 40 MILLIGRAM(S): at 17:34

## 2018-08-06 RX ADMIN — Medication 2.5 MILLIGRAM(S): at 17:34

## 2018-08-06 RX ADMIN — Medication 25 MILLIGRAM(S): at 05:34

## 2018-08-06 RX ADMIN — POTASSIUM PHOSPHATE, MONOBASIC POTASSIUM PHOSPHATE, DIBASIC 62.5 MILLIMOLE(S): 236; 224 INJECTION, SOLUTION INTRAVENOUS at 19:01

## 2018-08-06 NOTE — PROGRESS NOTE ADULT - PROBLEM SELECTOR PLAN 4
- DVT prophylaxis: on heparin subq - patient had episode of SVT with underlying atrial flutter. given adenosine and metoprolol.  - on lopressor 25 mg BID.  -discussed with cardiology fellow, still question whether a-fib or not.    -if patient has any more episodes of A-fib, given CHADVASC score 3, would need A/C.

## 2018-08-06 NOTE — PROGRESS NOTE ADULT - PROBLEM SELECTOR PLAN 1
- TTE concerning for takotsubo cardiomyopathy, EF 28%  - Follow up with cardiology about AICD placement on Monday  - started captopril 12.5 mg TID - TTE concerning for takotsubo cardiomyopathy, EF 28%  - planned cardiac cath on 8/7,   - Diffuse B-lines on POCUS  - start IV lasix 40 mg BID in the setting of respiratory failure in the setting of heart failure.   - Captopril switched to lisinopril 5 mg.   -continue metoprolol 25 mg BID  - on atorvastatin 80 mg qd.   - continue medication optimization for HFrEF and ACS. -patient with new reduced EF to 28%, whether takasubo or ischemic cardiomyopathy, acute treatment for heart failure remains the same.  -patient on 5L NC, crackles on exam, +JVD, and diffuse B-lines on exam.  No pitting edema, likely since new onset pure left sided failure at this time.  - change ACE-i to lisinopril 5mg  -will not adjust beta blocker as patient in decompensated heart filaure at this time.  -add lasix 40mg IV BID  -strict I/O, daily standing weights.   -catheterization when able to lie flat to assess for ischemic cause.

## 2018-08-06 NOTE — PROGRESS NOTE ADULT - ASSESSMENT
The patient is a 82 year old woman with a history of HTN, Peripheral artery disease on ASA, renal artery stenosis presented to the hospital after arresting in the field twice. She was found to have possible takotsubo cardiomyopathy. On lopressor for Aflutter. The patient is a 82 year old woman with a history of HTN, Peripheral artery disease on ASA, renal artery stenosis presented to the hospital after arresting in the field twice. She was admitted to the MICU for post cardiac arrest care. During her MICU stay she was found to have an episode of SVT with underlying A-flutter. Now with respiratory failure secondary to HFrEF

## 2018-08-06 NOTE — PROGRESS NOTE ADULT - PROBLEM SELECTOR PLAN 2
- patient had episode of SVT with underlying atrial flutter. given adenosine and metoprolol.  - on lopressor 25 mg BID. -patient with V-fib arrest in field.  -whether ischemic or non-ischemic given V-fib, would strongly consider for ICD placement.  -discussed with cardiology fellow, plan for catheteriization when stable and then discussion re: AICD  -continue to monitor on telemetry.

## 2018-08-06 NOTE — PROGRESS NOTE ADULT - SUBJECTIVE AND OBJECTIVE BOX
Patient seen and examined at bedside.    Overnight Events: No acute events. Pt reports that prior to her cardiac arrest she never experienced any chest pain or dyspnea with exertion.    Review Of Systems: No chest pain, shortness of breath, or palpitations            Current Meds:  acetaminophen   Tablet. 650 milliGRAM(s) Oral every 6 hours PRN  aspirin  chewable 81 milliGRAM(s) Oral daily  atorvastatin 80 milliGRAM(s) Oral at bedtime  captopril 12.5 milliGRAM(s) Oral three times a day  clopidogrel Tablet 75 milliGRAM(s) Oral daily  heparin  Injectable 5000 Unit(s) SubCutaneous every 8 hours  metoprolol tartrate 25 milliGRAM(s) Oral two times a day      PAST MEDICAL & SURGICAL HISTORY:  PAD (peripheral artery disease)  NSTEMI (non-ST elevation myocardial infarction)  Osteoporosis  HTN (hypertension)  No significant past surgical history      Vitals:  T(F): 97.8 (08-06), Max: 98.4 (08-05)  HR: 98 (08-06) (96 - 122)  BP: 141/78 (08-06) (136/83 - 146/78)  RR: 20 (08-06)  SpO2: 92% (08-06)  I&O's Summary    05 Aug 2018 07:01  -  06 Aug 2018 07:00  --------------------------------------------------------  IN: 721 mL / OUT: 1100 mL / NET: -379 mL        Physical Exam:  Appearance: No acute distress; well appearing  Eyes: PERRL, EOMI, pink conjunctiva  HENT: Normal oral mucosa  Cardiovascular: RRR, S1, S2, no murmurs, rubs, or gallops; no edema; no JVD  Respiratory: Trace crackles at bases b/l  Gastrointestinal: soft, non-tender, non-distended with normal bowel sounds  Musculoskeletal: No clubbing; no joint deformity   Neurologic: Non-focal  Lymphatic: No lymphadenopathy  Psychiatry: AAOx1 to person, mood & affect appropriate  Skin: No rashes, ecchymoses, or cyanosis                          9.5    9.34  )-----------( 218      ( 06 Aug 2018 05:00 )             29.0     08-06    138  |  105  |  16  ----------------------------<  88  4.2   |  19<L>  |  0.75    Ca    7.8<L>      06 Aug 2018 05:00  Phos  1.7     08-06  Mg     2.1     08-06    TPro  6.2  /  Alb  2.7<L>  /  TBili  1.3<H>  /  DBili  x   /  AST  116<H>  /  ALT  135<H>  /  AlkPhos  91  08-06          Serum Pro-Brain Natriuretic Peptide: 374.8 pg/mL (08-01 @ 17:00)          < from: Transthoracic Echocardiogram (08.02.18 @ 02:07) >  1. Mitral annular calcification, otherwise normal mitral  valve. Minimal mitral regurgitation.  2. Normal left ventricular internal dimensions and wall  thicknesses.  3. Severe segmental left ventricular systolic dysfunction.  Hypokinesis of the apex, mid to distal septum, and mid to  distal anterior wall.  4. Normal right ventricularsize and function.  5. Estimated right ventricular systolic pressure equals 45  mm Hg, assuming right atrial pressure equals 10 mm Hg,  consistent with mild pulmonary hypertension.    < end of copied text >  New ECG(s): Personally reviewed    Echo:     Stress Testing:     Cath:    Imaging:    Interpretation of Telemetry: Sinus  w PACS, 5-10s runs of NSVT

## 2018-08-06 NOTE — PROGRESS NOTE ADULT - ASSESSMENT
A: 81 yo female w h/o NSTEMI, PAD, Renal artery stenosis, HTN, good functional status, returned from [place] three days prior to admission, was not feeling well and had witnessed cardiac arrest. Was shocked in field for VF, then had PEA arrest and had ROSC.    1. Cardiac arrest/NSTEMI       - Unknown if cardiac arrest was due to cardiac event. No acute findings on EKG indicating STEMI       - Echo shows Severe LV dysfunction with LAD territory hypokinesis w possible apical ballooning        - Pt had normal cath last year       - Pt completed 48 hrs of heparin gtt       - c/w ASA, Plavix, Lipitor, BB, ACE       - Given improvement in mental status, will consider ischemic evaluation

## 2018-08-06 NOTE — PROGRESS NOTE ADULT - PROBLEM SELECTOR PLAN 3
-Patient was altered overnight into the morning requiring 5 mg of olanzapine.  - she is on enhanced observation  - consider transfer to  -Patient was altered overnight. She thought she was on a plane on her way home. has not required medical intervention for AMS  - f/u repeat CT head  - she is on enhanced observation  - consider transfer to  isabella العراقي acute encephalopathy, mostly in form of sundowning.  -multifactorial, likely in setting of critical care illness, possble ischemic component given cardiac arrest.  -no neurologic deficits.  -unsure whether can tolerate MRI and 5 days out, will order repeat CT to look for areas of subacute stroke.  -avoid any delirium inducing agents.  -frequent re-orientations.

## 2018-08-06 NOTE — PROGRESS NOTE ADULT - ATTENDING COMMENTS
Agree with above and modified as ina. Patient in hypervolemic state on 5L, wll give trial of lasix, 40mg IV BID with strict I/O, daily standing weights. Low suspicion for PE given no RHS on echocardiogram, LV dysfunction.    Catheterization for tomorrow.

## 2018-08-06 NOTE — PROGRESS NOTE ADULT - PROBLEM SELECTOR PLAN 5
- DVT prophylaxis: on heparin subq likely in setting of ischemic hepatitis in setting of cardiac arrest.  -contineu to trend.

## 2018-08-06 NOTE — PROGRESS NOTE ADULT - SUBJECTIVE AND OBJECTIVE BOX
Patient is a 82y old  Female who presents with a chief complaint of PEA Arrest (01 Aug 2018 20:24)    SUBJECTIVE / OVERNIGHT EVENTS:  Patient seen and examined at bedside. She had no acute events overnight. according to nursing she hasn't been agitated but she thinks she is on a plane. The also report that she has labored breathing.     Other Review of Systems Negative.    MEDICATIONS  (STANDING):  aspirin  chewable 81 milliGRAM(s) Oral daily  atorvastatin 80 milliGRAM(s) Oral at bedtime  captopril 12.5 milliGRAM(s) Oral three times a day  clopidogrel Tablet 75 milliGRAM(s) Oral daily  heparin  Injectable 5000 Unit(s) SubCutaneous every 8 hours  metoprolol tartrate 25 milliGRAM(s) Oral two times a day    MEDICATIONS  (PRN):  acetaminophen   Tablet. 650 milliGRAM(s) Oral every 6 hours PRN Moderate Pain (4 - 6)    OBJECTIVE:    Vital Signs Last 24 Hrs  T(C): 36.6 (06 Aug 2018 05:33), Max: 36.9 (05 Aug 2018 22:12)  T(F): 97.8 (06 Aug 2018 05:33), Max: 98.4 (05 Aug 2018 22:12)  HR: 98 (06 Aug 2018 05:33) (96 - 122)  BP: 141/78 (06 Aug 2018 05:33) (136/83 - 146/78)  BP(mean): --  RR: 20 (06 Aug 2018 05:33) (18 - 20)  SpO2: 92% (06 Aug 2018 05:33) (91% - 94%)    CAPILLARY BLOOD GLUCOSE    I&O's Summary    05 Aug 2018 07:01  -  06 Aug 2018 07:00  --------------------------------------------------------  IN: 721 mL / OUT: 1100 mL / NET: -379 mL      PHYSICAL EXAM:  GENERAL: NAD, well-developed  HEAD:  Atraumatic, Normocephalic  EYES: EOMI, PERRLA, conjunctiva and sclera clear  NECK: Supple, No JVD  CHEST/LUNG: Clear to auscultation bilaterally; No wheeze  HEART: Regular rate and rhythm; No murmurs, rubs, or gallops  ABDOMEN: Soft, Nontender, Nondistended; Bowel sounds present  EXTREMITIES:  2+ Peripheral Pulses, No clubbing, cyanosis, or edema  PSYCH: AAOx3  NEUROLOGY: non-focal  SKIN: No rashes or lesions    LABS:                        9.2    11.38 )-----------( 169      ( 05 Aug 2018 03:44 )             27.5     Auto Eosinophil # 0.03  / Auto Eosinophil % 0.3   / Auto Neutrophil # 8.96  / Auto Neutrophil % 78.6  / BANDS % x        08-05    142  |  106  |  17  ----------------------------<  99  3.9   |  22  |  0.83  08-05    141  |  105  |  14  ----------------------------<  106<H>  3.5   |  23  |  0.79    Ca    8.3<L>      05 Aug 2018 18:05  Mg     2.3     08-05  Phos  2.5     08-05  TPro  7.2  /  Alb  3.3  /  TBili  1.1  /  DBili  x   /  AST  94<H>  /  ALT  125<H>  /  AlkPhos  94  08-05  TPro  6.3  /  Alb  3.1<L>  /  TBili  1.2  /  DBili  x   /  AST  53<H>  /  ALT  113<H>  /  AlkPhos  72  08-05    Lactate, Blood: 2.7 mmol/L (08-01 @ 23:46)    ABG: ( 01 Aug 2018 23:46 ) pH: 7.38  /  pCO2: 38    /  pO2: 55    / HCO3: 22    / Base Excess: -2.3  /  SaO2: 87.4      ( 01 Aug 2018 19:10 ) pH: 7.38  /  pCO2: 36    /  pO2: 80    / HCO3: 22    / Base Excess: -3.7  /  SaO2: 96.2        Care Discussed with Consultants/Other Providers:    RADIOLOGY & ADDITIONAL TESTS:  (Imaging Personally Reviewed) Patient is a 82y old  Female who presents with a chief complaint of PEA Arrest (01 Aug 2018 20:24)    SUBJECTIVE / OVERNIGHT EVENTS:  Patient seen and examined at bedside. She had no acute events overnight. according to nursing she hasn't been agitated but she thinks she is on a plane. The also report that she has labored breathing.     Other Review of Systems Negative.    MEDICATIONS  (STANDING):  aspirin  chewable 81 milliGRAM(s) Oral daily  atorvastatin 80 milliGRAM(s) Oral at bedtime  captopril 12.5 milliGRAM(s) Oral three times a day  clopidogrel Tablet 75 milliGRAM(s) Oral daily  heparin  Injectable 5000 Unit(s) SubCutaneous every 8 hours  metoprolol tartrate 25 milliGRAM(s) Oral two times a day    MEDICATIONS  (PRN):  acetaminophen   Tablet. 650 milliGRAM(s) Oral every 6 hours PRN Moderate Pain (4 - 6)    OBJECTIVE:    Vital Signs Last 24 Hrs  T(C): 36.6 (06 Aug 2018 05:33), Max: 36.9 (05 Aug 2018 22:12)  T(F): 97.8 (06 Aug 2018 05:33), Max: 98.4 (05 Aug 2018 22:12)  HR: 98 (06 Aug 2018 05:33) (96 - 122)  BP: 141/78 (06 Aug 2018 05:33) (136/83 - 146/78)  BP(mean): --  RR: 20 (06 Aug 2018 05:33) (18 - 20)  SpO2: 92% (06 Aug 2018 05:33) (91% - 94%)    CAPILLARY BLOOD GLUCOSE    I&O's Summary    05 Aug 2018 07:01  -  06 Aug 2018 07:00  --------------------------------------------------------  IN: 721 mL / OUT: 1100 mL / NET: -379 mL      PHYSICAL EXAM:  GENERAL: NAD, well-developed  HEAD:  Atraumatic, Normocephalic  EYES: EOMI, PERRLA, conjunctiva and sclera clear  NECK: Supple, No JVD  CHEST/LUNG: crackles in the bases bilaterally. diffuse B-lines on POCUS  HEART: Regular rate and rhythm; No murmurs, rubs, or gallops  ABDOMEN: Soft, Nontender, Nondistended; Bowel sounds present  EXTREMITIES:  2+ Peripheral Pulses, No clubbing, cyanosis, or edema  PSYCH: AAOx2  NEUROLOGY: non-focal  SKIN: No rashes or lesions    LABS:                        9.2    11.38 )-----------( 169      ( 05 Aug 2018 03:44 )             27.5     Auto Eosinophil # 0.03  / Auto Eosinophil % 0.3   / Auto Neutrophil # 8.96  / Auto Neutrophil % 78.6  / BANDS % x        08-05    142  |  106  |  17  ----------------------------<  99  3.9   |  22  |  0.83  08-05    141  |  105  |  14  ----------------------------<  106<H>  3.5   |  23  |  0.79    Ca    8.3<L>      05 Aug 2018 18:05  Mg     2.3     08-05  Phos  2.5     08-05  TPro  7.2  /  Alb  3.3  /  TBili  1.1  /  DBili  x   /  AST  94<H>  /  ALT  125<H>  /  AlkPhos  94  08-05  TPro  6.3  /  Alb  3.1<L>  /  TBili  1.2  /  DBili  x   /  AST  53<H>  /  ALT  113<H>  /  AlkPhos  72  08-05    Lactate, Blood: 2.7 mmol/L (08-01 @ 23:46)    ABG: ( 01 Aug 2018 23:46 ) pH: 7.38  /  pCO2: 38    /  pO2: 55    / HCO3: 22    / Base Excess: -2.3  /  SaO2: 87.4      ( 01 Aug 2018 19:10 ) pH: 7.38  /  pCO2: 36    /  pO2: 80    / HCO3: 22    / Base Excess: -3.7  /  SaO2: 96.2        Care Discussed with Consultants/Other Providers:  yes Patient is a 82y old  Female who presents with a chief complaint of PEA Arrest (01 Aug 2018 20:24)    SUBJECTIVE / OVERNIGHT EVENTS:  Patient seen and examined at bedside. She had no acute events overnight. according to nursing she hasn't been agitated but she thinks she is on a plane. The also report that she has labored breathing.     Other Review of Systems Negative.    MEDICATIONS  (STANDING):  aspirin  chewable 81 milliGRAM(s) Oral daily  atorvastatin 80 milliGRAM(s) Oral at bedtime  captopril 12.5 milliGRAM(s) Oral three times a day  clopidogrel Tablet 75 milliGRAM(s) Oral daily  heparin  Injectable 5000 Unit(s) SubCutaneous every 8 hours  metoprolol tartrate 25 milliGRAM(s) Oral two times a day    MEDICATIONS  (PRN):  acetaminophen   Tablet. 650 milliGRAM(s) Oral every 6 hours PRN Moderate Pain (4 - 6)    OBJECTIVE:    Vital Signs Last 24 Hrs  T(C): 36.6 (06 Aug 2018 05:33), Max: 36.9 (05 Aug 2018 22:12)  T(F): 97.8 (06 Aug 2018 05:33), Max: 98.4 (05 Aug 2018 22:12)  HR: 98 (06 Aug 2018 05:33) (96 - 122)  BP: 141/78 (06 Aug 2018 05:33) (136/83 - 146/78)  BP(mean): --  RR: 20 (06 Aug 2018 05:33) (18 - 20), upon attending RR-24  SpO2: 92% (06 Aug 2018 05:33) (91% - 94%)    CAPILLARY BLOOD GLUCOSE    I&O's Summary    05 Aug 2018 07:01  -  06 Aug 2018 07:00  --------------------------------------------------------  IN: 721 mL / OUT: 1100 mL / NET: -379 mL      PHYSICAL EXAM:  GENERAL: NAD, well-developed mild respoiratory distress with use of abdominal accessory muscles.   HEAD:  Atraumatic, Normocephalic  EYES: EOMI, , conjunctiva and sclera clear  NECK: Supple, No JVD  CHEST/LUNG: crackles in the bases bilaterally. No rhonchi or wheeizng.    HEART: Regular rate and rhythm; No murmurs, rubs, or gallops  ABDOMEN: Soft, Nontender, Nondistended; Bowel sounds present  EXTREMITIES:  2+ Peripheral Pulses, No clubbing, cyanosis, or edema  PSYCH: AAOx2  NEUROLOGY: non-focal  SKIN: No rashes or lesions    POCUS (unofficial) diffuse anterior b-lines b/l with smooth pleural surface.  LABS:                        9.2    11.38 )-----------( 169      ( 05 Aug 2018 03:44 )             27.5     Auto Eosinophil # 0.03  / Auto Eosinophil % 0.3   / Auto Neutrophil # 8.96  / Auto Neutrophil % 78.6  / BANDS % x        08-05    142  |  106  |  17  ----------------------------<  99  3.9   |  22  |  0.83  08-05    141  |  105  |  14  ----------------------------<  106<H>  3.5   |  23  |  0.79    Ca    8.3<L>      05 Aug 2018 18:05  Mg     2.3     08-05  Phos  2.5     08-05  TPro  7.2  /  Alb  3.3  /  TBili  1.1  /  DBili  x   /  AST  94<H>  /  ALT  125<H>  /  AlkPhos  94  08-05  TPro  6.3  /  Alb  3.1<L>  /  TBili  1.2  /  DBili  x   /  AST  53<H>  /  ALT  113<H>  /  AlkPhos  72  08-05    Lactate, Blood: 2.7 mmol/L (08-01 @ 23:46)    ABG: ( 01 Aug 2018 23:46 ) pH: 7.38  /  pCO2: 38    /  pO2: 55    / HCO3: 22    / Base Excess: -2.3  /  SaO2: 87.4      ( 01 Aug 2018 19:10 ) pH: 7.38  /  pCO2: 36    /  pO2: 80    / HCO3: 22    / Base Excess: -3.7  /  SaO2: 96.2        Care Discussed with Consultants/Other Providers: Cardiology fellow (Dr. Granda)

## 2018-08-07 DIAGNOSIS — I47.1 SUPRAVENTRICULAR TACHYCARDIA: ICD-10-CM

## 2018-08-07 LAB
ALBUMIN SERPL ELPH-MCNC: 2.7 G/DL — LOW (ref 3.3–5)
ALP SERPL-CCNC: 94 U/L — SIGNIFICANT CHANGE UP (ref 40–120)
ALT FLD-CCNC: 127 U/L — HIGH (ref 4–33)
AST SERPL-CCNC: 96 U/L — HIGH (ref 4–32)
BACTERIA BLD CULT: SIGNIFICANT CHANGE UP
BACTERIA BLD CULT: SIGNIFICANT CHANGE UP
BILIRUB SERPL-MCNC: 1.5 MG/DL — HIGH (ref 0.2–1.2)
BUN SERPL-MCNC: 14 MG/DL — SIGNIFICANT CHANGE UP (ref 7–23)
BUN SERPL-MCNC: 19 MG/DL — SIGNIFICANT CHANGE UP (ref 7–23)
CALCIUM SERPL-MCNC: 7.8 MG/DL — LOW (ref 8.4–10.5)
CALCIUM SERPL-MCNC: 8.6 MG/DL — SIGNIFICANT CHANGE UP (ref 8.4–10.5)
CHLORIDE SERPL-SCNC: 101 MMOL/L — SIGNIFICANT CHANGE UP (ref 98–107)
CHLORIDE SERPL-SCNC: 99 MMOL/L — SIGNIFICANT CHANGE UP (ref 98–107)
CO2 SERPL-SCNC: 20 MMOL/L — LOW (ref 22–31)
CO2 SERPL-SCNC: 24 MMOL/L — SIGNIFICANT CHANGE UP (ref 22–31)
CREAT SERPL-MCNC: 0.91 MG/DL — SIGNIFICANT CHANGE UP (ref 0.5–1.3)
CREAT SERPL-MCNC: 1.07 MG/DL — SIGNIFICANT CHANGE UP (ref 0.5–1.3)
FERRITIN SERPL-MCNC: 499.1 NG/ML — HIGH (ref 15–150)
GLUCOSE SERPL-MCNC: 104 MG/DL — HIGH (ref 70–99)
GLUCOSE SERPL-MCNC: 114 MG/DL — HIGH (ref 70–99)
HCT VFR BLD CALC: 29.1 % — LOW (ref 34.5–45)
HGB BLD-MCNC: 9.8 G/DL — LOW (ref 11.5–15.5)
IRON SATN MFR SERPL: 158 UG/DL — SIGNIFICANT CHANGE UP (ref 140–530)
IRON SATN MFR SERPL: 24 UG/DL — LOW (ref 30–160)
MAGNESIUM SERPL-MCNC: 1.9 MG/DL — SIGNIFICANT CHANGE UP (ref 1.6–2.6)
MAGNESIUM SERPL-MCNC: 2.3 MG/DL — SIGNIFICANT CHANGE UP (ref 1.6–2.6)
MCHC RBC-ENTMCNC: 23 PG — LOW (ref 27–34)
MCHC RBC-ENTMCNC: 33.7 % — SIGNIFICANT CHANGE UP (ref 32–36)
MCV RBC AUTO: 68.3 FL — LOW (ref 80–100)
NRBC # FLD: 0.06 — SIGNIFICANT CHANGE UP
PHOSPHATE SERPL-MCNC: 2.5 MG/DL — SIGNIFICANT CHANGE UP (ref 2.5–4.5)
PHOSPHATE SERPL-MCNC: 2.8 MG/DL — SIGNIFICANT CHANGE UP (ref 2.5–4.5)
PLATELET # BLD AUTO: 227 K/UL — SIGNIFICANT CHANGE UP (ref 150–400)
PMV BLD: 11.4 FL — SIGNIFICANT CHANGE UP (ref 7–13)
POTASSIUM SERPL-MCNC: 3.9 MMOL/L — SIGNIFICANT CHANGE UP (ref 3.5–5.3)
POTASSIUM SERPL-MCNC: 4 MMOL/L — SIGNIFICANT CHANGE UP (ref 3.5–5.3)
POTASSIUM SERPL-SCNC: 3.9 MMOL/L — SIGNIFICANT CHANGE UP (ref 3.5–5.3)
POTASSIUM SERPL-SCNC: 4 MMOL/L — SIGNIFICANT CHANGE UP (ref 3.5–5.3)
PROT SERPL-MCNC: 6.3 G/DL — SIGNIFICANT CHANGE UP (ref 6–8.3)
RBC # BLD: 4.26 M/UL — SIGNIFICANT CHANGE UP (ref 3.8–5.2)
RBC # FLD: 14.8 % — HIGH (ref 10.3–14.5)
SODIUM SERPL-SCNC: 135 MMOL/L — SIGNIFICANT CHANGE UP (ref 135–145)
SODIUM SERPL-SCNC: 137 MMOL/L — SIGNIFICANT CHANGE UP (ref 135–145)
UIBC SERPL-MCNC: 134.4 UG/DL — SIGNIFICANT CHANGE UP (ref 110–370)
WBC # BLD: 10.08 K/UL — SIGNIFICANT CHANGE UP (ref 3.8–10.5)
WBC # FLD AUTO: 10.08 K/UL — SIGNIFICANT CHANGE UP (ref 3.8–10.5)

## 2018-08-07 PROCEDURE — 93010 ELECTROCARDIOGRAM REPORT: CPT

## 2018-08-07 PROCEDURE — 99222 1ST HOSP IP/OBS MODERATE 55: CPT | Mod: GC

## 2018-08-07 PROCEDURE — 99233 SBSQ HOSP IP/OBS HIGH 50: CPT | Mod: GC

## 2018-08-07 PROCEDURE — 99232 SBSQ HOSP IP/OBS MODERATE 35: CPT

## 2018-08-07 RX ORDER — METOPROLOL TARTRATE 50 MG
5 TABLET ORAL ONCE
Qty: 0 | Refills: 0 | Status: COMPLETED | OUTPATIENT
Start: 2018-08-07 | End: 2018-08-07

## 2018-08-07 RX ORDER — METOPROLOL TARTRATE 50 MG
50 TABLET ORAL
Qty: 0 | Refills: 0 | Status: DISCONTINUED | OUTPATIENT
Start: 2018-08-07 | End: 2018-08-15

## 2018-08-07 RX ORDER — FUROSEMIDE 40 MG
20 TABLET ORAL
Qty: 0 | Refills: 0 | Status: DISCONTINUED | OUTPATIENT
Start: 2018-08-07 | End: 2018-08-08

## 2018-08-07 RX ORDER — MAGNESIUM SULFATE 500 MG/ML
1 VIAL (ML) INJECTION ONCE
Qty: 0 | Refills: 0 | Status: COMPLETED | OUTPATIENT
Start: 2018-08-07 | End: 2018-08-07

## 2018-08-07 RX ORDER — METOPROLOL TARTRATE 50 MG
2.5 TABLET ORAL ONCE
Qty: 0 | Refills: 0 | Status: COMPLETED | OUTPATIENT
Start: 2018-08-07 | End: 2018-08-07

## 2018-08-07 RX ADMIN — Medication 2.5 MILLIGRAM(S): at 01:58

## 2018-08-07 RX ADMIN — Medication 5 MILLIGRAM(S): at 08:43

## 2018-08-07 RX ADMIN — Medication 25 MILLIGRAM(S): at 05:07

## 2018-08-07 RX ADMIN — CLOPIDOGREL BISULFATE 75 MILLIGRAM(S): 75 TABLET, FILM COATED ORAL at 11:08

## 2018-08-07 RX ADMIN — Medication 100 GRAM(S): at 08:03

## 2018-08-07 RX ADMIN — POLYETHYLENE GLYCOL 3350 17 GRAM(S): 17 POWDER, FOR SOLUTION ORAL at 11:08

## 2018-08-07 RX ADMIN — HEPARIN SODIUM 5000 UNIT(S): 5000 INJECTION INTRAVENOUS; SUBCUTANEOUS at 05:07

## 2018-08-07 RX ADMIN — HEPARIN SODIUM 5000 UNIT(S): 5000 INJECTION INTRAVENOUS; SUBCUTANEOUS at 13:18

## 2018-08-07 RX ADMIN — Medication 50 MILLIGRAM(S): at 17:46

## 2018-08-07 RX ADMIN — Medication 81 MILLIGRAM(S): at 11:08

## 2018-08-07 RX ADMIN — Medication 20 MILLIGRAM(S): at 17:46

## 2018-08-07 RX ADMIN — Medication 100 MILLIGRAM(S): at 17:46

## 2018-08-07 RX ADMIN — LISINOPRIL 5 MILLIGRAM(S): 2.5 TABLET ORAL at 05:07

## 2018-08-07 RX ADMIN — Medication 40 MILLIGRAM(S): at 05:07

## 2018-08-07 NOTE — PROGRESS NOTE ADULT - SUBJECTIVE AND OBJECTIVE BOX
Patient is a 82y old  Female who presents with a chief complaint of PEA Arrest (01 Aug 2018 20:24)      SUBJECTIVE / OVERNIGHT EVENTS:  Patient seen and examined at bedside. The patient had an episode of multifocal atrial tachycardia overnight with a heart rate above 160. The night float resident gave her 2.5 mg of metoprolol IV. She was asymptomatic at the time.     Other Review of Systems Negative.    MEDICATIONS  (STANDING):  aspirin  chewable 81 milliGRAM(s) Oral daily  atorvastatin 80 milliGRAM(s) Oral at bedtime  clopidogrel Tablet 75 milliGRAM(s) Oral daily  docusate sodium 100 milliGRAM(s) Oral two times a day  furosemide   Injectable 40 milliGRAM(s) IV Push two times a day  heparin  Injectable 5000 Unit(s) SubCutaneous every 8 hours  lisinopril 5 milliGRAM(s) Oral daily  metoprolol tartrate 50 milliGRAM(s) Oral two times a day  polyethylene glycol 3350 17 Gram(s) Oral daily  senna 2 Tablet(s) Oral at bedtime    MEDICATIONS  (PRN):  acetaminophen   Tablet. 650 milliGRAM(s) Oral every 6 hours PRN Moderate Pain (4 - 6)      OBJECTIVE:    Vital Signs Last 24 Hrs  T(C): 36.6 (07 Aug 2018 05:05), Max: 36.9 (06 Aug 2018 21:40)  T(F): 97.8 (07 Aug 2018 05:05), Max: 98.5 (06 Aug 2018 21:40)  HR: 180 (07 Aug 2018 08:39) (88 - 180)  BP: 147/75 (07 Aug 2018 08:39) (118/83 - 156/104)  BP(mean): --  RR: 17 (07 Aug 2018 05:05) (17 - 20)  SpO2: 97% (07 Aug 2018 05:05) (93% - 98%)    CAPILLARY BLOOD GLUCOSE        I&O's Summary    06 Aug 2018 07:01  -  07 Aug 2018 07:00  --------------------------------------------------------  IN: 750 mL / OUT: 4275 mL / NET: -3525 mL        PHYSICAL EXAM:  GENERAL: NAD, well-developed  HEAD:  Atraumatic, Normocephalic  EYES: EOMI, PERRLA, conjunctiva and sclera clear  NECK: Supple, No JVD  CHEST/LUNG: Clear to auscultation bilaterally; No wheeze  HEART: Regular rate and rhythm; No murmurs, rubs, or gallops  ABDOMEN: Soft, Nontender, Nondistended; Bowel sounds present  EXTREMITIES:  2+ Peripheral Pulses, No clubbing, cyanosis, or edema  PSYCH: AAOx3  NEUROLOGY: non-focal  SKIN: No rashes or lesions    LABS:                        9.8    10.08 )-----------( 227      ( 07 Aug 2018 02:29 )             29.1     Auto Eosinophil # x     / Auto Eosinophil % x     / Auto Neutrophil # x     / Auto Neutrophil % x     / BANDS % x                            9.5    9.34  )-----------( 218      ( 06 Aug 2018 05:00 )             29.0     Auto Eosinophil # 0.06  / Auto Eosinophil % 0.6   / Auto Neutrophil # 6.99  / Auto Neutrophil % 74.9  / BANDS % x        08-07    135  |  101  |  14  ----------------------------<  104<H>  4.0   |  20<L>  |  0.91  08-06    136  |  100  |  14  ----------------------------<  106<H>  4.1   |  19<L>  |  0.83  08-06    138  |  105  |  16  ----------------------------<  88  4.2   |  19<L>  |  0.75    Ca    7.8<L>      07 Aug 2018 02:29  Mg     1.9     08-07  Phos  2.8     08-07  TPro  6.3  /  Alb  2.7<L>  /  TBili  1.5<H>  /  DBili  x   /  AST  96<H>  /  ALT  127<H>  /  AlkPhos  94  08-07  TPro  6.2  /  Alb  2.7<L>  /  TBili  1.3<H>  /  DBili  x   /  AST  116<H>  /  ALT  135<H>  /  AlkPhos  91  08-06  TPro  7.2  /  Alb  3.3  /  TBili  1.1  /  DBili  x   /  AST  94<H>  /  ALT  125<H>  /  AlkPhos  94  08-05    Lactate, Blood: 2.7 mmol/L (08-01 @ 23:46)    ABG: ( 01 Aug 2018 23:46 ) pH: 7.38  /  pCO2: 38    /  pO2: 55    / HCO3: 22    / Base Excess: -2.3  /  SaO2: 87.4      ( 01 Aug 2018 19:10 ) pH: 7.38  /  pCO2: 36    /  pO2: 80    / HCO3: 22    / Base Excess: -3.7  /  SaO2: 96.2      Care Discussed with Consultants/Other Providers:    RADIOLOGY & ADDITIONAL TESTS:  (Imaging Personally Reviewed) Patient is a 82y old  Female who presents with a chief complaint of PEA Arrest (01 Aug 2018 20:24)      SUBJECTIVE / OVERNIGHT EVENTS:  Patient seen and examined at bedside. The patient had an episode of multifocal atrial tachycardia overnight with a heart rate above 160. The night float resident gave her 2.5 mg of metoprolol IV. She was asymptomatic at the time. This morning she was agitated and delirious.     Other Review of Systems Negative.    MEDICATIONS  (STANDING):  aspirin  chewable 81 milliGRAM(s) Oral daily  atorvastatin 80 milliGRAM(s) Oral at bedtime  clopidogrel Tablet 75 milliGRAM(s) Oral daily  docusate sodium 100 milliGRAM(s) Oral two times a day  furosemide   Injectable 40 milliGRAM(s) IV Push two times a day  heparin  Injectable 5000 Unit(s) SubCutaneous every 8 hours  lisinopril 5 milliGRAM(s) Oral daily  metoprolol tartrate 50 milliGRAM(s) Oral two times a day  polyethylene glycol 3350 17 Gram(s) Oral daily  senna 2 Tablet(s) Oral at bedtime    MEDICATIONS  (PRN):  acetaminophen   Tablet. 650 milliGRAM(s) Oral every 6 hours PRN Moderate Pain (4 - 6)      OBJECTIVE:    Vital Signs Last 24 Hrs  T(C): 36.6 (07 Aug 2018 05:05), Max: 36.9 (06 Aug 2018 21:40)  T(F): 97.8 (07 Aug 2018 05:05), Max: 98.5 (06 Aug 2018 21:40)  HR: 180 (07 Aug 2018 08:39) (88 - 180)  BP: 147/75 (07 Aug 2018 08:39) (118/83 - 156/104)  BP(mean): --  RR: 17 (07 Aug 2018 05:05) (17 - 20)  SpO2: 97% (07 Aug 2018 05:05) (93% - 98%)    CAPILLARY BLOOD GLUCOSE    I&O's Summary    06 Aug 2018 07:01  -  07 Aug 2018 07:00  --------------------------------------------------------  IN: 750 mL / OUT: 4275 mL / NET: -3525 mL    PHYSICAL EXAM:  GENERAL: agitated   HEAD:  Atraumatic, Normocephalic  EYES: EOMI, conjunctiva and sclera clear  NECK: Supple, No JVD  CHEST/LUNG: improved crackles in the bases bilaterally  HEART: Regular rate and rhythm; No murmurs, rubs, or gallops  ABDOMEN: Soft, Nontender, Nondistended; Bowel sounds present  EXTREMITIES:  2+ Peripheral Pulses, No clubbing, cyanosis, or edema  PSYCH: AAOx1    LABS:                        9.8    10.08 )-----------( 227      ( 07 Aug 2018 02:29 )             29.1     Auto Eosinophil # x     / Auto Eosinophil % x     / Auto Neutrophil # x     / Auto Neutrophil % x     / BANDS % x                            9.5    9.34  )-----------( 218      ( 06 Aug 2018 05:00 )             29.0     Auto Eosinophil # 0.06  / Auto Eosinophil % 0.6   / Auto Neutrophil # 6.99  / Auto Neutrophil % 74.9  / BANDS % x        08-07    135  |  101  |  14  ----------------------------<  104<H>  4.0   |  20<L>  |  0.91  08-06    136  |  100  |  14  ----------------------------<  106<H>  4.1   |  19<L>  |  0.83  08-06    138  |  105  |  16  ----------------------------<  88  4.2   |  19<L>  |  0.75    Ca    7.8<L>      07 Aug 2018 02:29  Mg     1.9     08-07  Phos  2.8     08-07  TPro  6.3  /  Alb  2.7<L>  /  TBili  1.5<H>  /  DBili  x   /  AST  96<H>  /  ALT  127<H>  /  AlkPhos  94  08-07  TPro  6.2  /  Alb  2.7<L>  /  TBili  1.3<H>  /  DBili  x   /  AST  116<H>  /  ALT  135<H>  /  AlkPhos  91  08-06  TPro  7.2  /  Alb  3.3  /  TBili  1.1  /  DBili  x   /  AST  94<H>  /  ALT  125<H>  /  AlkPhos  94  08-05    Lactate, Blood: 2.7 mmol/L (08-01 @ 23:46)    ABG: ( 01 Aug 2018 23:46 ) pH: 7.38  /  pCO2: 38    /  pO2: 55    / HCO3: 22    / Base Excess: -2.3  /  SaO2: 87.4      ( 01 Aug 2018 19:10 ) pH: 7.38  /  pCO2: 36    /  pO2: 80    / HCO3: 22    / Base Excess: -3.7  /  SaO2: 96.2      Care Discussed with Consultants/Other Providers:    RADIOLOGY & ADDITIONAL TESTS:  (Imaging Personally Reviewed)

## 2018-08-07 NOTE — CONSULT NOTE ADULT - ASSESSMENT
A/P:  83 yo female w h/o NSTEMI, PAD, Renal artery stenosis, HTN, good functional status, returned from [place] three days prior to admission, was not feeling well and had witnessed cardiac arrest. Was shocked in field for VF, then had PEA arrest and had ROSC. Pt now with TTE with evidence of severe segmental dysfunction in the territory of LAD. Pt now noted to confused agitated, likely from delirium and her cardiac cath is held. EP is c/s for eval of AICD.     Plan:  - would undergo ischemic eval first   - agree with lopressor 50 mg BID for likely MAT     - case to be discussed with Dr. Sterling.     Rashaad Black MD  EP fellow  x [phone] A/P:  83 yo female w h/o NSTEMI, PAD, Renal artery stenosis, HTN, good functional status, returned from [place] three days prior to admission, was not feeling well and had witnessed cardiac arrest. Was shocked in field for VF, then had PEA arrest and had ROSC. Pt now with TTE with evidence of severe segmental dysfunction in the territory of LAD. Pt now noted to confused agitated, likely from delirium and her cardiac cath is held. EP is c/s for eval of AICD. In addition on tele she has noted to have SVT to 180s, which appears to be multifocal atrial tachycardia when the rates are slower.     Plan:  - would undergo ischemic eval first- currently held as pt is delirious   - agree with lopressor 50 mg BID for likely MAT for now.     - case to be discussed with Dr. Sterling.     Rashaad Black MD  EP fellow  x [phone]

## 2018-08-07 NOTE — PROGRESS NOTE ADULT - ASSESSMENT
The patient is a 82 year old woman with a history of HTN, Peripheral artery disease on ASA, renal artery stenosis admitted for V-Fib arrest complicated by acute HFrEF in the setting of multifocal atrial tachycardia and delirium.

## 2018-08-07 NOTE — PROGRESS NOTE ADULT - PROBLEM SELECTOR PLAN 1
-patient with new reduced EF to 28%, whether takasubo or ischemic cardiomyopathy, acute treatment for heart failure remains the same.  -patient on 1L NC, improved crackles on exam.  No pitting edema  -adjusted lasix to 20mg IV BID  -strict I/O, daily standing weights.   -cardiology has delayed catheterization as she was delirious this morning. -patient with new reduced EF to 28%, whether takasubo or ischemic cardiomyopathy, acute treatment for heart failure remains the same.  -net negative approximately 3.2 liters with improvement in saturations.  -decrease lasix to 20mg IV BID with net negative goal 1-1.5L  -able to lay flat for possibel catheterization.  -patient on 1L NC, improved crackles on exam.  No pitting edema  -strict I/O, daily standing weights.   -cardiology has delayed catheterization as she was delirious this morning.

## 2018-08-07 NOTE — PROGRESS NOTE ADULT - PROBLEM SELECTOR PLAN 3
patient with acute encephalopathy, mostly in form of sundowning.  -multifactorial, likely in setting of critical care illness, possible ischemic component given cardiac arrest.  -no neurologic deficits.  -unsure whether can tolerate MRI and 5 days out  -repeat CT head shows no acute changes from previous study.   -avoid any delirium inducing agents.  -frequent re-orientations. delirium in setting of ICu stay, acute cardiac arrest  -reoritentation  -avoid delirium inducing medications  -CT head negative for neurologic cause.

## 2018-08-07 NOTE — PROGRESS NOTE ADULT - PROBLEM SELECTOR PLAN 4
- patient had episode of SVT with underlying atrial flutter in MICU. given adenosine and metoprolol.  - EKG done on the floors during an episode of tachycardia yesterday showed multifocal atrial tachycardia requiring multiple IV pushes of metoprolol.   - increased the dose of metoprolol to 50 mg BID as per cardiology recs.  -no need for anticoagulation at this time.

## 2018-08-07 NOTE — PROGRESS NOTE ADULT - ASSESSMENT
A: 83 yo female w h/o NSTEMI, PAD, Renal artery stenosis, HTN, good functional status, returned from [place] three days prior to admission, was not feeling well and had witnessed cardiac arrest. Was shocked in field for VF, then had PEA arrest and had ROSC.    1. Cardiac arrest/NSTEMI       - Unknown if cardiac arrest was due to cardiac event. No acute findings on EKG indicating STEMI       - Echo shows Severe LV dysfunction with LAD territory hypokinesis w possible apical ballooning        - Pt had normal cath last year       - Pt completed 48 hrs of heparin gtt       - c/w ASA, Plavix, Lipitor, BB, ACE       - Pt was planned for cardiac cath today, however given current mental status/agitation it is unsafe to perform procedure. Will re-evaluate patient this afternoon and if mental status has improved will reconsider cath.    2. HFrEF      - No crackles on exam today, O2 requirements have decreased. Can c/w Lasix 20 IV BID for now.    3. Multifocal atrial tachycardia       - Pt with -170s at times overnight       - Increase Metoprolol to 50 BID (pt was previously on this dose at home)       - Monitor lytes: goal K to 4, Mg to 2

## 2018-08-07 NOTE — PROGRESS NOTE ADULT - PROBLEM SELECTOR PLAN 2
-patient with V-fib arrest in field.  -whether ischemic or non-ischemic given V-fib, would strongly consider for ICD placement.  -discussed with cardiology fellow, plan for catheterization when stable and not delirious.   -EP is on board for AICD eval after ischemic workup is complete.   -continue to monitor on telemetry.

## 2018-08-07 NOTE — CONSULT NOTE ADULT - SUBJECTIVE AND OBJECTIVE BOX
HPI:  83yo F pmhx HTN osteoporosis ,HLD,? NSTEMI ,no stent  BIBEMS s/p cardiac arrest. Per family pt. hadn't been feeling well for past couple of days after returning home from trip to [place], family reports she became weak and lost consciousness, per EMS she was found unresponsive, in VT/VF received 1 shock, CPR with  achieved ROSC and intubation .Medics arrived and found patient pulseless, CPR re-initiated,  patient achieved ROSC again prior to any medications or defibrillation.On arrival to ED, patient found to have + pulse with elevated BP and HR. no STEMI on ekg. CTPA r/o for PE.     Cardiology and EP now c/s for AICD evaluation .     Pt overnight with episodes of tachycardia and with agitation this AM. Patient was considered unsafe for cardiac cath this AM.       PMH:   PAD (peripheral artery disease)  NSTEMI (non-ST elevation myocardial infarction)  Osteoporosis  HTN (hypertension)      PSH:   No significant past surgical history      Medications:   acetaminophen   Tablet. 650 milliGRAM(s) Oral every 6 hours PRN  aspirin  chewable 81 milliGRAM(s) Oral daily  atorvastatin 80 milliGRAM(s) Oral at bedtime  clopidogrel Tablet 75 milliGRAM(s) Oral daily  docusate sodium 100 milliGRAM(s) Oral two times a day  furosemide   Injectable 20 milliGRAM(s) IV Push two times a day  heparin  Injectable 5000 Unit(s) SubCutaneous every 8 hours  lisinopril 5 milliGRAM(s) Oral daily  metoprolol tartrate 50 milliGRAM(s) Oral two times a day  polyethylene glycol 3350 17 Gram(s) Oral daily  senna 2 Tablet(s) Oral at bedtime      Allergies:  No Known Allergies      FAMILY HISTORY:  No pertinent family history in first degree relatives        Review of Systems:  unable to participate.     Physical Exam:  T(F): 97.8 (08-07), Max: 98.5 (08-06)  HR: 180 (08-07) (88 - 180)  BP: 147/75 (08-07) (118/83 - 156/104)  RR: 17 (08-07)  SpO2: 97% (08-07)    GENERAL: No acute distress, well-developed  HEAD:  Atraumatic, Normocephalic  ENT: EOMI, PERRLA, conjunctiva and sclera clear, Neck supple, No JVD, moist mucosa  CHEST/LUNG: Clear to auscultation bilaterally; No wheeze, equal breath sounds bilaterally   BACK: No spinal tenderness  HEART: Regular rate and rhythm; No murmurs, rubs, or gallops  ABDOMEN: Soft, Nontender, Nondistended; Bowel sounds present  EXTREMITIES:  No clubbing, cyanosis, or edema  PSYCH: Nl behavior, nl affect  NEUROLOGY: AAOx3, non-focal, cranial nerves intact  SKIN: Normal color, No rashes or lesions  LINES:    Cardiovascular Diagnostic Testing:    ECG: Personally reviewed    Echo:  < from: Transthoracic Echocardiogram (08.02.18 @ 02:07) >  DIMENSIONS:  Dimensions:     Normal Values:  LA:     2.6 cm    2.0 - 4.0 cm  Ao:     2.9 cm    2.0 - 3.8 cm  SEPTUM: 0.8 cm    0.6 - 1.2 cm  PWT:    0.8 cm    0.6 - 1.1 cm  LVIDd:  4.0 cm    3.0 - 5.6 cm  LVIDs:  3.5 cm    1.8 - 4.0 cm  Derived Variables:  LVMI: 60 g/m2  RWT: 0.40  Fractional short: 13 %  Ejection Fraction (Modified Meadows Rule): 28 %  ------------------------------------------------------------------------  OBSERVATIONS:  Mitral Valve: Mitral annular calcification, otherwise  normal mitral valve. Minimal mitral regurgitation.  Aortic Root: Normal size aortic root. (Ao:2.9 cm).  Aortic Valve: Calcified trileaflet aortic valve with normal  opening. Minimal aortic regurgitation.  Left Atrium: Normal left atrium.  LA volume index = 26  cc/m2.  Left Ventricle: Severe segmental left ventricular systolic  dysfunction.  Hypokinesis of the apex, mid to distal  septum, and mid to distal anterior wall. Normal left  ventricular internal dimensions and wall thicknesses.  Right Heart: Normal right atrium. Normal right ventricular  size and function. Normal tricuspid valve.   Mild-moderate  tricuspid regurgitation. Normal pulmonic valve. Minimal  pulmonic regurgitation.  Pericardium/PleuraNormal pericardium with no pericardial  effusion.  Hemodynamic: Estimated right ventricular systolic pressure  equals 45 mm Hg, assuming right atrial pressure equals 10  mm Hg, consistent with mild pulmonary hypertension.  ------------------------------------------------------------------------  CONCLUSIONS:  1. Mitral annular calcification, otherwise normal mitral  valve. Minimal mitral regurgitation.  2. Normal left ventricular internal dimensions and wall  thicknesses.  3. Severe segmental left ventricular systolic dysfunction.  Hypokinesis of the apex, mid to distal septum, and mid to  distal anterior wall.  4. Normal right ventricularsize and function.  5. Estimated right ventricular systolic pressure equals 45  mm Hg, assuming right atrial pressure equals 10 mm Hg,  consistent with mild pulmonary hypertension.  *** No previous Echo exam.  ------------------------------------------------------------------------    < end of copied text >    Cath:  none     Interpretation of Telemetry:    Imaging:    Labs: Personally reviewed                        9.8    10.08 )-----------( 227      ( 07 Aug 2018 02:29 )             29.1     08-07    135  |  101  |  14  ----------------------------<  104<H>  4.0   |  20<L>  |  0.91    Ca    7.8<L>      07 Aug 2018 02:29  Phos  2.8     08-07  Mg     1.9     08-07    TPro  6.3  /  Alb  2.7<L>  /  TBili  1.5<H>  /  DBili  x   /  AST  96<H>  /  ALT  127<H>  /  AlkPhos  94  08-07      CARDIAC MARKERS ( 02 Aug 2018 02:34 )  x     / x     / x     / 452 u/L / 19.93 ng/mL / x      CARDIAC MARKERS ( 01 Aug 2018 23:46 )  x     / x     / x     / 381 u/L / 20.03 ng/mL / x      CARDIAC MARKERS ( 01 Aug 2018 17:00 )  x     / x     / x     / 131 u/L / 2.77 ng/mL / x          Serum Pro-Brain Natriuretic Peptide: 374.8 pg/mL (08-01 @ 17:00)      Hemoglobin A1C, Whole Blood: 5.4 % (08-02 @ 02:34)    Thyroid Stimulating Hormone, Serum: 0.61 uIU/mL (08-02 @ 02:34)

## 2018-08-07 NOTE — PROGRESS NOTE ADULT - SUBJECTIVE AND OBJECTIVE BOX
Patient seen and examined at bedside.    Overnight Events: Pt with MAT overnight with -170s requiring pushes of Lopressor. This morning pt is confused and agitated. She has pulled out her IV and is confrontational with medical team.    Review Of Systems: Pt denies any chest pain, shortness of breath, or palpitations            Current Meds:  acetaminophen   Tablet. 650 milliGRAM(s) Oral every 6 hours PRN  aspirin  chewable 81 milliGRAM(s) Oral daily  atorvastatin 80 milliGRAM(s) Oral at bedtime  clopidogrel Tablet 75 milliGRAM(s) Oral daily  docusate sodium 100 milliGRAM(s) Oral two times a day  furosemide   Injectable 20 milliGRAM(s) IV Push two times a day  heparin  Injectable 5000 Unit(s) SubCutaneous every 8 hours  lisinopril 5 milliGRAM(s) Oral daily  metoprolol tartrate 50 milliGRAM(s) Oral two times a day  polyethylene glycol 3350 17 Gram(s) Oral daily  senna 2 Tablet(s) Oral at bedtime      PAST MEDICAL & SURGICAL HISTORY:  PAD (peripheral artery disease)  NSTEMI (non-ST elevation myocardial infarction)  Osteoporosis  HTN (hypertension)  No significant past surgical history      Vitals:  T(F): 97.8 (08-07), Max: 98.5 (08-06)  HR: 180 (08-07) (88 - 180)  BP: 147/75 (08-07) (118/83 - 156/104)  RR: 17 (08-07)  SpO2: 97% (08-07)  I&O's Summary    06 Aug 2018 07:01  -  07 Aug 2018 07:00  --------------------------------------------------------  IN: 750 mL / OUT: 4275 mL / NET: -3525 mL        Physical Exam:  Appearance: Agitated  Eyes: PERRL, EOMI, pink conjunctiva  HENT: Normal oral mucosa  Cardiovascular: Tachycardic, irregular, no murmurs, no peripheral edema  Respiratory: Clear to auscultation bilaterally  Gastrointestinal: soft, non-tender, non-distended with normal bowel sounds  Musculoskeletal: No clubbing; no joint deformity   Neurologic: Non-focal  Lymphatic: No lymphadenopathy  Psychiatry: AAOx2 (person and place), agitated  Skin: No rashes, ecchymoses, or cyanosis                          9.8    10.08 )-----------( 227      ( 07 Aug 2018 02:29 )             29.1     08-07    135  |  101  |  14  ----------------------------<  104<H>  4.0   |  20<L>  |  0.91    Ca    7.8<L>      07 Aug 2018 02:29  Phos  2.8     08-07  Mg     1.9     08-07    TPro  6.3  /  Alb  2.7<L>  /  TBili  1.5<H>  /  DBili  x   /  AST  96<H>  /  ALT  127<H>  /  AlkPhos  94  08-07          Serum Pro-Brain Natriuretic Peptide: 374.8 pg/mL (08-01 @ 17:00)          New ECG(s): Personally reviewed    Echo:    Stress Testing:     Cath:    Imaging:    Interpretation of Telemetry: Sinus 90-110s w episodes of MAT to 150-170s

## 2018-08-07 NOTE — CHART NOTE - NSCHARTNOTEFT_GEN_A_CORE
Notified by RN of asymptomatic sustained HR >160 bpm Notified by RN of asymptomatic sustained HR of 160  Telemetry showing -160bpm for ~20 min  /80, pt asymptomatic (no chest pain, palpitations, SOB, AOx1 to self)   Lopressor 2.5 administered with HR -> 90  Pt seen and examined at bedside   ECG showing what appears to be wandering atrial pacemaker (irregular narrow complex rhythm, p-waves with variable morphology)   Cardiology notified suggesting BMP    ICU Vital Signs Last 24 Hrs  T(C): 36.6 (07 Aug 2018 01:46), Max: 36.9 (06 Aug 2018 21:40)  T(F): 97.9 (07 Aug 2018 01:46), Max: 98.5 (06 Aug 2018 21:40)  HR: 98 (07 Aug 2018 02:07) (97 - 180)  BP: 120/82 (07 Aug 2018 01:46) (118/83 - 156/104)  RR: 18 (07 Aug 2018 01:46) (18 - 20)  SpO2: 96% (07 Aug 2018 01:46) (92% - 98%)    GENERAL: NAD  HEENT: EOMI, MMM  Pulm: CTABL  CV: Tachycardic (~130) S1&S2+, no m/r/g appreciated  ABDOMEN: soft, nt, nd, no hepatosplenomegaly  EXTREMITIES:  no appreciable edema in b/l LE  Neuro: Oriented to self, believed she was in [facility] and that the year was "201"   SKIN: warm and dry, no visible rash    Assessment:   82F w h/o NSTEMI, PAD, HTN, s/p witnessed VF cardiac arrest s/p shock with ROSC, now with dysrhythmia of ?MAT/WAP     Plan:  - f/u BMP  - monitor tele

## 2018-08-08 LAB
ALBUMIN SERPL ELPH-MCNC: 2.7 G/DL — LOW (ref 3.3–5)
ALP SERPL-CCNC: 102 U/L — SIGNIFICANT CHANGE UP (ref 40–120)
ALT FLD-CCNC: 124 U/L — HIGH (ref 4–33)
AST SERPL-CCNC: 92 U/L — HIGH (ref 4–32)
BILIRUB SERPL-MCNC: 1.4 MG/DL — HIGH (ref 0.2–1.2)
BUN SERPL-MCNC: 21 MG/DL — SIGNIFICANT CHANGE UP (ref 7–23)
CALCIUM SERPL-MCNC: 8.2 MG/DL — LOW (ref 8.4–10.5)
CHLORIDE SERPL-SCNC: 99 MMOL/L — SIGNIFICANT CHANGE UP (ref 98–107)
CO2 SERPL-SCNC: 20 MMOL/L — LOW (ref 22–31)
CREAT SERPL-MCNC: 1.02 MG/DL — SIGNIFICANT CHANGE UP (ref 0.5–1.3)
GLUCOSE SERPL-MCNC: 99 MG/DL — SIGNIFICANT CHANGE UP (ref 70–99)
HCT VFR BLD CALC: 31.2 % — LOW (ref 34.5–45)
HGB BLD-MCNC: 10.5 G/DL — LOW (ref 11.5–15.5)
MAGNESIUM SERPL-MCNC: 2.2 MG/DL — SIGNIFICANT CHANGE UP (ref 1.6–2.6)
MCHC RBC-ENTMCNC: 23.2 PG — LOW (ref 27–34)
MCHC RBC-ENTMCNC: 33.7 % — SIGNIFICANT CHANGE UP (ref 32–36)
MCV RBC AUTO: 68.9 FL — LOW (ref 80–100)
NRBC # FLD: 0.07 — SIGNIFICANT CHANGE UP
PHOSPHATE SERPL-MCNC: 2.6 MG/DL — SIGNIFICANT CHANGE UP (ref 2.5–4.5)
PLATELET # BLD AUTO: 263 K/UL — SIGNIFICANT CHANGE UP (ref 150–400)
PMV BLD: 11.9 FL — SIGNIFICANT CHANGE UP (ref 7–13)
POTASSIUM SERPL-MCNC: 4 MMOL/L — SIGNIFICANT CHANGE UP (ref 3.5–5.3)
POTASSIUM SERPL-SCNC: 4 MMOL/L — SIGNIFICANT CHANGE UP (ref 3.5–5.3)
PROT SERPL-MCNC: 6.6 G/DL — SIGNIFICANT CHANGE UP (ref 6–8.3)
RBC # BLD: 4.53 M/UL — SIGNIFICANT CHANGE UP (ref 3.8–5.2)
RBC # FLD: 14.8 % — HIGH (ref 10.3–14.5)
SODIUM SERPL-SCNC: 134 MMOL/L — LOW (ref 135–145)
WBC # BLD: 9.58 K/UL — SIGNIFICANT CHANGE UP (ref 3.8–10.5)
WBC # FLD AUTO: 9.58 K/UL — SIGNIFICANT CHANGE UP (ref 3.8–10.5)

## 2018-08-08 PROCEDURE — 99233 SBSQ HOSP IP/OBS HIGH 50: CPT | Mod: GC

## 2018-08-08 PROCEDURE — 99232 SBSQ HOSP IP/OBS MODERATE 35: CPT

## 2018-08-08 RX ORDER — FUROSEMIDE 40 MG
20 TABLET ORAL DAILY
Qty: 0 | Refills: 0 | Status: DISCONTINUED | OUTPATIENT
Start: 2018-08-08 | End: 2018-08-15

## 2018-08-08 RX ADMIN — Medication 100 MILLIGRAM(S): at 18:21

## 2018-08-08 RX ADMIN — Medication 100 MILLIGRAM(S): at 05:43

## 2018-08-08 RX ADMIN — SENNA PLUS 2 TABLET(S): 8.6 TABLET ORAL at 21:55

## 2018-08-08 RX ADMIN — Medication 100 MILLIGRAM(S): at 17:21

## 2018-08-08 RX ADMIN — Medication 50 MILLIGRAM(S): at 17:21

## 2018-08-08 RX ADMIN — ATORVASTATIN CALCIUM 80 MILLIGRAM(S): 80 TABLET, FILM COATED ORAL at 21:55

## 2018-08-08 RX ADMIN — Medication 50 MILLIGRAM(S): at 05:43

## 2018-08-08 RX ADMIN — LISINOPRIL 5 MILLIGRAM(S): 2.5 TABLET ORAL at 05:43

## 2018-08-08 RX ADMIN — Medication 81 MILLIGRAM(S): at 11:25

## 2018-08-08 RX ADMIN — CLOPIDOGREL BISULFATE 75 MILLIGRAM(S): 75 TABLET, FILM COATED ORAL at 11:25

## 2018-08-08 RX ADMIN — POLYETHYLENE GLYCOL 3350 17 GRAM(S): 17 POWDER, FOR SOLUTION ORAL at 11:25

## 2018-08-08 RX ADMIN — Medication 20 MILLIGRAM(S): at 07:02

## 2018-08-08 RX ADMIN — HEPARIN SODIUM 5000 UNIT(S): 5000 INJECTION INTRAVENOUS; SUBCUTANEOUS at 21:55

## 2018-08-08 NOTE — DIETITIAN INITIAL EVALUATION ADULT. - NS AS NUTRI INTERV MEDICAL AND FOOD SUPPLEMENTS
Commercial beverage/Ensure Enlive 8oz. 2x daily (will provide additional ~700 Kcal, ~40 gm Protein);

## 2018-08-08 NOTE — PROGRESS NOTE ADULT - PROBLEM SELECTOR PLAN 5
likely in setting of ischemic hepatitis in setting of cardiac arrest.  -continued downtrending LFTs likely in setting of ischemic hepatitis in setting of cardiac arrest.

## 2018-08-08 NOTE — DIETITIAN INITIAL EVALUATION ADULT. - OTHER INFO
Pt seen for Length of stay. Pt 81 yo female appears alert, oriented. Pt's grand daughter @ bed side participated in the interview. Pt's appetite usually good, but does not like the hospital food reported. Pt's UBW: ~125# per Pt. Per granddaughter Pt probably lost weight since admission (last 7 days), but unable to quantify. No chewing/swallowing difficulties reported; no report of vomiting/diarrhea @ present. Pt seen for Length of stay. Pt 83 yo female appears alert, oriented. Pt's grand daughter @ bed side participated in the interview. Pt's appetite usually good, but does not like the hospital food reported. Per Pt her UBW: ~125#. Per granddaughter Pt probably lost weight since her admission at [facility] (in last 7 days), but unable to quantify. No chewing/swallowing difficulties reported; no report of vomiting/diarrhea @ present. At home Pt avoids Salt reported. Food preferences discussed; better food choices discussed as well. RDN remains available, Pt & her granddaughter made aware.

## 2018-08-08 NOTE — PROGRESS NOTE ADULT - ASSESSMENT
Detail Level: Generalized The patient is a 82 year old woman with a history of HTN, Peripheral artery disease on ASA, renal artery stenosis admitted for V-Fib arrest complicated by acute HFrEF in the setting of multifocal atrial tachycardia and delirium. The patient is a 82 year old woman with a history of HTN, Peripheral artery disease on ASA, renal artery stenosis admitted for V-Fib arrest complicated by acute HFrEF,  multifocal atrial tachycardia and delirium.

## 2018-08-08 NOTE — PROGRESS NOTE ADULT - PROBLEM SELECTOR PLAN 3
delirium in setting of ICu stay, acute cardiac arrest  -reoritentation  -avoid delirium inducing medications  -CT head negative for neurologic cause.

## 2018-08-08 NOTE — CHART NOTE - NSCHARTNOTEFT_GEN_A_CORE
Notified by RN that pt refusing to taking morning lasix and subq heparin   Pt seen and examined   Pt says she does not want to take lasix this AM because "I feel fine, I don't need it.  Look my legs are skinny!"     ICU Vital Signs Last 24 Hrs  T(C): 36.4 (08 Aug 2018 05:40), Max: 36.4 (07 Aug 2018 13:39)  T(F): 97.6 (08 Aug 2018 05:40), Max: 97.6 (07 Aug 2018 13:39)  HR: 93 (08 Aug 2018 05:40) (93 - 180)  BP: 135/76 (08 Aug 2018 05:40) (102/72 - 147/75)  RR: 18 (08 Aug 2018 05:40) (18 - 18)  SpO2: 97% (08 Aug 2018 05:40) (97% - 97%)    NAD  No JVD appreciated  tachycardic to ~110, irregular rhythm with many ectopic beats  crackles at lung bases b/l   abd soft non-tender nondistended +BS  no appreciable lower extremity edema, +1 DP pulses bl    telemetry reviewed, HR remains in 100s-110s, irregular narrow complex rhythm with p waves     Pt encouraged to take medication but she is adamant that she will not.      Mark Hellerman, PGY1  Internal Medicine  Pager: [phone]

## 2018-08-08 NOTE — PROGRESS NOTE ADULT - ASSESSMENT
A: 83 yo female w h/o NSTEMI, PAD, Renal artery stenosis, HTN, good functional status, returned from [place] three days prior to admission, was not feeling well and had witnessed cardiac arrest. Was shocked in field for VF, then had PEA arrest and had ROSC.    1. Cardiac arrest/NSTEMI       - Unknown if cardiac arrest was due to cardiac event. No acute findings on EKG indicating STEMI       - Echo shows Severe LV dysfunction with LAD territory hypokinesis w possible apical ballooning        - Pt had normal cath last year       - Pt completed 48 hrs of heparin gtt       - c/w ASA, Plavix, Lipitor, BB, ACE       - Pt will need cardiac cath, however is still delirious, waxing and waning, refusing lab work earlier this morning. Not safe for cath today. Will reconsider once delirium has resolved.    2. HFrEF      - No crackles on exam today, O2 requirements have decreased. Can c/w Lasix 20 IV BID for now.    3. Multifocal atrial tachycardia       - HR better controlled last night, still w occasional short bursts of MAT       - EP following       - c/w Metoprolol 50 BID       - Monitor lytes: goal K to 4, Mg to 2

## 2018-08-08 NOTE — PROGRESS NOTE ADULT - SUBJECTIVE AND OBJECTIVE BOX
Patient seen and examined at bedside.    Overnight Events: No acute events.     Review Of Systems: No chest pain, shortness of breath, or palpitations. Pt still confused, reports this morning she was in her kitchen preparing breakfast.            Current Meds:  acetaminophen   Tablet. 650 milliGRAM(s) Oral every 6 hours PRN  aspirin  chewable 81 milliGRAM(s) Oral daily  atorvastatin 80 milliGRAM(s) Oral at bedtime  clopidogrel Tablet 75 milliGRAM(s) Oral daily  docusate sodium 100 milliGRAM(s) Oral two times a day  furosemide    Tablet 20 milliGRAM(s) Oral daily  heparin  Injectable 5000 Unit(s) SubCutaneous every 8 hours  lisinopril 5 milliGRAM(s) Oral daily  metoprolol tartrate 50 milliGRAM(s) Oral two times a day  polyethylene glycol 3350 17 Gram(s) Oral daily  senna 2 Tablet(s) Oral at bedtime      PAST MEDICAL & SURGICAL HISTORY:  PAD (peripheral artery disease)  NSTEMI (non-ST elevation myocardial infarction)  Osteoporosis  HTN (hypertension)  No significant past surgical history      Vitals:  T(F): 97.6 (08-08), Max: 97.6 (08-07)  HR: 93 (08-08) (93 - 98)  BP: 135/76 (08-08) (102/72 - 135/76)  RR: 18 (08-08)  SpO2: 97% (08-08)  I&O's Summary    07 Aug 2018 07:01  -  08 Aug 2018 07:00  --------------------------------------------------------  IN: 735 mL / OUT: 850 mL / NET: -115 mL    08 Aug 2018 07:01  -  08 Aug 2018 11:46  --------------------------------------------------------  IN: 340 mL / OUT: 0 mL / NET: 340 mL        Physical Exam:  Appearance: No acute distress  Eyes: PERRL, EOMI, pink conjunctiva  HENT: Normal oral mucosa  Cardiovascular: Tachycardic, no murmurs, rubs, or gallops; no edema; no JVD  Respiratory: Clear to auscultation bilaterally  Gastrointestinal: soft, non-tender, non-distended with normal bowel sounds  Musculoskeletal: No clubbing; no joint deformity   Neurologic: Non-focal  Lymphatic: No lymphadenopathy  Psychiatry: AAOx1  Skin: No rashes, ecchymoses, or cyanosis                          10.5   9.58  )-----------( 263      ( 08 Aug 2018 07:30 )             31.2     08-08    134<L>  |  99  |  21  ----------------------------<  99  4.0   |  20<L>  |  1.02    Ca    8.2<L>      08 Aug 2018 07:30  Phos  2.6     08-08  Mg     2.2     08-08    TPro  6.6  /  Alb  2.7<L>  /  TBili  1.4<H>  /  DBili  x   /  AST  92<H>  /  ALT  124<H>  /  AlkPhos  102  08-08          Serum Pro-Brain Natriuretic Peptide: 374.8 pg/mL (08-01 @ 17:00)          New ECG(s): Personally reviewed    Echo:    Stress Testing:     Cath:    Imaging:    Interpretation of Telemetry: Sinus 90, PACs, short bursts of MAT

## 2018-08-08 NOTE — PROGRESS NOTE ADULT - PROBLEM SELECTOR PLAN 1
-patient with new reduced EF to 28%, whether takasubo or ischemic cardiomyopathy, acute treatment for heart failure remains the same.  -net negative approximately .1 liters with improvement in saturations. questionable accuracy of intakes and outputs.   -decrease lasix to 20mg PO daily  -able to lay flat for possible catheterization.  -patient on 1L NC, improved crackles on exam.  No pitting edema  -strict I/O, daily standing weights.   -cardiology has delayed catheterization as she was delirious this morning. -patient with new reduced EF to 28%, whether takasubo or ischemic cardiomyopathy, acute treatment for heart failure remains the same.  -patient appears euvolemic, saturating well on RA  -change to lasix 20mg PO continue to monitor I/O, daily standing weights.

## 2018-08-08 NOTE — PROGRESS NOTE ADULT - ATTENDING COMMENTS
Agree with above.    #HFrEF, acute: patient now euvolemic, transition to PO lasix.  Continue curent dosages of ACE-i and beta blocker for now.    #V-fib arrrest  -if not stable for cath 2/2 mental status, ?EPS.  If need ischemic eval possible cath under general anesthesia?  Cards to discuss with EP    Rest of plan as above.

## 2018-08-08 NOTE — DIETITIAN INITIAL EVALUATION ADULT. - NS AS NUTRI INTERV MEALS SNACK
1. Suggest: PO diet rx: Regular, DASH/TLC (cholesterol and sodium restricted); PO supplement: Ensure Enlive 8oz. 2x daily (will provide additional ~700 Kcal, ~40 gm Protein);               2. Encourage & assist Pt with meals; Monitor PO diet tolerance; Honor food preferences;             3. Monitor labs, weights, hydration status;/Diets modified for specific foods and ingredients/Other (specify)

## 2018-08-08 NOTE — PROGRESS NOTE ADULT - SUBJECTIVE AND OBJECTIVE BOX
Patient is a 82y old  Female who presents with a chief complaint of PEA Arrest (01 Aug 2018 20:24)      SUBJECTIVE / OVERNIGHT EVENTS:  Patient seen and examined at bedside. no acute events overnight. no episodes of shortness of breath, no chest pain. The patient is still delirious but redirectable.     Other Review of Systems Negative.    MEDICATIONS  (STANDING):  aspirin  chewable 81 milliGRAM(s) Oral daily  atorvastatin 80 milliGRAM(s) Oral at bedtime  clopidogrel Tablet 75 milliGRAM(s) Oral daily  docusate sodium 100 milliGRAM(s) Oral two times a day  furosemide    Tablet 20 milliGRAM(s) Oral daily  heparin  Injectable 5000 Unit(s) SubCutaneous every 8 hours  lisinopril 5 milliGRAM(s) Oral daily  metoprolol tartrate 50 milliGRAM(s) Oral two times a day  polyethylene glycol 3350 17 Gram(s) Oral daily  senna 2 Tablet(s) Oral at bedtime    MEDICATIONS  (PRN):  acetaminophen   Tablet. 650 milliGRAM(s) Oral every 6 hours PRN Moderate Pain (4 - 6)      OBJECTIVE:    Vital Signs Last 24 Hrs  T(C): 36.4 (08 Aug 2018 05:40), Max: 36.4 (07 Aug 2018 13:39)  T(F): 97.6 (08 Aug 2018 05:40), Max: 97.6 (07 Aug 2018 13:39)  HR: 93 (08 Aug 2018 05:40) (93 - 98)  BP: 135/76 (08 Aug 2018 05:40) (102/72 - 135/76)  BP(mean): --  RR: 18 (08 Aug 2018 05:40) (18 - 18)  SpO2: 97% (08 Aug 2018 05:40) (97% - 97%)    CAPILLARY BLOOD GLUCOSE        I&O's Summary    07 Aug 2018 07:01  -  08 Aug 2018 07:00  --------------------------------------------------------  IN: 735 mL / OUT: 850 mL / NET: -115 mL    08 Aug 2018 07:01  -  08 Aug 2018 12:52  --------------------------------------------------------  IN: 340 mL / OUT: 0 mL / NET: 340 mL    PHYSICAL EXAM:  GENERAL: NAD, well-developed  HEAD:  Atraumatic, Normocephalic  EYES: EOMI, PERRLA, conjunctiva and sclera clear  NECK: Supple, No JVD  CHEST/LUNG: improved crackles in the bases bilaterally No wheeze  HEART: Regular rate and rhythm; No murmurs, rubs, or gallops  ABDOMEN: Soft, Nontender, Nondistended; Bowel sounds present  EXTREMITIES:  2+ Peripheral Pulses, No clubbing, cyanosis, or edema  PSYCH: AAOx3  NEUROLOGY: non-focal  SKIN: No rashes or lesions    LABS:                        10.5   9.58  )-----------( 263      ( 08 Aug 2018 07:30 )             31.2     Auto Eosinophil # x     / Auto Eosinophil % x     / Auto Neutrophil # x     / Auto Neutrophil % x     / BANDS % x                            9.8    10.08 )-----------( 227      ( 07 Aug 2018 02:29 )             29.1     Auto Eosinophil # x     / Auto Eosinophil % x     / Auto Neutrophil # x     / Auto Neutrophil % x     / BANDS % x        08-08    134<L>  |  99  |  21  ----------------------------<  99  4.0   |  20<L>  |  1.02  08-07    137  |  99  |  19  ----------------------------<  114<H>  3.9   |  24  |  1.07  08-07    135  |  101  |  14  ----------------------------<  104<H>  4.0   |  20<L>  |  0.91    Ca    8.2<L>      08 Aug 2018 07:30  Mg     2.2     08-08  Phos  2.6     08-08  TPro  6.6  /  Alb  2.7<L>  /  TBili  1.4<H>  /  DBili  x   /  AST  92<H>  /  ALT  124<H>  /  AlkPhos  102  08-08  TPro  6.3  /  Alb  2.7<L>  /  TBili  1.5<H>  /  DBili  x   /  AST  96<H>  /  ALT  127<H>  /  AlkPhos  94  08-07    Lactate, Blood: 2.7 mmol/L (08-01 @ 23:46)    ABG: ( 01 Aug 2018 23:46 ) pH: 7.38  /  pCO2: 38    /  pO2: 55    / HCO3: 22    / Base Excess: -2.3  /  SaO2: 87.4      ( 01 Aug 2018 19:10 ) pH: 7.38  /  pCO2: 36    /  pO2: 80    / HCO3: 22    / Base Excess: -3.7  /  SaO2: 96.2    Care Discussed with Consultants/Other Providers: yes Patient is a 82y old  Female who presents with a chief complaint of PEA Arrest (01 Aug 2018 20:24)      SUBJECTIVE / OVERNIGHT EVENTS:  Patient seen and examined at bedside. no acute events overnight. no episodes of shortness of breath, no chest pain. The patient is still delirious but redirectable.     Other Review of Systems Negative.    MEDICATIONS  (STANDING):  aspirin  chewable 81 milliGRAM(s) Oral daily  atorvastatin 80 milliGRAM(s) Oral at bedtime  clopidogrel Tablet 75 milliGRAM(s) Oral daily  docusate sodium 100 milliGRAM(s) Oral two times a day  furosemide    Tablet 20 milliGRAM(s) Oral daily  heparin  Injectable 5000 Unit(s) SubCutaneous every 8 hours  lisinopril 5 milliGRAM(s) Oral daily  metoprolol tartrate 50 milliGRAM(s) Oral two times a day  polyethylene glycol 3350 17 Gram(s) Oral daily  senna 2 Tablet(s) Oral at bedtime    MEDICATIONS  (PRN):  acetaminophen   Tablet. 650 milliGRAM(s) Oral every 6 hours PRN Moderate Pain (4 - 6)      OBJECTIVE:    Vital Signs Last 24 Hrs  T(C): 36.4 (08 Aug 2018 05:40), Max: 36.4 (07 Aug 2018 13:39)  T(F): 97.6 (08 Aug 2018 05:40), Max: 97.6 (07 Aug 2018 13:39)  HR: 93 (08 Aug 2018 05:40) (93 - 98)  BP: 135/76 (08 Aug 2018 05:40) (102/72 - 135/76)  BP(mean): --  RR: 18 (08 Aug 2018 05:40) (18 - 18)  SpO2: 97% (08 Aug 2018 05:40) (97% - 97%)    CAPILLARY BLOOD GLUCOSE        I&O's Summary    07 Aug 2018 07:01  -  08 Aug 2018 07:00  --------------------------------------------------------  IN: 735 mL / OUT: 850 mL / NET: -115 mL    08 Aug 2018 07:01  -  08 Aug 2018 12:52  --------------------------------------------------------  IN: 340 mL / OUT: 0 mL / NET: 340 mL    PHYSICAL EXAM:  GENERAL: NAD, well-developed  HEAD:  Atraumatic, Normocephalic  EYES: EOMI, PERRLA, conjunctiva and sclera clear  NECK: Supple, No JVD  CHEST/LUNG: improved crackles in the bases bilaterally No wheeze  HEART: Regular rate and rhythm; No murmurs, rubs, or gallops  ABDOMEN: Soft, Nontender, Nondistended; Bowel sounds present  EXTREMITIES:  2+ Peripheral Pulses, No clubbing, cyanosis, or edema  PSYCH: AAOx1  NEUROLOGY: non-focal  SKIN: No rashes or lesions    LABS:                        10.5   9.58  )-----------( 263      ( 08 Aug 2018 07:30 )             31.2     Auto Eosinophil # x     / Auto Eosinophil % x     / Auto Neutrophil # x     / Auto Neutrophil % x     / BANDS % x                            9.8    10.08 )-----------( 227      ( 07 Aug 2018 02:29 )             29.1     Auto Eosinophil # x     / Auto Eosinophil % x     / Auto Neutrophil # x     / Auto Neutrophil % x     / BANDS % x        08-08    134<L>  |  99  |  21  ----------------------------<  99  4.0   |  20<L>  |  1.02  08-07    137  |  99  |  19  ----------------------------<  114<H>  3.9   |  24  |  1.07  08-07    135  |  101  |  14  ----------------------------<  104<H>  4.0   |  20<L>  |  0.91    Ca    8.2<L>      08 Aug 2018 07:30  Mg     2.2     08-08  Phos  2.6     08-08  TPro  6.6  /  Alb  2.7<L>  /  TBili  1.4<H>  /  DBili  x   /  AST  92<H>  /  ALT  124<H>  /  AlkPhos  102  08-08  TPro  6.3  /  Alb  2.7<L>  /  TBili  1.5<H>  /  DBili  x   /  AST  96<H>  /  ALT  127<H>  /  AlkPhos  94  08-07    Lactate, Blood: 2.7 mmol/L (08-01 @ 23:46)    ABG: ( 01 Aug 2018 23:46 ) pH: 7.38  /  pCO2: 38    /  pO2: 55    / HCO3: 22    / Base Excess: -2.3  /  SaO2: 87.4      ( 01 Aug 2018 19:10 ) pH: 7.38  /  pCO2: 36    /  pO2: 80    / HCO3: 22    / Base Excess: -3.7  /  SaO2: 96.2    Care Discussed with Consultants/Other Providers: yes

## 2018-08-08 NOTE — PROGRESS NOTE ADULT - PROBLEM SELECTOR PLAN 4
- patient had episode of SVT with underlying atrial flutter in MICU. given adenosine and metoprolol.  - EKG done on the floors during an episode of tachycardia yesterday showed multifocal atrial tachycardia requiring multiple IV pushes of metoprolol.   - Heart rate better controlled on metoprolol 50 mg BID.   -no need for anticoagulation at this time. - improved rhythms with increased lopressor, continue current regmine.

## 2018-08-08 NOTE — PROGRESS NOTE ADULT - PROBLEM SELECTOR PLAN 2
-patient with V-fib arrest in field.  -whether ischemic or non-ischemic given V-fib, would strongly consider for ICD placement.  -discussed with cardiology fellow, plan for catheterization when stable and not delirious.   -EP is on board for AICD eval after ischemic workup is complete.   -continue to monitor on telemetry. -patient with V-fib arrest in field.  -whether ischemic or non-ischemic given V-fib, would strongly consider for ICD placement.  -discussed with cardiology fellow, plan for catheterization when stable and not delirious.  -as ischemic workup on hold and patietn came in for V-fib arrest, cards fellow will discuss with ep whether need for EP study and ICD or whether need cardiac cath first.  -if DOES need cath, ?cath under general sedation?

## 2018-08-09 LAB
ALBUMIN SERPL ELPH-MCNC: 2.9 G/DL — LOW (ref 3.3–5)
ALP SERPL-CCNC: 95 U/L — SIGNIFICANT CHANGE UP (ref 40–120)
ALT FLD-CCNC: 102 U/L — HIGH (ref 4–33)
AST SERPL-CCNC: 64 U/L — HIGH (ref 4–32)
BASOPHILS # BLD AUTO: 0.03 K/UL — SIGNIFICANT CHANGE UP (ref 0–0.2)
BASOPHILS NFR BLD AUTO: 0.3 % — SIGNIFICANT CHANGE UP (ref 0–2)
BILIRUB DIRECT SERPL-MCNC: 0.3 MG/DL — HIGH (ref 0.1–0.2)
BILIRUB SERPL-MCNC: 1.8 MG/DL — HIGH (ref 0.2–1.2)
BUN SERPL-MCNC: 18 MG/DL — SIGNIFICANT CHANGE UP (ref 7–23)
CALCIUM SERPL-MCNC: 8.7 MG/DL — SIGNIFICANT CHANGE UP (ref 8.4–10.5)
CHLORIDE SERPL-SCNC: 101 MMOL/L — SIGNIFICANT CHANGE UP (ref 98–107)
CO2 SERPL-SCNC: 23 MMOL/L — SIGNIFICANT CHANGE UP (ref 22–31)
CREAT SERPL-MCNC: 0.94 MG/DL — SIGNIFICANT CHANGE UP (ref 0.5–1.3)
EOSINOPHIL # BLD AUTO: 0.33 K/UL — SIGNIFICANT CHANGE UP (ref 0–0.5)
EOSINOPHIL NFR BLD AUTO: 3.5 % — SIGNIFICANT CHANGE UP (ref 0–6)
GLUCOSE SERPL-MCNC: 101 MG/DL — HIGH (ref 70–99)
HCT VFR BLD CALC: 30.2 % — LOW (ref 34.5–45)
HGB BLD-MCNC: 9.8 G/DL — LOW (ref 11.5–15.5)
IMM GRANULOCYTES # BLD AUTO: 0.05 # — SIGNIFICANT CHANGE UP
IMM GRANULOCYTES NFR BLD AUTO: 0.5 % — SIGNIFICANT CHANGE UP (ref 0–1.5)
LYMPHOCYTES # BLD AUTO: 2.14 K/UL — SIGNIFICANT CHANGE UP (ref 1–3.3)
LYMPHOCYTES # BLD AUTO: 22.5 % — SIGNIFICANT CHANGE UP (ref 13–44)
MAGNESIUM SERPL-MCNC: 2.1 MG/DL — SIGNIFICANT CHANGE UP (ref 1.6–2.6)
MCHC RBC-ENTMCNC: 23 PG — LOW (ref 27–34)
MCHC RBC-ENTMCNC: 32.5 % — SIGNIFICANT CHANGE UP (ref 32–36)
MCV RBC AUTO: 70.9 FL — LOW (ref 80–100)
MONOCYTES # BLD AUTO: 1.13 K/UL — HIGH (ref 0–0.9)
MONOCYTES NFR BLD AUTO: 11.9 % — SIGNIFICANT CHANGE UP (ref 2–14)
NEUTROPHILS # BLD AUTO: 5.82 K/UL — SIGNIFICANT CHANGE UP (ref 1.8–7.4)
NEUTROPHILS NFR BLD AUTO: 61.3 % — SIGNIFICANT CHANGE UP (ref 43–77)
NRBC # FLD: 0.05 — SIGNIFICANT CHANGE UP
PHOSPHATE SERPL-MCNC: 3.2 MG/DL — SIGNIFICANT CHANGE UP (ref 2.5–4.5)
PLATELET # BLD AUTO: 328 K/UL — SIGNIFICANT CHANGE UP (ref 150–400)
PMV BLD: 11.5 FL — SIGNIFICANT CHANGE UP (ref 7–13)
POTASSIUM SERPL-MCNC: 4.1 MMOL/L — SIGNIFICANT CHANGE UP (ref 3.5–5.3)
POTASSIUM SERPL-SCNC: 4.1 MMOL/L — SIGNIFICANT CHANGE UP (ref 3.5–5.3)
PROT SERPL-MCNC: 6.5 G/DL — SIGNIFICANT CHANGE UP (ref 6–8.3)
RBC # BLD: 4.26 M/UL — SIGNIFICANT CHANGE UP (ref 3.8–5.2)
RBC # FLD: 15.2 % — HIGH (ref 10.3–14.5)
SODIUM SERPL-SCNC: 136 MMOL/L — SIGNIFICANT CHANGE UP (ref 135–145)
WBC # BLD: 9.5 K/UL — SIGNIFICANT CHANGE UP (ref 3.8–10.5)
WBC # FLD AUTO: 9.5 K/UL — SIGNIFICANT CHANGE UP (ref 3.8–10.5)

## 2018-08-09 PROCEDURE — 99233 SBSQ HOSP IP/OBS HIGH 50: CPT | Mod: GC

## 2018-08-09 RX ORDER — HALOPERIDOL DECANOATE 100 MG/ML
0.25 INJECTION INTRAMUSCULAR ONCE
Qty: 0 | Refills: 0 | Status: COMPLETED | OUTPATIENT
Start: 2018-08-09 | End: 2018-08-09

## 2018-08-09 RX ADMIN — POLYETHYLENE GLYCOL 3350 17 GRAM(S): 17 POWDER, FOR SOLUTION ORAL at 11:16

## 2018-08-09 RX ADMIN — Medication 50 MILLIGRAM(S): at 05:32

## 2018-08-09 RX ADMIN — LISINOPRIL 5 MILLIGRAM(S): 2.5 TABLET ORAL at 05:29

## 2018-08-09 RX ADMIN — HEPARIN SODIUM 5000 UNIT(S): 5000 INJECTION INTRAVENOUS; SUBCUTANEOUS at 05:29

## 2018-08-09 RX ADMIN — Medication 50 MILLIGRAM(S): at 17:13

## 2018-08-09 RX ADMIN — Medication 100 MILLIGRAM(S): at 17:13

## 2018-08-09 RX ADMIN — CLOPIDOGREL BISULFATE 75 MILLIGRAM(S): 75 TABLET, FILM COATED ORAL at 11:16

## 2018-08-09 RX ADMIN — Medication 81 MILLIGRAM(S): at 11:16

## 2018-08-09 RX ADMIN — Medication 20 MILLIGRAM(S): at 05:29

## 2018-08-09 RX ADMIN — Medication 100 MILLIGRAM(S): at 05:29

## 2018-08-09 NOTE — PROGRESS NOTE ADULT - ATTENDING COMMENTS
Still awaiting cath once mental status imrpoves.  Patient has likely delirium 2/2 critical illness and prolonged hospital stay. PAtient sundowns at night and is improved during the day.  Patient at times I have seen her able to have full conversation able to tell me she needs and wants a catheterization performed.  Given cardiac arrest, I am not sure she will return to normal baseline mental status and if so may take weeks.  If concern about patient not cooperating with necesssary cath, ?cath under general anesthesia?.     Once cath is performed (tomorrow or next week), EP to follow re: ICD.  Per cardiology patient cannot leave hospital until cath is performed.

## 2018-08-09 NOTE — PROGRESS NOTE ADULT - PROBLEM SELECTOR PLAN 2
-patient with V-fib arrest in field.  -whether ischemic or non-ischemic given V-fib, would strongly consider for ICD placement.  -discussed with cardiology fellow, plan for catheterization when stable and not delirious.  -as ischemic workup on hold and patietn came in for V-fib arrest, cards fellow will discuss with ep whether need for EP study and ICD or whether need cardiac cath first.  -if DOES need cath, ?cath under general sedation?

## 2018-08-09 NOTE — PROGRESS NOTE ADULT - PROBLEM SELECTOR PLAN 1
-patient with new reduced EF to 28%, whether takasubo or ischemic cardiomyopathy, acute treatment for heart failure remains the same.  -patient appears euvolemic, saturating well on RA  -change to lasix 20mg PO continue to monitor I/O, daily standing weights.

## 2018-08-09 NOTE — PROGRESS NOTE ADULT - SUBJECTIVE AND OBJECTIVE BOX
Patient seen and examined at bedside.    Overnight Events: No acute events.    Review Of Systems: No chest pain, shortness of breath, or palpitations. Pt is A&O x 2.            Current Meds:  acetaminophen   Tablet. 650 milliGRAM(s) Oral every 6 hours PRN  aspirin  chewable 81 milliGRAM(s) Oral daily  atorvastatin 80 milliGRAM(s) Oral at bedtime  benzonatate 100 milliGRAM(s) Oral three times a day PRN  clopidogrel Tablet 75 milliGRAM(s) Oral daily  docusate sodium 100 milliGRAM(s) Oral two times a day  furosemide    Tablet 20 milliGRAM(s) Oral daily  heparin  Injectable 5000 Unit(s) SubCutaneous every 8 hours  lisinopril 5 milliGRAM(s) Oral daily  metoprolol tartrate 50 milliGRAM(s) Oral two times a day  polyethylene glycol 3350 17 Gram(s) Oral daily  senna 2 Tablet(s) Oral at bedtime      PAST MEDICAL & SURGICAL HISTORY:  PAD (peripheral artery disease)  NSTEMI (non-ST elevation myocardial infarction)  Osteoporosis  HTN (hypertension)  No significant past surgical history      Vitals:  T(F): 98 (08-09), Max: 98.1 (08-08)  HR: 88 (08-09) (85 - 97)  BP: 113/68 (08-09) (113/68 - 131/89)  RR: 18 (08-09)  SpO2: 96% (08-09)  I&O's Summary    08 Aug 2018 07:01  -  09 Aug 2018 07:00  --------------------------------------------------------  IN: 840 mL / OUT: 1100 mL / NET: -260 mL    09 Aug 2018 07:01  -  09 Aug 2018 10:54  --------------------------------------------------------  IN: 200 mL / OUT: 0 mL / NET: 200 mL        Physical Exam:  Appearance: No acute distress  Eyes: PERRL, EOMI, pink conjunctiva  HENT: Normal oral mucosa  Cardiovascular: RRR, S1, S2, no murmurs, rubs, or gallops; no edema; no JVD  Respiratory: Clear to auscultation bilaterally  Gastrointestinal: soft, non-tender, non-distended with normal bowel sounds  Musculoskeletal: No clubbing; no joint deformity   Neurologic: Non-focal  Lymphatic: No lymphadenopathy  Psychiatry: AAOx2 (person and place)  Skin: No rashes, ecchymoses, or cyanosis                          9.8    9.50  )-----------( 328      ( 09 Aug 2018 04:00 )             30.2     08-09    136  |  101  |  18  ----------------------------<  101<H>  4.1   |  23  |  0.94    Ca    8.7      09 Aug 2018 04:00  Phos  3.2     08-09  Mg     2.1     08-09    TPro  6.5  /  Alb  2.9<L>  /  TBili  1.8<H>  /  DBili  x   /  AST  64<H>  /  ALT  102<H>  /  AlkPhos  95  08-09                  New ECG(s): Personally reviewed    Echo:    Stress Testing:     Cath:    Imaging:    Interpretation of Telemetry: Sinus 80s

## 2018-08-09 NOTE — PROGRESS NOTE ADULT - SUBJECTIVE AND OBJECTIVE BOX
Patient is a 82y old  Female who presents with a chief complaint of PEA Arrest (01 Aug 2018 20:24)      SUBJECTIVE / OVERNIGHT EVENTS:  Patient seen and examined at bedside. no acute events overnight. The patient reports no nausea, no shortness of breath, no chest pain.     Other Review of Systems Negative.    MEDICATIONS  (STANDING):  aspirin  chewable 81 milliGRAM(s) Oral daily  atorvastatin 80 milliGRAM(s) Oral at bedtime  clopidogrel Tablet 75 milliGRAM(s) Oral daily  docusate sodium 100 milliGRAM(s) Oral two times a day  furosemide    Tablet 20 milliGRAM(s) Oral daily  heparin  Injectable 5000 Unit(s) SubCutaneous every 8 hours  lisinopril 5 milliGRAM(s) Oral daily  metoprolol tartrate 50 milliGRAM(s) Oral two times a day  polyethylene glycol 3350 17 Gram(s) Oral daily  senna 2 Tablet(s) Oral at bedtime    MEDICATIONS  (PRN):  acetaminophen   Tablet. 650 milliGRAM(s) Oral every 6 hours PRN Moderate Pain (4 - 6)  benzonatate 100 milliGRAM(s) Oral three times a day PRN Cough      OBJECTIVE:    Vital Signs Last 24 Hrs  T(C): 36.8 (09 Aug 2018 13:34), Max: 36.8 (09 Aug 2018 13:34)  T(F): 98.3 (09 Aug 2018 13:34), Max: 98.3 (09 Aug 2018 13:34)  HR: 81 (09 Aug 2018 13:34) (81 - 89)  BP: 119/69 (09 Aug 2018 13:34) (113/68 - 125/79)  BP(mean): --  RR: 18 (09 Aug 2018 13:34) (18 - 18)  SpO2: 98% (09 Aug 2018 13:34) (95% - 98%)    I&O's Summary    08 Aug 2018 07:01  -  09 Aug 2018 07:00  --------------------------------------------------------  IN: 840 mL / OUT: 1100 mL / NET: -260 mL    09 Aug 2018 07:01  -  09 Aug 2018 14:52  --------------------------------------------------------  IN: 320 mL / OUT: 225 mL / NET: 95 mL      PHYSICAL EXAM:  GENERAL: NAD, well-developed  CHEST/LUNG: Clear to auscultation bilaterally; No wheeze  HEART: Regular rate and rhythm; No murmurs, rubs, or gallops  ABDOMEN: Soft, Nontender, Nondistended; Bowel sounds present  EXTREMITIES:  2+ Peripheral Pulses, No clubbing, cyanosis, or edema  PSYCH: AAOx2    LABS:                        9.8    9.50  )-----------( 328      ( 09 Aug 2018 04:00 )             30.2     Auto Eosinophil # 0.33  / Auto Eosinophil % 3.5   / Auto Neutrophil # 5.82  / Auto Neutrophil % 61.3  / BANDS % x                            10.5   9.58  )-----------( 263      ( 08 Aug 2018 07:30 )             31.2     Auto Eosinophil # x     / Auto Eosinophil % x     / Auto Neutrophil # x     / Auto Neutrophil % x     / BANDS % x        08-09    136  |  101  |  18  ----------------------------<  101<H>  4.1   |  23  |  0.94  08-08    134<L>  |  99  |  21  ----------------------------<  99  4.0   |  20<L>  |  1.02  08-07    137  |  99  |  19  ----------------------------<  114<H>  3.9   |  24  |  1.07    Ca    8.7      09 Aug 2018 04:00  Mg     2.1     08-09  Phos  3.2     08-09  TPro  6.5  /  Alb  2.9<L>  /  TBili  1.8<H>  /  DBili  0.3<H>  /  AST  64<H>  /  ALT  102<H>  /  AlkPhos  95  08-09  TPro  6.6  /  Alb  2.7<L>  /  TBili  1.4<H>  /  DBili  x   /  AST  92<H>  /  ALT  124<H>  /  AlkPhos  102  08-08 Patient is a 82y old  Female who presents with a chief complaint of PEA Arrest (01 Aug 2018 20:24)      SUBJECTIVE / OVERNIGHT EVENTS:  Patient seen and examined at bedside. no acute events overnight. The patient reports no nausea, no shortness of breath, no chest pain. Patient AAOX2 today, pleasant, able to answer all questions.     Other Review of Systems Negative.    MEDICATIONS  (STANDING):  aspirin  chewable 81 milliGRAM(s) Oral daily  atorvastatin 80 milliGRAM(s) Oral at bedtime  clopidogrel Tablet 75 milliGRAM(s) Oral daily  docusate sodium 100 milliGRAM(s) Oral two times a day  furosemide    Tablet 20 milliGRAM(s) Oral daily  heparin  Injectable 5000 Unit(s) SubCutaneous every 8 hours  lisinopril 5 milliGRAM(s) Oral daily  metoprolol tartrate 50 milliGRAM(s) Oral two times a day  polyethylene glycol 3350 17 Gram(s) Oral daily  senna 2 Tablet(s) Oral at bedtime    MEDICATIONS  (PRN):  acetaminophen   Tablet. 650 milliGRAM(s) Oral every 6 hours PRN Moderate Pain (4 - 6)  benzonatate 100 milliGRAM(s) Oral three times a day PRN Cough      OBJECTIVE:    Vital Signs Last 24 Hrs  T(C): 36.8 (09 Aug 2018 13:34), Max: 36.8 (09 Aug 2018 13:34)  T(F): 98.3 (09 Aug 2018 13:34), Max: 98.3 (09 Aug 2018 13:34)  HR: 81 (09 Aug 2018 13:34) (81 - 89)  BP: 119/69 (09 Aug 2018 13:34) (113/68 - 125/79)  BP(mean): --  RR: 18 (09 Aug 2018 13:34) (18 - 18)  SpO2: 98% (09 Aug 2018 13:34) (95% - 98%)    I&O's Summary    08 Aug 2018 07:01  -  09 Aug 2018 07:00  --------------------------------------------------------  IN: 840 mL / OUT: 1100 mL / NET: -260 mL    09 Aug 2018 07:01  -  09 Aug 2018 14:52  --------------------------------------------------------  IN: 320 mL / OUT: 225 mL / NET: 95 mL      PHYSICAL EXAM:  GENERAL: NAD, well-developed  CHEST/LUNG: Clear to auscultation bilaterally; No wheeze  HEART: Regular rate and rhythm; No murmurs, rubs, or gallops  ABDOMEN: Soft, Nontender, Nondistended; Bowel sounds present  EXTREMITIES:  2+ Peripheral Pulses, No clubbing, cyanosis, or edema  PSYCH: AAOx2    LABS:                        9.8    9.50  )-----------( 328      ( 09 Aug 2018 04:00 )             30.2     Auto Eosinophil # 0.33  / Auto Eosinophil % 3.5   / Auto Neutrophil # 5.82  / Auto Neutrophil % 61.3  / BANDS % x                            10.5   9.58  )-----------( 263      ( 08 Aug 2018 07:30 )             31.2     Auto Eosinophil # x     / Auto Eosinophil % x     / Auto Neutrophil # x     / Auto Neutrophil % x     / BANDS % x        08-09    136  |  101  |  18  ----------------------------<  101<H>  4.1   |  23  |  0.94  08-08    134<L>  |  99  |  21  ----------------------------<  99  4.0   |  20<L>  |  1.02  08-07    137  |  99  |  19  ----------------------------<  114<H>  3.9   |  24  |  1.07    Ca    8.7      09 Aug 2018 04:00  Mg     2.1     08-09  Phos  3.2     08-09  TPro  6.5  /  Alb  2.9<L>  /  TBili  1.8<H>  /  DBili  0.3<H>  /  AST  64<H>  /  ALT  102<H>  /  AlkPhos  95  08-09  TPro  6.6  /  Alb  2.7<L>  /  TBili  1.4<H>  /  DBili  x   /  AST  92<H>  /  ALT  124<H>  /  AlkPhos  102  08-08

## 2018-08-09 NOTE — PROGRESS NOTE ADULT - ASSESSMENT
The patient is a 82 year old woman with a history of HTN, Peripheral artery disease on ASA, renal artery stenosis admitted for V-Fib arrest complicated by acute HFrEF,  multifocal atrial tachycardia and delirium. Pending cardiac cath.

## 2018-08-09 NOTE — PROGRESS NOTE ADULT - PROBLEM SELECTOR PLAN 5
likely in setting of ischemic hepatitis in setting of cardiac arrest. patient still with elevated bilirubin, indirect predominant.  -no other signs of hemolysis and Hb is stable.  -likely in setting of gilbert's, but if H/H starts to decline, can obtain hapto/ldh.

## 2018-08-09 NOTE — PROGRESS NOTE ADULT - ASSESSMENT
A: 81 yo female w h/o NSTEMI, PAD, Renal artery stenosis, HTN, good functional status, returned from [place] three days prior to admission, was not feeling well and had witnessed cardiac arrest. Was shocked in field for VF, then had PEA arrest and had ROSC.    1. Cardiac arrest/NSTEMI       - Unknown if cardiac arrest was due to cardiac event. No acute findings on EKG indicating STEMI       - Echo shows Severe LV dysfunction with LAD territory hypokinesis w possible apical ballooning        - Pt had normal cath last year       - Pt completed 48 hrs of heparin gtt       - c/w ASA, Plavix, Lipitor, BB, ACE       - Pt's mental status is improving, but she is still not at her baseline, is A&O x 2 at this time and is still waxing and waning. Will reevaluate later today. Plan for cath once pt is at baseline mental status.    2. HFrEF      - Euvolemic, c/w Lasix 20 PO daily    3. Multifocal atrial tachycardia       - Sinus on tele overnight       - EP following       - c/w Metoprolol 50 BID       - Monitor lytes: goal K to 4, Mg to 2

## 2018-08-09 NOTE — CHART NOTE - NSCHARTNOTEFT_GEN_A_CORE
Notified by RN that pt with agitation Notified by RN that pt with agitation  Pt seen and examined  Pt agitated and yelling at note writer and pt's granddaughter   Pt holding amaral bag insisting that she was going to go home because she has been in the hospital too long   Reorientation attempted   Pt eventually agreed to stay in hospital pending update regarding time of cath procedure tomorrow     **update**   At 9:30 PM pt with persistent agitation, insisting that she was going to go home and come back for procedure tomorrow   Again yelling at note-writer and granddaughter   Haldol 0.25 given for agitation.        ICU Vital Signs Last 24 Hrs  T(C): 36.8 (09 Aug 2018 23:00), Max: 36.8 (09 Aug 2018 13:34)  T(F): 98.2 (09 Aug 2018 23:00), Max: 98.3 (09 Aug 2018 13:34)  HR: 87 (09 Aug 2018 23:00) (81 - 94)  BP: 113/69 (09 Aug 2018 23:00) (109/65 - 119/69)  RR: 17 (09 Aug 2018 23:00) (17 - 18)  SpO2: 97% (09 Aug 2018 23:00) (96% - 98%)    82F w h/o NSTEMI, PAD, HTN, s/p witnessed VF cardiac arrest s/p shock with ROSC, now with dysrhythmia of ?MAT/WAP pending cardiac cath.  Notified for agitation: reorientation attempted but failing.   - Haldol 0.25mg given for agitation    Mark Hellerman, PGY1  Internal Medicine  Pager: [phone]

## 2018-08-10 LAB
APTT BLD: 27.2 SEC — LOW (ref 27.5–37.4)
BUN SERPL-MCNC: 21 MG/DL — SIGNIFICANT CHANGE UP (ref 7–23)
CALCIUM SERPL-MCNC: 8.8 MG/DL — SIGNIFICANT CHANGE UP (ref 8.4–10.5)
CHLORIDE SERPL-SCNC: 101 MMOL/L — SIGNIFICANT CHANGE UP (ref 98–107)
CO2 SERPL-SCNC: 23 MMOL/L — SIGNIFICANT CHANGE UP (ref 22–31)
CREAT SERPL-MCNC: 0.97 MG/DL — SIGNIFICANT CHANGE UP (ref 0.5–1.3)
GLUCOSE SERPL-MCNC: 96 MG/DL — SIGNIFICANT CHANGE UP (ref 70–99)
HCT VFR BLD CALC: 29.4 % — LOW (ref 34.5–45)
HGB BLD-MCNC: 9.5 G/DL — LOW (ref 11.5–15.5)
INR BLD: 1.08 — SIGNIFICANT CHANGE UP (ref 0.88–1.17)
MAGNESIUM SERPL-MCNC: 2.1 MG/DL — SIGNIFICANT CHANGE UP (ref 1.6–2.6)
MCHC RBC-ENTMCNC: 23.2 PG — LOW (ref 27–34)
MCHC RBC-ENTMCNC: 32.3 % — SIGNIFICANT CHANGE UP (ref 32–36)
MCV RBC AUTO: 71.7 FL — LOW (ref 80–100)
NRBC # FLD: 0.02 — SIGNIFICANT CHANGE UP
PHOSPHATE SERPL-MCNC: 3.5 MG/DL — SIGNIFICANT CHANGE UP (ref 2.5–4.5)
PLATELET # BLD AUTO: 326 K/UL — SIGNIFICANT CHANGE UP (ref 150–400)
PMV BLD: 11.1 FL — SIGNIFICANT CHANGE UP (ref 7–13)
POTASSIUM SERPL-MCNC: 4.1 MMOL/L — SIGNIFICANT CHANGE UP (ref 3.5–5.3)
POTASSIUM SERPL-SCNC: 4.1 MMOL/L — SIGNIFICANT CHANGE UP (ref 3.5–5.3)
PROTHROM AB SERPL-ACNC: 12.4 SEC — SIGNIFICANT CHANGE UP (ref 9.8–13.1)
RBC # BLD: 4.1 M/UL — SIGNIFICANT CHANGE UP (ref 3.8–5.2)
RBC # FLD: 15.7 % — HIGH (ref 10.3–14.5)
SODIUM SERPL-SCNC: 137 MMOL/L — SIGNIFICANT CHANGE UP (ref 135–145)
WBC # BLD: 8.99 K/UL — SIGNIFICANT CHANGE UP (ref 3.8–10.5)
WBC # FLD AUTO: 8.99 K/UL — SIGNIFICANT CHANGE UP (ref 3.8–10.5)

## 2018-08-10 PROCEDURE — 99233 SBSQ HOSP IP/OBS HIGH 50: CPT | Mod: GC

## 2018-08-10 PROCEDURE — 93458 L HRT ARTERY/VENTRICLE ANGIO: CPT | Mod: 26

## 2018-08-10 RX ORDER — LANOLIN ALCOHOL/MO/W.PET/CERES
3 CREAM (GRAM) TOPICAL AT BEDTIME
Qty: 0 | Refills: 0 | Status: DISCONTINUED | OUTPATIENT
Start: 2018-08-10 | End: 2018-08-15

## 2018-08-10 RX ADMIN — Medication 20 MILLIGRAM(S): at 06:14

## 2018-08-10 RX ADMIN — HEPARIN SODIUM 5000 UNIT(S): 5000 INJECTION INTRAVENOUS; SUBCUTANEOUS at 21:14

## 2018-08-10 RX ADMIN — Medication 50 MILLIGRAM(S): at 06:14

## 2018-08-10 RX ADMIN — Medication 81 MILLIGRAM(S): at 11:05

## 2018-08-10 RX ADMIN — LISINOPRIL 5 MILLIGRAM(S): 2.5 TABLET ORAL at 06:14

## 2018-08-10 RX ADMIN — Medication 100 MILLIGRAM(S): at 17:26

## 2018-08-10 RX ADMIN — Medication 100 MILLIGRAM(S): at 06:14

## 2018-08-10 RX ADMIN — CLOPIDOGREL BISULFATE 75 MILLIGRAM(S): 75 TABLET, FILM COATED ORAL at 11:05

## 2018-08-10 RX ADMIN — POLYETHYLENE GLYCOL 3350 17 GRAM(S): 17 POWDER, FOR SOLUTION ORAL at 11:05

## 2018-08-10 RX ADMIN — ATORVASTATIN CALCIUM 80 MILLIGRAM(S): 80 TABLET, FILM COATED ORAL at 21:14

## 2018-08-10 RX ADMIN — Medication 50 MILLIGRAM(S): at 17:26

## 2018-08-10 RX ADMIN — Medication 3 MILLIGRAM(S): at 21:14

## 2018-08-10 NOTE — PROGRESS NOTE ADULT - ATTENDING COMMENTS
pt seen and examined by me Agree with resident. Family at bedside   Reviewed events from overnight- As per chart note,  patient was agitated overnight and was given  0.25 mg of haldol  This AM, pt/family  states that she was agitated as she wanted to go home   During my exam, pt calm, cooperative,  denies CP/SOB or lightheadedness  Awaiting cardiac cath  Transaminitis likely secondary to cardiac event- denies nausea, vomiting, abdominal pain   continue to trend  CT abdomen with indeterminate liver lesion-FU further testing inpatient vs oupt when stable

## 2018-08-10 NOTE — PROGRESS NOTE ADULT - SUBJECTIVE AND OBJECTIVE BOX
Patient seen and examined at bedside.    Overnight Events: Agitated overnight, required Haldol. Less confused this morning.    Review Of Systems: No chest pain, shortness of breath, or palpitations            Current Meds:  acetaminophen   Tablet. 650 milliGRAM(s) Oral every 6 hours PRN  aspirin  chewable 81 milliGRAM(s) Oral daily  atorvastatin 80 milliGRAM(s) Oral at bedtime  benzonatate 100 milliGRAM(s) Oral three times a day PRN  clopidogrel Tablet 75 milliGRAM(s) Oral daily  docusate sodium 100 milliGRAM(s) Oral two times a day  furosemide    Tablet 20 milliGRAM(s) Oral daily  heparin  Injectable 5000 Unit(s) SubCutaneous every 8 hours  lisinopril 5 milliGRAM(s) Oral daily  metoprolol tartrate 50 milliGRAM(s) Oral two times a day  polyethylene glycol 3350 17 Gram(s) Oral daily  senna 2 Tablet(s) Oral at bedtime      PAST MEDICAL & SURGICAL HISTORY:  PAD (peripheral artery disease)  NSTEMI (non-ST elevation myocardial infarction)  Osteoporosis  HTN (hypertension)  No significant past surgical history      Vitals:  T(F): 98.2 (08-10), Max: 98.3 (08-09)  HR: 64 (08-10) (64 - 94)  BP: 116/70 (08-10) (109/65 - 119/69)  RR: 17 (08-10)  SpO2: 98% (08-10)  I&O's Summary    09 Aug 2018 07:01  -  10 Aug 2018 07:00  --------------------------------------------------------  IN: 420 mL / OUT: 525 mL / NET: -105 mL    10 Aug 2018 07:01  -  10 Aug 2018 10:47  --------------------------------------------------------  IN: 240 mL / OUT: 0 mL / NET: 240 mL        Physical Exam:  Appearance: No acute distress  Eyes: PERRL, EOMI, pink conjunctiva  HENT: Normal oral mucosa  Cardiovascular: RRR, S1, S2, no murmurs, rubs, or gallops; no edema; no JVD  Respiratory: Clear to auscultation bilaterally  Gastrointestinal: soft, non-tender, non-distended with normal bowel sounds  Musculoskeletal: No clubbing; no joint deformity   Neurologic: Non-focal  Lymphatic: No lymphadenopathy  Psychiatry: AAOx2  Skin: No rashes, ecchymoses, or cyanosis                          9.5    8.99  )-----------( 326      ( 10 Aug 2018 04:00 )             29.4     08-10    137  |  101  |  21  ----------------------------<  96  4.1   |  23  |  0.97    Ca    8.8      10 Aug 2018 04:00  Phos  3.5     08-10  Mg     2.1     08-10    TPro  6.5  /  Alb  2.9<L>  /  TBili  1.8<H>  /  DBili  0.3<H>  /  AST  64<H>  /  ALT  102<H>  /  AlkPhos  95  08-09    PT/INR - ( 10 Aug 2018 04:00 )   PT: 12.4 SEC;   INR: 1.08          PTT - ( 10 Aug 2018 04:00 )  PTT:27.2 SEC              New ECG(s): Personally reviewed    Echo:    Stress Testing:     Cath:    Imaging:    Interpretation of Telemetry: Sinus 80-90

## 2018-08-10 NOTE — PROGRESS NOTE ADULT - SUBJECTIVE AND OBJECTIVE BOX
Patient is a 82y old  Female who presents with a chief complaint of PEA Arrest (01 Aug 2018 20:24)      SUBJECTIVE / OVERNIGHT EVENTS:  Patient seen and examined at bedside. The patient was agitated overnight and was not redirectable. She recieve 0.25 mg of haldol and had not further events overnight. She was pleasant this morning but was still unclear why she was in the hospital and is very repetitive in her questioning which has been her baseline while in the hospital.      Other Review of Systems Negative.    MEDICATIONS  (STANDING):  aspirin  chewable 81 milliGRAM(s) Oral daily  atorvastatin 80 milliGRAM(s) Oral at bedtime  clopidogrel Tablet 75 milliGRAM(s) Oral daily  docusate sodium 100 milliGRAM(s) Oral two times a day  furosemide    Tablet 20 milliGRAM(s) Oral daily  heparin  Injectable 5000 Unit(s) SubCutaneous every 8 hours  lisinopril 5 milliGRAM(s) Oral daily  metoprolol tartrate 50 milliGRAM(s) Oral two times a day  polyethylene glycol 3350 17 Gram(s) Oral daily  senna 2 Tablet(s) Oral at bedtime    MEDICATIONS  (PRN):  acetaminophen   Tablet. 650 milliGRAM(s) Oral every 6 hours PRN Moderate Pain (4 - 6)  benzonatate 100 milliGRAM(s) Oral three times a day PRN Cough      OBJECTIVE:    Vital Signs Last 24 Hrs  T(C): 36.8 (10 Aug 2018 06:15), Max: 36.8 (09 Aug 2018 13:34)  T(F): 98.2 (10 Aug 2018 06:15), Max: 98.3 (09 Aug 2018 13:34)  HR: 64 (10 Aug 2018 06:15) (64 - 94)  BP: 116/70 (10 Aug 2018 06:15) (109/65 - 119/69)  BP(mean): --  RR: 17 (10 Aug 2018 06:15) (17 - 18)  SpO2: 98% (10 Aug 2018 06:15) (97% - 98%)    CAPILLARY BLOOD GLUCOSE        I&O's Summary    09 Aug 2018 07:01  -  10 Aug 2018 07:00  --------------------------------------------------------  IN: 420 mL / OUT: 525 mL / NET: -105 mL    10 Aug 2018 07:01  -  10 Aug 2018 10:19  --------------------------------------------------------  IN: 240 mL / OUT: 0 mL / NET: 240 mL        PHYSICAL EXAM:  GENERAL: A&Ox2  HEAD:  Atraumatic, Normocephalic  EYES: EOMI, conjunctiva and sclera clear  NECK: Supple, No JVD  CHEST/LUNG: Clear to auscultation bilaterally; No wheeze  HEART: Regular rate and rhythm; No murmurs, rubs, or gallops  ABDOMEN: Soft, Nontender, Nondistended; Bowel sounds present  EXTREMITIES:  2+ Peripheral Pulses, No clubbing, cyanosis, or edema      LABS:                        9.5    8.99  )-----------( 326      ( 10 Aug 2018 04:00 )             29.4     Auto Eosinophil # x     / Auto Eosinophil % x     / Auto Neutrophil # x     / Auto Neutrophil % x     / BANDS % x                            9.8    9.50  )-----------( 328      ( 09 Aug 2018 04:00 )             30.2     Auto Eosinophil # 0.33  / Auto Eosinophil % 3.5   / Auto Neutrophil # 5.82  / Auto Neutrophil % 61.3  / BANDS % x        08-10    137  |  101  |  21  ----------------------------<  96  4.1   |  23  |  0.97  08-09    136  |  101  |  18  ----------------------------<  101<H>  4.1   |  23  |  0.94    Ca    8.8      10 Aug 2018 04:00  Mg     2.1     08-10  Phos  3.5     08-10  TPro  6.5  /  Alb  2.9<L>  /  TBili  1.8<H>  /  DBili  0.3<H>  /  AST  64<H>  /  ALT  102<H>  /  AlkPhos  95  08-09    PT/INR - ( 10 Aug 2018 04:00 )   PT: 12.4 SEC;   INR: 1.08          PTT - ( 10 Aug 2018 04:00 )  PTT:27.2 SEC    Care Discussed with Consultants/Other Providers:yes

## 2018-08-10 NOTE — PROGRESS NOTE ADULT - ASSESSMENT
The patient is a 82 year old woman with a history of HTN, Peripheral artery disease on ASA, renal artery stenosis admitted for V-Fib arrest complicated by acute HFrEF,  multifocal atrial tachycardia and delirium. Pending cardiac cath today.

## 2018-08-10 NOTE — PROGRESS NOTE ADULT - PROBLEM SELECTOR PLAN 2
-patient with V-fib arrest in field.  -whether ischemic or non-ischemic given V-fib, would strongly consider for ICD placement.  -discussed with cardiology fellow, plan for catheterization today  -as ischemic workup on hold and patient came in for V-fib arrest, cards fellow will discuss with ep whether need for EP study and ICD   -if DOES need cath, ?cath under general sedation? -patient with V-fib arrest in field.  -whether ischemic or non-ischemic given V-fib, would strongly consider for ICD placement.  -discussed with cardiology fellow, plan for catheterization today  -f/u with EP study after cath.

## 2018-08-10 NOTE — PROGRESS NOTE ADULT - PROBLEM SELECTOR PLAN 4
- improved rhythms with increased lopressor, continue current regmine. - improved rhythms with increased lopressor, continue current regimen.

## 2018-08-10 NOTE — PROGRESS NOTE ADULT - PROBLEM SELECTOR PLAN 3
delirium in setting of ICu stay, acute cardiac arrest  -reoritentation  -avoid delirium inducing medications  -CT head negative for neurologic cause. delirium in setting of ICU stay, acute cardiac arrest  -reorientation  -avoid delirium inducing medications  -CT head negative for neurologic cause.

## 2018-08-10 NOTE — PROGRESS NOTE ADULT - ASSESSMENT
A: 81 yo female w h/o NSTEMI, PAD, Renal artery stenosis, HTN, good functional status, returned from [place] three days prior to admission, was not feeling well and had witnessed cardiac arrest. Was shocked in field for VF, then had PEA arrest and had ROSC.    1. Cardiac arrest/NSTEMI       - Unknown if cardiac arrest was due to cardiac event. No acute findings on EKG indicating STEMI       - Echo shows Severe LV dysfunction with LAD territory hypokinesis w possible apical ballooning        - Pt had normal cath last year       - Pt completed 48 hrs of heparin gtt       - c/w ASA, Plavix, Lipitor, BB, ACE       - Will go for [facility] today    2. HFrEF      - Euvolemic, c/w Lasix 20 PO daily    3. Multifocal atrial tachycardia       - Sinus on tele overnight       - EP following       - c/w Metoprolol 50 BID       - Monitor lytes: goal K to 4, Mg to 2

## 2018-08-10 NOTE — PROGRESS NOTE ADULT - PROBLEM SELECTOR PLAN 5
patient still with elevated bilirubin, indirect predominant.  -no other signs of hemolysis and Hb is stable.  -likely in setting of gilbert's, but if H/H starts to decline, can obtain hapto/ldh.

## 2018-08-10 NOTE — PROGRESS NOTE ADULT - ASSESSMENT
A/P:  83 yo female w h/o NSTEMI, PAD, Renal artery stenosis, HTN, good functional status, returned from [place] three days prior to admission, was not feeling well and had witnessed cardiac arrest. Was shocked in field for VF, then had PEA arrest and had ROSC. Pt now with TTE with evidence of severe segmental dysfunction in the territory of LAD. Pt now planned for cardiac cath.  EP is c/s for eval of AICD. In addition on tele she has noted to have SVT to 180s, which appears to be multifocal atrial tachycardia when the rates are slower.     Plan:  - would undergo ischemic eval first- currently held as pt is delirious   - if etiology is ischemic drive would do goal directed medical therapy for 3 months and recheck TTE  (i.e. beta blockers, spironolactone, ACEi , statin )   - agree with lopressor 50 mg BID   - agree with Lisinopril 5 mg daily     Rashaad Black MD  EP fellow  x [phone] A/P:  81 yo female w h/o NSTEMI, PAD, Renal artery stenosis, HTN, good functional status, returned from [place] three days prior to admission, was not feeling well and had witnessed cardiac arrest. Was shocked in field for VF, then had PEA arrest and had ROSC. Pt now with TTE with evidence of severe segmental dysfunction in the territory of LAD. Pt now planned for cardiac cath.  EP is c/s for eval of AICD. In addition on tele she has noted to have SVT to 180s, which appears to be multifocal atrial tachycardia when the rates are slower.     Plan:  - would undergo ischemic eval first (pt with focal WMA on TTE)   - if etiology is ischemic drive would do goal directed medical therapy for 3 months and recheck TTE  (i.e. beta blockers [ toprol XL, coreg, bisoprolol], spironolactone, ACEi , statin )   - agree with lopressor 50 mg BID   - agree with Lisinopril 5 mg daily       Rashaad Black MD  EP fellow  x [phone]

## 2018-08-11 LAB
BUN SERPL-MCNC: 21 MG/DL — SIGNIFICANT CHANGE UP (ref 7–23)
CALCIUM SERPL-MCNC: 8.7 MG/DL — SIGNIFICANT CHANGE UP (ref 8.4–10.5)
CHLORIDE SERPL-SCNC: 103 MMOL/L — SIGNIFICANT CHANGE UP (ref 98–107)
CO2 SERPL-SCNC: 21 MMOL/L — LOW (ref 22–31)
CREAT SERPL-MCNC: 0.94 MG/DL — SIGNIFICANT CHANGE UP (ref 0.5–1.3)
GLUCOSE SERPL-MCNC: 88 MG/DL — SIGNIFICANT CHANGE UP (ref 70–99)
HCT VFR BLD CALC: 28.3 % — LOW (ref 34.5–45)
HGB BLD-MCNC: 9.1 G/DL — LOW (ref 11.5–15.5)
MAGNESIUM SERPL-MCNC: 2 MG/DL — SIGNIFICANT CHANGE UP (ref 1.6–2.6)
MCHC RBC-ENTMCNC: 22.9 PG — LOW (ref 27–34)
MCHC RBC-ENTMCNC: 32.2 % — SIGNIFICANT CHANGE UP (ref 32–36)
MCV RBC AUTO: 71.1 FL — LOW (ref 80–100)
NRBC # FLD: 0 — SIGNIFICANT CHANGE UP
PHOSPHATE SERPL-MCNC: 3.6 MG/DL — SIGNIFICANT CHANGE UP (ref 2.5–4.5)
PLATELET # BLD AUTO: 352 K/UL — SIGNIFICANT CHANGE UP (ref 150–400)
PMV BLD: 11.6 FL — SIGNIFICANT CHANGE UP (ref 7–13)
POTASSIUM SERPL-MCNC: 4.5 MMOL/L — SIGNIFICANT CHANGE UP (ref 3.5–5.3)
POTASSIUM SERPL-SCNC: 4.5 MMOL/L — SIGNIFICANT CHANGE UP (ref 3.5–5.3)
RBC # BLD: 3.98 M/UL — SIGNIFICANT CHANGE UP (ref 3.8–5.2)
RBC # FLD: 15.9 % — HIGH (ref 10.3–14.5)
SODIUM SERPL-SCNC: 136 MMOL/L — SIGNIFICANT CHANGE UP (ref 135–145)
WBC # BLD: 7.96 K/UL — SIGNIFICANT CHANGE UP (ref 3.8–10.5)
WBC # FLD AUTO: 7.96 K/UL — SIGNIFICANT CHANGE UP (ref 3.8–10.5)

## 2018-08-11 PROCEDURE — 99233 SBSQ HOSP IP/OBS HIGH 50: CPT | Mod: GC

## 2018-08-11 RX ORDER — HALOPERIDOL DECANOATE 100 MG/ML
2 INJECTION INTRAMUSCULAR DAILY
Qty: 0 | Refills: 0 | Status: DISCONTINUED | OUTPATIENT
Start: 2018-08-11 | End: 2018-08-12

## 2018-08-11 RX ORDER — HALOPERIDOL DECANOATE 100 MG/ML
0.5 INJECTION INTRAMUSCULAR ONCE
Qty: 0 | Refills: 0 | Status: COMPLETED | OUTPATIENT
Start: 2018-08-11 | End: 2018-08-11

## 2018-08-11 RX ADMIN — Medication 50 MILLIGRAM(S): at 06:12

## 2018-08-11 RX ADMIN — LISINOPRIL 5 MILLIGRAM(S): 2.5 TABLET ORAL at 06:12

## 2018-08-11 RX ADMIN — HEPARIN SODIUM 5000 UNIT(S): 5000 INJECTION INTRAVENOUS; SUBCUTANEOUS at 06:12

## 2018-08-11 RX ADMIN — Medication 20 MILLIGRAM(S): at 06:12

## 2018-08-11 NOTE — PROGRESS NOTE ADULT - PROBLEM SELECTOR PLAN 2
-s/p V-fib arrest in field.  -EP following for possible AICD placement  -LHC reportedly not demonstrative of obstructive CAD  -f/u with EP study after cath.

## 2018-08-11 NOTE — PROGRESS NOTE ADULT - PROBLEM SELECTOR PLAN 5
-transaminitis improving and possibly secondary to hypoperfusion in setting of arrest  -hyperbilirubinemia persists   -no other signs of hemolysis and Hb is stable.  -consider Gilbert's  -H/H has been stable however will obtain hapto/ldh given predominantly indirect bilirubin

## 2018-08-11 NOTE — PROGRESS NOTE ADULT - ASSESSMENT
82F with HTN, PAD, renal artery stenosis admitted for V-Fib arrest complicated by acute HFrEF, multifocal atrial tachycardia and delirium, s/p cardiac cath pending AICD placement Monday.

## 2018-08-11 NOTE — PROGRESS NOTE ADULT - SUBJECTIVE AND OBJECTIVE BOX
Jazz Alexander MD   PGY 3  Pager [phone]  Page [phone] or [phone] after 7 pm ([facility])  Page [phone] or [phone] after 7 pm ([facility])      Patient is a 82y old  Female who presents with a chief complaint of PEA Arrest (01 Aug 2018 20:24)      INTERVAL HPI/OVERNIGHT EVENTS: no overnight events. Patient denies having complaints and reports "no pain anywhere." She feels we are experimenting on her and wants to leave the hospital. She does not understand why she needs an AICD and does not wish to discuss this. She also does not want any more "experimental" pills.     REVIEW OF SYSTEMS:  CONSTITUTIONAL: No fever, chills  ENMT:  No difficulty hearing, no change in vision  NECK: No pain or stiffness  RESPIRATORY: No cough, SOB  CARDIOVASCULAR: No chest pain, palpitations  GASTROINTESTINAL: No abdominal pain. No nausea, vomiting, or diarrhea  GENITOURINARY: No dysuria  NEUROLOGICAL: No HA  SKIN: No rash  LYMPH NODES: No enlarged glands  ENDOCRINE: No heat or cold intolerance;   MUSCULOSKELETAL: No joint pain or swelling; No muscle, back, or extremity pain  PSYCHIATRIC: No depression, anxiety  HEME/LYMPH: No easy bruising, or bleeding gums    T(C): 36.9 (08-11-18 @ 06:09), Max: 36.9 (08-11-18 @ 06:09)  HR: 74 (08-11-18 @ 06:09) (74 - 84)  BP: 115/58 (08-11-18 @ 06:09) (110/58 - 129/62)  RR: 16 (08-11-18 @ 06:09) (16 - 16)  SpO2: 100% (08-11-18 @ 06:09) (92% - 100%)  Wt(kg): --Vital Signs Last 24 Hrs  T(C): 36.9 (11 Aug 2018 06:09), Max: 36.9 (11 Aug 2018 06:09)  T(F): 98.4 (11 Aug 2018 06:09), Max: 98.4 (11 Aug 2018 06:09)  HR: 74 (11 Aug 2018 06:09) (74 - 84)  BP: 115/58 (11 Aug 2018 06:09) (110/58 - 129/62)  BP(mean): --  RR: 16 (11 Aug 2018 06:09) (16 - 16)  SpO2: 100% (11 Aug 2018 06:09) (92% - 100%)    PHYSICAL EXAM:  GENERAL: NAD  EYES: clear conjunctiva; EOMI  ENMT: Moist mucous membranes  NECK: Supple  CHEST/LUNG: Clear to auscultation bilaterally; No rales, rhonchi, wheezing, or rubs  HEART: S1, S2, Regular rate and rhythm  ABDOMEN: Soft, Nontender, Nondistended  NEURO: Alert & Oriented X2  EXTREMITIES: No LE edema, no calf tenderness  SKIN: No rashes or lesions    Consultant(s) Notes Reviewed:  [x ] YES  [ ] NO  Care Discussed with Consultants/Other Providers [ x] YES  [ ] NO    LABS:                        9.5    8.99  )-----------( 326      ( 10 Aug 2018 04:00 )             29.4     08-10    137  |  101  |  21  ----------------------------<  96  4.1   |  23  |  0.97    Ca    8.8      10 Aug 2018 04:00  Phos  3.5     08-10  Mg     2.1     08-10      PT/INR - ( 10 Aug 2018 04:00 )   PT: 12.4 SEC;   INR: 1.08          PTT - ( 10 Aug 2018 04:00 )  PTT:27.2 SEC    CAPILLARY BLOOD GLUCOSE                RADIOLOGY & ADDITIONAL TESTS:    Imaging Personally Reviewed:  [ ] YES  [ ] NO

## 2018-08-11 NOTE — CHART NOTE - NSCHARTNOTEFT_GEN_A_CORE
Medicine Chart Note: Sanjeevchantelle Li PGY-1    Called by nursing staff to patient bedside to find patient belligerent, and very agitated. She was demanding to leave and was claiming she was being kept for experimentation. Family was attempting to calm patient down and she started cursing at everyone and attempted to leave. Haldol 0.5mg IV given.

## 2018-08-11 NOTE — PROGRESS NOTE ADULT - PROBLEM SELECTOR PLAN 1
-patient with new reduced EF to 28%, per cardiology cath reportedly demonstrated no evidence of obstructive CAD.   -patient appears euvolemic, saturating well on RA  -c/w lasix 20mg PO continue to monitor I/O, daily standing weights. Patient for 24 hours is +340 cc.

## 2018-08-11 NOTE — PROGRESS NOTE ADULT - PROBLEM SELECTOR PLAN 3
-delirium in setting of ICU stay, prolonged hospital stay, acute cardiac arrest  -reorientat frequently, avoid sedating medications  -CT head negative for ischemia, infarct, mass.  -patient has not demonstrated understanding of her medical condition and medical plan and I feel she does not have capacity to leave hospital AMA.

## 2018-08-11 NOTE — PROGRESS NOTE ADULT - ATTENDING COMMENTS
Patient seen and examined by myself , case discussed  with resident ,agree with the above finding and plan  , pt calm, cooperative,  denies CP/SOB or lightheadedness  s/p cardiac cath, as per cardiology , no obstructive CAD, awaiting official f/u note from cardiology   will d/w EP regarding AICD, pt told me she does not want it   Transaminitis likely secondary to cardiac event- denies nausea, vomiting, abdominal pain   continue to trend  CT abdomen with indeterminate liver lesion-FU further testing inpatient vs oupt when stable Patient seen and examined by myself , case discussed  with resident ,agree with the above finding and plan  , pt calm, cooperative,  denies CP/SOB or lightheadedness, no acute event on telemonitoring  s/p cardiac cath, as per cardiology , no obstructive CAD, awaiting official f/u note from cardiology   will d/w EP regarding AICD, pt told me she does not want it   Transaminitis likely secondary to cardiac event- denies nausea, vomiting, abdominal pain   continue to trend  CT abdomen with indeterminate liver lesion-FU further testing inpatient vs oupt when stable  TOV today

## 2018-08-12 LAB
BASOPHILS # BLD AUTO: 0.03 K/UL — SIGNIFICANT CHANGE UP (ref 0–0.2)
BASOPHILS NFR BLD AUTO: 0.3 % — SIGNIFICANT CHANGE UP (ref 0–2)
EOSINOPHIL # BLD AUTO: 0.14 K/UL — SIGNIFICANT CHANGE UP (ref 0–0.5)
EOSINOPHIL NFR BLD AUTO: 1.6 % — SIGNIFICANT CHANGE UP (ref 0–6)
HAPTOGLOB SERPL-MCNC: 180 MG/DL — SIGNIFICANT CHANGE UP (ref 34–200)
HCT VFR BLD CALC: 29.6 % — LOW (ref 34.5–45)
HGB BLD-MCNC: 9.6 G/DL — LOW (ref 11.5–15.5)
IMM GRANULOCYTES # BLD AUTO: 0.05 # — SIGNIFICANT CHANGE UP
IMM GRANULOCYTES NFR BLD AUTO: 0.6 % — SIGNIFICANT CHANGE UP (ref 0–1.5)
LDH SERPL L TO P-CCNC: 296 U/L — HIGH (ref 135–225)
LYMPHOCYTES # BLD AUTO: 1.69 K/UL — SIGNIFICANT CHANGE UP (ref 1–3.3)
LYMPHOCYTES # BLD AUTO: 19.6 % — SIGNIFICANT CHANGE UP (ref 13–44)
MCHC RBC-ENTMCNC: 23.3 PG — LOW (ref 27–34)
MCHC RBC-ENTMCNC: 32.4 % — SIGNIFICANT CHANGE UP (ref 32–36)
MCV RBC AUTO: 71.8 FL — LOW (ref 80–100)
MONOCYTES # BLD AUTO: 0.94 K/UL — HIGH (ref 0–0.9)
MONOCYTES NFR BLD AUTO: 10.9 % — SIGNIFICANT CHANGE UP (ref 2–14)
NEUTROPHILS # BLD AUTO: 5.76 K/UL — SIGNIFICANT CHANGE UP (ref 1.8–7.4)
NEUTROPHILS NFR BLD AUTO: 67 % — SIGNIFICANT CHANGE UP (ref 43–77)
NRBC # FLD: 0 — SIGNIFICANT CHANGE UP
PLATELET # BLD AUTO: 390 K/UL — SIGNIFICANT CHANGE UP (ref 150–400)
PMV BLD: 11.7 FL — SIGNIFICANT CHANGE UP (ref 7–13)
RBC # BLD: 4.12 M/UL — SIGNIFICANT CHANGE UP (ref 3.8–5.2)
RBC # FLD: 17.2 % — HIGH (ref 10.3–14.5)
WBC # BLD: 8.61 K/UL — SIGNIFICANT CHANGE UP (ref 3.8–10.5)
WBC # FLD AUTO: 8.61 K/UL — SIGNIFICANT CHANGE UP (ref 3.8–10.5)

## 2018-08-12 PROCEDURE — 93010 ELECTROCARDIOGRAM REPORT: CPT

## 2018-08-12 PROCEDURE — 99233 SBSQ HOSP IP/OBS HIGH 50: CPT | Mod: GC

## 2018-08-12 RX ADMIN — Medication 20 MILLIGRAM(S): at 06:38

## 2018-08-12 RX ADMIN — CLOPIDOGREL BISULFATE 75 MILLIGRAM(S): 75 TABLET, FILM COATED ORAL at 08:38

## 2018-08-12 RX ADMIN — HALOPERIDOL DECANOATE 2 MILLIGRAM(S): 100 INJECTION INTRAMUSCULAR at 08:38

## 2018-08-12 RX ADMIN — LISINOPRIL 5 MILLIGRAM(S): 2.5 TABLET ORAL at 06:39

## 2018-08-12 RX ADMIN — Medication 50 MILLIGRAM(S): at 06:38

## 2018-08-12 RX ADMIN — Medication 50 MILLIGRAM(S): at 20:37

## 2018-08-12 RX ADMIN — Medication 100 MILLIGRAM(S): at 20:36

## 2018-08-12 RX ADMIN — POLYETHYLENE GLYCOL 3350 17 GRAM(S): 17 POWDER, FOR SOLUTION ORAL at 08:38

## 2018-08-12 RX ADMIN — Medication 81 MILLIGRAM(S): at 08:38

## 2018-08-12 NOTE — PROGRESS NOTE ADULT - PROBLEM SELECTOR PLAN 2
-patient with V-fib arrest in field.  -non-ischemic given patent coronaries on cath. would strongly consider for ICD placement.  -f/u with EP study

## 2018-08-12 NOTE — PROGRESS NOTE ADULT - SUBJECTIVE AND OBJECTIVE BOX
Patient is a 82y old  Female who presents with a chief complaint of PEA Arrest (01 Aug 2018 20:24)      SUBJECTIVE / OVERNIGHT EVENTS:  Patient seen and examined at bedside. The patient was very agitated yesterday requiring medication to calm her down. Overnight she was uncooperative with nursing and refused her meds. Eventually she agreed to take her meds if warmed up her soup.     Other Review of Systems Negative.    MEDICATIONS  (STANDING):  aspirin  chewable 81 milliGRAM(s) Oral daily  atorvastatin 80 milliGRAM(s) Oral at bedtime  clopidogrel Tablet 75 milliGRAM(s) Oral daily  docusate sodium 100 milliGRAM(s) Oral two times a day  furosemide    Tablet 20 milliGRAM(s) Oral daily  haloperidol     Tablet 2 milliGRAM(s) Oral daily  heparin  Injectable 5000 Unit(s) SubCutaneous every 8 hours  lisinopril 5 milliGRAM(s) Oral daily  melatonin 3 milliGRAM(s) Oral at bedtime  metoprolol tartrate 50 milliGRAM(s) Oral two times a day  polyethylene glycol 3350 17 Gram(s) Oral daily  senna 2 Tablet(s) Oral at bedtime    MEDICATIONS  (PRN):  acetaminophen   Tablet. 650 milliGRAM(s) Oral every 6 hours PRN Moderate Pain (4 - 6)  benzonatate 100 milliGRAM(s) Oral three times a day PRN Cough    OBJECTIVE:    Vital Signs Last 24 Hrs  T(C): 36.9 (12 Aug 2018 05:46), Max: 36.9 (12 Aug 2018 05:46)  T(F): 98.5 (12 Aug 2018 05:46), Max: 98.5 (12 Aug 2018 05:46)  HR: 80 (12 Aug 2018 05:46) (77 - 85)  BP: 105/62 (12 Aug 2018 05:46) (105/62 - 120/61)  BP(mean): --  RR: 18 (12 Aug 2018 05:46) (18 - 18)  SpO2: 100% (12 Aug 2018 05:46) (100% - 100%)    I&O's Summary    11 Aug 2018 07:01  -  12 Aug 2018 07:00  --------------------------------------------------------  IN: 220 mL / OUT: 600 mL / NET: -380 mL    PHYSICAL EXAM:  GENERAL: NAD, well-developed  HEAD:  Atraumatic, Normocephalic  EYES: EOMI, conjunctiva and sclera clear  NECK: Supple, No JVD  CHEST/LUNG: Clear to auscultation bilaterally; No wheeze  HEART: Regular rate and rhythm; No murmurs, rubs, or gallops  ABDOMEN: Soft, Nontender, Nondistended; Bowel sounds present  EXTREMITIES:  2+ Peripheral Pulses, No clubbing, cyanosis, or edema  PSYCH: AAOx2-3  NEUROLOGY: non-focal  SKIN: No rashes or lesions    LABS:                        9.6    8.61  )-----------( 390      ( 12 Aug 2018 05:30 )             29.6     Auto Eosinophil # 0.14  / Auto Eosinophil % 1.6   / Auto Neutrophil # 5.76  / Auto Neutrophil % 67.0  / BANDS % x                            9.1    7.96  )-----------( 352      ( 11 Aug 2018 00:00 )             28.3     Auto Eosinophil # x     / Auto Eosinophil % x     / Auto Neutrophil # x     / Auto Neutrophil % x     / BANDS % x        08-11    136  |  103  |  21  ----------------------------<  88  4.5   |  21<L>  |  0.94    Ca    8.7      11 Aug 2018 00:00  Mg     2.0     08-11  Phos  3.6     08-11 Patient is a 82y old  Female who presents with a chief complaint of PEA Arrest (01 Aug 2018 20:24)      SUBJECTIVE / OVERNIGHT EVENTS:  Patient seen and examined at bedside. The patient was very agitated yesterday requiring medication to calm her down. Overnight she was uncooperative with nursing and refused her meds. Eventually she agreed to take her meds if warmed up her soup.     Other Review of Systems Negative.    MEDICATIONS  (STANDING):  aspirin  chewable 81 milliGRAM(s) Oral daily  atorvastatin 80 milliGRAM(s) Oral at bedtime  clopidogrel Tablet 75 milliGRAM(s) Oral daily  docusate sodium 100 milliGRAM(s) Oral two times a day  furosemide    Tablet 20 milliGRAM(s) Oral daily  haloperidol     Tablet 2 milliGRAM(s) Oral daily  heparin  Injectable 5000 Unit(s) SubCutaneous every 8 hours  lisinopril 5 milliGRAM(s) Oral daily  melatonin 3 milliGRAM(s) Oral at bedtime  metoprolol tartrate 50 milliGRAM(s) Oral two times a day  polyethylene glycol 3350 17 Gram(s) Oral daily  senna 2 Tablet(s) Oral at bedtime    MEDICATIONS  (PRN):  acetaminophen   Tablet. 650 milliGRAM(s) Oral every 6 hours PRN Moderate Pain (4 - 6)  benzonatate 100 milliGRAM(s) Oral three times a day PRN Cough    OBJECTIVE:    Vital Signs Last 24 Hrs  T(C): 36.9 (12 Aug 2018 05:46), Max: 36.9 (12 Aug 2018 05:46)  T(F): 98.5 (12 Aug 2018 05:46), Max: 98.5 (12 Aug 2018 05:46)  HR: 80 (12 Aug 2018 05:46) (77 - 85)  BP: 105/62 (12 Aug 2018 05:46) (105/62 - 120/61)  BP(mean): --  RR: 18 (12 Aug 2018 05:46) (18 - 18)  SpO2: 100% (12 Aug 2018 05:46) (100% - 100%)    I&O's Summary    11 Aug 2018 07:01  -  12 Aug 2018 07:00  --------------------------------------------------------  IN: 220 mL / OUT: 600 mL / NET: -380 mL    PHYSICAL EXAM:  GENERAL: NAD, well-developed  HEAD:  Atraumatic, Normocephalic  EYES: EOMI, conjunctiva and sclera clear  NECK: Supple, No JVD  CHEST/LUNG: Clear to auscultation bilaterally; No wheeze  HEART: Regular rate and rhythm;   ABDOMEN: Soft, Nontender, Nondistended; Bowel sounds present  EXTREMITIES:  2+ Peripheral Pulses, No clubbing, cyanosis, or edema  PSYCH: AAOx2-3  NEUROLOGY: non-focal  SKIN: No rashes or lesions    LABS:                        9.6    8.61  )-----------( 390      ( 12 Aug 2018 05:30 )             29.6     Auto Eosinophil # 0.14  / Auto Eosinophil % 1.6   / Auto Neutrophil # 5.76  / Auto Neutrophil % 67.0  / BANDS % x                            9.1    7.96  )-----------( 352      ( 11 Aug 2018 00:00 )             28.3     Auto Eosinophil # x     / Auto Eosinophil % x     / Auto Neutrophil # x     / Auto Neutrophil % x     / BANDS % x        08-11    136  |  103  |  21  ----------------------------<  88  4.5   |  21<L>  |  0.94    Ca    8.7      11 Aug 2018 00:00  Mg     2.0     08-11  Phos  3.6     08-11

## 2018-08-12 NOTE — PROGRESS NOTE ADULT - PROBLEM SELECTOR PLAN 1
-patient with new reduced EF to 28%, whether takasubo or ischemic cardiomyopathy, acute treatment for heart failure remains the same.  -patient appears euvolemic, saturating well on RA  -on lasix 20mg PO continue to monitor I/O, daily standing weights.

## 2018-08-12 NOTE — PROGRESS NOTE ADULT - PROBLEM SELECTOR PLAN 3
delirium in setting of ICU stay, acute cardiac arrest  -reorientation  - has required haldol on multiple occasions. consider psych eval.   -avoid delirium inducing medications  -CT head negative for neurologic cause.

## 2018-08-12 NOTE — CHART NOTE - NSCHARTNOTEFT_GEN_A_CORE
Nurse informed me that the patient urinated 50 CCs. Post void bladder scan showed 300 CCs still in the bladder. The patient adamantly refused straight cath. The patients daughter was notified via telephone about the situation. The nurse was advised to continue attempting to bring her to the bathroom and we can reassess later in the afternoon.

## 2018-08-12 NOTE — PROGRESS NOTE ADULT - ASSESSMENT
The patient is a 82 year old woman with a history of HTN, Peripheral artery disease on ASA, renal artery stenosis admitted for V-Fib arrest complicated by acute HFrEF, multifocal atrial tachycardia and delirium. Cardiac cath showed patent coronaries.

## 2018-08-12 NOTE — CHART NOTE - NSCHARTNOTEFT_GEN_A_CORE
Psychiatry C/L called to evaluate patient for capacity to leave AMA and capacity to refuse medications. Patient is 82F with HTN, Peripheral artery disease, renal artery stenosis admitted for V-Fib arrest complicated by acute HFrEF, multifocal atrial tachycardia. Over past week, patient has been sundowning frequently with fluctuating mental status. Last night, patient was found to be severely agitated, demanding to leave, claiming she was being kept for experimentation. Was given Haldol 0.5mg IV and started on Haldol 2mg PO qd.     On evaluation today, patient AAOx2 (knows person and place, but states date is 219, month is November, and season in Spring). States she wants to leave and go home and is waiting for family to pick her up. Denies that anyone is trying to harm her here and states people have been treating her fine, though feels that she does not need continued medical care. Reports that she came to the hospital because she "fainted" and when told that she suffered a cardiac arrest, states that no one has told her anything about her heart. When explained that she had a cardiac arrest, patient reports that she does not need medical care or to take medications "because I am not going to die." Explains that she lives with her grandchildren and will f/u with PMD so will be fine.     Patient p/w fluctuating mental status associated with episodes of agitation and confusion. Dx c/w delirium 2/2 general medical condition. Given dx of delirium, patient does not have capacity to leave AMA or to refuse medications. She is unable to communicate a consistent decision, understand the relevant information, or appreciate the consequences of her decision to refuse care. Recommend to involve family in medical decision making.     For agitation, avoid antipsychotics as patient with QTc (most recent 8/7/18) of 540ms and risk for cardiac arrhythmia on these meds. Recommend to repeat EKG. If QTc < 500, can give Haldol 0.5mg q6 PRN agitation and monitor QTc closely. Given elevated liver enzymes, would avoid Depakote as treatment for agitation. Though benzodiazapines can worsen confusion and increase risk of falls, especially in patients with delirium, can give Ativan 0.5mg q6 PRN acute agitation and closely monitor response for any worsening of mental status.     Full consult to follow tomorrow, Monday 8/12/18.  Case discussed with Attending, Dr. Webber.

## 2018-08-12 NOTE — PROGRESS NOTE ADULT - ATTENDING COMMENTS
Patient seen and examined by myself , case discussed  with resident ,agree with the above finding and plan  , pt calm, cooperative,  denies CP/SOB or lightheadedness, no acute event on telemonitoring  events from yesterday noted, patient agitated   s/p cardiac cath, as per cardiology , no obstructive CAD, awaiting official f/u note from cardiology   will d/w EP regarding AICD, pt told me she does not want it   Transaminitis likely secondary to cardiac event- denies nausea, vomiting, abdominal pain   continue to trend  CT abdomen with indeterminate liver lesion-FU further testing inpatient vs oupt when stable  TOV noted, pt noted to have PVR of 300, refusing Nicole, will check PVR q shift for 24 hrs   Psych eval noted, pt with elevated Qtc, will avoid antipsychotics, if very agitated can use haldol or ativan with caution as per psych , closely monitor Qtc

## 2018-08-13 DIAGNOSIS — F05 DELIRIUM DUE TO KNOWN PHYSIOLOGICAL CONDITION: ICD-10-CM

## 2018-08-13 PROCEDURE — 99232 SBSQ HOSP IP/OBS MODERATE 35: CPT

## 2018-08-13 PROCEDURE — 99233 SBSQ HOSP IP/OBS HIGH 50: CPT | Mod: GC

## 2018-08-13 RX ADMIN — LISINOPRIL 5 MILLIGRAM(S): 2.5 TABLET ORAL at 07:50

## 2018-08-13 RX ADMIN — Medication 50 MILLIGRAM(S): at 17:58

## 2018-08-13 RX ADMIN — Medication 100 MILLIGRAM(S): at 17:58

## 2018-08-13 RX ADMIN — ATORVASTATIN CALCIUM 80 MILLIGRAM(S): 80 TABLET, FILM COATED ORAL at 22:24

## 2018-08-13 RX ADMIN — POLYETHYLENE GLYCOL 3350 17 GRAM(S): 17 POWDER, FOR SOLUTION ORAL at 07:50

## 2018-08-13 RX ADMIN — HEPARIN SODIUM 5000 UNIT(S): 5000 INJECTION INTRAVENOUS; SUBCUTANEOUS at 22:24

## 2018-08-13 RX ADMIN — CLOPIDOGREL BISULFATE 75 MILLIGRAM(S): 75 TABLET, FILM COATED ORAL at 07:50

## 2018-08-13 RX ADMIN — Medication 50 MILLIGRAM(S): at 07:50

## 2018-08-13 RX ADMIN — Medication 81 MILLIGRAM(S): at 07:50

## 2018-08-13 RX ADMIN — Medication 3 MILLIGRAM(S): at 22:24

## 2018-08-13 NOTE — BEHAVIORAL HEALTH ASSESSMENT NOTE - HPI (INCLUDE ILLNESS QUALITY, SEVERITY, DURATION, TIMING, CONTEXT, MODIFYING FACTORS, ASSOCIATED SIGNS AND SYMPTOMS)
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 Psychiatry C/L called to evaluate patient for capacity to refuse placement of cardiac AICD.    One encounter, patient is easily irritated and guarded. She is AOx2. She denies feelings of confusion or agitation during hospitalization, and does not cooperate with further interview assessment of memory/concentration. When asked why she is in the hospital, she avoids the question and instead perseverates on how doctors are not helpful and how she has not seen a single doctor since being on this unit. When asked if she has medical issues with her heart, she initially denies, then states that she would like a doctor to come explain her heart problems to her. She does not communicate a decision regarding AICD placement, is unable to articulate understanding of her medical condition/heart issues, does not demonstrate appreciation of the consequences of not receiving an AICD, and is unable to tolerate a discussion reasoning through the decision-making process with the interviewer. She denies AVT hallucinations and no sxs of paranoia ware elicited. She states that she feels safe in the hospital but wants to go home.     Per hospital staff, patient has been sundowning frequently with fluctuating mental status and episodes of agitation (demanding to leave, claiming she was being kept for experimentation) necessitating PRN medication administration. 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 Psychiatry C/L called to evaluate patient for capacity to refuse placement of cardiac AICD.    One encounter, patient is easily irritated and guarded. She is AOx2. She denies feelings of confusion or agitation during hospitalization, and does not cooperate with further interview assessment of memory/concentration. When asked why she is in the hospital, she avoids the question and instead perseverates on how doctors are not helpful and how she has not seen a single doctor since being on this unit (which is untrue). When asked if she has medical issues with her heart, she initially denies, then states that she would like a doctor to come explain her heart problems to her. She does not communicate a decision regarding AICD placement, is unable to articulate understanding of her medical condition/heart issues, does not demonstrate appreciation of the consequences of not receiving an AICD, and is unable to tolerate a discussion reasoning through the decision-making process with the interviewer. She denies AVT hallucinations and no sxs of paranoia ware elicited. She states that she feels safe in the hospital but wants to go home.     Per hospital staff, patient has been sundowning frequently with fluctuating mental status and episodes of agitation (demanding to leave, claiming she was being kept for experimentation) necessitating PRN medication administration.

## 2018-08-13 NOTE — PROGRESS NOTE ADULT - PROBLEM SELECTOR PLAN 3
delirium in setting of ICU stay, acute cardiac arrest  -reorientation  - has required haldol on multiple occasions. psych recs appreciated. f/u up final psych recs today.   -avoid delirium inducing medications  -CT head negative for neurologic cause.

## 2018-08-13 NOTE — BEHAVIORAL HEALTH ASSESSMENT NOTE - RISK ASSESSMENT
Low Risk. Patient is at elevated risk due to acute medical condition and fluctuating mental status, mood lability, and lack of insight into diagnosis. However, risk is mitigated by patient's denial of SI/SIB/HI, lack of past psychiatric hx (including hospitalizations and medications), supportive family, treatment response, lack of access to weapons, and future orientation.

## 2018-08-13 NOTE — BEHAVIORAL HEALTH ASSESSMENT NOTE - SUMMARY
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 Psychiatry C/L called to evaluate patient for capacity to refuse placement of cardiac AICD.    Patient p/w fluctuating mental status associated with episodes of agitation and confusion. Dx c/w delirium 2/2 general medical condition. In addition to delirious sxs, patient is unable to communicate a decision regarding AICD placement, is unable to articulate understanding of her medical condition/heart issues, does not demonstrate appreciation of the consequences of not receiving an AICD, and is unable to tolerate a discussion reasoning through the decision-making process with the interviewer. At this time, patient does not demonstrate capacity to decide on AICD placement. Decision regarding AICD placement should be discussed with patient’s health proxy or next of kin. Per med team, EP team is going to speak with patient and family today.    For agitation, avoid antipsychotics as patient with QTc (most recent 8/12/18) of 521ms and risk for cardiac arrhythmia on these meds. Recommend to repeat EKG. If QTc < 500, can give Haldol 0.5mg q6 PRN agitation and monitor QTc closely. Given elevated liver enzymes, would avoid Depakote as treatment for agitation. Though benzodiazapines can worsen confusion and increase risk of falls, especially in patients with delirium, can give Ativan 0.5mg q6 PRN acute agitation and closely monitor response for any worsening of mental status.

## 2018-08-13 NOTE — PROGRESS NOTE ADULT - ATTENDING COMMENTS
Agre with following addendum:    Saw patient with granddaughter at bedside.  Patient AAOx2, but able to carry conversation, much more agreeable and less agitated.  I believe patietn sundowns and gets agitated when awoken early in the morning, is more calm in afternoon.      #V-fib arrest  -discussed with EP, to perform ICD tomorrow.      #heart failure with reduced EF  -2/2 takasubo, clean cath.  -continue with current medications, will need repaet TTE in 1 month to assess for recovery.  -upon D/C would change lopressor to toprol.    #urinary retention  -patient agreeable to amaral as failed ToV.  -discussed with patient and granddaughter, would leave with amaral catheter and follow up as outpatient with urologist for urodynamic studies and ToV as outpatient.

## 2018-08-13 NOTE — BEHAVIORAL HEALTH ASSESSMENT NOTE - AXIS III
V-Fib arrest complicated by acute HFrEF, multifocal atrial tachycardia and delirium. PMHx of HTN, peripheral artery disease on ASA, renal artery stenosis.

## 2018-08-13 NOTE — PROGRESS NOTE ADULT - SUBJECTIVE AND OBJECTIVE BOX
Patient seen and evaluated today.  No significant events overnight.  Feels generally well; "I'm going home today".  [facility] performed 8/10/2018; report pending.  Seen by psych; does not have capacity to make medical decisions.  Telemetry review demonstrates NSR with HR: 67 (08-13-18 @ 05:42) (67 - 73).    MEDICATIONS:  acetaminophen   Tablet. 650 milliGRAM(s) Oral every 6 hours PRN  aspirin  chewable 81 milliGRAM(s) Oral daily  atorvastatin 80 milliGRAM(s) Oral at bedtime  benzonatate 100 milliGRAM(s) Oral three times a day PRN  clopidogrel Tablet 75 milliGRAM(s) Oral daily  docusate sodium 100 milliGRAM(s) Oral two times a day  furosemide    Tablet 20 milliGRAM(s) Oral daily  heparin  Injectable 5000 Unit(s) SubCutaneous every 8 hours  lisinopril 5 milliGRAM(s) Oral daily  melatonin 3 milliGRAM(s) Oral at bedtime  metoprolol tartrate 50 milliGRAM(s) Oral two times a day  polyethylene glycol 3350 17 Gram(s) Oral daily  senna 2 Tablet(s) Oral at bedtime      REVIEW OF SYSTEMS:  CONSTITUTIONAL: No fever, weight loss, or fatigue  NECK: No pain or stiffness  RESPIRATORY: No cough, wheezing, chills or hemoptysis; No Shortness of Breath  CARDIOVASCULAR: No chest pain, palpitations, passing out, dizziness, or leg swelling  GASTROINTESTINAL: No abdominal or epigastric pain. No nausea, vomiting, or hematemesis; No diarrhea or constipation. No melena or hematochezia.  NEUROLOGICAL: No headaches, memory loss, loss of strength, numbness, or tremors  SKIN: No itching, burning, rashes, or lesions   PSYCHIATRIC: No depression, anxiety, mood swings, or difficulty sleeping  HEME/LYMPH: No easy bruising, or bleeding gums  ALLERGY AND IMMUNOLOGIC: No hives or eczema	  All others negative	    PHYSICAL EXAM:  T(C): 36.8 (08-13-18 @ 05:42), Max: 36.9 (08-12-18 @ 16:59)  HR: 67 (08-13-18 @ 05:42) (67 - 73)  BP: 130/72 (08-13-18 @ 05:42) (109/67 - 130/72)  RR: 16 (08-13-18 @ 05:42) (16 - 18)  SpO2: 100% (08-13-18 @ 05:42) (98% - 100%)  Wt(kg): --  I&O's Summary    12 Aug 2018 07:01  -  13 Aug 2018 07:00  --------------------------------------------------------  IN: 640 mL / OUT: 1225 mL / NET: -585 mL      Appearance: Normal	  HEENT:   Normal oral mucosa, PERRL, EOMI	  Lymphatic: No lymphadenopathy  Cardiovascular: Normal S1 S2, No JVD, No murmurs, No edema  Respiratory: Lungs clear to auscultation	  Psychiatry: A & O x 3, Mood & affect appropriate  Gastrointestinal:  Soft, Non-tender, + BS	  Skin: No rashes, No ecchymoses, No cyanosis	  Neurologic: Non-focal  Extremities: Normal range of motion, No clubbing, cyanosis or edema  Vascular: Peripheral pulses palpable 2+ bilaterally    DIAGNOSTICS:  TELEMETRY: 	    LABS:	 	                          9.6    8.61  )-----------( 390      ( 12 Aug 2018 05:30 )             29.6

## 2018-08-13 NOTE — BEHAVIORAL HEALTH ASSESSMENT NOTE - NSBHCHARTREVIEWVS_PSY_A_CORE FT
Vital Signs Last 24 Hrs  T(C): 36.7 (13 Aug 2018 12:58), Max: 36.9 (12 Aug 2018 16:59)  T(F): 98 (13 Aug 2018 12:58), Max: 98.5 (12 Aug 2018 16:59)  HR: 67 (13 Aug 2018 12:58) (67 - 73)  BP: 103/65 (13 Aug 2018 12:58) (103/65 - 130/72)  BP(mean): --  RR: 18 (13 Aug 2018 12:58) (16 - 18)  SpO2: 100% (13 Aug 2018 12:58) (98% - 100%)

## 2018-08-13 NOTE — BEHAVIORAL HEALTH ASSESSMENT NOTE - NSBHCHARTREVIEWIMAGING_PSY_A_CORE FT
< from: CT Head No Cont (08.06.18 @ 16:10) >    EXAM:  CT BRAIN        PROCEDURE DATE:  Aug  6 2018         INTERPRETATION:  History: Status post cardiac arrest. Patient presents   with altered mental status.    Multiple axial sections were performed from base of skull to vertex   without contrast enhancement. Coronal and sagittal reconstructions were   performed.    This exam is compared with prior noncontrast head CT performed on August 1, 2018.    Parenchymal volume loss and chronic microvascular ischemic changes are   identified.    There is no evidence of acute hemorrhage mass or mass effect in the   posterior fossa or supratentorial region.    Evaluation of the osseous structures with the appropriate window   demonstrates hyperostosis frontalis internal which is likely of no   clinical significance    The visualized paranasal sinuses mastoid and middle ear regions appear   clear.    Impression: No significant change when allowing for differences in   technique.    < end of copied text >

## 2018-08-13 NOTE — PROGRESS NOTE ADULT - SUBJECTIVE AND OBJECTIVE BOX
Patient is a 82y old  Female who presents with a chief complaint of PEA Arrest (01 Aug 2018 20:24)      SUBJECTIVE / OVERNIGHT EVENTS:  Patient seen and examined at bedside. No acute events overnight. Patient is refusing meds and blood work. She has no chest pain no shortness of breath no suprapubic pain.     Other Review of Systems Negative.    MEDICATIONS  (STANDING):  aspirin  chewable 81 milliGRAM(s) Oral daily  atorvastatin 80 milliGRAM(s) Oral at bedtime  clopidogrel Tablet 75 milliGRAM(s) Oral daily  docusate sodium 100 milliGRAM(s) Oral two times a day  furosemide    Tablet 20 milliGRAM(s) Oral daily  heparin  Injectable 5000 Unit(s) SubCutaneous every 8 hours  lisinopril 5 milliGRAM(s) Oral daily  melatonin 3 milliGRAM(s) Oral at bedtime  metoprolol tartrate 50 milliGRAM(s) Oral two times a day  polyethylene glycol 3350 17 Gram(s) Oral daily  senna 2 Tablet(s) Oral at bedtime    MEDICATIONS  (PRN):  acetaminophen   Tablet. 650 milliGRAM(s) Oral every 6 hours PRN Moderate Pain (4 - 6)  benzonatate 100 milliGRAM(s) Oral three times a day PRN Cough      OBJECTIVE:    Vital Signs Last 24 Hrs  T(C): 36.8 (13 Aug 2018 05:42), Max: 36.9 (12 Aug 2018 16:59)  T(F): 98.2 (13 Aug 2018 05:42), Max: 98.5 (12 Aug 2018 16:59)  HR: 67 (13 Aug 2018 05:42) (67 - 73)  BP: 130/72 (13 Aug 2018 05:42) (109/67 - 130/72)  BP(mean): --  RR: 16 (13 Aug 2018 05:42) (16 - 18)  SpO2: 100% (13 Aug 2018 05:42) (98% - 100%)    CAPILLARY BLOOD GLUCOSE        I&O's Summary    12 Aug 2018 07:01  -  13 Aug 2018 07:00  --------------------------------------------------------  IN: 640 mL / OUT: 1225 mL / NET: -585 mL    PHYSICAL EXAM:  GENERAL: agitated, set on leaving today  HEAD:  Atraumatic, Normocephalic  NECK: Supple  CHEST/LUNG: clear to auscultation, no wheezing, no crackles  HEART: Regular rate and rhythm; No murmurs, rubs, or gallops  ABDOMEN: Soft, Nontender, Nondistended; Bowel sounds present  EXTREMITIES:  2+ Peripheral Pulses, No clubbing, cyanosis, or edema  PSYCH: AAOx1-2    LABS:                        9.6    8.61  )-----------( 390      ( 12 Aug 2018 05:30 )             29.6     Auto Eosinophil # 0.14  / Auto Eosinophil % 1.6   / Auto Neutrophil # 5.76  / Auto Neutrophil % 67.0  / BANDS % x        Care Discussed with Consultants/Other Providers: yes    RADIOLOGY & ADDITIONAL TESTS:  (Imaging Personally Reviewed) Patient is a 82y old  Female who presents with a chief complaint of PEA Arrest (01 Aug 2018 20:24)      SUBJECTIVE / OVERNIGHT EVENTS:  Patient seen and examined at bedside. No acute events overnight. Patient is refusing meds and blood work. She has no chest pain no shortness of breath no suprapubic pain.     Other Review of Systems Negative.    MEDICATIONS  (STANDING):  aspirin  chewable 81 milliGRAM(s) Oral daily  atorvastatin 80 milliGRAM(s) Oral at bedtime  clopidogrel Tablet 75 milliGRAM(s) Oral daily  docusate sodium 100 milliGRAM(s) Oral two times a day  furosemide    Tablet 20 milliGRAM(s) Oral daily  heparin  Injectable 5000 Unit(s) SubCutaneous every 8 hours  lisinopril 5 milliGRAM(s) Oral daily  melatonin 3 milliGRAM(s) Oral at bedtime  metoprolol tartrate 50 milliGRAM(s) Oral two times a day  polyethylene glycol 3350 17 Gram(s) Oral daily  senna 2 Tablet(s) Oral at bedtime    MEDICATIONS  (PRN):  acetaminophen   Tablet. 650 milliGRAM(s) Oral every 6 hours PRN Moderate Pain (4 - 6)  benzonatate 100 milliGRAM(s) Oral three times a day PRN Cough      OBJECTIVE:    Vital Signs Last 24 Hrs  T(C): 36.8 (13 Aug 2018 05:42), Max: 36.9 (12 Aug 2018 16:59)  T(F): 98.2 (13 Aug 2018 05:42), Max: 98.5 (12 Aug 2018 16:59)  HR: 67 (13 Aug 2018 05:42) (67 - 73)  BP: 130/72 (13 Aug 2018 05:42) (109/67 - 130/72)  BP(mean): --  RR: 16 (13 Aug 2018 05:42) (16 - 18)  SpO2: 100% (13 Aug 2018 05:42) (98% - 100%)    CAPILLARY BLOOD GLUCOSE        I&O's Summary    12 Aug 2018 07:01  -  13 Aug 2018 07:00  --------------------------------------------------------  IN: 640 mL / OUT: 1225 mL / NET: -585 mL    PHYSICAL EXAM:  GENERAL: agitated, set on leaving today  HEAD:  Atraumatic, Normocephalic  NECK: Supple  CHEST/LUNG: clear to auscultation, no wheezing, no crackles  HEART: Regular rate and rhythm; No murmurs, rubs, or gallops  ABDOMEN: Soft, Nontender, Nondistended; Bowel sounds present  EXTREMITIES:  2+ Peripheral Pulses, No clubbing, cyanosis, or edema  PSYCH: AAOx1-2    LABS:                        9.6    8.61  )-----------( 390      ( 12 Aug 2018 05:30 )             29.6     Auto Eosinophil # 0.14  / Auto Eosinophil % 1.6   / Auto Neutrophil # 5.76  / Auto Neutrophil % 67.0  / BANDS % x        Care Discussed with Consultants/Other Providers: yes: :Electrophysiology PA    RADIOLOGY & ADDITIONAL TESTS:  (Imaging Personally Reviewed)

## 2018-08-13 NOTE — BEHAVIORAL HEALTH ASSESSMENT NOTE - NSBHSUICPROTECTFACT_PSY_A_CORE
Supportive social network or family/Fear of death or dying due to pain/suffering/High spirituality/Identifies reasons for living/Future oriented/Responsibility to family and others

## 2018-08-13 NOTE — BEHAVIORAL HEALTH ASSESSMENT NOTE - NSBHCONSULTRECOMMENDOTHER_PSY_A_CORE FT
- Does not demonstrate capacity to consent or refuse AICD or other relevant indicated medical procedures or ability to leave AMA at this time given lack of understanding of her medical state, lack of understanding of risks of acceptance or refusal of any current medical intervention, and lack of ability to engage in discussion about her rationale. Primary team should defer to surrogate decision making, likely with patient's healthcare proxy agent, which appears to be patient's daughter.

## 2018-08-13 NOTE — BEHAVIORAL HEALTH ASSESSMENT NOTE - NSBHCONSULTMEDSEVERE_PSY_A_CORE FT
For agitation, avoid antipsychotics as patient with QTc (most recent 8/12/18) of 521ms and risk for cardiac arrhythmia on these meds. Recommend to repeat EKG. If QTc < 500, can give Haldol 0.5mg q6 PRN agitation and monitor QTc closely. Given elevated liver enzymes, would avoid Depakote as treatment for agitation. Though benzodiazapines can worsen confusion and increase risk of falls, especially in patients with delirium, can give Ativan 0.5mg q6 PRN acute agitation and closely monitor response for any worsening of mental status.
home w/ home PT

## 2018-08-13 NOTE — BEHAVIORAL HEALTH ASSESSMENT NOTE - OTHER
V-Fib arrest complicated by acute HFrEF, multifocal atrial tachycardia and delirium gait not assessed

## 2018-08-13 NOTE — PROVIDER CONTACT NOTE (OTHER) - ASSESSMENT
Pt was told by [employer] that HR elevated 180 house staff 4 notified.
after PO lopressor given, HR cont to be elevated
pt came back from head CT was put back on tele monitor and noted to be sustaining in the 170s-180s
Patient denies chest pain, shortness of breath and palpitations.
Patient denies chest pain, shortness of breath and palpitations.
VSS. Diastolic 58
all VSS,. pt in NAD
patient denies chest pain, shortness of breath and palpitations.
pt abdomen soft/nontender, pt with no discomfort.
BP stable
pt abdomen soft and nondistended, pt has no discomfort
vss

## 2018-08-13 NOTE — PROVIDER CONTACT NOTE (OTHER) - ACTION/TREATMENT ORDERED:
continue to monitor
2.5IV push of lopressor given. will cont to monitor
5mg IVP lopressor given
Educated patient on the importance of labs in the morning. Notified MD.
OK to give 1800 dose of 25mg PO lopressor. EKG completed. Will cont to monitor
educated pt on importance of taking medications. Pt still refused. Notified hs4.
Bladder scan  Straight cath patient
Continue to monitor on telemetry   MD to evaluate patient
pt still refusing straight cath, team 4 made aware, okay with not straight cathing patient at this time. will continue to monitor output and bladder scans.
will continue to monitor,
will continue to monitor.
pt offered tolieting again, states she does not want another catheter to drain urine. team 4 made aware, says he will assess pt. will continue to monitor.
MAR at bedside Safety mantained,will continue to monitor.
MAR at bedside with interns awaiting further orders. Safety mantained,will continue to monitor.
MD to evaluate need for metoprolol. Safety mantained,will continue to monitor.
continue to monitor patient. will re-assess needs for medication pt goes in and out of st.
if pt does not void, bladder scan at midnight 0000 Safety mantained,will continue to monitor.
waited until 3 am and bladder scan again

## 2018-08-13 NOTE — PROGRESS NOTE ADULT - ASSESSMENT
81 yo F with PMH of NSTEMI, PAD, LUIS FERNANDO, HTN, with good functional status, s/p cardiac arrest for VF; found to have severe segmental; s/p C cardiac cath; no further MAT/SVT over weekend:    - continue goal directed medical therapy with ACE-I and BB therapy  - awaiting cath report  - continue telemetry  - d/w EP attending

## 2018-08-13 NOTE — PROGRESS NOTE ADULT - PROBLEM SELECTOR PLAN 1
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  -patient appears euvolemic, saturating well on RA  -on lasix 20mg PO continue to monitor I/O, daily standing weights.

## 2018-08-13 NOTE — BEHAVIORAL HEALTH ASSESSMENT NOTE - CASE SUMMARY
patient exhibits mild delirium likely superimposed on mild cognitive impairment. Given inability to engage in capacity assessment with specific domains above, patient does not demonstrate to consent to or refuse AICD as above, or ability to leave AMA, would converse with patient's health care proxy agent to further assess GOC and provide surrogate decisions in medical setting. would avoid antipsychotics given prolonged QT until QTc <500, would avoid depakote given persistant (though downtrending) transaminitis. Can use ativan sparingly though would avoid unless absolutely necessary.

## 2018-08-14 ENCOUNTER — TRANSCRIPTION ENCOUNTER (OUTPATIENT)
Age: 83
End: 2018-08-14

## 2018-08-14 PROBLEM — Z00.00 ENCOUNTER FOR PREVENTIVE HEALTH EXAMINATION: Noted: 2018-08-14

## 2018-08-14 LAB
APTT BLD: 28.7 SEC — SIGNIFICANT CHANGE UP (ref 27.5–37.4)
BLD GP AB SCN SERPL QL: NEGATIVE — SIGNIFICANT CHANGE UP
BUN SERPL-MCNC: 16 MG/DL — SIGNIFICANT CHANGE UP (ref 7–23)
CALCIUM SERPL-MCNC: 8.4 MG/DL — SIGNIFICANT CHANGE UP (ref 8.4–10.5)
CHLORIDE SERPL-SCNC: 104 MMOL/L — SIGNIFICANT CHANGE UP (ref 98–107)
CO2 SERPL-SCNC: 21 MMOL/L — LOW (ref 22–31)
CREAT SERPL-MCNC: 0.95 MG/DL — SIGNIFICANT CHANGE UP (ref 0.5–1.3)
GLUCOSE SERPL-MCNC: 97 MG/DL — SIGNIFICANT CHANGE UP (ref 70–99)
HCT VFR BLD CALC: 28 % — LOW (ref 34.5–45)
HGB BLD-MCNC: 8.9 G/DL — LOW (ref 11.5–15.5)
INR BLD: 1.09 — SIGNIFICANT CHANGE UP (ref 0.88–1.17)
MAGNESIUM SERPL-MCNC: 2 MG/DL — SIGNIFICANT CHANGE UP (ref 1.6–2.6)
MCHC RBC-ENTMCNC: 23.1 PG — LOW (ref 27–34)
MCHC RBC-ENTMCNC: 31.8 % — LOW (ref 32–36)
MCV RBC AUTO: 72.5 FL — LOW (ref 80–100)
NRBC # FLD: 0 — SIGNIFICANT CHANGE UP
PHOSPHATE SERPL-MCNC: 3 MG/DL — SIGNIFICANT CHANGE UP (ref 2.5–4.5)
PLATELET # BLD AUTO: 408 K/UL — HIGH (ref 150–400)
PMV BLD: 11.3 FL — SIGNIFICANT CHANGE UP (ref 7–13)
POTASSIUM SERPL-MCNC: 4.2 MMOL/L — SIGNIFICANT CHANGE UP (ref 3.5–5.3)
POTASSIUM SERPL-SCNC: 4.2 MMOL/L — SIGNIFICANT CHANGE UP (ref 3.5–5.3)
PROTHROM AB SERPL-ACNC: 12.6 SEC — SIGNIFICANT CHANGE UP (ref 9.8–13.1)
RBC # BLD: 3.86 M/UL — SIGNIFICANT CHANGE UP (ref 3.8–5.2)
RBC # FLD: 16.6 % — HIGH (ref 10.3–14.5)
RH IG SCN BLD-IMP: POSITIVE — SIGNIFICANT CHANGE UP
SODIUM SERPL-SCNC: 136 MMOL/L — SIGNIFICANT CHANGE UP (ref 135–145)
WBC # BLD: 6.19 K/UL — SIGNIFICANT CHANGE UP (ref 3.8–10.5)
WBC # FLD AUTO: 6.19 K/UL — SIGNIFICANT CHANGE UP (ref 3.8–10.5)

## 2018-08-14 PROCEDURE — 33249 INSJ/RPLCMT DEFIB W/LEAD(S): CPT

## 2018-08-14 PROCEDURE — 71045 X-RAY EXAM CHEST 1 VIEW: CPT | Mod: 26

## 2018-08-14 PROCEDURE — 99233 SBSQ HOSP IP/OBS HIGH 50: CPT | Mod: GC

## 2018-08-14 PROCEDURE — 93010 ELECTROCARDIOGRAM REPORT: CPT

## 2018-08-14 RX ORDER — VANCOMYCIN HCL 1 G
1000 VIAL (EA) INTRAVENOUS ONCE
Qty: 0 | Refills: 0 | Status: COMPLETED | OUTPATIENT
Start: 2018-08-14 | End: 2018-08-14

## 2018-08-14 RX ADMIN — ATORVASTATIN CALCIUM 80 MILLIGRAM(S): 80 TABLET, FILM COATED ORAL at 21:28

## 2018-08-14 RX ADMIN — CLOPIDOGREL BISULFATE 75 MILLIGRAM(S): 75 TABLET, FILM COATED ORAL at 07:24

## 2018-08-14 RX ADMIN — Medication 50 MILLIGRAM(S): at 17:18

## 2018-08-14 RX ADMIN — Medication 20 MILLIGRAM(S): at 05:02

## 2018-08-14 RX ADMIN — Medication 50 MILLIGRAM(S): at 05:02

## 2018-08-14 RX ADMIN — Medication 250 MILLIGRAM(S): at 23:22

## 2018-08-14 RX ADMIN — Medication 100 MILLIGRAM(S): at 17:17

## 2018-08-14 RX ADMIN — Medication 81 MILLIGRAM(S): at 07:24

## 2018-08-14 RX ADMIN — Medication 3 MILLIGRAM(S): at 21:28

## 2018-08-14 RX ADMIN — LISINOPRIL 5 MILLIGRAM(S): 2.5 TABLET ORAL at 05:02

## 2018-08-14 NOTE — DISCHARGE NOTE ADULT - CARE PROVIDERS DIRECT ADDRESSES
,[email or web address] ,[email or web address]

## 2018-08-14 NOTE — DISCHARGE NOTE ADULT - HOSPITAL COURSE
HPI:  81 yo with h/o NSTEMI as per chart, PAD on ASA, Renal artery stenosis, HTN, good functional status, returned from [place] three days ago and has been not feeling well since. Per family pt. hadn't been feeling well for past couple of days after returning home from trip to [place], family reports pt was sitting in chair, became tachypneic, she became weak and lost consciousness. Down for 10 minutes prior to arrival of EMS.  Per EMS she was found unresponsive, in a shockable rhythm, received 1 shock, was intubated and achieved ROSC after 2nd round (approx 4 min) of CPR. Arrives to ED intubated.    Hospital Course:  When the patient was admitted to the hospital she was admitted to the MICU intubated. The MICU team did an ACS work up and the patient was extubated. While in the MICU an echocardiogram was done that was suggestive of takatsubo cardiomyopathy. She was downgraded to the medicine floors when she was extubated. The cardiology team did not bring her to cardiac cath immediately as the patient's mental status was waxing and waning. She was HPI:  83 yo with h/o NSTEMI as per chart, PAD on ASA, Renal artery stenosis, HTN, good functional status, returned from [place] three days ago and has been not feeling well since. Per family pt. hadn't been feeling well for past couple of days after returning home from trip to [place], family reports pt was sitting in chair, became tachypneic, she became weak and lost consciousness. Down for 10 minutes prior to arrival of EMS.  Per EMS she was found unresponsive, in a shockable rhythm, received 1 shock, was intubated and achieved ROSC after 2nd round (approx 4 min) of CPR. Arrives to ED intubated.    Hospital Course:  When the patient was admitted to the hospital she was admitted to the MICU intubated. The MICU team did an ACS work up and the patient was extubated. While in the MICU an echocardiogram was done that was suggestive of takatsubo cardiomyopathy with an EF of 38%. She was downgraded to the medicine floors when she was extubated. The cardiology team did not bring her to cardiac cath immediately as the patient's mental status was waxing and waning. Over the course of her admission she had to be given a small dose of haldol a few times for sun downing. 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 She had an EP evaluation and they recommended ICD placement. ICD was placed and pneumothorax was ruled out with cxr. They recommend getting a follow up echocardiogram 1 month from discharge. The patient is medically optimized for discharge. HPI:  83 yo with h/o NSTEMI as per chart, PAD on ASA, Renal artery stenosis, HTN, good functional status, returned from [place] three days ago and has been not feeling well since. Per family pt. hadn't been feeling well for past couple of days after returning home from trip to [place], family reports pt was sitting in chair, became tachypneic, she became weak and lost consciousness. Down for 10 minutes prior to arrival of EMS.  Per EMS she was found unresponsive, in a shockable rhythm, received 1 shock, was intubated and achieved ROSC after 2nd round (approx 4 min) of CPR. Arrives to ED intubated.    Hospital Course:  When the patient was admitted to the hospital she was admitted to the MICU intubated. The MICU team did an ACS work up and the patient was extubated. While in the MICU an echocardiogram was done that was suggestive of takatsubo cardiomyopathy with an EF of 38%. She was downgraded to the medicine floors when she was extubated. The cardiology team did not bring her to cardiac cath immediately as the patient's mental status was waxing and waning. Over the course of her admission she had to be given a small dose of haldol a few times for sun downing. 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 She had an EP evaluation and they recommended ICD placement. ICD was placed and pneumothorax was ruled out with cxr. They recommend getting a follow up echocardiogram 1 month from discharge. Patient's course was complicated by urinary retention for which patient had a amaral catheter placed. Patient will need to follow up with urology outpatient to have a trial of void performed. The patient is medically optimized for discharge.

## 2018-08-14 NOTE — DISCHARGE NOTE ADULT - CARE PROVIDER_API CALL
Rashaad Sterling), Cardiac Electrophysiology; Cardiovascular Disease; Internal Medicine  [address] [place] [address] [address] [place] [address]  Phone: [phone]  Fax: [phone]    Emre Perez), Geriatric Medicine; HospiceBar[facility] Centerative Medicine; Internal Medicine  [address] [address]  Phone: [phone]  Fax: [phone] Rashaad Sterling), Cardiac Electrophysiology; Cardiovascular Disease; Internal Medicine  [address] [place] [address]  [address] [address]  [place] [address]  Phone: [phone]  Fax: [phone]    Emre Perez), Geriatric Medicine; [facility]iceTri[facility] Hospitalative Medicine; Internal Medicine  [address] [facility]  [address]  [place] [address]  Phone: [phone]  Fax: [phone]    Lalit Pepe), Cardiovascular Disease; Internal Medicine  [address] [place] [address]  Phone: [phone]  Fax: [phone] Rashaad Sterling), Cardiac Electrophysiology; Cardiovascular Disease; Internal Medicine  [address] [place] [address]  [address] [address]  [place] [address]  Phone: [phone]  Fax: [phone]    Emre Perez), Geriatric Medicine; Hospice\[facility]\""liative Medicine; Internal Medicine  [address] [facility]  [address]  [place] [address]  Phone: [phone]  Fax: [phone]    Lalit Pepe), Cardiovascular Disease; Internal Medicine  [address] [place] [address]  Phone: [phone]  Fax: [phone]    Da Ingram), Urology  [address] [facility] [address]  [place] [address]  Phone: [phone]  Fax: [phone]

## 2018-08-14 NOTE — CHART NOTE - NSCHARTNOTEFT_GEN_A_CORE
Type of Procedure: Implantable defibrillator  Licensed independent practitioner: Rashaad Sterling MD  Assistant: none  Description of procedure: Under sterile conditions, antibiotic coverage, subclavian access, ventricular and atrial lead, prepectoral pocket copiously irrigated with antibiotic solution, ICD generator implanted, closed in 3 layers  Findings of procedure: normal pacing, sensing and lead integrity.  Estimated blood loss: < 20 cc  Specimen removed: none  Preoperative Dx: Ventricular fibrillation  Postoperative Dx: Ventricular fibrillation  Complications: none  Anesthesia type: deep sedation  No heparin for 24 hours and then reassess.    Rashaad Sterling MD

## 2018-08-14 NOTE — DISCHARGE NOTE ADULT - CARE PLAN
Principal Discharge DX:	Cardiac arrest with ventricular fibrillation Principal Discharge DX:	Cardiac arrest with ventricular fibrillation  Goal:	treated  Assessment and plan of treatment:	You were brought to your hospital because your heart stopped at home. You received CPR and received a shock from EMS which restarted your heart. they brought you to the hospital and you were admitted to the medical intensive care unit for 2 days. When you left the intensive care unit the cardiology team did an extensive work-up for the possible causes of the cardiac arrest. They did a cardiac catheterization which showed that the arteries to your heart are clear and were not the cause of the event. You were also put on constant heart monitoring to see if you had any abnormal heart rhythms that could have caused the event. The electrophysiologists implanted a cardio-defibrilator to monitor your heart in case it goes into another abnormal rhythm and it can give you a shock to restart the heart. You have an appointment with your cardiologist, Dr. Pepe, on August 20th at 11:50 AM. You also have an appointment with Dr. Sterling, the doctor who put in the cardio-defibrillator, on August 28th at 1:00 PM.  Secondary Diagnosis:	Acute systolic heart failure  Assessment and plan of treatment:	When you came to the hospital a sonogram of your heart was done which showed that your heart was not functioning as well as it should because your heart stopped. This caused fluid to back up into your lungs. You were started on medications to help preserve your heart function and keep fluid off your lungs. You have an appointment with your cardiologist, Dr. Pepe, on August 20th at 11:50 AM.  Secondary Diagnosis:	Delirium due to general medical condition  Assessment and plan of treatment:	When you came into the hospital there were times when you were confused about where you were. It is normal after such an event like cardiac arrest. It will likely improve over time but if you suddenly have a decrease in mental function......................................... Principal Discharge DX:	Cardiac arrest with ventricular fibrillation  Goal:	treated  Assessment and plan of treatment:	You were brought to your hospital because your heart stopped at home. You received CPR and received a shock from EMS which restarted your heart. they brought you to the hospital and you were admitted to the medical intensive care unit for 2 days. When you left the intensive care unit the cardiology team did an extensive work-up for the possible causes of the cardiac arrest. They did a cardiac catheterization which showed that the arteries to your heart are clear and were not the cause of the event. You were also put on constant heart monitoring to see if you had any abnormal heart rhythms that could have caused the event. The electrophysiologists implanted a cardio-defibrilator to monitor your heart in case it goes into another abnormal rhythm and it can give you a shock to restart the heart. You have an appointment with your cardiologist, Dr. Pepe, on August 20th at 11:50 AM. You also have an appointment with Dr. Sterling, the doctor who put in the cardio-defibrillator, on August 28th at 1:00 PM. You also have an appointment with your new primary care doctor, Dr. Perez of 8/15/18 at 12:30 PM.  Secondary Diagnosis:	Acute systolic heart failure  Assessment and plan of treatment:	When you came to the hospital a sonogram of your heart was done which showed that your heart was not functioning as well as it should because your heart stopped. This caused fluid to back up into your lungs. You were started on medications to help preserve your heart function and keep fluid off your lungs. You have an appointment with your cardiologist, Dr. Pepe, on August 20th at 11:50 AM.  Secondary Diagnosis:	Delirium due to general medical condition  Assessment and plan of treatment:	When you came into the hospital there were times when you were confused about where you were. It is normal after such an event like cardiac arrest. It will likely improve over time but if you suddenly have a decrease in mental function......................................... Principal Discharge DX:	Cardiac arrest with ventricular fibrillation  Goal:	treated  Assessment and plan of treatment:	You were brought to your hospital because your heart stopped at home. You received CPR and received a shock from EMS which restarted your heart. they brought you to the hospital and you were admitted to the medical intensive care unit for 2 days. When you left the intensive care unit the cardiology team did an extensive work-up for the possible causes of the cardiac arrest. They did a cardiac catheterization which showed that the arteries to your heart are clear and were not the cause of the event. You were also put on constant heart monitoring to see if you had any abnormal heart rhythms that could have caused the event. The electrophysiologists implanted a cardio-defibrilator to monitor your heart in case it goes into another abnormal rhythm and it can give you a shock to restart the heart. You have an appointment with your cardiologist, Dr. Pepe, on August 20th at 11:50 AM. You also have an appointment with Dr. Sterling, the doctor who put in the cardio-defibrillator, on August 28th at 1:00 PM. You also have an appointment with your new primary care doctor, Dr. Perez of 8/15/18 at 12:30 PM.  Secondary Diagnosis:	Acute systolic heart failure  Assessment and plan of treatment:	When you came to the hospital a sonogram of your heart was done which showed that your heart was not functioning as well as it should because your heart stopped. This caused fluid to back up into your lungs. You were started on medications to help preserve your heart function and keep fluid off your lungs. You have an appointment with your cardiologist, Dr. Pepe, on August 20th at 11:50 AM.  Secondary Diagnosis:	Delirium due to general medical condition  Assessment and plan of treatment:	When you came into the hospital there were times when you were confused about where you were. It is normal after such an event like cardiac arrest. It will likely improve over time and we advise you take melatonin at night to help with sleeping. If you have a sudden decline in mental status or increased confusion or memory problems, please seek medical attention. Please follow up with your primary care doctor, Dr. Perez, tomorrow at 12:30 PM. Principal Discharge DX:	Cardiac arrest with ventricular fibrillation  Goal:	treated  Assessment and plan of treatment:	You were brought to your hospital because your heart stopped at home. You received CPR and received a shock from EMS which restarted your heart. they brought you to the hospital and you were admitted to the medical intensive care unit for 2 days. When you left the intensive care unit the cardiology team did an extensive work-up for the possible causes of the cardiac arrest. They did a cardiac catheterization which showed that the arteries to your heart are clear and were not the cause of the event. You were also put on constant heart monitoring to see if you had any abnormal heart rhythms that could have caused the event. The electrophysiologists implanted a cardio-defibrilator to monitor your heart in case it goes into another abnormal rhythm and it can give you a shock to restart the heart. You have an appointment with your cardiologist, Dr. Pepe, on August 20th at 11:50 AM. You also have an appointment with Dr. Sterling, the doctor who put in the cardio-defibrillator, on August 28th at 1:00 PM. You also have an appointment with your new primary care doctor, Dr. Perez of 8/15/18 at 12:30 PM.  Secondary Diagnosis:	Acute systolic heart failure  Assessment and plan of treatment:	When you came to the hospital a sonogram of your heart was done which showed that your heart was not functioning as well as it should because your heart stopped. This caused fluid to back up into your lungs. You were started on medications to help preserve your heart function and keep fluid off your lungs. You have an appointment with your cardiologist, Dr. Pepe, on August 20th at 11:50 AM.  Secondary Diagnosis:	Delirium due to general medical condition  Assessment and plan of treatment:	When you came into the hospital there were times when you were confused about where you were. It is normal after such an event like cardiac arrest. It will likely improve over time and we advise you take melatonin at night to help with sleeping. If you have a sudden decline in mental status or increased confusion or memory problems, please seek medical attention. Please follow up with your primary care doctor, Dr. Perez, tomorrow at 12:30 PM.  Secondary Diagnosis:	Urinary retention  Assessment and plan of treatment:	While you were in the hospital, you were found to be retaining urine and you were not able to completely empty your bladder on your own. Therefore, a amaral catheter was placed, which has been draining your urine for you. Please follow up with a urologist outpatient in order to have a trial of void performed.

## 2018-08-14 NOTE — DISCHARGE NOTE ADULT - PATIENT PORTAL LINK FT
You can access the [facility] Patient Portal, offered by [facility], by registering with the following website: [email or web address]

## 2018-08-14 NOTE — DISCHARGE NOTE ADULT - PLAN OF CARE
treated You were brought to your hospital because your heart stopped at home. You received CPR and received a shock from EMS which restarted your heart. they brought you to the hospital and you were admitted to the medical intensive care unit for 2 days. When you left the intensive care unit the cardiology team did an extensive work-up for the possible causes of the cardiac arrest. They did a cardiac catheterization which showed that the arteries to your heart are clear and were not the cause of the event. You were also put on constant heart monitoring to see if you had any abnormal heart rhythms that could have caused the event. The electrophysiologists implanted a cardio-defibrilator to monitor your heart in case it goes into another abnormal rhythm and it can give you a shock to restart the heart. You have an appointment with your cardiologist, Dr. Pepe, on August 20th at 11:50 AM. You also have an appointment with Dr. Sterling, the doctor who put in the cardio-defibrillator, on August 28th at 1:00 PM. When you came to the hospital a sonogram of your heart was done which showed that your heart was not functioning as well as it should because your heart stopped. This caused fluid to back up into your lungs. You were started on medications to help preserve your heart function and keep fluid off your lungs. You have an appointment with your cardiologist, Dr. Pepe, on August 20th at 11:50 AM. When you came into the hospital there were times when you were confused about where you were. It is normal after such an event like cardiac arrest. It will likely improve over time but if you suddenly have a decrease in mental function......................................... You were brought to your hospital because your heart stopped at home. You received CPR and received a shock from EMS which restarted your heart. they brought you to the hospital and you were admitted to the medical intensive care unit for 2 days. When you left the intensive care unit the cardiology team did an extensive work-up for the possible causes of the cardiac arrest. They did a cardiac catheterization which showed that the arteries to your heart are clear and were not the cause of the event. You were also put on constant heart monitoring to see if you had any abnormal heart rhythms that could have caused the event. The electrophysiologists implanted a cardio-defibrilator to monitor your heart in case it goes into another abnormal rhythm and it can give you a shock to restart the heart. You have an appointment with your cardiologist, Dr. Pepe, on August 20th at 11:50 AM. You also have an appointment with Dr. Sterling, the doctor who put in the cardio-defibrillator, on August 28th at 1:00 PM. You also have an appointment with your new primary care doctor, Dr. Perez of 8/15/18 at 12:30 PM. When you came into the hospital there were times when you were confused about where you were. It is normal after such an event like cardiac arrest. It will likely improve over time and we advise you take melatonin at night to help with sleeping. If you have a sudden decline in mental status or increased confusion or memory problems, please seek medical attention. Please follow up with your primary care doctor, Dr. Perez, tomorrow at 12:30 PM. While you were in the hospital, you were found to be retaining urine and you were not able to completely empty your bladder on your own. Therefore, a amaral catheter was placed, which has been draining your urine for you. Please follow up with a urologist outpatient in order to have a trial of void performed.

## 2018-08-14 NOTE — DISCHARGE NOTE ADULT - ADDITIONAL INSTRUCTIONS
Please follow up with your cardiologist, Dr. Pepe, on August 20th at 11:50 AM. You also have an appointment with Dr. Sterling, the doctor who put in the cardio-defibrillator, on August 28th at 1:00 PM. You also have an appointment with your new primary care doctor, Dr. Perez of 8/15/18 at 12:30 PM. Please follow up with your cardiologist, Dr. Pepe, on August 20th at 11:50 AM. You also have an appointment with Dr. Sterilng, the doctor who put in the cardio-defibrillator, on August 28th at 1:00 PM. You also have an appointment with your new primary care doctor, Dr. Perez of 8/15/18 at 12:30 PM.  Please follow up with urology outpatient to perform urodynamic studies and to have a trial of void performed to have your amaral removed.  You can make an appointment with Dr. Da Ingram at [address] [place] [address], phone number [phone]

## 2018-08-14 NOTE — DISCHARGE NOTE ADULT - PROVIDER TOKENS
TOKEN:'[id number]',TOKEN:'[id number]' TOKEN:'[id number]',TOKEN:'[id number]',TOKEN:'[id number]' TOKEN:'[id number]',[id number]',TOKEN:'[id number]',[id number]'

## 2018-08-14 NOTE — PROGRESS NOTE ADULT - PROBLEM SELECTOR PLAN 2
-patient with V-fib arrest in field.  -non-ischemic given patent coronaries on cath.   -Going for AICD placement today.

## 2018-08-14 NOTE — DISCHARGE NOTE ADULT - MEDICATION SUMMARY - MEDICATIONS TO TAKE
I will START or STAY ON the medications listed below when I get home from the hospital:    Aspir 81 oral delayed release tablet  -- 1 tab(s) by mouth once a day  -- Indication: For NSTEMI (non-ST elevation myocardial infarction)    lisinopril 5 mg oral tablet  -- 1 tab(s) by mouth once a day  -- Indication: For Acute systolic heart failure    atorvastatin 40 mg oral tablet  -- 1 tab(s) by mouth once a day   -- Avoid grapefruit and grapefruit juice while taking this medication.  Do not take this drug if you are pregnant.  It is very important that you take or use this exactly as directed.  Do not skip doses or discontinue unless directed by your doctor.  Obtain medical advice before taking any non-prescription drugs as some may affect the action of this medication.  Take with food or milk.    -- Indication: For Acute systolic heart failure    metoprolol succinate 100 mg oral tablet, extended release  -- 1 tab(s) by mouth once a day   -- It is very important that you take or use this exactly as directed.  Do not skip doses or discontinue unless directed by your doctor.  May cause drowsiness.  Alcohol may intensify this effect.  Use care when operating dangerous machinery.  Some non-prescription drugs may aggravate your condition.  Read all labels carefully.  If a warning appears, check with your doctor before taking.  Swallow whole.  Do not crush.  Take with food or milk.  This drug may impair the ability to drive or operate machinery.  Use care until you become familiar with its effects.    -- Indication: For Acute systolic heart failure    Boniva 150 mg oral tablet  -- 1 tab(s) by mouth once a month  -- Indication: For Need for prophylactic measure    furosemide 20 mg oral tablet  -- 1 tab(s) by mouth once a day  -- Indication: For Acute systolic heart failure    senna oral tablet  -- 2 tab(s) by mouth once a day (at bedtime)  -- Indication: For Need for prophylactic measure    docusate sodium 100 mg oral capsule  -- 1 cap(s) by mouth 2 times a day  -- Indication: For Need for prophylactic measure    polyethylene glycol 3350 oral powder for reconstitution  -- 17 gram(s) by mouth once a day  -- Indication: For Need for prophylactic measure    melatonin 3 mg oral tablet  -- 1 tab(s) by mouth once a day (at bedtime)  -- Indication: For Delirium due to general medical condition

## 2018-08-14 NOTE — PROGRESS NOTE ADULT - ATTENDING COMMENTS
Agree with above.  Patient seen after AICD placed, no complications.  Plan for D/C tomorrow.  Will change lopressor to toprol.  Discussed with outpatient cardiologist, will obtain repeat TTE in 1 month.  Follow up appointment with EP arranged.  CXR reviewed, no pneumothorax.

## 2018-08-14 NOTE — PROGRESS NOTE ADULT - ASSESSMENT
The patient is a 82 year old woman with a history of HTN, Peripheral artery disease on ASA, renal artery stenosis admitted for V-Fib arrest complicated by acute HFrEF, multifocal atrial tachycardia and delirium. Cardiac cath showed patent coronaries. Going for AICD placement today.

## 2018-08-14 NOTE — PROGRESS NOTE ADULT - SUBJECTIVE AND OBJECTIVE BOX
Patient is a 82y old  Female who presents with a chief complaint of PEA Arrest (01 Aug 2018 20:24)      SUBJECTIVE / OVERNIGHT EVENTS:  Patient seen and examined at bedside. No acute events overnight. Patient is aware of her procedure and is ready for it. She is aware that she will likely have to stay another night barring any complications. She feels well. She has no cough, no shortness of breath, no chest pain.     Other Review of Systems Negative.    MEDICATIONS  (STANDING):  aspirin  chewable 81 milliGRAM(s) Oral daily  atorvastatin 80 milliGRAM(s) Oral at bedtime  clopidogrel Tablet 75 milliGRAM(s) Oral daily  docusate sodium 100 milliGRAM(s) Oral two times a day  furosemide    Tablet 20 milliGRAM(s) Oral daily  heparin  Injectable 5000 Unit(s) SubCutaneous every 8 hours  lisinopril 5 milliGRAM(s) Oral daily  melatonin 3 milliGRAM(s) Oral at bedtime  metoprolol tartrate 50 milliGRAM(s) Oral two times a day  polyethylene glycol 3350 17 Gram(s) Oral daily  senna 2 Tablet(s) Oral at bedtime    MEDICATIONS  (PRN):  acetaminophen   Tablet. 650 milliGRAM(s) Oral every 6 hours PRN Moderate Pain (4 - 6)  benzonatate 100 milliGRAM(s) Oral three times a day PRN Cough      OBJECTIVE:    Vital Signs Last 24 Hrs  T(C): 36.7 (14 Aug 2018 04:59), Max: 36.7 (13 Aug 2018 12:58)  T(F): 98.1 (14 Aug 2018 04:59), Max: 98.1 (13 Aug 2018 17:57)  HR: 70 (14 Aug 2018 04:59) (66 - 74)  BP: 114/62 (14 Aug 2018 04:59) (103/65 - 122/64)  BP(mean): --  RR: 18 (14 Aug 2018 04:59) (17 - 18)  SpO2: 99% (14 Aug 2018 04:59) (99% - 100%)    CAPILLARY BLOOD GLUCOSE        I&O's Summary    13 Aug 2018 07:01  -  14 Aug 2018 07:00  --------------------------------------------------------  IN: 560 mL / OUT: 700 mL / NET: -140 mL        PHYSICAL EXAM:  GENERAL: pleasant  CHEST/LUNG: Clear to auscultation bilaterally; No wheeze  HEART: Regular rate and rhythm; No murmurs, rubs, or gallops  ABDOMEN: Soft, Nontender, Nondistended; Bowel sounds present  : Nicole catheter in place  EXTREMITIES:  2+ Peripheral Pulses, No clubbing, cyanosis, or edema  PSYCH: AAOx2-3    LABS:                        8.9    6.19  )-----------( 408      ( 14 Aug 2018 05:15 )             28.0     Auto Eosinophil # x     / Auto Eosinophil % x     / Auto Neutrophil # x     / Auto Neutrophil % x     / BANDS % x        08-14    136  |  104  |  16  ----------------------------<  97  4.2   |  21<L>  |  0.95    Ca    8.4      14 Aug 2018 05:20  Mg     2.0     08-14  Phos  3.0     08-14    PT/INR - ( 14 Aug 2018 05:15 )   PT: 12.6 SEC;   INR: 1.09     PTT - ( 14 Aug 2018 05:15 )  PTT:28.7 SEC    Care Discussed with Consultants/Other Providers: yes Patient is a 82y old  Female who presents with a chief complaint of PEA Arrest (01 Aug 2018 20:24)      SUBJECTIVE / OVERNIGHT EVENTS:  Patient seen and examined at bedside. No acute events overnight. Patient is aware of her procedure and is ready for it. She is aware that she will likely have to stay another night barring any complications. She feels well. She has no cough, no shortness of breath, no chest pain.     Other Review of Systems Negative.    MEDICATIONS  (STANDING):  aspirin  chewable 81 milliGRAM(s) Oral daily  atorvastatin 80 milliGRAM(s) Oral at bedtime  clopidogrel Tablet 75 milliGRAM(s) Oral daily  docusate sodium 100 milliGRAM(s) Oral two times a day  furosemide    Tablet 20 milliGRAM(s) Oral daily  heparin  Injectable 5000 Unit(s) SubCutaneous every 8 hours  lisinopril 5 milliGRAM(s) Oral daily  melatonin 3 milliGRAM(s) Oral at bedtime  metoprolol tartrate 50 milliGRAM(s) Oral two times a day  polyethylene glycol 3350 17 Gram(s) Oral daily  senna 2 Tablet(s) Oral at bedtime    MEDICATIONS  (PRN):  acetaminophen   Tablet. 650 milliGRAM(s) Oral every 6 hours PRN Moderate Pain (4 - 6)  benzonatate 100 milliGRAM(s) Oral three times a day PRN Cough      OBJECTIVE:    Vital Signs Last 24 Hrs  T(C): 36.7 (14 Aug 2018 04:59), Max: 36.7 (13 Aug 2018 12:58)  T(F): 98.1 (14 Aug 2018 04:59), Max: 98.1 (13 Aug 2018 17:57)  HR: 70 (14 Aug 2018 04:59) (66 - 74)  BP: 114/62 (14 Aug 2018 04:59) (103/65 - 122/64)  BP(mean): --  RR: 18 (14 Aug 2018 04:59) (17 - 18)  SpO2: 99% (14 Aug 2018 04:59) (99% - 100%)    CAPILLARY BLOOD GLUCOSE        I&O's Summary    13 Aug 2018 07:01  -  14 Aug 2018 07:00  --------------------------------------------------------  IN: 560 mL / OUT: 700 mL / NET: -140 mL        PHYSICAL EXAM:  GENERAL: pleasant  CHEST/LUNG: Clear to auscultation bilaterally; No wheeze  HEART: Regular rate and rhythm; No murmurs, rubs, or gallops  ABDOMEN: Soft, Nontender, Nondistended; Bowel sounds present  : Nicole catheter in place  EXTREMITIES:  2+ Peripheral Pulses, No clubbing, cyanosis, or edema  PSYCH: AAOx2-3    LABS:                        8.9    6.19  )-----------( 408      ( 14 Aug 2018 05:15 )             28.0     Auto Eosinophil # x     / Auto Eosinophil % x     / Auto Neutrophil # x     / Auto Neutrophil % x     / BANDS % x        08-14    136  |  104  |  16  ----------------------------<  97  4.2   |  21<L>  |  0.95    Ca    8.4      14 Aug 2018 05:20  Mg     2.0     08-14  Phos  3.0     08-14    PT/INR - ( 14 Aug 2018 05:15 )   PT: 12.6 SEC;   INR: 1.09     PTT - ( 14 Aug 2018 05:15 )  PTT:28.7 SEC    Care Discussed with Consultants/Other Providers: yes: EP

## 2018-08-14 NOTE — DISCHARGE NOTE ADULT - MEDICATION SUMMARY - MEDICATIONS TO STOP TAKING
I will STOP taking the medications listed below when I get home from the hospital:    NIFEdipine 60 mg oral tablet, extended release  -- 1 tab(s) by mouth once a day    rosuvastatin 10 mg oral tablet  -- 1 tab(s) by mouth once a day (at bedtime)

## 2018-08-14 NOTE — PROGRESS NOTE ADULT - PROBLEM SELECTOR PLAN 3
delirium in setting of ICU stay, acute cardiac arrest  -reorientation  - has required haldol on multiple occasions. psych recs appreciated.   -avoid delirium inducing medications  -CT head negative for neurologic cause.

## 2018-08-14 NOTE — PROGRESS NOTE ADULT - SUBJECTIVE AND OBJECTIVE BOX
Patient is s/p BSC single chamber AICD implant with Dr. Sterling; left anterior chest with dry dressing; intact. Teaching provided to pt and family (Granddaughter, Ngoc via phone) regarding left arm restrictions and wound care.  All questions answered to patient and family's satisfaction.  Wound care instructions and F/U letter given to granddaughter.  Device booklet and temporary ID card to be given to patient in the a.m.  CXR reviewed; the lead is in optimal position and there is no PTX.   Interrogation of AICD by device rep, pending.  Outpatient F/U is scheduled with Dr. Sterling for 8/28/2018 at 1:00pm. Patient is s/p BSC dual chamber AICD implant with Dr. Sterlnig; left anterior chest with dry dressing; intact. Teaching provided to pt and family (Granddaughter, Ngoc via phone) regarding left arm restrictions and wound care.  All questions answered to patient and family's satisfaction.  Wound care instructions and F/U letter given to granddaughter.  Device booklet and temporary ID card to be given to patient in the a.m.  CXR reviewed; the A & RV leads are in optimal position and there is no PTX.   Interrogation of AICD by device rep, pending.  Outpatient F/U is scheduled with Dr. Sterling for 8/28/2018 at 1:00pm.

## 2018-08-14 NOTE — PROGRESS NOTE ADULT - PROBLEM SELECTOR PLAN 5
patient still with elevated bilirubin, indirect predominant.  -no other signs of hemolysis and Hb is stable.  -likely in setting of gilbert's, but if H/H starts to decline, can obtain hapto/ldh. patient still with elevated bilirubin, indirect predominant.  -no other signs of hemolysis and Hb is stable.  -likely in setting of gilbert's

## 2018-08-15 VITALS
RESPIRATION RATE: 18 BRPM | OXYGEN SATURATION: 100 % | DIASTOLIC BLOOD PRESSURE: 76 MMHG | SYSTOLIC BLOOD PRESSURE: 138 MMHG | HEART RATE: 81 BPM | TEMPERATURE: 99 F

## 2018-08-15 PROCEDURE — 99239 HOSP IP/OBS DSCHRG MGMT >30: CPT

## 2018-08-15 RX ORDER — METOPROLOL TARTRATE 50 MG
1 TABLET ORAL
Qty: 30 | Refills: 0
Start: 2018-08-15 | End: 2018-09-13

## 2018-08-15 RX ORDER — ATORVASTATIN CALCIUM 80 MG/1
1 TABLET, FILM COATED ORAL
Qty: 30 | Refills: 0
Start: 2018-08-15 | End: 2018-09-13

## 2018-08-15 RX ORDER — LISINOPRIL 2.5 MG/1
1 TABLET ORAL
Qty: 30 | Refills: 0
Start: 2018-08-15 | End: 2018-09-13

## 2018-08-15 RX ORDER — DOCUSATE SODIUM 100 MG
1 CAPSULE ORAL
Qty: 0 | Refills: 0 | COMMUNITY
Start: 2018-08-15

## 2018-08-15 RX ORDER — FUROSEMIDE 40 MG
1 TABLET ORAL
Qty: 30 | Refills: 0
Start: 2018-08-15 | End: 2018-09-13

## 2018-08-15 RX ORDER — SENNA PLUS 8.6 MG/1
2 TABLET ORAL
Qty: 0 | Refills: 0 | DISCHARGE
Start: 2018-08-15

## 2018-08-15 RX ORDER — POLYETHYLENE GLYCOL 3350 17 G/17G
17 POWDER, FOR SOLUTION ORAL
Qty: 0 | Refills: 0 | DISCHARGE
Start: 2018-08-15

## 2018-08-15 RX ORDER — LANOLIN ALCOHOL/MO/W.PET/CERES
1 CREAM (GRAM) TOPICAL
Qty: 30 | Refills: 0 | OUTPATIENT
Start: 2018-08-15 | End: 2018-09-13

## 2018-08-15 RX ADMIN — LISINOPRIL 5 MILLIGRAM(S): 2.5 TABLET ORAL at 05:49

## 2018-08-15 RX ADMIN — CLOPIDOGREL BISULFATE 75 MILLIGRAM(S): 75 TABLET, FILM COATED ORAL at 11:28

## 2018-08-15 RX ADMIN — Medication 20 MILLIGRAM(S): at 05:49

## 2018-08-15 RX ADMIN — Medication 50 MILLIGRAM(S): at 05:49

## 2018-08-15 RX ADMIN — Medication 81 MILLIGRAM(S): at 11:28

## 2018-08-15 RX ADMIN — POLYETHYLENE GLYCOL 3350 17 GRAM(S): 17 POWDER, FOR SOLUTION ORAL at 11:28

## 2018-08-15 NOTE — PROGRESS NOTE ADULT - SUBJECTIVE AND OBJECTIVE BOX
Patient is a 82y old  Female who presents with a chief complaint of V-Fib arrest (14 Aug 2018 17:38)      SUBJECTIVE / OVERNIGHT EVENTS:  Patient seen and examined at bedside. No acute events overnight. patient notes slight discomfort near implant but doesn't want pain meds. She wants to go home.     Other Review of Systems Negative.    MEDICATIONS  (STANDING):  aspirin  chewable 81 milliGRAM(s) Oral daily  atorvastatin 80 milliGRAM(s) Oral at bedtime  clopidogrel Tablet 75 milliGRAM(s) Oral daily  docusate sodium 100 milliGRAM(s) Oral two times a day  furosemide    Tablet 20 milliGRAM(s) Oral daily  lisinopril 5 milliGRAM(s) Oral daily  melatonin 3 milliGRAM(s) Oral at bedtime  metoprolol tartrate 50 milliGRAM(s) Oral two times a day  polyethylene glycol 3350 17 Gram(s) Oral daily  senna 2 Tablet(s) Oral at bedtime    MEDICATIONS  (PRN):  acetaminophen   Tablet. 650 milliGRAM(s) Oral every 6 hours PRN Moderate Pain (4 - 6)  benzonatate 100 milliGRAM(s) Oral three times a day PRN Cough      OBJECTIVE:    Vital Signs Last 24 Hrs  T(C): 36.2 (15 Aug 2018 08:57), Max: 36.5 (14 Aug 2018 20:39)  T(F): 97.2 (15 Aug 2018 08:57), Max: 97.7 (14 Aug 2018 20:39)  HR: 73 (15 Aug 2018 08:57) (69 - 78)  BP: 121/72 (15 Aug 2018 08:57) (118/59 - 131/74)  BP(mean): --  RR: 18 (15 Aug 2018 08:57) (16 - 18)  SpO2: 100% (15 Aug 2018 08:57) (98% - 100%)    I&O's Summary    14 Aug 2018 07:01  -  15 Aug 2018 07:00  --------------------------------------------------------  IN: 914 mL / OUT: 800 mL / NET: 114 mL    PHYSICAL EXAM:  GENERAL: happily sitting in the chair next to her bed  CHEST/LUNG: Incision site covered with bandage. AICD placed. No hematoma felt. Clear to auscultation bilaterally; No wheeze  HEART: Regular rate and rhythm; No murmurs, rubs, or gallops  ABDOMEN: Soft, Nontender, Nondistended; Bowel sounds present  EXTREMITIES:  2+ Peripheral Pulses, No clubbing, cyanosis, or edema  PSYCH: AAOx2  LABS:                        8.9    6.19  )-----------( 408      ( 14 Aug 2018 05:15 )             28.0     Auto Eosinophil # x     / Auto Eosinophil % x     / Auto Neutrophil # x     / Auto Neutrophil % x     / BANDS % x        08-14    136  |  104  |  16  ----------------------------<  97  4.2   |  21<L>  |  0.95    Ca    8.4      14 Aug 2018 05:20  Mg     2.0     08-14  Phos  3.0     08-14    PT/INR - ( 14 Aug 2018 05:15 )   PT: 12.6 SEC;   INR: 1.09          PTT - ( 14 Aug 2018 05:15 )  PTT:28.7 SEC    Care Discussed with Consultants/Other Providers: yes

## 2018-08-15 NOTE — PROGRESS NOTE ADULT - PROVIDER SPECIALTY LIST ADULT
Cardiology
Electrophysiology
Internal Medicine
MICU
Electrophysiology
MICU
MICU
Cardiology
Internal Medicine

## 2018-08-15 NOTE — CHART NOTE - NSCHARTNOTESELECT_GEN_ALL_CORE
Event Note
Nutrition Follow-Up Note/Nutrition Services
POCUS
POCUS
Transfer Note
Transfer Note
discharge note/Off Service Note

## 2018-08-15 NOTE — PROGRESS NOTE ADULT - SUBJECTIVE AND OBJECTIVE BOX
Patient states she had mild discomfort at the device site earlier this morning but prefers not to take medications because it was "not that bad".. Instructed to take Tylenol for pain if necessary. No chest pain, shortness of breath, palpitations or lightheadedness. Wants to go home.     Vital Signs Last 24 Hrs  T(C): 36.2 (15 Aug 2018 08:57), Max: 36.5 (14 Aug 2018 20:39)  T(F): 97.2 (15 Aug 2018 08:57), Max: 97.7 (14 Aug 2018 20:39)  HR: 73 (15 Aug 2018 08:57) (69 - 78)  BP: 121/72 (15 Aug 2018 08:57) (118/59 - 131/74)  BP(mean): --  RR: 18 (15 Aug 2018 08:57) (16 - 18)  SpO2: 100% (15 Aug 2018 08:57) (98% - 100%)      EKG  Telemetry:  Normal sinus rhythm and sinus bradycardia 58bpm  MEDICATIONS  (STANDING):  aspirin  chewable 81 milliGRAM(s) Oral daily  atorvastatin 80 milliGRAM(s) Oral at bedtime  clopidogrel Tablet 75 milliGRAM(s) Oral daily  docusate sodium 100 milliGRAM(s) Oral two times a day  furosemide    Tablet 20 milliGRAM(s) Oral daily  lisinopril 5 milliGRAM(s) Oral daily  melatonin 3 milliGRAM(s) Oral at bedtime  metoprolol tartrate 50 milliGRAM(s) Oral two times a day  polyethylene glycol 3350 17 Gram(s) Oral daily  senna 2 Tablet(s) Oral at bedtime    MEDICATIONS  (PRN):  acetaminophen   Tablet. 650 milliGRAM(s) Oral every 6 hours PRN Moderate Pain (4 - 6)  benzonatate 100 milliGRAM(s) Oral three times a day PRN Cough          Physical exam:   Gen- Patient alert, cooperative. NAD.  Resp- clear to auscultation.  NO wheezing, rales or rhonchi  CV- S1 and S2 RRR. Grade 2/6 systolic murmur. No gallops or rubs. SIte with intact steristrips. No bleeding, hematoma or ecchymosis  ABD- soft nontender +bowel sounds.   EXT- No edema  Neuro-grossly nonfocal                            8.9    6.19  )-----------( 408      ( 14 Aug 2018 05:15 )             28.0     PT/INR - ( 14 Aug 2018 05:15 )   PT: 12.6 SEC;   INR: 1.09          PTT - ( 14 Aug 2018 05:15 )  PTT:28.7 SEC  08-14    136  |  104  |  16  ----------------------------<  97  4.2   |  21<L>  |  0.95    Ca    8.4      14 Aug 2018 05:20  Phos  3.0     08-14  Mg     2.0     08-14

## 2018-08-15 NOTE — CHART NOTE - NSCHARTNOTEFT_GEN_A_CORE
Source: Patient [X]    Family [ ]     other [X] Medical Chart     Pt  81 yo female appears alert, oriented. Per Pt her appetite "okay". Pt partially edentulous. But Pt denied having any chewing difficulty; no swallowing difficulty voiced either. No report of vomiting/diarrhea @ present. No other food related concerns voiced. Plan for Pt to be discharged today. RDN remains available.     Diet Rx: Regular, DASH/TLC (cholesterol and sodium restricted)    Patient reports [ ] nausea  [ ] vomiting [ ] diarrhea [ ] constipation  [ ] chewing problems [ ] swallowing issues  [ ] other:   PO intake:  < 50% [X] 50-75% [ ]   % [ ]  other :  Source for PO intake [X] Patient [ ] family [ ] chart [ ] staff [ ] other  Enteral /Parenteral Nutrition: N/A    Current Weight: 53.3 Kg (8/15)   % Weight Change    Pertinent Medications: Aspirin, Plavix, Lasix, Colace, Miralax, Senna, Lipitor,   Pertinent Labs: (8/14) H/H 8.9/28.0 L,  H;  (8/9) Albumin 2.9 L, AST 64 H,  H     Skin:   Estimated Needs:   [X] no change since previous assessment  [ ] recalculated:     Previous Nutrition Diagnosis:   [ ] Inadequate Energy Intake [ ] Inadequate Oral Intake [ ] Excessive Energy Intake   [ ] Underweight [ ] Increased Nutrient Needs [ ] Overweight/Obesity   [ ] Altered GI Function [ ] Unintended Weight Loss [ ] Food & Nutrition Related Knowledge Deficit [ ] Malnutrition   [X] Energy Balance: Predicted suboptimal energy intake    Nutrition Diagnosis is [X] ongoing  [ ] resolved [ ] not applicable     Interventions:   Recommendations  1. Encourage & assist Pt with meals; Monitor PO diet tolerance; Honor food preferences;   2. Monitor labs, weights, hydration status;

## 2018-08-15 NOTE — CHART NOTE - NSCHARTNOTEFT_GEN_A_CORE
Please see R1 note for full progress note. Patient seen and evaluated by me today.  Offers no complaints.  No spinal tenderness, no weakness in b/l lower extremities.  AICD site covered by bandage, no pain to palpation, no hematoma felt.      D/C plannin. change lopressor 50mg BID to toprol 100mg daily as HFrEF  2.. continue all other medications.  3. follow up with cardiologist in 1 week, he will perform follow up echocardiogram for LV recovery as wel as management of lasix  4. Follow up with EP as per appointment made by their team  5. follow up urology for TOV and urodynamic changes as no found etiology of urinary retention.     D/C planning 55 minutes.

## 2018-08-15 NOTE — PROGRESS NOTE ADULT - PROBLEM SELECTOR PLAN 6
- DVT prophylaxis: on heparin subq
- DVT prophylaxis: on heparin subq  - f/u as an outpatient with urology for trial of void.
- DVT prophylaxis: on heparin subq; may consider change to lovenox given creatinine ~0.8.
- DVT prophylaxis: on heparin subq
- DVT prophylaxis: on heparin subq

## 2018-08-15 NOTE — PROGRESS NOTE ADULT - ASSESSMENT
83 yo F with PMH of NSTEMI, PAD, LUIS FERNANDO, HTN, with good functional status, s/p cardiac arrest for VF in the field requiring shock x1 -> s/p [facility] cardiac cath which showed nonobstructive disease.  No intervention.  Patient on goal directed medical therapy with ACE-I and beta blocker therapy. EKG and telemetry with bradycardia and prolonged QT.  She received a dual chamber ICD and tolerated the procedure well.  Post procedure done yesterday with patient and her daughter and again this morning with the patient.  She had reasonable recall regarding care of the device and short term limitations. Written instructions and contact information have been provided.  Labs reviewed and are stable.  CXR with no pneumothorax.  She has a follow-up appointment with the device clinic on August 81 yo F with PMH of NSTEMI, PAD, LUIS FERNANDO, HTN, with good functional status, s/p cardiac arrest for VF in the field requiring shock x1 -> s/p [facility] cardiac cath which showed nonobstructive disease.  No intervention.  Patient on goal directed medical therapy with ACE-I and beta blocker therapy. EKG and telemetry with bradycardia and prolonged QT.  She received a dual chamber ICD and tolerated the procedure well.  Post procedure done yesterday with patient and her daughter and again this morning with the patient.  She had reasonable recall regarding care of the device and short term limitations. Written instructions and contact information have been provided.  Labs reviewed and are stable.  CXR with no pneumothorax.  She has a follow-up appointment with the device clinic on August 28.2018 at 1:00pm 4th floor Oncology Building  [phone].

## 2018-08-15 NOTE — PROGRESS NOTE ADULT - PROBLEM SELECTOR PLAN 5
patient still with elevated bilirubin, indirect predominant.  -no other signs of hemolysis and Hb is stable.  -likely in setting of gilbert's

## 2018-08-15 NOTE — PROGRESS NOTE ADULT - PROBLEM SELECTOR PLAN 1
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  -patient appears euvolemic, saturating well on RA  -on lasix 20mg PO continue to monitor I/O, daily standing weights.  - will send home on toprol 100 mg qd

## 2018-08-16 ENCOUNTER — LABORATORY RESULT (OUTPATIENT)
Age: 83
End: 2018-08-16

## 2018-08-16 ENCOUNTER — APPOINTMENT (OUTPATIENT)
Dept: GERIATRICS | Facility: CLINIC | Age: 83
End: 2018-08-16
Payer: MEDICARE

## 2018-08-16 VITALS
WEIGHT: 117.8 LBS | OXYGEN SATURATION: 98 % | DIASTOLIC BLOOD PRESSURE: 80 MMHG | BODY MASS INDEX: 19.6 KG/M2 | HEART RATE: 79 BPM | SYSTOLIC BLOOD PRESSURE: 124 MMHG | TEMPERATURE: 97.7 F

## 2018-08-16 PROCEDURE — 99204 OFFICE O/P NEW MOD 45 MIN: CPT

## 2018-08-16 PROCEDURE — 99214 OFFICE O/P EST MOD 30 MIN: CPT

## 2018-08-16 RX ORDER — ROSUVASTATIN CALCIUM 10 MG/1
10 TABLET, FILM COATED ORAL
Refills: 0 | Status: DISCONTINUED | COMMUNITY
Start: 2017-12-19 | End: 2018-08-16

## 2018-08-16 RX ORDER — METOPROLOL TARTRATE 25 MG/1
25 TABLET, FILM COATED ORAL TWICE DAILY
Qty: 180 | Refills: 3 | Status: DISCONTINUED | COMMUNITY
Start: 2017-12-19 | End: 2018-08-16

## 2018-08-17 ENCOUNTER — APPOINTMENT (OUTPATIENT)
Dept: UROLOGY | Facility: CLINIC | Age: 83
End: 2018-08-17
Payer: MEDICARE

## 2018-08-17 VITALS
DIASTOLIC BLOOD PRESSURE: 83 MMHG | RESPIRATION RATE: 18 BRPM | HEART RATE: 86 BPM | TEMPERATURE: 97.7 F | SYSTOLIC BLOOD PRESSURE: 130 MMHG

## 2018-08-17 LAB
ALBUMIN SERPL ELPH-MCNC: 3.2 G/DL
ALP BLD-CCNC: 122 U/L
ALT SERPL-CCNC: 35 U/L
ANION GAP SERPL CALC-SCNC: 13 MMOL/L
AST SERPL-CCNC: 51 U/L
BASOPHILS # BLD AUTO: 0.02 K/UL
BASOPHILS NFR BLD AUTO: 0.3 %
BILIRUB SERPL-MCNC: 0.6 MG/DL
BUN SERPL-MCNC: 12 MG/DL
CALCIUM SERPL-MCNC: 9.2 MG/DL
CHLORIDE SERPL-SCNC: 103 MMOL/L
CO2 SERPL-SCNC: 23 MMOL/L
CREAT SERPL-MCNC: 0.91 MG/DL
EOSINOPHIL # BLD AUTO: 0.08 K/UL
EOSINOPHIL NFR BLD AUTO: 1.1 %
GLUCOSE SERPL-MCNC: 90 MG/DL
HCT VFR BLD CALC: 31.9 %
HGB BLD-MCNC: 10.1 G/DL
IMM GRANULOCYTES NFR BLD AUTO: 0.1 %
LYMPHOCYTES # BLD AUTO: 1.66 K/UL
LYMPHOCYTES NFR BLD AUTO: 22.6 %
MAN DIFF?: NORMAL
MCHC RBC-ENTMCNC: 23.3 PG
MCHC RBC-ENTMCNC: 31.7 GM/DL
MCV RBC AUTO: 73.7 FL
MONOCYTES # BLD AUTO: 0.71 K/UL
MONOCYTES NFR BLD AUTO: 9.7 %
NEUTROPHILS # BLD AUTO: 4.86 K/UL
NEUTROPHILS NFR BLD AUTO: 66.2 %
PLATELET # BLD AUTO: 505 K/UL
POTASSIUM SERPL-SCNC: 5.4 MMOL/L
PROT SERPL-MCNC: 7.7 G/DL
RBC # BLD: 4.33 M/UL
RBC # FLD: 17.8 %
SODIUM SERPL-SCNC: 139 MMOL/L
WBC # FLD AUTO: 7.34 K/UL

## 2018-08-17 PROCEDURE — 99203 OFFICE O/P NEW LOW 30 MIN: CPT

## 2018-08-22 ENCOUNTER — MEDICATION RENEWAL (OUTPATIENT)
Age: 83
End: 2018-08-22

## 2018-08-24 ENCOUNTER — APPOINTMENT (OUTPATIENT)
Dept: UROLOGY | Facility: CLINIC | Age: 83
End: 2018-08-24
Payer: MEDICARE

## 2018-08-24 PROCEDURE — 99214 OFFICE O/P EST MOD 30 MIN: CPT

## 2018-08-31 ENCOUNTER — APPOINTMENT (OUTPATIENT)
Dept: ELECTROPHYSIOLOGY | Facility: CLINIC | Age: 83
End: 2018-08-31
Payer: MEDICARE

## 2018-08-31 DIAGNOSIS — M19.90 UNSPECIFIED OSTEOARTHRITIS, UNSPECIFIED SITE: ICD-10-CM

## 2018-08-31 DIAGNOSIS — Z86.79 PERSONAL HISTORY OF OTHER DISEASES OF THE CIRCULATORY SYSTEM: ICD-10-CM

## 2018-08-31 DIAGNOSIS — Z86.39 PERSONAL HISTORY OF OTHER ENDOCRINE, NUTRITIONAL AND METABOLIC DISEASE: ICD-10-CM

## 2018-08-31 PROCEDURE — 99024 POSTOP FOLLOW-UP VISIT: CPT

## 2018-09-07 ENCOUNTER — APPOINTMENT (OUTPATIENT)
Dept: UROLOGY | Facility: CLINIC | Age: 83
End: 2018-09-07
Payer: MEDICARE

## 2018-09-07 PROCEDURE — 99213 OFFICE O/P EST LOW 20 MIN: CPT

## 2018-09-10 LAB
APPEARANCE: CLEAR
BACTERIA UR CULT: ABNORMAL
BACTERIA: ABNORMAL
BILIRUBIN URINE: NEGATIVE
BLOOD URINE: ABNORMAL
COLOR: YELLOW
GLUCOSE QUALITATIVE U: NEGATIVE MG/DL
HYALINE CASTS: 1 /LPF
KETONES URINE: NEGATIVE
LEUKOCYTE ESTERASE URINE: ABNORMAL
MICROSCOPIC-UA: NORMAL
NITRITE URINE: NEGATIVE
PH URINE: 6.5
PROTEIN URINE: NEGATIVE MG/DL
RED BLOOD CELLS URINE: 2 /HPF
SPECIFIC GRAVITY URINE: 1.01
SQUAMOUS EPITHELIAL CELLS: 0 /HPF
UROBILINOGEN URINE: NEGATIVE MG/DL
WHITE BLOOD CELLS URINE: 62 /HPF

## 2018-09-14 ENCOUNTER — TRANSCRIPTION ENCOUNTER (OUTPATIENT)
Age: 83
End: 2018-09-14

## 2018-09-14 ENCOUNTER — MEDICATION RENEWAL (OUTPATIENT)
Age: 83
End: 2018-09-14

## 2018-09-21 ENCOUNTER — APPOINTMENT (OUTPATIENT)
Dept: UROLOGY | Facility: CLINIC | Age: 83
End: 2018-09-21
Payer: MEDICARE

## 2018-09-21 PROCEDURE — 51798 US URINE CAPACITY MEASURE: CPT

## 2018-09-21 PROCEDURE — 99213 OFFICE O/P EST LOW 20 MIN: CPT | Mod: 25

## 2018-09-21 RX ORDER — CEPHALEXIN 500 MG/1
500 CAPSULE ORAL TWICE DAILY
Qty: 14 | Refills: 0 | Status: DISCONTINUED | COMMUNITY
Start: 2018-09-10 | End: 2018-09-21

## 2018-09-27 ENCOUNTER — APPOINTMENT (OUTPATIENT)
Dept: GERIATRICS | Facility: CLINIC | Age: 83
End: 2018-09-27
Payer: MEDICARE

## 2018-09-27 VITALS
OXYGEN SATURATION: 97 % | BODY MASS INDEX: 19.7 KG/M2 | SYSTOLIC BLOOD PRESSURE: 156 MMHG | HEART RATE: 70 BPM | DIASTOLIC BLOOD PRESSURE: 90 MMHG | TEMPERATURE: 97.9 F | WEIGHT: 118.4 LBS

## 2018-09-27 VITALS — SYSTOLIC BLOOD PRESSURE: 138 MMHG | DIASTOLIC BLOOD PRESSURE: 74 MMHG

## 2018-09-27 PROCEDURE — 99214 OFFICE O/P EST MOD 30 MIN: CPT | Mod: GC

## 2018-09-28 LAB
APPEARANCE: ABNORMAL
BACTERIA: ABNORMAL
BILIRUBIN URINE: NEGATIVE
BLOOD URINE: ABNORMAL
COLOR: YELLOW
GLUCOSE QUALITATIVE U: NEGATIVE MG/DL
HYALINE CASTS: 2 /LPF
KETONES URINE: NEGATIVE
LEUKOCYTE ESTERASE URINE: ABNORMAL
MICROSCOPIC-UA: NORMAL
NITRITE URINE: NEGATIVE
PH URINE: 7.5
PROTEIN URINE: NEGATIVE MG/DL
RED BLOOD CELLS URINE: 1 /HPF
SPECIFIC GRAVITY URINE: 1.01
SQUAMOUS EPITHELIAL CELLS: 4 /HPF
UROBILINOGEN URINE: NEGATIVE MG/DL
WHITE BLOOD CELLS URINE: 278 /HPF

## 2018-10-01 LAB — BACTERIA UR CULT: NORMAL

## 2018-10-10 ENCOUNTER — TRANSCRIPTION ENCOUNTER (OUTPATIENT)
Age: 83
End: 2018-10-10

## 2018-10-10 ENCOUNTER — CLINICAL ADVICE (OUTPATIENT)
Age: 83
End: 2018-10-10

## 2018-10-19 ENCOUNTER — APPOINTMENT (OUTPATIENT)
Dept: ULTRASOUND IMAGING | Facility: IMAGING CENTER | Age: 83
End: 2018-10-19
Payer: MEDICARE

## 2018-10-19 ENCOUNTER — OUTPATIENT (OUTPATIENT)
Dept: OUTPATIENT SERVICES | Facility: HOSPITAL | Age: 83
LOS: 1 days | End: 2018-10-19
Payer: MEDICARE

## 2018-10-19 DIAGNOSIS — Z00.8 ENCOUNTER FOR OTHER GENERAL EXAMINATION: ICD-10-CM

## 2018-10-19 PROCEDURE — 93975 VASCULAR STUDY: CPT

## 2018-10-20 PROCEDURE — 93975 VASCULAR STUDY: CPT | Mod: 26

## 2018-10-24 ENCOUNTER — APPOINTMENT (OUTPATIENT)
Dept: NEPHROLOGY | Facility: CLINIC | Age: 83
End: 2018-10-24
Payer: MEDICARE

## 2018-10-24 VITALS — SYSTOLIC BLOOD PRESSURE: 160 MMHG | DIASTOLIC BLOOD PRESSURE: 76 MMHG

## 2018-10-24 VITALS
HEIGHT: 65 IN | SYSTOLIC BLOOD PRESSURE: 152 MMHG | HEART RATE: 67 BPM | OXYGEN SATURATION: 98 % | DIASTOLIC BLOOD PRESSURE: 90 MMHG | WEIGHT: 121 LBS | BODY MASS INDEX: 20.16 KG/M2

## 2018-10-24 PROCEDURE — 99214 OFFICE O/P EST MOD 30 MIN: CPT

## 2018-10-26 ENCOUNTER — APPOINTMENT (OUTPATIENT)
Dept: ELECTROPHYSIOLOGY | Facility: CLINIC | Age: 83
End: 2018-10-26
Payer: MEDICARE

## 2018-10-26 DIAGNOSIS — Z86.79 PERSONAL HISTORY OF OTHER DISEASES OF THE CIRCULATORY SYSTEM: ICD-10-CM

## 2018-10-26 PROCEDURE — 93283 PRGRMG EVAL IMPLANTABLE DFB: CPT

## 2018-11-28 NOTE — ED ADULT TRIAGE NOTE - HEIGHT IN FEET
Pt reporting he was applying hydrocortisone cream, aware of bactriin application instead, po abx, and imp of follow up, strict return precautions given.
5

## 2018-12-11 RX ORDER — NIFEDIPINE 30 MG
1 TABLET, EXTENDED RELEASE 24 HR ORAL
Qty: 0 | Refills: 0 | COMMUNITY

## 2018-12-11 RX ORDER — ROSUVASTATIN CALCIUM 5 MG/1
1 TABLET ORAL
Qty: 0 | Refills: 0 | COMMUNITY

## 2018-12-26 ENCOUNTER — APPOINTMENT (OUTPATIENT)
Dept: CARDIOLOGY | Facility: CLINIC | Age: 83
End: 2018-12-26
Payer: MEDICARE

## 2018-12-26 ENCOUNTER — NON-APPOINTMENT (OUTPATIENT)
Age: 83
End: 2018-12-26

## 2018-12-26 VITALS
HEIGHT: 65 IN | SYSTOLIC BLOOD PRESSURE: 131 MMHG | WEIGHT: 122 LBS | OXYGEN SATURATION: 98 % | HEART RATE: 65 BPM | RESPIRATION RATE: 16 BRPM | BODY MASS INDEX: 20.33 KG/M2 | DIASTOLIC BLOOD PRESSURE: 84 MMHG

## 2018-12-26 PROCEDURE — 99205 OFFICE O/P NEW HI 60 MIN: CPT

## 2018-12-26 PROCEDURE — 93000 ELECTROCARDIOGRAM COMPLETE: CPT

## 2018-12-26 PROCEDURE — 36415 COLL VENOUS BLD VENIPUNCTURE: CPT

## 2018-12-26 RX ORDER — ASPIRIN 81 MG/1
81 TABLET, CHEWABLE ORAL
Refills: 0 | Status: DISCONTINUED | COMMUNITY
End: 2018-12-26

## 2018-12-26 RX ORDER — ATORVASTATIN CALCIUM 40 MG/1
40 TABLET, FILM COATED ORAL
Refills: 0 | Status: DISCONTINUED | COMMUNITY
End: 2018-12-26

## 2018-12-26 RX ORDER — FUROSEMIDE 20 MG/1
20 TABLET ORAL
Refills: 0 | Status: DISCONTINUED | COMMUNITY
End: 2018-12-26

## 2018-12-26 RX ORDER — LISINOPRIL 5 MG/1
5 TABLET ORAL
Refills: 0 | Status: DISCONTINUED | COMMUNITY
End: 2018-12-26

## 2018-12-26 RX ORDER — IBANDRONATE SODIUM 150 MG/1
150 TABLET, FILM COATED ORAL
Refills: 0 | Status: DISCONTINUED | COMMUNITY
End: 2018-12-26

## 2018-12-26 RX ORDER — DOCUSATE SODIUM 100 MG/1
100 CAPSULE, LIQUID FILLED ORAL
Refills: 0 | Status: DISCONTINUED | COMMUNITY
End: 2018-12-26

## 2018-12-26 RX ORDER — METOPROLOL SUCCINATE 100 MG/1
100 TABLET, EXTENDED RELEASE ORAL
Refills: 0 | Status: DISCONTINUED | COMMUNITY
End: 2018-12-26

## 2018-12-27 PROBLEM — M81.0 AGE-RELATED OSTEOPOROSIS WITHOUT CURRENT PATHOLOGICAL FRACTURE: Chronic | Status: ACTIVE | Noted: 2018-08-01

## 2018-12-28 ENCOUNTER — MEDICATION RENEWAL (OUTPATIENT)
Age: 83
End: 2018-12-28

## 2018-12-28 LAB
ALBUMIN SERPL ELPH-MCNC: 3.8 G/DL
ALP BLD-CCNC: 155 U/L
ALT SERPL-CCNC: 19 U/L
ANION GAP SERPL CALC-SCNC: 10 MMOL/L
AST SERPL-CCNC: 24 U/L
BASOPHILS # BLD AUTO: 0.02 K/UL
BASOPHILS NFR BLD AUTO: 0.4 %
BILIRUB SERPL-MCNC: 0.6 MG/DL
BUN SERPL-MCNC: 19 MG/DL
CALCIUM SERPL-MCNC: 8.9 MG/DL
CHLORIDE SERPL-SCNC: 103 MMOL/L
CHOLEST SERPL-MCNC: 145 MG/DL
CHOLEST/HDLC SERPL: 2.9 RATIO
CO2 SERPL-SCNC: 24 MMOL/L
CREAT SERPL-MCNC: 0.89 MG/DL
EOSINOPHIL # BLD AUTO: 0.2 K/UL
EOSINOPHIL NFR BLD AUTO: 4.4 %
FERRITIN SERPL-MCNC: 66 NG/ML
FOLATE SERPL-MCNC: 13.7 NG/ML
GLUCOSE SERPL-MCNC: 71 MG/DL
HBA1C MFR BLD HPLC: 6 %
HCT VFR BLD CALC: 33.6 %
HDLC SERPL-MCNC: 50 MG/DL
HGB BLD-MCNC: 10.8 G/DL
IMM GRANULOCYTES NFR BLD AUTO: 0.2 %
IRON SATN MFR SERPL: 28 %
IRON SERPL-MCNC: 70 UG/DL
LDLC SERPL CALC-MCNC: 81 MG/DL
LYMPHOCYTES # BLD AUTO: 1.89 K/UL
LYMPHOCYTES NFR BLD AUTO: 41.8 %
MAN DIFF?: NORMAL
MCHC RBC-ENTMCNC: 22.5 PG
MCHC RBC-ENTMCNC: 32.1 GM/DL
MCV RBC AUTO: 70 FL
MONOCYTES # BLD AUTO: 0.43 K/UL
MONOCYTES NFR BLD AUTO: 9.5 %
NEUTROPHILS # BLD AUTO: 1.97 K/UL
NEUTROPHILS NFR BLD AUTO: 43.7 %
PLATELET # BLD AUTO: 213 K/UL
POTASSIUM SERPL-SCNC: 4.2 MMOL/L
PROT SERPL-MCNC: 7.4 G/DL
RBC # BLD: 4.8 M/UL
RBC # FLD: 16.2 %
SODIUM SERPL-SCNC: 138 MMOL/L
TIBC SERPL-MCNC: 247 UG/DL
TRIGL SERPL-MCNC: 69 MG/DL
TSH SERPL-ACNC: 1.47 UIU/ML
UIBC SERPL-MCNC: 177 UG/DL
VIT B12 SERPL-MCNC: 451 PG/ML
WBC # FLD AUTO: 4.52 K/UL

## 2018-12-28 NOTE — PHYSICAL EXAM
[General Appearance - Well Developed] : well developed [Normal Appearance] : normal appearance [Normal Conjunctiva] : the conjunctiva exhibited no abnormalities [Normal Oral Mucosa] : normal oral mucosa [Heart Rate And Rhythm] : heart rate and rhythm were normal [Heart Sounds] : normal S1 and S2 [] : no respiratory distress [Auscultation Breath Sounds / Voice Sounds] : lungs were clear to auscultation bilaterally [Bowel Sounds] : normal bowel sounds [Abdomen Soft] : soft [Abdomen Tenderness] : non-tender [Abnormal Walk] : normal gait [Nail Clubbing] : no clubbing of the fingernails [Skin Color & Pigmentation] : normal skin color and pigmentation [Oriented To Time, Place, And Person] : oriented to person, place, and time [FreeTextEntry1] : JVP approx 6 cm w/o HJR

## 2018-12-28 NOTE — ASSESSMENT
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 Currently endorses class I-II symptoms and appears euvolemic.\par \par 1. HFrEF - possibley resolved Takotsubo\par - will repeat TTE\par - reduce lasix to every other day; instructed to take additional if weight increases or has LE swelling\par - on toprol  mg daily; if TTE improved, can consider reducing given may be contributing to fatigue\par - continue lisinopril 5 mg daily\par \par 2. HTN - controlled today\par - goal <130\par - on lisinopril 5 and procardia 30 mg daily (can consider increasing lisinopril and reducing procardia for maximal benefit however given renal artery stenosis will defer for now)\par - keep log of BP\par \par 3. Non-obstructive CAD\par - on ASA 81 mg daily and lipitor 40 mg daily \par \par 4. Fatigue - unclear etiology; possibly medication induced\par - labs checked which revealed microcytic anemia with low ferritin; will start iron daily; informed pt to have worked up with primary care\par - TSH wnl\par - B12/folate wnl\par \par RTC 2 months\par \par \par \par

## 2018-12-28 NOTE — HISTORY OF PRESENT ILLNESS
[FreeTextEntry1] : 84 y/o F w/ h/o R renal artery stenosis (since ), HTN, HL who presents for initial evaluation after recent episode of Takotsubo in  where she had Vfib arrest and received ICD. Accompanied by granddaughter.\par \par Per patient, had been well until she attended a  in [place] end of July and then came up to [place], shortly after had acute LOC, EMS was called and was found to have arrhythmia and required defibrillation. Was intubated and admitted to MICU. A bedside US was suggestive of Takotsubo, confirmed on formal echo. She was worked up for ischemic heart disease which showed non-obstructive coronaries and was diagnosed with Takotsubo's cardiomyopathy. For secondary prevention, received ICD and hasn't required subsequent shock. Initial EF was 28%. \par \leslie Was previously followed by Dr. Pepe at [facility], last seen , but to consolidate her care, they requested to see cardiology here. \par \par Since discharge, has been reportedly more fatigue but states ET is unlimited. Has been able to walk up the stairs w/o difficulty. Denies orthopnea/PND/LE edema. Tolerating medications.

## 2019-01-04 ENCOUNTER — APPOINTMENT (OUTPATIENT)
Dept: UROLOGY | Facility: CLINIC | Age: 84
End: 2019-01-04
Payer: MEDICARE

## 2019-01-04 PROCEDURE — 51798 US URINE CAPACITY MEASURE: CPT

## 2019-01-04 PROCEDURE — 99213 OFFICE O/P EST LOW 20 MIN: CPT | Mod: 25

## 2019-01-10 ENCOUNTER — APPOINTMENT (OUTPATIENT)
Dept: GERIATRICS | Facility: CLINIC | Age: 84
End: 2019-01-10

## 2019-01-10 ENCOUNTER — INPATIENT (INPATIENT)
Facility: HOSPITAL | Age: 84
LOS: 1 days | Discharge: HOME | End: 2019-01-12
Attending: HOSPITALIST | Admitting: HOSPITALIST
Payer: MEDICARE

## 2019-01-10 VITALS
HEART RATE: 78 BPM | SYSTOLIC BLOOD PRESSURE: 117 MMHG | RESPIRATION RATE: 18 BRPM | OXYGEN SATURATION: 99 % | DIASTOLIC BLOOD PRESSURE: 69 MMHG | TEMPERATURE: 97 F

## 2019-01-10 NOTE — ED ADULT TRIAGE NOTE - CHIEF COMPLAINT QUOTE
PT C/O chest tightness starting approximately 7pm. Pt states she was sitting down when pain suddenly appears: it felt like a bloated/ burning sensation. Pt currently denies any/ all symptoms since arriving to ED and states pain is relieved on its own. PMH: AICD, HTN, HLD.

## 2019-01-10 NOTE — ED PROVIDER NOTE - PROGRESS NOTE DETAILS
labs show delta trop from 49 to 41, EKG nsr, consulted cards (house cards pt), d/w hospitalist, will admit

## 2019-01-10 NOTE — ED PROVIDER NOTE - NS ED ROS FT
GENERAL: No fever or chills, EYES: no change in vision, HEENT: no trouble speaking, CARDIAC: see hpi PULMONARY: no cough but had SOB, GI: no abdominal pain, no nausea, no vomiting, no diarrhea or constipation, : No changes in urination, SKIN: no rashes, NEURO: no headache,  MSK: No muscle pain ~Vy Menendez MD (PGY 1)

## 2019-01-10 NOTE — ED ADULT NURSE NOTE - CHPI ED NUR SYMPTOMS NEG
no chills/no decreased eating/drinking/no tingling/no pain/no nausea/no weakness/no dizziness/no fever/no vomiting

## 2019-01-10 NOTE — ED PROVIDER NOTE - PHYSICAL EXAMINATION
Gen: AAOx3, non-toxic  Head: NCAT  HEENT: EOMI, PERRLA, oral mucosa moist, normal conjunctiva  Lung: CTAB, no respiratory distress, no wheezes/rhonchi/rales B/L, speaking in full sentences  CV: RRR, no murmurs, rubs or gallops  Abd: soft, NTND, no guarding, no CVA tenderness, no rebound tenderness  MSK: no visible deformities, full range of motion of all 4 exts  Neuro: No focal sensory or motor deficits  Skin: Warm, well perfused, no rash  Psych: normal affect.   ~Vy Menendez MD (PGY1)

## 2019-01-10 NOTE — ED PROVIDER NOTE - OBJECTIVE STATEMENT
Vy Menendez MD PGY1: 83 yo with h/o NSTEMI as per chart, PAD on ASA, Renal artery stenosis, HTN, Cardiac arrest 08/2018, CHF, AICD p/w non exertional chest tightness 4hrs ago. Pt states that she was sitting on the couch when she had a substernal non-radiating pressure like pain. No aggravating factors. Reports that she drank some pepsi which help settled her chest tightness. Pt denies any pain currently. Reported some SOB will having the chest tightness. Pt has a echo schedule in 1 week. Denies fevers, chill, n/v, palpitations, abd pain blood in stool or urine dysuria

## 2019-01-10 NOTE — ED ADULT NURSE NOTE - INTERVENTIONS DEFINITIONS
Stretcher in lowest position, wheels locked, appropriate side rails in place/Provide visual cue, wrist band, yellow gown, etc./Monitor gait and stability/Room bathroom lighting operational/Reinforce activity limits and safety measures with patient and family/Call bell, personal items and telephone within reach/[place] to call system/Instruct patient to call for assistance/Physically safe environment: no spills, clutter or unnecessary equipment

## 2019-01-10 NOTE — ED ADULT NURSE NOTE - PMH
CKD (chronic kidney disease)    HTN (hypertension)    HTN (hypertension)    NSTEMI (non-ST elevation myocardial infarction)    Osteoporosis    Osteoporosis    PAD (peripheral artery disease)    Poor circulation    RA (rheumatoid arthritis)

## 2019-01-10 NOTE — ED ADULT NURSE NOTE - NS PRO AD PATIENT TYPE
"Subjective:       Patient ID: Alexa Otero is a 77 y.o. female.    This patient is new to me.  Referring MD:  Dr. Greco for Kwon's esophagus.      Chief Complaint: Kwon's esophagus    Patient seen for Kwon's esophagus, onset in  per notes from Dr. Jimenez which were reviewed, short segment with focal HGD, treated with HALO and Stretta by Dr. Samuel, with subsequent EGD showing no residual Kwon's in 2016, with no current symptoms and no alleviating/exacerbating factors.  She states that she was told she would not need further EGD but she would feel better with continued surveillance given that she had a family member that  of esophageal cancer.  She had colon polyps in the past and last adenoma was noted 3 years ago.      Review of Systems   Constitutional: Negative for chills, fatigue and fever.   HENT: Negative for sore throat and trouble swallowing.    Respiratory: Negative for cough, shortness of breath and wheezing.    Cardiovascular: Negative for chest pain and palpitations.   Gastrointestinal: Negative for abdominal pain, blood in stool, nausea and vomiting.   Genitourinary: Negative for dysuria and hematuria.   Musculoskeletal: Negative for arthralgias and myalgias.   Skin: Negative for color change and rash.   Neurological: Negative for dizziness and headaches.   Hematological: Negative for adenopathy.   Psychiatric/Behavioral: Negative for confusion. The patient is not nervous/anxious.    All other systems reviewed and are negative.      Objective:       Vitals:    18 1506   BP: 117/70   Pulse: 97   Weight: 69.3 kg (152 lb 12.5 oz)   Height: 5' 3" (1.6 m)       Physical Exam   Constitutional: She appears well-developed and well-nourished.   HENT:   Head: Normocephalic and atraumatic.   Eyes: Pupils are equal, round, and reactive to light. No scleral icterus.   Neck: Normal range of motion.   Cardiovascular: Normal rate and regular rhythm.    No murmur heard.  Pulmonary/Chest: " Effort normal and breath sounds normal. She has no wheezes.   Abdominal: Soft. Bowel sounds are normal. She exhibits no distension. There is no tenderness.   Musculoskeletal: She exhibits no edema or tenderness.   Lymphadenopathy:     She has no cervical adenopathy.   Neurological: She is alert.   Skin: Skin is warm and dry. No rash noted.         Lab Results   Component Value Date    WBC 9.05 04/17/2018    HGB 13.4 04/17/2018    HCT 41.5 04/17/2018    MCV 90 04/17/2018     04/17/2018       CMP  Sodium   Date Value Ref Range Status   04/17/2018 142 136 - 145 mmol/L Final     Potassium   Date Value Ref Range Status   04/17/2018 3.9 3.5 - 5.1 mmol/L Final     Chloride   Date Value Ref Range Status   04/17/2018 103 95 - 110 mmol/L Final     CO2   Date Value Ref Range Status   04/17/2018 26 23 - 29 mmol/L Final     Glucose   Date Value Ref Range Status   04/17/2018 119 (H) 70 - 110 mg/dL Final     BUN, Bld   Date Value Ref Range Status   04/17/2018 24 (H) 8 - 23 mg/dL Final     Creatinine   Date Value Ref Range Status   04/17/2018 0.9 0.5 - 1.4 mg/dL Final     Calcium   Date Value Ref Range Status   04/17/2018 10.2 8.7 - 10.5 mg/dL Final     Total Protein   Date Value Ref Range Status   04/17/2018 7.2 6.0 - 8.4 g/dL Final     Albumin   Date Value Ref Range Status   04/17/2018 3.9 3.5 - 5.2 g/dL Final     Total Bilirubin   Date Value Ref Range Status   04/17/2018 0.6 0.1 - 1.0 mg/dL Final     Comment:     For infants and newborns, interpretation of results should be based  on gestational age, weight and in agreement with clinical  observations.  Premature Infant recommended reference ranges:  Up to 24 hours.............<8.0 mg/dL  Up to 48 hours............<12.0 mg/dL  3-5 days..................<15.0 mg/dL  6-29 days.................<15.0 mg/dL       Alkaline Phosphatase   Date Value Ref Range Status   04/17/2018 45 (L) 55 - 135 U/L Final     AST   Date Value Ref Range Status   04/17/2018 20 10 - 40 U/L Final      ALT   Date Value Ref Range Status   04/17/2018 22 10 - 44 U/L Final     Anion Gap   Date Value Ref Range Status   04/17/2018 13 8 - 16 mmol/L Final     eGFR if    Date Value Ref Range Status   04/17/2018 >60.0 >60 mL/min/1.73 m^2 Final     eGFR if non    Date Value Ref Range Status   04/17/2018 >60.0 >60 mL/min/1.73 m^2 Final     Comment:     Calculation used to obtain the estimated glomerular filtration  rate (eGFR) is the CKD-EPI equation.          Images from scope with Dr. Samuel were independently visualized and reviewed by me and showed short segment Kwon's status post Stretta and HALO.      Old records from Dr. Jimenez reviewed and are as summarized above in the HPI.    Assessment:       1. Gastroesophageal reflux disease, esophagitis presence not specified    2. History of Kwon's esophagus    3. History of colon polyps        Plan:       1.  Colonoscopy in 2 years for polyp surveillance  2.  Antireflux precautions including avoidance of late night eating and lying down soon after eating.     3.  Continue PPI  4.  Discussed risks and benefits of EGD and patient agrees to procedure.  Will schedule  5.  Further recommendations to follow after above.  6.  Communication will be sent to the referring MD, Dr. Greco regarding my assessment and plan on this patient via EPIC.        Health Care Proxy (HCP)

## 2019-01-10 NOTE — ED PROVIDER NOTE - MEDICAL DECISION MAKING DETAILS
Vy Menendez MD PGY1:83 yo with h/o NSTEMI as per chart, PAD on ASA, Renal artery stenosis, HTN, Cardiac arrest 08/2018, CHF, AICD p/w non exertional chest tightness 4hrs ago. mostly GERD bc pain relief by pepsi however pt has a extensive cardiac hx will r/o ACS most like admit on tele. Will get cbc, cmp, troponin, cxr

## 2019-01-10 NOTE — ED PROVIDER NOTE - ATTENDING CONTRIBUTION TO CARE
agree with resident note  " 81 yo with h/o NSTEMI as per chart, PAD on ASA, Renal artery stenosis, HTN, Cardiac arrest 08/2018, CHF, AICD p/w non exertional chest tightness 4hrs ago."  States was sitting on cough when pain started.  Does state has been belching and that has helped with pain.  Currently has no complaints.  Denies SOB, diaphoresis during episode    PE: well appearing; VSS; CTAB/L; s1 s2 no m/r/g abd soft/NT/ND ext: no edema    Imp: likely reflux given hx but with hx of NSTEMI and vfib arrest will admit for cardiology to assess

## 2019-01-11 ENCOUNTER — TRANSCRIPTION ENCOUNTER (OUTPATIENT)
Age: 84
End: 2019-01-11

## 2019-01-11 VITALS — WEIGHT: 119.05 LBS

## 2019-01-11 DIAGNOSIS — R07.9 CHEST PAIN, UNSPECIFIED: ICD-10-CM

## 2019-01-11 DIAGNOSIS — N18.9 CHRONIC KIDNEY DISEASE, UNSPECIFIED: ICD-10-CM

## 2019-01-11 DIAGNOSIS — I10 ESSENTIAL (PRIMARY) HYPERTENSION: ICD-10-CM

## 2019-01-11 LAB
ALBUMIN SERPL ELPH-MCNC: 3.8 G/DL — SIGNIFICANT CHANGE UP (ref 3.3–5)
ALP SERPL-CCNC: 164 U/L — HIGH (ref 40–120)
ALT FLD-CCNC: 20 U/L — SIGNIFICANT CHANGE UP (ref 4–33)
ANION GAP SERPL CALC-SCNC: 13 MEQ/L — SIGNIFICANT CHANGE UP (ref 7–14)
ANION GAP SERPL CALC-SCNC: 16 MEQ/L — HIGH (ref 7–14)
ANISOCYTOSIS BLD QL: SLIGHT — SIGNIFICANT CHANGE UP
AST SERPL-CCNC: 27 U/L — SIGNIFICANT CHANGE UP (ref 4–32)
BASOPHILS # BLD AUTO: 0.04 K/UL — SIGNIFICANT CHANGE UP (ref 0–0.2)
BASOPHILS NFR BLD AUTO: 0.5 % — SIGNIFICANT CHANGE UP (ref 0–2)
BASOPHILS NFR SPEC: 0 % — SIGNIFICANT CHANGE UP (ref 0–2)
BILIRUB SERPL-MCNC: 0.5 MG/DL — SIGNIFICANT CHANGE UP (ref 0.2–1.2)
BLASTS # FLD: 0 % — SIGNIFICANT CHANGE UP (ref 0–0)
BUN SERPL-MCNC: 16 MG/DL — SIGNIFICANT CHANGE UP (ref 7–23)
BUN SERPL-MCNC: 18 MG/DL — SIGNIFICANT CHANGE UP (ref 7–23)
CALCIUM SERPL-MCNC: 9.7 MG/DL — SIGNIFICANT CHANGE UP (ref 8.4–10.5)
CALCIUM SERPL-MCNC: 9.8 MG/DL — SIGNIFICANT CHANGE UP (ref 8.4–10.5)
CHLORIDE SERPL-SCNC: 103 MMOL/L — SIGNIFICANT CHANGE UP (ref 98–107)
CHLORIDE SERPL-SCNC: 104 MMOL/L — SIGNIFICANT CHANGE UP (ref 98–107)
CHOLEST SERPL-MCNC: 144 MG/DL — SIGNIFICANT CHANGE UP (ref 120–199)
CK MB BLD-MCNC: 2.46 NG/ML — SIGNIFICANT CHANGE UP (ref 1–4.7)
CK MB BLD-MCNC: SIGNIFICANT CHANGE UP (ref 0–2.5)
CK SERPL-CCNC: 58 U/L — SIGNIFICANT CHANGE UP (ref 25–170)
CO2 SERPL-SCNC: 16 MMOL/L — LOW (ref 22–31)
CO2 SERPL-SCNC: 20 MMOL/L — LOW (ref 22–31)
CREAT SERPL-MCNC: 1 MG/DL — SIGNIFICANT CHANGE UP (ref 0.5–1.3)
CREAT SERPL-MCNC: 1.13 MG/DL — SIGNIFICANT CHANGE UP (ref 0.5–1.3)
ELLIPTOCYTES BLD QL SMEAR: SLIGHT — SIGNIFICANT CHANGE UP
EOSINOPHIL # BLD AUTO: 0.09 K/UL — SIGNIFICANT CHANGE UP (ref 0–0.5)
EOSINOPHIL NFR BLD AUTO: 1.2 % — SIGNIFICANT CHANGE UP (ref 0–6)
EOSINOPHIL NFR FLD: 1.8 % — SIGNIFICANT CHANGE UP (ref 0–6)
GIANT PLATELETS BLD QL SMEAR: PRESENT — SIGNIFICANT CHANGE UP
GLUCOSE SERPL-MCNC: 116 MG/DL — HIGH (ref 70–99)
GLUCOSE SERPL-MCNC: 98 MG/DL — SIGNIFICANT CHANGE UP (ref 70–99)
HBA1C BLD-MCNC: 5.7 % — HIGH (ref 4–5.6)
HCT VFR BLD CALC: 37.4 % — SIGNIFICANT CHANGE UP (ref 34.5–45)
HCT VFR BLD CALC: 40.2 % — SIGNIFICANT CHANGE UP (ref 34.5–45)
HDLC SERPL-MCNC: 52 MG/DL — SIGNIFICANT CHANGE UP (ref 45–65)
HGB BLD-MCNC: 11.7 G/DL — SIGNIFICANT CHANGE UP (ref 11.5–15.5)
HGB BLD-MCNC: 12.2 G/DL — SIGNIFICANT CHANGE UP (ref 11.5–15.5)
IMM GRANULOCYTES NFR BLD AUTO: 0.3 % — SIGNIFICANT CHANGE UP (ref 0–1.5)
LIPID PNL WITH DIRECT LDL SERPL: 90 MG/DL — SIGNIFICANT CHANGE UP
LYMPHOCYTES # BLD AUTO: 1.7 K/UL — SIGNIFICANT CHANGE UP (ref 1–3.3)
LYMPHOCYTES # BLD AUTO: 22.9 % — SIGNIFICANT CHANGE UP (ref 13–44)
LYMPHOCYTES NFR SPEC AUTO: 13.3 % — SIGNIFICANT CHANGE UP (ref 13–44)
MAGNESIUM SERPL-MCNC: 2 MG/DL — SIGNIFICANT CHANGE UP (ref 1.6–2.6)
MCHC RBC-ENTMCNC: 22.3 PG — LOW (ref 27–34)
MCHC RBC-ENTMCNC: 22.5 PG — LOW (ref 27–34)
MCHC RBC-ENTMCNC: 30.3 % — LOW (ref 32–36)
MCHC RBC-ENTMCNC: 31.3 % — LOW (ref 32–36)
MCV RBC AUTO: 72.1 FL — LOW (ref 80–100)
MCV RBC AUTO: 73.6 FL — LOW (ref 80–100)
METAMYELOCYTES # FLD: 0 % — SIGNIFICANT CHANGE UP (ref 0–1)
MICROCYTES BLD QL: SLIGHT — SIGNIFICANT CHANGE UP
MONOCYTES # BLD AUTO: 0.76 K/UL — SIGNIFICANT CHANGE UP (ref 0–0.9)
MONOCYTES NFR BLD AUTO: 10.3 % — SIGNIFICANT CHANGE UP (ref 2–14)
MONOCYTES NFR BLD: 8.8 % — SIGNIFICANT CHANGE UP (ref 2–9)
MYELOCYTES NFR BLD: 0 % — SIGNIFICANT CHANGE UP (ref 0–0)
NEUTROPHIL AB SER-ACNC: 63.7 % — SIGNIFICANT CHANGE UP (ref 43–77)
NEUTROPHILS # BLD AUTO: 4.8 K/UL — SIGNIFICANT CHANGE UP (ref 1.8–7.4)
NEUTROPHILS NFR BLD AUTO: 64.8 % — SIGNIFICANT CHANGE UP (ref 43–77)
NEUTS BAND # BLD: 0 % — SIGNIFICANT CHANGE UP (ref 0–6)
NRBC # FLD: 0 K/UL — LOW (ref 25–125)
NRBC # FLD: 0 K/UL — LOW (ref 25–125)
OTHER - HEMATOLOGY %: 0 — SIGNIFICANT CHANGE UP
OVALOCYTES BLD QL SMEAR: SLIGHT — SIGNIFICANT CHANGE UP
PLATELET # BLD AUTO: 244 K/UL — SIGNIFICANT CHANGE UP (ref 150–400)
PLATELET # BLD AUTO: 264 K/UL — SIGNIFICANT CHANGE UP (ref 150–400)
PLATELET COUNT - ESTIMATE: NORMAL — SIGNIFICANT CHANGE UP
PMV BLD: 11.2 FL — SIGNIFICANT CHANGE UP (ref 7–13)
PMV BLD: 11.9 FL — SIGNIFICANT CHANGE UP (ref 7–13)
POIKILOCYTOSIS BLD QL AUTO: SLIGHT — SIGNIFICANT CHANGE UP
POLYCHROMASIA BLD QL SMEAR: SLIGHT — SIGNIFICANT CHANGE UP
POTASSIUM SERPL-MCNC: 3.7 MMOL/L — SIGNIFICANT CHANGE UP (ref 3.5–5.3)
POTASSIUM SERPL-MCNC: 4.1 MMOL/L — SIGNIFICANT CHANGE UP (ref 3.5–5.3)
POTASSIUM SERPL-SCNC: 3.7 MMOL/L — SIGNIFICANT CHANGE UP (ref 3.5–5.3)
POTASSIUM SERPL-SCNC: 4.1 MMOL/L — SIGNIFICANT CHANGE UP (ref 3.5–5.3)
PROMYELOCYTES # FLD: 0 % — SIGNIFICANT CHANGE UP (ref 0–0)
PROT SERPL-MCNC: 7.6 G/DL — SIGNIFICANT CHANGE UP (ref 6–8.3)
RBC # BLD: 5.19 M/UL — SIGNIFICANT CHANGE UP (ref 3.8–5.2)
RBC # BLD: 5.46 M/UL — HIGH (ref 3.8–5.2)
RBC # FLD: 16.4 % — HIGH (ref 10.3–14.5)
RBC # FLD: 16.5 % — HIGH (ref 10.3–14.5)
SMUDGE CELLS # BLD: PRESENT — SIGNIFICANT CHANGE UP
SODIUM SERPL-SCNC: 136 MMOL/L — SIGNIFICANT CHANGE UP (ref 135–145)
SODIUM SERPL-SCNC: 136 MMOL/L — SIGNIFICANT CHANGE UP (ref 135–145)
TARGETS BLD QL SMEAR: SLIGHT — SIGNIFICANT CHANGE UP
TRIGL SERPL-MCNC: 53 MG/DL — SIGNIFICANT CHANGE UP (ref 10–149)
TROPONIN T, HIGH SENSITIVITY: 41 NG/L — SIGNIFICANT CHANGE UP (ref ?–14)
TROPONIN T, HIGH SENSITIVITY: 49 NG/L — SIGNIFICANT CHANGE UP (ref ?–14)
TSH SERPL-MCNC: 2.27 UIU/ML — SIGNIFICANT CHANGE UP (ref 0.27–4.2)
VARIANT LYMPHS # BLD: 10.6 % — SIGNIFICANT CHANGE UP
WBC # BLD: 7.41 K/UL — SIGNIFICANT CHANGE UP (ref 3.8–10.5)
WBC # BLD: 8.33 K/UL — SIGNIFICANT CHANGE UP (ref 3.8–10.5)
WBC # FLD AUTO: 7.41 K/UL — SIGNIFICANT CHANGE UP (ref 3.8–10.5)
WBC # FLD AUTO: 8.33 K/UL — SIGNIFICANT CHANGE UP (ref 3.8–10.5)

## 2019-01-11 PROCEDURE — 99223 1ST HOSP IP/OBS HIGH 75: CPT

## 2019-01-11 PROCEDURE — 71046 X-RAY EXAM CHEST 2 VIEWS: CPT | Mod: 26

## 2019-01-11 PROCEDURE — 99222 1ST HOSP IP/OBS MODERATE 55: CPT

## 2019-01-11 RX ORDER — ATORVASTATIN CALCIUM 80 MG/1
1 TABLET, FILM COATED ORAL
Qty: 0 | Refills: 0 | COMMUNITY

## 2019-01-11 RX ORDER — ASPIRIN/CALCIUM CARB/MAGNESIUM 324 MG
81 TABLET ORAL DAILY
Qty: 0 | Refills: 0 | Status: DISCONTINUED | OUTPATIENT
Start: 2019-01-11 | End: 2019-01-12

## 2019-01-11 RX ORDER — ASPIRIN/CALCIUM CARB/MAGNESIUM 324 MG
162 TABLET ORAL ONCE
Qty: 0 | Refills: 0 | Status: COMPLETED | OUTPATIENT
Start: 2019-01-11 | End: 2019-01-11

## 2019-01-11 RX ORDER — IBANDRONATE SODIUM 150 MG/1
1 TABLET ORAL
Qty: 0 | Refills: 0 | COMMUNITY

## 2019-01-11 RX ORDER — NIFEDIPINE 30 MG
30 TABLET, EXTENDED RELEASE 24 HR ORAL DAILY
Qty: 0 | Refills: 0 | Status: DISCONTINUED | OUTPATIENT
Start: 2019-01-11 | End: 2019-01-12

## 2019-01-11 RX ORDER — ASPIRIN/CALCIUM CARB/MAGNESIUM 324 MG
1 TABLET ORAL
Qty: 0 | Refills: 0 | COMMUNITY

## 2019-01-11 RX ORDER — ENOXAPARIN SODIUM 100 MG/ML
40 INJECTION SUBCUTANEOUS EVERY 24 HOURS
Qty: 0 | Refills: 0 | Status: DISCONTINUED | OUTPATIENT
Start: 2019-01-11 | End: 2019-01-12

## 2019-01-11 RX ORDER — ATORVASTATIN CALCIUM 80 MG/1
40 TABLET, FILM COATED ORAL AT BEDTIME
Qty: 0 | Refills: 0 | Status: DISCONTINUED | OUTPATIENT
Start: 2019-01-11 | End: 2019-01-12

## 2019-01-11 RX ORDER — LISINOPRIL 2.5 MG/1
5 TABLET ORAL DAILY
Qty: 0 | Refills: 0 | Status: DISCONTINUED | OUTPATIENT
Start: 2019-01-11 | End: 2019-01-12

## 2019-01-11 RX ORDER — ROSUVASTATIN CALCIUM 5 MG/1
1 TABLET ORAL
Qty: 0 | Refills: 0 | COMMUNITY

## 2019-01-11 RX ORDER — FUROSEMIDE 40 MG
20 TABLET ORAL DAILY
Qty: 0 | Refills: 0 | Status: DISCONTINUED | OUTPATIENT
Start: 2019-01-11 | End: 2019-01-12

## 2019-01-11 RX ORDER — ACETAMINOPHEN 500 MG
650 TABLET ORAL EVERY 6 HOURS
Qty: 0 | Refills: 0 | Status: DISCONTINUED | OUTPATIENT
Start: 2019-01-11 | End: 2019-01-12

## 2019-01-11 RX ORDER — METOPROLOL TARTRATE 50 MG
100 TABLET ORAL DAILY
Qty: 0 | Refills: 0 | Status: DISCONTINUED | OUTPATIENT
Start: 2019-01-11 | End: 2019-01-12

## 2019-01-11 RX ADMIN — Medication 162 MILLIGRAM(S): at 03:06

## 2019-01-11 NOTE — DISCHARGE NOTE ADULT - CARE PROVIDER_API CALL
Demarcus Beltran), Cardiovascular Disease; Nuclear Cardiology  [address] [place] [address]  Phone: [phone]  Fax: [phone]    David Banks), Cardiovascular Disease; Internal Medicine  [address] [place] [address]  Phone: [phone]  Fax: [phone]

## 2019-01-11 NOTE — CONSULT NOTE ADULT - ATTENDING COMMENTS
Unlikely to be cardiac in etiology. No need for echo, can be done as outpt. No further inpatient w/u.

## 2019-01-11 NOTE — ED ADULT NURSE REASSESSMENT NOTE - NS ED NURSE REASSESS COMMENT FT1
Pt received in room 10.  AOX4, skin warm, dry and intact.  Pt cooperative, bu noted to be restless at times.  Labs drawn and sent.  Right AC 20 g, site dry and intact at this time.  Flushing w/o difficulties.  Pt on CM.  Awaiting bed assignment.  Continue to monitor.

## 2019-01-11 NOTE — DISCHARGE NOTE ADULT - HOSPITAL COURSE
82 y/o female, with a PmHx of CKD, HTN, CAD s/p MI, PAD, RA, R renal artery stenosis,  osteoporosis, CHF s/p ICD, presented with chest pain. Admitted to telemetry for r/o acs. MI ruled out. Chest pain is atypical. cardiology consult done. As per cardiology Dr Ruby franco patient stable for discharge home and follow up with Dr Banks as outpatient. TTE next thursday. Patient wants to go home, and spoke with patients gilbertoer.       tests  EKG: SR @ 68 bpmFlipped Ts V1 biphasic T V2  hsTrop: 49-->41                     Glucose: 116                 Alk phos: 164  1/11 CXR - clear lungs

## 2019-01-11 NOTE — H&P ADULT - DOES THIS PATIENT HAVE A HISTORY OF OR HAS BEEN DX WITH HEART FAILURE?
History of Present Illness





- Reason for Consult


Consult date: 07/06/18


rectal bleeding


Requesting physician: SUSIE CASTANEDA





- History of Present Illness


Patient is a 69 y/o female with PMH of ESRD on HD, CHF, HIV, and HTN who 

presented to ED with c/o presented to ED with c/o increased fatigue, 

generalized weakness intermittent right-sided abdominal pain, and bright red 

blood in her stool. This morning patient was resting in bed w/o acute distress. 

Reports bright red blood in stool x 4 days this week following an episode of 

constipation with straining. No signs of active bleeding x 2 days. No melena or 

hematochezia. Also admits to intermittent right-sided abd pain but no current 

pain. Denies fever, wt loss, CP, SOB, dizziness, N/V, dyphagia, or diarrhea. 

Last EGD 09/2017 that revealed gastritis and esophagitis. Last colonoscopy 

approximately 2-3 years ago that revealed polyps.














Past History


Past Medical History: ESRD, heart failure, hypertension, other (HIV)


Past Surgical History: appendectomy, Other (removal of PD catheter, AV fistula)


Social history: denies: smoking





Medications and Allergies


 Allergies











Allergy/AdvReac Type Severity Reaction Status Date / Time


 


codeine Allergy  HALLUCINATE Verified 07/05/18 17:25


 


hydralazine AdvReac  Vomiting Verified 07/05/18 17:25











 Home Medications











 Medication  Instructions  Recorded  Confirmed  Last Taken  Type


 


Efavirenz [Sustiva] 600 mg PO DAILY 02/02/17 07/05/18 05/10/17 History


 


Minoxidil [Loniten] 10 mg PO QDAY 02/02/17 07/05/18 05/10/17 History


 


Terazosin [Hytrin] 10 mg PO BID 02/02/17 07/05/18 05/10/17 History


 


Lamivudine 1 tab PO QDAY 07/17/17 07/05/18 Unknown History


 


Tenofovir [Viread] 300 mg PO QWEEK 07/17/17 07/05/18 Unknown History


 


NIFEdipine XL [Procardia Xl] 30 mg PO Q12HR #60 tablet 07/18/17 07/05/18 

Unknown Rx


 


cloNIDine [Catapres] 0.3 mg PO BID #180 tablet 07/18/17 07/05/18 Unknown Rx


 


ALBUTEROL Inhaler [ProAir HFA 2 puff IH QID PRN #1 inhalation 06/13/18 07/05/18 

Unknown Rx





Inhaler]     


 


Fluticasone [Flonase] 1 spray NS QDAY #1 bottle 06/13/18 07/05/18 Unknown Rx











Active Meds: 


Active Medications





Acetaminophen (Tylenol)  650 mg PO Q4H PRN


   PRN Reason: Fever >101


Albuterol (Proventil)  2.5 mg IH QIDRT PRN


   PRN Reason: Shortness Of Breath


Clonidine HCl (Catapres)  0.3 mg PO BID [facility]


Efavirenz (Sustiva)  600 mg PO DAILY@2200 [facility]


Fluticasone Propionate (Flonase)  50 mcg NS QDAY [facility]


Hydromorphone HCl (Dilaudid)  0.5 mg IV Q4H PRN


   PRN Reason: Pain, Moderate (4-6)


   Last Admin: 07/06/18 06:15 Dose:  0.5 mg


Sodium Chloride (Nacl 0.9% 500 Ml)  500 mls @ 0 mls/hr IV ONCE NR


   Stop: 07/06/18 12:30


Lamivudine (Epivir)  150 mg PO DAILY@2200 [facility]


Minoxidil (Loniten)  10 mg PO QDAY [facility]


Nifedipine (Procardia Xl)  30 mg PO Q12HR [facility]


Ondansetron HCl (Zofran)  4 mg IV Q8H PRN


   PRN Reason: Nausea And Vomiting


Pantoprazole Sodium (Protonix)  40 mg IV Q12H [facility]


   Last Admin: 07/05/18 23:53 Dose:  40 mg


Prazosin HCl (Minipress)  5 mg PO BID [facility]


Tenofovir Disoproxil Fumarate (Viread)  300 mg PO Th@2200 [facility]











Review of Systems





- Review of Systems


All systems: negative


Gastrointestinal: abdominal pain, hematochezia





Exam





- Constitutional


Vital Signs: 


 











Temp Pulse Resp BP Pulse Ox


 


 97.9 F   106 H  20   190/88   98 


 


 07/06/18 07:43  07/06/18 07:43  07/06/18 07:43  07/06/18 07:43  07/06/18 07:43











General appearance: no acute distress





- EENT


Eyes: PERRL, EOM intact


ENT: hearing intact





- Respiratory


Respiratory: bilateral: CTA





- Cardiovascular


Rhythm: regular


Heart Sounds: Present: S1 & S2





- Gastrointestinal


General gastrointestinal: Present: soft, tender (RUQ), non-distended, normal 

bowel sounds


Rectal Exam: hemorrhoids, stool brown, other (no blood)





- Neurologic


Neurological: alert and oriented x3





- Labs


CBC & Chem 7: 


 07/06/18 09:07





 07/05/18 17:40


Lab Results: 


 Laboratory Results - last 24 hr











  07/05/18 07/05/18 07/05/18





  17:40 17:40 17:40


 


WBC  8.2  


 


RBC  2.35 L  


 


Hgb  6.3 L  


 


Hct  20.6 L  


 


MCV  87  


 


MCH  27 L  


 


MCHC  31  


 


RDW  20.7 H  


 


Plt Count  177  


 


Lymph % (Auto)  14.8  


 


Mono % (Auto)  5.6  


 


Eos % (Auto)  2.4  


 


Baso % (Auto)  1.4  


 


Lymph #  1.2  


 


Mono #  0.5  


 


Eos #  0.2  


 


Baso #  0.1  


 


Seg Neutrophils %  75.8 H  


 


Seg Neutrophils #  6.2  


 


Sodium   139 


 


Potassium   3.7 


 


Chloride   95.6 L 


 


Carbon Dioxide   30 


 


Anion Gap   17 


 


BUN   26 H 


 


Creatinine   4.8 H 


 


Estimated GFR   11 


 


BUN/Creatinine Ratio   5 


 


Glucose   129 H 


 


Calcium   8.3 L 


 


Total Bilirubin   0.30 


 


AST   18 


 


ALT   5 L 


 


Alkaline Phosphatase   135 H 


 


Total Protein   6.4 


 


Albumin   3.0 L 


 


Albumin/Globulin Ratio   0.9 


 


Blood Type    B POSITIVE


 


Antibody Screen    Negative


 


Crossmatch    See Detail














  07/06/18 07/06/18 07/06/18





  00:59 05:21 09:07


 


WBC   


 


RBC   


 


Hgb  5.5 L*  5.9 L*  5.9 L*


 


Hct  17.9 L*  18.4 L*  19.0 L*


 


MCV   


 


MCH   


 


MCHC   


 


RDW   


 


Plt Count   


 


Lymph % (Auto)   


 


Mono % (Auto)   


 


Eos % (Auto)   


 


Baso % (Auto)   


 


Lymph #   


 


Mono #   


 


Eos #   


 


Baso #   


 


Seg Neutrophils %   


 


Seg Neutrophils #   


 


Sodium   


 


Potassium   


 


Chloride   


 


Carbon Dioxide   


 


Anion Gap   


 


BUN   


 


Creatinine   


 


Estimated GFR   


 


BUN/Creatinine Ratio   


 


Glucose   


 


Calcium   


 


Total Bilirubin   


 


AST   


 


ALT   


 


Alkaline Phosphatase   


 


Total Protein   


 


Albumin   


 


Albumin/Globulin Ratio   


 


Blood Type   


 


Antibody Screen   


 


Crossmatch   














Assessment and Plan


1.anemia


 2.hematochezia


-HGB 5.9- trending down (2 units PRBCs pending transfusion)


-continue to monitor H/H and transfuse as needed


-hold blood thinning medications


-no active signs of bleeding-currently HD stable


-last EGD 09/2017 showed gastritis and esophagitis


-last colonoscopy approximately 2-3 years ago showed polyps


-etiology unclear- possibly anorectal vs diverticular vs other


-will schedule for a colonoscopy in am


-clear liquids today then NPO after MN


-continue supportive care





3.right sided abd pain


-improved


-CT showed increased desity of liver and spleen as well as a dilated pancreatic 

duct


-T.rhonda 0.30, AST 18, ALT 5, alk phos 135


-recommend further evaluation as an outpatient yes

## 2019-01-11 NOTE — H&P ADULT - NEGATIVE NEUROLOGICAL SYMPTOMS
no paresthesias/no transient paralysis/no weakness/no tremors/no vertigo/no facial palsy/no generalized seizures/no focal seizures/no headache/no loss of consciousness/no hemiparesis/no loss of sensation/no syncope/no difficulty walking/no confusion

## 2019-01-11 NOTE — DISCHARGE NOTE ADULT - MEDICATION SUMMARY - MEDICATIONS TO TAKE
I will START or STAY ON the medications listed below when I get home from the hospital:    Aspirin Enteric Coated 81 mg oral delayed release tablet  -- 1 tab(s) by mouth once a day  -- Indication: For CAD    lisinopril 5 mg oral tablet  -- 1 tab(s) by mouth once a day  -- Indication: For CAD    atorvastatin 40 mg oral tablet  -- 1 tab(s) by mouth once a day   -- Avoid grapefruit and grapefruit juice while taking this medication.  Do not take this drug if you are pregnant.  It is very important that you take or use this exactly as directed.  Do not skip doses or discontinue unless directed by your doctor.  Obtain medical advice before taking any non-prescription drugs as some may affect the action of this medication.  Take with food or milk.    -- Indication: For CAD    metoprolol succinate 100 mg oral tablet, extended release  -- 1 tab(s) by mouth once a day   -- It is very important that you take or use this exactly as directed.  Do not skip doses or discontinue unless directed by your doctor.  May cause drowsiness.  Alcohol may intensify this effect.  Use care when operating dangerous machinery.  Some non-prescription drugs may aggravate your condition.  Read all labels carefully.  If a warning appears, check with your doctor before taking.  Swallow whole.  Do not crush.  Take with food or milk.  This drug may impair the ability to drive or operate machinery.  Use care until you become familiar with its effects.    -- Indication: For HTN (hypertension)    NIFEdipine 30 mg oral tablet, extended release  -- 1 tab(s) by mouth once a day  -- Indication: For HTN (hypertension)    furosemide 20 mg oral tablet  -- 1 tab(s) by mouth once a day  -- Indication: For HTN (hypertension)    senna oral tablet  -- 2 tab(s) by mouth once a day (at bedtime)  -- Indication: For Constipation    polyethylene glycol 3350 oral powder for reconstitution  -- 17 gram(s) by mouth once a day  -- Indication: For Constipation

## 2019-01-11 NOTE — DISCHARGE NOTE ADULT - CARE PLAN
Principal Discharge DX:	Chest pain  Goal:	remain chest pain free  Assessment and plan of treatment:	Follow up with Dr David Banks for heart failure and cardiology follow up  you have an echocardiogram scheduled next thursday  low salt, low fat, low cholesterol  Secondary Diagnosis:	HTN (hypertension)  Assessment and plan of treatment:	low salt, low fat, low cholesterol  continue current medications  Secondary Diagnosis:	CHF (congestive heart failure)  Assessment and plan of treatment:	continue lasix, lisinopril, coreg, asa, lipitor

## 2019-01-11 NOTE — DISCHARGE NOTE ADULT - NS AS ACTIVITY OBS
Showering allowed/Walking-Indoors allowed/Do not make important decisions/No Heavy lifting/straining

## 2019-01-11 NOTE — H&P ADULT - ATTENDING COMMENTS
Pt seen and examined  Chest pain free since admission  Cleared by Cardiology for d/c home w/ OP Cards f/u     35 min spent on dc planning

## 2019-01-11 NOTE — CONSULT NOTE ADULT - SUBJECTIVE AND OBJECTIVE BOX
Date of Admission: 1/10/2019    CHIEF COMPLAINT: Chest pain    HISTORY OF PRESENT ILLNESS:  This is a 83yoF w/ PMHx R renal artery stenosis, HTN, HLD, recent h/o takotsubo 8/18 with Vfib arrest s/p shock requiring ICD presents today with chest pain.    Allergies  sulfa drugs (Hives)  sulfa drugs (Swelling)    MEDICATIONS:      PAST MEDICAL & SURGICAL HISTORY:  PAD (peripheral artery disease)  NSTEMI (non-ST elevation myocardial infarction)  Osteoporosis  HTN (hypertension)  RA (rheumatoid arthritis)  Poor circulation  Osteoporosis  CKD (chronic kidney disease)  HTN (hypertension)  No significant past surgical history  No significant past surgical history    FAMILY HISTORY:  No pertinent family history in first degree relatives  No pertinent family history in first degree relatives    SOCIAL HISTORY:    Denies alcohol, cigarette or illicit drug use      REVIEW OF SYSTEMS:  See HPI. Otherwise, 10 point ROS done and otherwise negative.    PHYSICAL EXAM:  T(C): 36.3 (01-10-19 @ 20:58), Max: 36.3 (01-10-19 @ 20:58)  HR: 78 (01-10-19 @ 20:58) (78 - 78)  BP: 117/69 (01-10-19 @ 20:58) (117/69 - 117/69)  RR: 18 (01-10-19 @ 20:58) (18 - 18)  SpO2: 99% (01-10-19 @ 20:58) (99% - 99%)  Wt(kg): --  I&O's Summary      Appearance: Normal	  HEENT:   Normal oral mucosa, PERRL, EOMI	  Lymphatic: No lymphadenopathy  Cardiovascular: Normal S1 S2, No JVD, No murmurs, No edema  Respiratory: Lungs clear to auscultation	  Psychiatry: A & O x 3, Mood & affect appropriate  Gastrointestinal:  Soft, Non-tender, + BS	  Skin: No rashes, No ecchymoses, No cyanosis	  Neurologic: Non-focal  Extremities: Normal range of motion, No clubbing, cyanosis or edema  Vascular: Peripheral pulses palpable 2+ bilaterally        LABS:	 	    CBC Full  -  ( 10 Dale 2019 22:40 )  WBC Count : 7.41 K/uL  Hemoglobin : 11.7 g/dL  Hematocrit : 37.4 %  Platelet Count - Automated : 244 K/uL  Mean Cell Volume : 72.1 fL  Mean Cell Hemoglobin : 22.5 pg  Mean Cell Hemoglobin Concentration : 31.3 %  Auto Neutrophil # : 4.80 K/uL  Auto Lymphocyte # : 1.70 K/uL  Auto Monocyte # : 0.76 K/uL  Auto Eosinophil # : 0.09 K/uL  Auto Basophil # : 0.04 K/uL  Auto Neutrophil % : 64.8 %  Auto Lymphocyte % : 22.9 %  Auto Monocyte % : 10.3 %  Auto Eosinophil % : 1.2 %  Auto Basophil % : 0.5 %    01-10    136  |  103  |  18  ----------------------------<  116<H>  3.7   |  20<L>  |  1.13    Ca    9.7      10 Dale 2019 22:40    TPro  7.6  /  Alb  3.8  /  TBili  0.5  /  DBili  x   /  AST  27  /  ALT  20  /  AlkPhos  164<H>  01-10    CORONARY VESSELS: The coronary circulation is right dominant.  LM:   --  LM: Normal.  LAD:   --  Proximal LAD: There was a discrete 30 % stenosis at a site with  no prior intervention. The lesion was eccentric. There was STEF grade 3  flow through the vessel (brisk flow).  --  D1: The vessel was medium sized. Angiography showed mild  atherosclerosis with no flow limiting lesions.  CX:   --  Circumflex: Angiography showed mild atherosclerosis with no flow  limiting lesions.  RCA:   --  RCA: The vessel was large sized (dominant). Angiography showed  mild atherosclerosis with no flow limiting lesions.  --  RPDA: Angiography showed mild atherosclerosis with no flow limiting  lesions.  --  RPLS: Angiography showed mild atherosclerosis with no flow limiting  lesions.  COMPLICATIONS: No complications occurred during the cath lab visit.  DIAGNOSTIC IMPRESSIONS: Mild non-obstructive CAD      < from: Transthoracic Echocardiogram (08.02.18 @ 02:07) >  CONCLUSIONS:  1. Mitral annular calcification, otherwise normal mitral  valve. Minimal mitral regurgitation.  2. Normal left ventricular internal dimensions and wall  thicknesses.  3. Severe segmental left ventricular systolic dysfunction.  Hypokinesis of the apex, mid to distal septum, and mid to  distal anterior wall.  4. Normal right ventricularsize and function.  5. Estimated right ventricular systolic pressure equals 45  mm Hg, assuming right atrial pressure equals 10 mm Hg,  consistent with mild pulmonary hypertension. Date of Admission: 1/10/2019    CHIEF COMPLAINT: Chest pain    HISTORY OF PRESENT ILLNESS:  This is a 83yoF w/ PMHx R renal artery stenosis, HTN, HLD, recent h/o takotsubo 8/18 with Vfib arrest s/p shock requiring ICD presents today with chest pain.  Chest pain described as sharp, midsternal radiating to the right occurring at rest.  Pain subsided on its own while patient was en route to hospital.  During examination, patient states that she is feeling better and is chest pain free.  Denies shortness of breath, palpitations, lightheadedness, dizziness, recent travel, fever, chills, n/v/d.  Recent admission in 08/18 for Vfib arrest requiring shock was intubated and managed in MICU found to have takotsubo's.  Patient endorses she has been taking her medications every day and has not missed a dose.      EKG: NSR, no STACEY     Allergies  sulfa drugs (Hives)  sulfa drugs (Swelling)    MEDICATIONS:      PAST MEDICAL & SURGICAL HISTORY:  PAD (peripheral artery disease)  NSTEMI (non-ST elevation myocardial infarction)  Osteoporosis  HTN (hypertension)  RA (rheumatoid arthritis)  Poor circulation  Osteoporosis  CKD (chronic kidney disease)  HTN (hypertension)  No significant past surgical history  No significant past surgical history    FAMILY HISTORY:  No pertinent family history in first degree relatives  No pertinent family history in first degree relatives    SOCIAL HISTORY:    Denies alcohol, cigarette or illicit drug use      REVIEW OF SYSTEMS:  See HPI. Otherwise, 10 point ROS done and otherwise negative.    PHYSICAL EXAM:  T(C): 36.3 (01-10-19 @ 20:58), Max: 36.3 (01-10-19 @ 20:58)  HR: 78 (01-10-19 @ 20:58) (78 - 78)  BP: 117/69 (01-10-19 @ 20:58) (117/69 - 117/69)  RR: 18 (01-10-19 @ 20:58) (18 - 18)  SpO2: 99% (01-10-19 @ 20:58) (99% - 99%)  Wt(kg): --  I&O's Summary      Appearance: Normal	  HEENT:   Normal oral mucosa, PERRL, EOMI	  Lymphatic: No lymphadenopathy  Cardiovascular: Normal S1 S2, No JVD, No murmurs, No edema  Respiratory: Lungs clear to auscultation	  Psychiatry: A & O x 3, Mood & affect appropriate  Gastrointestinal:  Soft, Non-tender, + BS	  Skin: No rashes, No ecchymoses, No cyanosis	  Neurologic: Non-focal  Extremities: Normal range of motion, No clubbing, cyanosis or edema  Vascular: Peripheral pulses palpable 2+ bilaterally        LABS:	 	    CBC Full  -  ( 10 Dale 2019 22:40 )  WBC Count : 7.41 K/uL  Hemoglobin : 11.7 g/dL  Hematocrit : 37.4 %  Platelet Count - Automated : 244 K/uL  Mean Cell Volume : 72.1 fL  Mean Cell Hemoglobin : 22.5 pg  Mean Cell Hemoglobin Concentration : 31.3 %  Auto Neutrophil # : 4.80 K/uL  Auto Lymphocyte # : 1.70 K/uL  Auto Monocyte # : 0.76 K/uL  Auto Eosinophil # : 0.09 K/uL  Auto Basophil # : 0.04 K/uL  Auto Neutrophil % : 64.8 %  Auto Lymphocyte % : 22.9 %  Auto Monocyte % : 10.3 %  Auto Eosinophil % : 1.2 %  Auto Basophil % : 0.5 %    01-10    136  |  103  |  18  ----------------------------<  116<H>  3.7   |  20<L>  |  1.13    Ca    9.7      10 Dale 2019 22:40    TPro  7.6  /  Alb  3.8  /  TBili  0.5  /  DBili  x   /  AST  27  /  ALT  20  /  AlkPhos  164<H>  01-10    CORONARY VESSELS: The coronary circulation is right dominant.  LM:   --  LM: Normal.  LAD:   --  Proximal LAD: There was a discrete 30 % stenosis at a site with  no prior intervention. The lesion was eccentric. There was STEF grade 3  flow through the vessel (brisk flow).  --  D1: The vessel was medium sized. Angiography showed mild  atherosclerosis with no flow limiting lesions.  CX:   --  Circumflex: Angiography showed mild atherosclerosis with no flow  limiting lesions.  RCA:   --  RCA: The vessel was large sized (dominant). Angiography showed  mild atherosclerosis with no flow limiting lesions.  --  RPDA: Angiography showed mild atherosclerosis with no flow limiting  lesions.  --  RPLS: Angiography showed mild atherosclerosis with no flow limiting  lesions.  COMPLICATIONS: No complications occurred during the cath lab visit.  DIAGNOSTIC IMPRESSIONS: Mild non-obstructive CAD      < from: Transthoracic Echocardiogram (08.02.18 @ 02:07) >  CONCLUSIONS:  1. Mitral annular calcification, otherwise normal mitral  valve. Minimal mitral regurgitation.  2. Normal left ventricular internal dimensions and wall  thicknesses.  3. Severe segmental left ventricular systolic dysfunction.  Hypokinesis of the apex, mid to distal septum, and mid to  distal anterior wall.  4. Normal right ventricularsize and function.  5. Estimated right ventricular systolic pressure equals 45  mm Hg, assuming right atrial pressure equals 10 mm Hg,  consistent with mild pulmonary hypertension.

## 2019-01-11 NOTE — DISCHARGE NOTE ADULT - CARE PROVIDERS DIRECT ADDRESSES
,[facility]othyHealth[facility] Campusjmed.[employer].[facility],samitshahHealthAlliance Hospital: [facility] Campusjmed.[facility] HospitalMission Research.[facility]

## 2019-01-11 NOTE — CONSULT NOTE ADULT - PROBLEM SELECTOR RECOMMENDATION 9
Patient presented with atypical chest pain, now chest pain free; first set of cardiac enzymes negative; recent catheterization in 08/2018 showed mild obstructive CAD  -low suspicion for ACS at this time  -TTE in AM to evaluate LV/RV function

## 2019-01-11 NOTE — DISCHARGE NOTE ADULT - PATIENT PORTAL LINK FT
You can access the [facility] Portal, offered by [facility], by registering with the following website: [email or web address]

## 2019-01-11 NOTE — H&P ADULT - NEGATIVE GASTROINTESTINAL SYMPTOMS
no constipation/no abdominal pain/no vomiting/no diarrhea/no nausea/no change in bowel habits/no melena/no hematochezia/no jaundice/no hiccoughs/no flatulence/no steatorrhea

## 2019-01-11 NOTE — H&P ADULT - ASSESSMENT
81 yo F CAD s/p MI ( recent cath showed non obstructive coronaries), PVD, renal artery stenosis, HTN, CHF s/p ICD, CKD, osteoporosis here with chest pain

## 2019-01-11 NOTE — H&P ADULT - HISTORY OF PRESENT ILLNESS
81 yo F CAD s/p MI ( recent cath showed non obstructive coronaries), PVD, renal artery stenosis, HTN, CHF s/p ICD, CKD, osteoporosis here with chest pain. Chest pain started at 5pm left sided non radiating 4/10 " dull pain", while at rest lasted 10 minutes and resolved on its own. No alleviating or aggravating factors. NO  SOB, GOMEZ, PND, orthopnea, palpitations, diaphoresis, lightheadedness, dizziness, syncope, increased lower extremity edema, fever chills, malaise, myalgias, anorexia, weight changes ( loss or gain), night sweats, generalized fatigue abdominal pain, N/V/C/D BRBPR, melena, urinary symptoms, cough, and wheezing. Pt states she feels fine and wants to go home.            TTE: Mitral annular calcification, otherwise normal mitral  valve. Minimal mitral regurgitation.  2. Normal left ventricular internal dimensions and wall  thicknesses.  3. Severe segmental left ventricular systolic dysfunction.  Hypokinesis of the apex, mid to distal septum, and mid to  distal anterior wall.  4. Normal right ventricular size and function.  5. Estimated right ventricular systolic pressure equals 45  mm Hg, assuming right atrial pressure equals 10 mm Hg,  consistent with mild pulmonary hypertension.

## 2019-01-11 NOTE — H&P ADULT - NEGATIVE GENERAL SYMPTOMS
no weight loss/no weight gain/no polyphagia/no polyuria/no fatigue/no polydipsia/no fever/no chills/no sweating/no anorexia

## 2019-01-11 NOTE — CONSULT NOTE ADULT - ASSESSMENT
83yoF w/ PMHx R renal artery stenosis, HTN, HLD, recent h/o takotsubo 8/18 with Vfib arrest s/p shock requiring ICD presents today with chest pain.

## 2019-01-11 NOTE — DISCHARGE NOTE ADULT - PLAN OF CARE
remain chest pain free Follow up with Dr David Banks for heart failure and cardiology follow up  you have an echocardiogram scheduled next thursday  low salt, low fat, low cholesterol low salt, low fat, low cholesterol  continue current medications continue lasix, lisinopril, coreg, asa, lipitor

## 2019-01-11 NOTE — H&P ADULT - PROBLEM SELECTOR PLAN 1
MI ruled out. No arrythmias on telemetry, ekg showed no new changes  Continue asa, lipitor, metoprolol, lisinopril  recent TTE and cath showed non obstructive coronaries  case dw Dr Beltran and Dr Johnston patient stable for discharge home

## 2019-01-11 NOTE — H&P ADULT - NEGATIVE CARDIOVASCULAR SYMPTOMS
no paroxysmal nocturnal dyspnea/no peripheral edema/no claudication/no palpitations/no dyspnea on exertion/no orthopnea

## 2019-01-17 ENCOUNTER — OUTPATIENT (OUTPATIENT)
Dept: OUTPATIENT SERVICES | Facility: HOSPITAL | Age: 84
LOS: 1 days | End: 2019-01-17

## 2019-01-17 ENCOUNTER — APPOINTMENT (OUTPATIENT)
Dept: CV DIAGNOSITCS | Facility: HOSPITAL | Age: 84
End: 2019-01-17
Payer: MEDICARE

## 2019-01-17 ENCOUNTER — APPOINTMENT (OUTPATIENT)
Dept: GERIATRICS | Facility: CLINIC | Age: 84
End: 2019-01-17
Payer: MEDICARE

## 2019-01-17 VITALS
WEIGHT: 119 LBS | DIASTOLIC BLOOD PRESSURE: 70 MMHG | SYSTOLIC BLOOD PRESSURE: 126 MMHG | TEMPERATURE: 97.2 F | HEART RATE: 74 BPM | BODY MASS INDEX: 19.8 KG/M2 | OXYGEN SATURATION: 98 %

## 2019-01-17 DIAGNOSIS — I50.22 CHRONIC SYSTOLIC (CONGESTIVE) HEART FAILURE: ICD-10-CM

## 2019-01-17 PROCEDURE — 99214 OFFICE O/P EST MOD 30 MIN: CPT | Mod: GC

## 2019-01-17 PROCEDURE — 93306 TTE W/DOPPLER COMPLETE: CPT | Mod: 26

## 2019-01-17 RX ORDER — GLUCOSAMINE HCL/CHONDROITIN SU 500-400 MG
3 CAPSULE ORAL
Refills: 0 | Status: DISCONTINUED | COMMUNITY
End: 2019-01-17

## 2019-01-17 RX ORDER — SENNOSIDES 8.6 MG TABLETS 8.6 MG/1
8.6 TABLET ORAL
Refills: 0 | Status: DISCONTINUED | COMMUNITY
End: 2019-01-17

## 2019-01-17 NOTE — PHYSICAL EXAM
[General Appearance - Alert] : alert [General Appearance - In No Acute Distress] : in no acute distress [Sclera] : the sclera and conjunctiva were normal [PERRL With Normal Accommodation] : pupils were equal in size, round, and reactive to light [Extraocular Movements] : extraocular movements were intact [Normal Oral Mucosa] : normal oral mucosa [No Oral Pallor] : no oral pallor [No Oral Cyanosis] : no oral cyanosis [Outer Ear] : the ears and nose were normal in appearance [Oropharynx] : The oropharynx was normal [Neck Appearance] : the appearance of the neck was normal [Neck Cervical Mass (___cm)] : no neck mass was observed [Jugular Venous Distention Increased] : there was no jugular-venous distention [Thyroid Diffuse Enlargement] : the thyroid was not enlarged [Thyroid Nodule] : there were no palpable thyroid nodules [Auscultation Breath Sounds / Voice Sounds] : lungs were clear to auscultation bilaterally [Heart Rate And Rhythm] : heart rate was normal and rhythm regular [Heart Sounds] : normal S1 and S2 [Heart Sounds Gallop] : no gallops [Murmurs] : no murmurs [Heart Sounds Pericardial Friction Rub] : no pericardial rub [Full Pulse] : the pedal pulses are present [Edema] : there was no peripheral edema [Bowel Sounds] : normal bowel sounds [Abdomen Soft] : soft [Abdomen Tenderness] : non-tender [] : no hepato-splenomegaly [Abdomen Mass (___ Cm)] : no abdominal mass palpated [Cervical Lymph Nodes Enlarged Posterior Bilaterally] : posterior cervical [Cervical Lymph Nodes Enlarged Anterior Bilaterally] : anterior cervical [Supraclavicular Lymph Nodes Enlarged Bilaterally] : supraclavicular [Axillary Lymph Nodes Enlarged Bilaterally] : axillary [No CVA Tenderness] : no ~M costovertebral angle tenderness [No Spinal Tenderness] : no spinal tenderness [Nail Clubbing] : no clubbing  or cyanosis of the fingernails [Musculoskeletal - Swelling] : no joint swelling seen [Motor Tone] : muscle strength and tone were normal [Deep Tendon Reflexes (DTR)] : deep tendon reflexes were 2+ and symmetric [Sensation] : the sensory exam was normal to light touch and pinprick [No Focal Deficits] : no focal deficits [Total Score ___ / 30] : the patient achieved a score of [unfilled] /30 [Date / Time ___ / 5] : date / time [unfilled] / 5 [Place ___ / 5] : place [unfilled] / 5 [Registration ___ / 3] : registration [unfilled] / 3 [Serial Sevens ___/5] : serial sevens [unfilled] / 5 [Naming 2 Objects ___ / 2] : naming two objects [unfilled] / 2 [Repeating a Sentence ___ / 1] : repeating a sentence [unfilled] / 1 [Writing a Sentence ___ / 1] : write sentence [unfilled] / 1 [3-stage Verbal Command ___ / 3] : three-stage verbal command [unfilled] / 3 [Written Command ___ / 1] : written command [unfilled] / 1 [Copy a Design ___ / 1] : copy a design [unfilled] / 1 [Recall ___ / 3] : recall [unfilled] / 3 [Oriented To Time, Place, And Person] : oriented to person, place, and time [Impaired Insight] : insight and judgment were intact [Affect] : the affect was normal [FreeTextEntry1] : Left chest surgical scar from AICD placement. C/D/I  [Over the Past 2 Weeks, Have You Felt Down, Depressed, or Hopeless?] : 1.) Over the past 2 weeks, have you felt down, depressed, or hopeless? No [Over the Past 2 Weeks, Have You Felt Little Interest or Pleasure Doing Things?] : 2.) Over the past 2 weeks, have you felt little interest or pleasure doing things? No

## 2019-01-17 NOTE — ADDENDUM
[FreeTextEntry1] : Mrs. Zabala was seen with Dr. Bradley, R3. Detailed history, exam and plan as per fellow's note. Briefly this is an 83-year-old woman presenting for followup. She was seen in the emergency department one week ago with atypical chest pain. Studies were negative and she was discharged home. She has not had recurrence of her pain. She had an echocardiogram earlier today. ER studies were reviewed.\par Of note the patient has been experiencing persistent short-term memory problems consistent with probable Alzheimer's dementia without behavioral features. Family was given information regarding possible drug trial. They will contact the [facility][1] for memory disorders. Patient was advised followup with me in 6 months.

## 2019-01-17 NOTE — HISTORY OF PRESENT ILLNESS
[Memory Lapses Or Loss] : stable memory impairment [Patient Observed To Be Agitated] : stable agitation [Hostility Toward Caregivers] : denies aggression [] : denies wandering [Stable] : Status: Stable [Sleep Disturbances] : stable sleep disturbances [Fixed Beliefs Contradicted By Reality (Delusions)] : denies delusions [Difficulty Finding Desired Words] : denies difficulty finding desired words [0] : 2) Feeling down, depressed, or hopeless: Not at all [PHQ-2 Score ___] : PHQ-2 Score [unfilled] [Mild] : Stage: Mild [FreeTextEntry1] : Tatiana Zabala presents today with Ngoc (grand-daughter) and Tori (grand-daughter) for a follow up visit for CAD, probable dementia.\par \par On Thursday 1/11 she had some chest pain/pressure at home, was told by her grand-daughter that it was probably gas and drank some baking soda with water with no improvement in her symptoms. She was then brought to [facility] ED, had an EKG and labs done which reportedly were normal and she was d/lila home. In the ED her chest pain had resolved. She went for an echocardiogram this morning.\par \par She has had no changes from her baseline. Her grand-daughter reports some improvement in her short term memory since August,. Her memory seems to temporarily worsen whenever she becomes frustrated. She has had no recent falls. She has a tablet at home that she uses for watching [employer] videos, looking up recipes and playing games. Her grand-daughter would like her to start becoming more active in her community with other people.

## 2019-01-17 NOTE — SOCIAL HISTORY
[No falls in past year] : Patient reported no falls in the past year [Independent] : feeding [Canes] : [place]

## 2019-01-28 ENCOUNTER — APPOINTMENT (OUTPATIENT)
Dept: ELECTROPHYSIOLOGY | Facility: CLINIC | Age: 84
End: 2019-01-28
Payer: MEDICARE

## 2019-01-28 PROCEDURE — XXXXX: CPT

## 2019-01-31 ENCOUNTER — RX RENEWAL (OUTPATIENT)
Age: 84
End: 2019-01-31

## 2019-01-31 ENCOUNTER — TRANSCRIPTION ENCOUNTER (OUTPATIENT)
Age: 84
End: 2019-01-31

## 2019-02-15 ENCOUNTER — APPOINTMENT (OUTPATIENT)
Dept: CARDIOLOGY | Facility: CLINIC | Age: 84
End: 2019-02-15
Payer: MEDICARE

## 2019-02-15 VITALS
WEIGHT: 117 LBS | HEIGHT: 65 IN | BODY MASS INDEX: 19.49 KG/M2 | SYSTOLIC BLOOD PRESSURE: 129 MMHG | OXYGEN SATURATION: 100 % | DIASTOLIC BLOOD PRESSURE: 83 MMHG | HEART RATE: 83 BPM

## 2019-02-15 PROCEDURE — 99213 OFFICE O/P EST LOW 20 MIN: CPT

## 2019-02-17 NOTE — PHYSICAL EXAM
[General Appearance - Well Developed] : well developed [Normal Appearance] : normal appearance [Normal Conjunctiva] : the conjunctiva exhibited no abnormalities [Normal Oral Mucosa] : normal oral mucosa [] : no respiratory distress [Auscultation Breath Sounds / Voice Sounds] : lungs were clear to auscultation bilaterally [Heart Rate And Rhythm] : heart rate and rhythm were normal [Heart Sounds] : normal S1 and S2 [Bowel Sounds] : normal bowel sounds [Abdomen Soft] : soft [Abdomen Tenderness] : non-tender [Abnormal Walk] : normal gait [Nail Clubbing] : no clubbing of the fingernails [Skin Color & Pigmentation] : normal skin color and pigmentation [Oriented To Time, Place, And Person] : oriented to person, place, and time [FreeTextEntry1] : JVP approx 6 cm w/o HJR

## 2019-02-17 NOTE — HISTORY OF PRESENT ILLNESS
[FreeTextEntry1] : 82 y/o F w/ h/o R renal artery stenosis (since 2015), HTN, HL who presents for f/u evaluation Takotsubo in 8/18 (now recovered) where she had Vfib arrest and received ICD for secondary prevention. Accompanied by granddaughter.\par \par Since last visit, has been reportedly more fatigue but states ET is unlimited. Has been able to walk up the stairs w/o difficulty. Denies orthopnea/PND/LE edema. Tolerating medications. \par \par Reports poor appetite. Has had some weight loss. Seen by geriatrics.

## 2019-02-17 NOTE — ASSESSMENT
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 Currently endorses class I-II symptoms and appears euvolemic.\par \par 1. HFrecEF - resolved Takotsubo\par - given improvement in function, will reduce toprol to 50 mg daily as may be contributing to her fatigue\par - instructed to take lasix as needed as appears euvolemic\par - continue lisinopril 5 mg daily\par \par 2. HTN - controlled today\par - goal <130\par - on lisinopril 5 and procardia 30 mg daily (can consider increasing lisinopril and reducing procardia for maximal benefit however given renal artery stenosis will defer for now)\par - keep log of BP\par \par 3. Non-obstructive CAD\par - on ASA 81 mg daily and Lipitor 40 mg daily \par \par 4. Fatigue - unclear etiology; possibly medication induced\par - labs checked which revealed microcytic anemia with low ferritin; will start iron daily; informed pt to have worked up with primary care\par - TSH wnl\par - B12/folate wnl\par - toprol as above\par \par RTC 3 months

## 2019-03-06 ENCOUNTER — RX RENEWAL (OUTPATIENT)
Age: 84
End: 2019-03-06

## 2019-03-06 ENCOUNTER — TRANSCRIPTION ENCOUNTER (OUTPATIENT)
Age: 84
End: 2019-03-06

## 2019-04-05 ENCOUNTER — APPOINTMENT (OUTPATIENT)
Dept: UROLOGY | Facility: CLINIC | Age: 84
End: 2019-04-05

## 2019-04-13 NOTE — PHYSICAL THERAPY INITIAL EVALUATION ADULT - MD/RN NOTIFIED
[facility]  ED  Emergency Department Encounter  EmergencyMedicine Resident     Pt Name:Valeria Gan Shown  MRN: [id number]  Joycegfyue 1960  Date of evaluation: 4/12/19  PCP:  No primary care provider on file. 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 male who presents with sudden onset vertigo symptoms with associated nausea and vomiting for the past two hours. Symptoms began shortly after patient got home from work. He had sudden onset sensation that the room is spinning around him and he had difficulty ambulating due to his dizziness. He's been vomiting every few minutes. Associated diaphoresis and blurry vision. No headache, numbness or tingling, focal weakness, tinnitus. No chest pain, shortness of breath, abdominal pain. Patient denies any significant past medical history aside from allergies. He denies any cardiac history whatsoever. He states he has occasionally become dizzy in the past, but never this severe and not recently.     PAST MEDICAL / SURGICAL / SOCIAL / FAMILY HISTORY     No significant PMHx on review with patient    No significant PSHx on review with patient    Social History     Socioeconomic History    Marital status:      Spouse name: Not on file    Number of children: Not on file    Years of education: Not on file    Highest education level: Not on file   Occupational History    Not on file   Social Needs    Financial resource strain: Not on file    Food insecurity:     Worry: Not on file     Inability: Not on file    Transportation needs:     Medical: Not on file     Non-medical: Not on file   Tobacco Use    Smoking status: Not on file   Substance and Sexual Activity    Alcohol use: Not on file    Drug use: Not on file    Sexual activity: Not on file   Lifestyle    Physical activity:     Days per week: Not on file     Minutes per session: Not on file    Stress: Not on file   Relationships    Social connections:     Talks on phone: Not on file     Gets together: Not on file     Attends Yarsani service: Not on file     Active member of club or organization: Not on file     Attends meetings of clubs or organizations: Not on file     Relationship status: Not on file    Intimate partner violence:     Fear of current or ex partner: Not on file     Emotionally abused: Not on file     Physically abused: Not on file     Forced sexual activity: Not on file   Other Topics Concern    Not on file   Social History Narrative    Not on file       History reviewed. No pertinent family history. Allergies:  Asa [aspirin]; Ibuprofen; and Voltaren [diclofenac sodium]    Home Medications:  Prior to Admission medications    Medication Sig Start Date End Date Taking? Authorizing Provider   meclizine (ANTIVERT) 25 MG tablet Take 1 tablet by mouth 3 times daily as needed for Dizziness 4/13/19 4/23/19 Yes Jess Moore MD       REVIEW OF SYSTEMS    (2-9 systems for level 4, 10 or more for level 5)      Review of Systems   Constitutional: Negative for chills and fever. HENT: Negative for congestion, sore throat and tinnitus. Eyes: Negative for photophobia and visual disturbance. Respiratory: Negative for cough and shortness of breath. Cardiovascular: Negative for chest pain. Gastrointestinal: Positive for nausea and vomiting. Negative for abdominal pain and diarrhea. Genitourinary: Negative for dysuria, flank pain and hematuria. Musculoskeletal: Negative for neck pain and neck stiffness. Skin: Negative for rash and wound. Neurological: Positive for dizziness. Negative for weakness, light-headedness, numbness and headaches.        PHYSICAL EXAM   (up to 7 for level 4, 8 or more for level 5)      INITIAL VITALS:   /75   Pulse 74   Temp 98.4 °F (36.9 °C) (Oral)   Resp 13   SpO2 96%     Physical Exam   Gen. Appearance: patient appears well, nondistressed. HEENT: head atraumatic, normocephalic. Pupils equal and reactive to light. Oropharynx clear and moist.  Neck: Supple, normal range of motion. No lymphadenopathy. Pulmonary: Lungs clear to auscultation bilaterally. Equal air entry right left. Cardiovascular:  Heart sounds normal, no murmurs. Peripheral pulses strong, regular, equal.   Abdomen: Soft, nontender, nondistended. Bowel sounds normal. No palpable masses. Skin: Warm, dry, well perfused  Neurology: GCS 15. Oriented to person place and time.    -Cranial nerve exam:                        CN III, IV, VI: EOM normal.  There is rightward horizontal fatiguable nystagmus; no vertical nystagmus. CN V: facial sensation normal                        CN VII: normal facial expressions - symmetrical and normal eyebrow raise, eye closure, smile                        CN IX, X: uvula midline, symmetrical palate motion                        CN XI: normal symmetrical shoulder raise                        CN XII: normal tongue movement; no deviation  -Peripheral nerve exam: Normal tone and power in all 4 extremities.   No sensory deficits.    -No dysdiadochokinesia  -No past-pointing      DIFFERENTIAL  DIAGNOSIS     PLAN (LABS / IMAGING / EKG):  Orders Placed This Encounter   Procedures    XR CHEST PORTABLE    MRI BRAIN WO CONTRAST    CBC Auto Differential    COMPREHENSIVE METABOLIC PANEL    Troponin    EKG 12 Lead       MEDICATIONS ORDERED:  Orders Placed This Encounter   Medications    ondansetron (ZOFRAN) 4 MG/2ML injection     KARINA PRESCOTT: cabinet override    ondansetron (ZOFRAN) injection 4 mg    0.9 % sodium chloride bolus    meclizine (ANTIVERT) tablet 25 mg    diphenhydrAMINE (BENADRYL) injection 50 mg    meclizine (ANTIVERT) tablet 25 mg    meclizine (ANTIVERT) 25 MG tablet     Sig: Take 1 tablet by mouth 3 times daily as needed for Dizziness     Dispense:  30 tablet     Refill:  0       DDX: BPPV, perilymphatic fistula, otitis media, labyrinthitis, Ménière's disease, cerebellar tumor, carotid or vertebral artery dissection, vertebrobasilar insufficiency, cerebellar stroke, CNS infection    DIAGNOSTIC RESULTS / EMERGENCY DEPARTMENT COURSE / MDM     LABS:  Results for orders placed or performed during the hospital encounter of 04/12/19   CBC Auto Differential   Result Value Ref Range    WBC 10.2 3.5 - 11.3 k/uL    RBC 5.25 4.21 - 5.77 m/uL    Hemoglobin 16.3 13.0 - 17.0 g/dL    Hematocrit 47.7 40.7 - 50.3 %    MCV 90.9 82.6 - 102.9 fL    MCH 31.0 25.2 - 33.5 pg    MCHC 34.2 28.4 - 34.8 g/dL    RDW 11.8 11.8 - 14.4 %    Platelets 106 694 - 720 k/uL    MPV 9.6 8.1 - 13.5 fL    NRBC Automated 0.0 0.0 per 100 WBC    Differential Type NOT REPORTED     Seg Neutrophils 38 36 - 65 %    Lymphocytes 50 (H) 24 - 43 %    Monocytes 7 3 - 12 %    Eosinophils % 4 1 - 4 %    Basophils 1 0 - 2 %    Immature Granulocytes 0 0 %    Segs Absolute 3.87 1.50 - 8.10 k/uL    Absolute Lymph # 4.98 (H) 1.10 - 3.70 k/uL    Absolute Mono # 0.71 0.10 - 1.20 k/uL    Absolute Eos # 0.43 0.00 - 0.44 k/uL    Basophils # 0.13 0.00 - 0.20 k/uL    Absolute Immature Granulocyte 0.04 0.00 - 0.30 k/uL    WBC Morphology NOT REPORTED     RBC Morphology NOT REPORTED     Platelet Estimate NOT REPORTED    COMPREHENSIVE METABOLIC PANEL   Result Value Ref Range    Glucose 169 (H) 70 - 99 mg/dL    BUN 14 6 - 20 mg/dL    CREATININE 0.93 0.70 - 1.20 mg/dL    Bun/Cre Ratio NOT REPORTED 9 - 20    Calcium 9.4 8.6 - 10.4 mg/dL    Sodium 134 (L) 135 - 144 mmol/L    Potassium 3.8 3.7 - 5.3 mmol/L    Chloride 99 98 - 107 mmol/L    CO2 18 (L) 20 - 31 mmol/L    Anion Gap 17 9 - 17 mmol/L    Alkaline Phosphatase 56 40 - 129 U/L    ALT 42 (H) 5 - 41 U/L    AST 30 <40 U/L    Total Bilirubin 0.28 (L) 0.3 - 1.2 mg/dL    Total Protein 7.3 6.4 - 8.3 g/dL    Alb 4.5 3.5 - 5.2 g/dL    Albumin/Globulin Ratio 1.6 1.0 - 2.5    GFR Non-African American >60 >60 mL/min    GFR African American >60 >60 mL/min    GFR Comment          GFR Staging NOT REPORTED    Troponin   Result Value Ref Range    Troponin, High Sensitivity 8 0 - 22 ng/L    Troponin T NOT REPORTED <0.03 ng/mL    Troponin Interp NOT REPORTED        IMPRESSION: 61year old patient presents with vertigo, nausea, and vomiting. Patient is afebrile and hemodynamically stable. He does appear quite uncomfortable, holding his head, actively vomiting. Appropriately interactive and cooperative with interview and examination. Normal respiratory effort, lungs clear bilaterally, heart sounds normal, abdomen benign. Peripheral pulses strong, regular, equal.  Thorough neurological examination unremarkable aside from fatigable rightward horizontal nystagmus. There are no cerebellar findings. No focal neurological deficits. Patient's presentation is most consistent with a peripheral cause of his vertigo, given the onset and course of his symptoms and benign nature of his physical examination. Severe, sudden onset with difficulty ambulating, nausea and vomiting is most consistent with peripheral vertigo. Fatigable horizontal nystagmus is most consistent with peripheral vertigo. No insidious onset, headache, syncope, or focal neurological deficit to suggest central vertigo such as vertebrobasilar insufficiency, arterial dissection, mass. No focal or systemic findings to suggest infectious process. No chest pain or shortness of breath to suggest ACS; however, given patient's age and the absence of ongoing outpatient medical care, concern for possible atypical ACS presentation particularly with his diaphoresis. We'll obtain ACS workup. Plan for EKG, CBC, CMP, troponins, chest x-ray, Zofran, Antivert, reassess.     RADIOLOGY:  MRI BRAIN WO CONTRAST (Preliminary result)   Result time 04/13/19 00:55:02   Preliminary result by Silvestre DAWKINS Mary Frost MD (04/13/19 00:55:02)                Impression:    No acute intracranial abnormality. Subcortical and periventricular white matter lesions are nonspecific, but are  usually attributed to chronic microvascular ischemic disease. Pansinus disease, including near complete opacification of the frontal,  ethmoid, sphenoid and left maxillary sinuses.  Aerated secretions in the left  maxillary sinus may reflect acute sinusitis in the correct clinical setting.                        XR CHEST PORTABLE (Final result)   Result time 04/12/19 21:25:58   Final result by Elicia Swanson MD (04/12/19 21:25:58)                Impression:    Mild airspace disease at the left lung base. Narrative:    EXAMINATION:  SINGLE XRAY VIEW OF THE CHEST    4/12/2019 9:19 pm    COMPARISON:  None. HISTORY:  ORDERING SYSTEM PROVIDED HISTORY: ACS workup  TECHNOLOGIST PROVIDED HISTORY:  ACS workup    FINDINGS:  Mild airspace disease at the left lung base.  No pleural effusions.  No  pneumothorax.  No free air below the diaphragm.  Low lung volumes. Heart size is normal.                  EKG  EKG Interpretation    Interpreted by me    Rhythm: normal sinus   Rate: normal  Axis: Leftward  Ectopy: none  Conduction: normal  ST Segments: no acute change  T Waves: T-wave flattening lead II, aVF, V1, V4, V5, V6  Q Waves: none  Poor R-wave progression    Clinical Impression: no acute changes    All EKG's are interpreted by the Emergency Department Physician who either signs or Co-signs this chart in the absence of a cardiologist.    EMERGENCY DEPARTMENT COURSE:    Patient with minimal relief of his symptoms after Zofran, Antivert, and Benadryl. After discussion with attending physician, will obtain MRI brain to evaluate for cerebellar pathology. MRI brain with no acute findings.   There is subcortical and periventricular white matter lesions most consistent with chronic microvascular ischemic disease; and findings of yes

## 2019-04-19 ENCOUNTER — APPOINTMENT (OUTPATIENT)
Dept: UROLOGY | Facility: CLINIC | Age: 84
End: 2019-04-19
Payer: MEDICARE

## 2019-04-19 ENCOUNTER — APPOINTMENT (OUTPATIENT)
Dept: NEPHROLOGY | Facility: CLINIC | Age: 84
End: 2019-04-19
Payer: MEDICARE

## 2019-04-19 VITALS
WEIGHT: 110 LBS | DIASTOLIC BLOOD PRESSURE: 71 MMHG | OXYGEN SATURATION: 98 % | HEIGHT: 65 IN | SYSTOLIC BLOOD PRESSURE: 108 MMHG | BODY MASS INDEX: 18.33 KG/M2 | HEART RATE: 85 BPM

## 2019-04-19 PROCEDURE — 99214 OFFICE O/P EST MOD 30 MIN: CPT

## 2019-04-19 RX ORDER — IBANDRONATE SODIUM 150 MG/1
150 TABLET ORAL
Qty: 12 | Refills: 0 | Status: DISCONTINUED | COMMUNITY
Start: 2018-05-29 | End: 2019-04-19

## 2019-04-19 RX ORDER — FUROSEMIDE 20 MG/1
20 TABLET ORAL EVERY OTHER DAY
Refills: 0 | Status: DISCONTINUED | COMMUNITY
Start: 2018-08-16 | End: 2019-04-19

## 2019-04-19 NOTE — PHYSICAL EXAM
[General Appearance - Well Developed] : well developed [General Appearance - Well Nourished] : well nourished [General Appearance - In No Acute Distress] : no acute distress [Costovertebral Angle Tenderness] : no ~M costovertebral angle tenderness [Abdomen Tenderness] : non-tender [Abdomen Soft] : soft [Edema] : no peripheral edema [Skin Color & Pigmentation] : normal skin color and pigmentation [Oriented To Time, Place, And Person] : oriented to person, place, and time [] : no respiratory distress [Normal Station and Gait] : the gait and station were normal for the patient's age [No Focal Deficits] : no focal deficits [FreeTextEntry1] : [id number]

## 2019-04-19 NOTE — HISTORY OF PRESENT ILLNESS
[FreeTextEntry1] : 81yo female with cc of urinary retention. Pt with cardiac arrest on 8/1 witnessed and CPR and shocked in the field. Went to [facility] and was admitted and had defibrillator placed. She had issues with post procedure ICU delirium. She failed a VT and had amaral replaced (? 1100 cc came out). No rehab needed, pt up and moving around. At baseline, no urinary complaints. Not a ray. She drinks a lot of water. Amaral removed previously and while pt was voiding on her own with no complaints of straining or feelings of incomplete emptying, her PVR was significnatly elevated at 650. Pt was taught to CIC. \par \par She was told to do BID-TID. SHe has not been consistently doing. She has been refusing to have cath done. SHe continues to have bothersome nocturia. PVR today much improved at 260cc.

## 2019-04-19 NOTE — REVIEW OF SYSTEMS
[Fever] : no fever [Chills] : no chills [Feeling Poorly] : feeling poorly [Feeling Tired] : feeling tired [Eyesight Problems] : no eyesight problems [Nosebleeds] : no nosebleeds [Chest Pain] : no chest pain [Palpitations] : no palpitations [Leg Claudication] : no intermittent leg claudication [Lower Ext Edema] : no lower extremity edema [Shortness Of Breath] : no shortness of breath [Wheezing] : no wheezing [Abdominal Pain] : no abdominal pain [Vomiting] : no vomiting [Dysuria] : no dysuria [Incontinence] : no incontinence [Joint Swelling] : no joint swelling [Joint Stiffness] : no joint stiffness [Dizziness] : no dizziness [Fainting] : no fainting [Muscle Weakness] : muscle weakness [Easy Bleeding] : no tendency for easy bleeding [Easy Bruising] : no tendency for easy bruising

## 2019-04-19 NOTE — ASSESSMENT
[FreeTextEntry1] : 83 years old woman with a history of renal artery stenosis here for follow up.\par \par Renal Artery Stenosis -- The patient's blood pressure is rising.  Most recent doppler is stable.  Will continue to monitor blood pressure and blood work.  Recent blood work reviewed and stable WNL.  I have asked the patient to decrease her nifedipine back to 30 in light of her fatigue and now relative hypotension.\par \par Anemia -- I am conerned that the patient has a new iron deficiency anemia that is contributing to her fatigue.  I will repeat blood work today.  If she remains with PEYTON I will refer to hematology for a possible iron infusion.  She would also need stool occult cards.\par rto 6 months or sooner if needed.

## 2019-04-19 NOTE — PHYSICAL EXAM
[General Appearance - Alert] : alert [General Appearance - In No Acute Distress] : in no acute distress [Oropharynx] : the oropharynx was normal [Neck Appearance] : the appearance of the neck was normal [Jugular Venous Distention Increased] : there was no jugular-venous distention [Auscultation Breath Sounds / Voice Sounds] : lungs were clear to auscultation bilaterally [Heart Rate And Rhythm] : heart rate was normal and rhythm regular [Heart Sounds] : normal S1 and S2 [Heart Sounds Gallop] : no gallops [Heart Sounds Pericardial Friction Rub] : no pericardial rub [Arterial Pulses Carotid] : carotid pulses were normal with no bruits [Edema] : there was no peripheral edema [Bowel Sounds] : normal bowel sounds [Abdomen Tenderness] : non-tender [Abdomen Soft] : soft [Cervical Lymph Nodes Enlarged Posterior Bilaterally] : posterior cervical [Cervical Lymph Nodes Enlarged Anterior Bilaterally] : anterior cervical [Supraclavicular Lymph Nodes Enlarged Bilaterally] : supraclavicular [No CVA Tenderness] : no ~M costovertebral angle tenderness [No Spinal Tenderness] : no spinal tenderness [Abnormal Walk] : normal gait [Nail Clubbing] : no clubbing  or cyanosis of the fingernails [Skin Color & Pigmentation] : normal skin color and pigmentation [] : no rash [FreeTextEntry1] : responsive [Impaired Insight] : insight and judgment were intact [Oriented To Time, Place, And Person] : oriented to person, place, and time [Mood] : the mood was normal [Affect] : the affect was normal

## 2019-04-19 NOTE — HISTORY OF PRESENT ILLNESS
[FreeTextEntry1] : Today I had the pleasure of seeing Tatiana Chauhan.  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  She is here today accompanied by two of her grand daughters.  She has a history of hypertension secondary to renal artery stenosis.  She used to follow up with Dr. Fay who has since retried.  Over the summer she had a cardiac arrest in [place] and was diagnosed with a new cardiomyopathy.  She also has some issues with chronic urinary retention.  After the cardiac arrest some of her BP meds were held and now she is starting to have her bp creep up and is 150 to 160s systolic at times.  On evaluation today her only complaint is fatigue.\par \par 4/19/19 -- Since our last visit Tatiana has continued to feel fatigued she's lost weight and she has a very poor appetite.  Her diuretics were recently stopped.

## 2019-04-19 NOTE — ASSESSMENT
[FreeTextEntry1] : Urinary retention. Multifactorial with multiple medical issues at the time of hospitalization. Passed VT but significant PVR. Discussed risks of UTI and renal injury with residual this high. Suspect some baseline dysfunction of bladder in the fact that pt is not uncomfortable right now with this much remaining in bladder. Now again with inconsistent CIC use. However, PVR is improved today to 260. Discussed that nocturia is likely a function of this. \par --Renal/bladder US in June (getting with nephrology)\par --RTC in 3mo\par

## 2019-04-22 ENCOUNTER — MEDICATION RENEWAL (OUTPATIENT)
Age: 84
End: 2019-04-22

## 2019-04-22 LAB
APPEARANCE: ABNORMAL
BACTERIA: NEGATIVE
BASOPHILS # BLD AUTO: 0.06 K/UL
BASOPHILS NFR BLD AUTO: 1 %
BILIRUBIN URINE: NEGATIVE
BLOOD URINE: ABNORMAL
COLOR: YELLOW
CREAT SPEC-SCNC: 129 MG/DL
CREAT/PROT UR: 1.4 RATIO
EOSINOPHIL # BLD AUTO: 0.06 K/UL
EOSINOPHIL NFR BLD AUTO: 1 %
GLUCOSE QUALITATIVE U: NEGATIVE
HCT VFR BLD CALC: 36.5 %
HGB BLD-MCNC: 11.4 G/DL
HYALINE CASTS: 1 /LPF
IMM GRANULOCYTES NFR BLD AUTO: 0.2 %
KETONES URINE: NEGATIVE
LEUKOCYTE ESTERASE URINE: ABNORMAL
LYMPHOCYTES # BLD AUTO: 1.93 K/UL
LYMPHOCYTES NFR BLD AUTO: 31.4 %
MAN DIFF?: NORMAL
MCHC RBC-ENTMCNC: 23.5 PG
MCHC RBC-ENTMCNC: 31.2 GM/DL
MCV RBC AUTO: 75.1 FL
MICROSCOPIC-UA: NORMAL
MONOCYTES # BLD AUTO: 0.61 K/UL
MONOCYTES NFR BLD AUTO: 9.9 %
NEUTROPHILS # BLD AUTO: 3.47 K/UL
NEUTROPHILS NFR BLD AUTO: 56.5 %
NITRITE URINE: NEGATIVE
PH URINE: 6
PLATELET # BLD AUTO: 285 K/UL
PROT UR-MCNC: 183 MG/DL
PROTEIN URINE: ABNORMAL
RBC # BLD: 4.86 M/UL
RBC # FLD: 16.2 %
RED BLOOD CELLS URINE: 127 /HPF
SPECIFIC GRAVITY URINE: 1.01
SQUAMOUS EPITHELIAL CELLS: 16 /HPF
UROBILINOGEN URINE: NORMAL
WBC # FLD AUTO: 6.14 K/UL
WHITE BLOOD CELLS URINE: >720 /HPF

## 2019-04-26 ENCOUNTER — APPOINTMENT (OUTPATIENT)
Dept: ELECTROPHYSIOLOGY | Facility: CLINIC | Age: 84
End: 2019-04-26
Payer: MEDICARE

## 2019-04-26 PROCEDURE — 93283 PRGRMG EVAL IMPLANTABLE DFB: CPT

## 2019-05-17 ENCOUNTER — NON-APPOINTMENT (OUTPATIENT)
Age: 84
End: 2019-05-17

## 2019-05-17 ENCOUNTER — APPOINTMENT (OUTPATIENT)
Dept: CARDIOLOGY | Facility: CLINIC | Age: 84
End: 2019-05-17
Payer: MEDICARE

## 2019-05-17 VITALS
BODY MASS INDEX: 18.49 KG/M2 | OXYGEN SATURATION: 100 % | HEIGHT: 65 IN | HEART RATE: 79 BPM | SYSTOLIC BLOOD PRESSURE: 137 MMHG | WEIGHT: 111 LBS | DIASTOLIC BLOOD PRESSURE: 81 MMHG

## 2019-05-17 PROCEDURE — 99214 OFFICE O/P EST MOD 30 MIN: CPT

## 2019-05-17 PROCEDURE — 93000 ELECTROCARDIOGRAM COMPLETE: CPT

## 2019-05-17 NOTE — HISTORY OF PRESENT ILLNESS
[FreeTextEntry1] : 82 y/o F w/ h/o R renal artery stenosis (since 2015), HTN, HL, microcytic anemia who presents for f/u evaluation Takotsubo in 8/18 (now recovered) where she had Vfib arrest and received ICD for secondary prevention. Accompanied by granddaughter.\par \par Since last visit, has been reportedly doing well. Has chronic fatigue which is less than prior. States ET is unlimited. Has been able to walk up the stairs w/o difficulty. Denies orthopnea/PND/LE edema. Tolerating medications. Has been off Procardia for 3 weeks and BP at home ranging 120-130. \par \par Still reports poor appetite. Has had some weight loss. Seen by geriatrics.

## 2019-05-17 NOTE — PHYSICAL EXAM
[General Appearance - Well Developed] : well developed [Normal Appearance] : normal appearance [Normal Conjunctiva] : the conjunctiva exhibited no abnormalities [Normal Oral Mucosa] : normal oral mucosa [] : no respiratory distress [Heart Rate And Rhythm] : heart rate and rhythm were normal [Auscultation Breath Sounds / Voice Sounds] : lungs were clear to auscultation bilaterally [Heart Sounds] : normal S1 and S2 [Abdomen Soft] : soft [Bowel Sounds] : normal bowel sounds [Abdomen Tenderness] : non-tender [Abnormal Walk] : normal gait [Nail Clubbing] : no clubbing of the fingernails [Skin Color & Pigmentation] : normal skin color and pigmentation [Oriented To Time, Place, And Person] : oriented to person, place, and time [FreeTextEntry1] : JVP approx 6 cm w/o HJR

## 2019-05-17 NOTE — ASSESSMENT
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 Currently endorses class I-II symptoms and appears euvolemic. \par \par 1. HFrecEF - resolved Takotsubo\par - continue toprol XL 50 mg daily; will not uptitrate d/t fatigue which is less at lower dose\par \par 2. HTN - slightly elevated today\par - goal <130\par - has been off procardia and has proteinuria; will increase lisinopril to 7.5 mg daily; will need repeat labs in 3 weeks\par - keep log of BP\par \par 3. Non-obstructive CAD\par - on ASA 81 mg daily and Lipitor 40 mg daily \par \par 4. Fatigue - unclear etiology; possibly medication induced; has iron def anemia\par - iron therapy\par - further w/u with primary care\par \par RTC 6 months

## 2019-05-20 LAB
ALBUMIN SERPL ELPH-MCNC: 3.3 G/DL
ANION GAP SERPL CALC-SCNC: 13 MMOL/L
BUN SERPL-MCNC: 11 MG/DL
CALCIUM SERPL-MCNC: 9 MG/DL
CHLORIDE SERPL-SCNC: 104 MMOL/L
CO2 SERPL-SCNC: 23 MMOL/L
CREAT SERPL-MCNC: 0.93 MG/DL
GLUCOSE SERPL-MCNC: 62 MG/DL
PHOSPHATE SERPL-MCNC: 3.4 MG/DL
POTASSIUM SERPL-SCNC: 4.3 MMOL/L
SODIUM SERPL-SCNC: 140 MMOL/L

## 2019-06-07 ENCOUNTER — APPOINTMENT (OUTPATIENT)
Dept: CARDIOLOGY | Facility: CLINIC | Age: 84
End: 2019-06-07

## 2019-07-11 ENCOUNTER — APPOINTMENT (OUTPATIENT)
Dept: GERIATRICS | Facility: CLINIC | Age: 84
End: 2019-07-11
Payer: MEDICARE

## 2019-07-11 VITALS
HEART RATE: 83 BPM | TEMPERATURE: 97.5 F | BODY MASS INDEX: 17.93 KG/M2 | DIASTOLIC BLOOD PRESSURE: 64 MMHG | OXYGEN SATURATION: 97 % | RESPIRATION RATE: 16 BRPM | WEIGHT: 107.6 LBS | HEIGHT: 65 IN | SYSTOLIC BLOOD PRESSURE: 116 MMHG

## 2019-07-11 DIAGNOSIS — M81.0 AGE-RELATED OSTEOPOROSIS W/OUT CURRENT PATHOLOGICAL FRACTURE: ICD-10-CM

## 2019-07-11 DIAGNOSIS — I25.10 ATHEROSCLEROTIC HEART DISEASE OF NATIVE CORONARY ARTERY W/OUT ANGINA PECTORIS: ICD-10-CM

## 2019-07-11 PROCEDURE — 99214 OFFICE O/P EST MOD 30 MIN: CPT | Mod: GC

## 2019-07-11 RX ORDER — LORATADINE 10 MG
17 TABLET,DISINTEGRATING ORAL
Refills: 0 | Status: DISCONTINUED | COMMUNITY
End: 2019-07-11

## 2019-07-11 NOTE — REASON FOR VISIT
[Follow-Up] : a follow-up visit [Family Member] : family member [FreeTextEntry1] : Follow up CHF, dementia

## 2019-07-11 NOTE — ADDENDUM
[FreeTextEntry1] : Mrs. Zabala was seen with Dr. Das, geriatric fellow. Detailed history, exam and plan as per fellow's note. Overall the patient is doing quite well. She is euvolemic and not experiencing any cardiac or pulmonary symptoms. She has lost some weight and I advised her granddaughter to offer smaller more frequent meals as well as a nutritional supplement. We'll followup in 4 months

## 2019-07-11 NOTE — PHYSICAL EXAM
[General Appearance - Alert] : alert [General Appearance - In No Acute Distress] : in no acute distress [General Appearance - Well Nourished] : well nourished [General Appearance - Well Developed] : well developed [Sclera] : the sclera and conjunctiva were normal [PERRL With Normal Accommodation] : pupils were equal in size, round, and reactive to light [Extraocular Movements] : extraocular movements were intact [Normal Oral Mucosa] : normal oral mucosa [No Oral Pallor] : no oral pallor [Neck Appearance] : the appearance of the neck was normal [Neck Cervical Mass (___cm)] : no neck mass was observed [Jugular Venous Distention Increased] : there was no jugular-venous distention [Apical Impulse] : the apical impulse was normal [Heart Rate And Rhythm] : heart rate was normal and rhythm regular [Heart Sounds] : normal S1 and S2 [Arterial Pulses Carotid] : carotid pulses were normal with no bruits [Bowel Sounds] : normal bowel sounds [Abdomen Soft] : soft [Abdomen Tenderness] : non-tender [No CVA Tenderness] : no ~M costovertebral angle tenderness [No Spinal Tenderness] : no spinal tenderness [Abnormal Walk] : normal gait [Skin Color & Pigmentation] : normal skin color and pigmentation [] : no rash [Cranial Nerves] : cranial nerves 2-12 were intact [Total Score ___ / 30] : the patient achieved a score of [unfilled] /30 [Date / Time ___ / 5] : date / time [unfilled] / 5 [Place ___ / 5] : place [unfilled] / 5 [Registration ___ / 3] : registration [unfilled] / 3 [Serial Sevens ___/5] : serial sevens [unfilled] / 5 [Naming 2 Objects ___ / 2] : naming two objects [unfilled] / 2 [Repeating a Sentence ___ / 1] : repeating a sentence [unfilled] / 1 [Writing a Sentence ___ / 1] : write sentence [unfilled] / 1 [3-stage Verbal Command ___ / 3] : three-stage verbal command [unfilled] / 3 [Written Command ___ / 1] : written command [unfilled] / 1 [Copy a Design ___ / 1] : copy a design [unfilled] / 1 [Recall ___ / 3] : recall [unfilled] / 3 [Cervical Lymph Nodes Enlarged Posterior Bilaterally] : posterior cervical [Cervical Lymph Nodes Enlarged Anterior Bilaterally] : anterior cervical [Supraclavicular Lymph Nodes Enlarged Bilaterally] : supraclavicular [Axillary Lymph Nodes Enlarged Bilaterally] : axillary [FreeTextEntry1] : AO x 2 (person, place)

## 2019-07-11 NOTE — HISTORY OF PRESENT ILLNESS
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 Lupillo reported decrease appetite since last visit and was noted to have about 10lbs weight loss. Next Echocardiogram checkup in November \par \par She was instructed to take nifedipine only as needed for better BP control only if SBP > 130, But has not required any doses.\par \par

## 2019-07-19 ENCOUNTER — APPOINTMENT (OUTPATIENT)
Dept: UROLOGY | Facility: CLINIC | Age: 84
End: 2019-07-19
Payer: MEDICARE

## 2019-07-19 PROCEDURE — 99214 OFFICE O/P EST MOD 30 MIN: CPT

## 2019-07-19 NOTE — PHYSICAL EXAM
[General Appearance - Well Developed] : well developed [General Appearance - Well Nourished] : well nourished [General Appearance - In No Acute Distress] : no acute distress [Abdomen Soft] : soft [Abdomen Tenderness] : non-tender [Costovertebral Angle Tenderness] : no ~M costovertebral angle tenderness [Skin Color & Pigmentation] : normal skin color and pigmentation [Edema] : no peripheral edema [] : no respiratory distress [Oriented To Time, Place, And Person] : oriented to person, place, and time [Normal Station and Gait] : the gait and station were normal for the patient's age [No Focal Deficits] : no focal deficits [FreeTextEntry1] : EWL=840

## 2019-07-19 NOTE — ASSESSMENT
[FreeTextEntry1] : Urinary retention. Multifactorial with multiple medical issues at the time of hospitalization. Passed VT but significant PVR. SHe has been refusing CIC. EMptying however, is much improved. Discussed risks of UTI with residual but this is a manageable volume. \par --Renal/bladder US with nephrology\par --RTC in 6mo\par

## 2019-07-19 NOTE — HISTORY OF PRESENT ILLNESS
[FreeTextEntry1] : 84yo female with cc of urinary retention. Pt with cardiac arrest on 8/1 witnessed and CPR and shocked in the field. Went to [facility] and was admitted and had defibrillator placed. She had issues with post procedure ICU delirium. She failed a VT and had amaral replaced (? 1100 cc came out). No rehab needed, pt up and moving around. At baseline, no urinary complaints. Not a ray. She drinks a lot of water. Amaral removed previously and while pt was voiding on her own with no complaints of straining or feelings of incomplete emptying, her PVR was significantly elevated at 650. Pt was taught to CIC. \par \par She was told to do BID-TID. SHe has not been consistently doing. She has been refusing to have cath done. SHe continues to have bothersome nocturia. PVR at last visit much improved at 260cc. She comes in today and has not been cathing. Per grand daughter, she refuses. SHe feels she is going well. PVR today much improved at 177cc.

## 2019-08-01 ENCOUNTER — APPOINTMENT (OUTPATIENT)
Dept: ELECTROPHYSIOLOGY | Facility: CLINIC | Age: 84
End: 2019-08-01
Payer: MEDICARE

## 2019-08-01 PROCEDURE — 93295 DEV INTERROG REMOTE 1/2/MLT: CPT

## 2019-08-01 PROCEDURE — 93296 REM INTERROG EVL PM/IDS: CPT

## 2019-09-24 ENCOUNTER — RX RENEWAL (OUTPATIENT)
Age: 84
End: 2019-09-24

## 2019-10-14 ENCOUNTER — OTHER (OUTPATIENT)
Age: 84
End: 2019-10-14

## 2019-10-16 ENCOUNTER — TRANSCRIPTION ENCOUNTER (OUTPATIENT)
Age: 84
End: 2019-10-16

## 2019-10-29 ENCOUNTER — MEDICATION RENEWAL (OUTPATIENT)
Age: 84
End: 2019-10-29

## 2019-10-31 ENCOUNTER — MEDICATION RENEWAL (OUTPATIENT)
Age: 84
End: 2019-10-31

## 2019-10-31 ENCOUNTER — RX RENEWAL (OUTPATIENT)
Age: 84
End: 2019-10-31

## 2019-11-01 ENCOUNTER — APPOINTMENT (OUTPATIENT)
Dept: ELECTROPHYSIOLOGY | Facility: CLINIC | Age: 84
End: 2019-11-01

## 2019-11-01 ENCOUNTER — APPOINTMENT (OUTPATIENT)
Dept: ULTRASOUND IMAGING | Facility: IMAGING CENTER | Age: 84
End: 2019-11-01

## 2019-11-01 ENCOUNTER — OUTPATIENT (OUTPATIENT)
Dept: OUTPATIENT SERVICES | Facility: HOSPITAL | Age: 84
LOS: 1 days | End: 2019-11-01
Payer: MEDICARE

## 2019-11-01 DIAGNOSIS — I10 ESSENTIAL (PRIMARY) HYPERTENSION: ICD-10-CM

## 2019-11-01 PROCEDURE — 93975 VASCULAR STUDY: CPT

## 2019-11-01 PROCEDURE — 93975 VASCULAR STUDY: CPT | Mod: 26

## 2019-11-06 ENCOUNTER — APPOINTMENT (OUTPATIENT)
Dept: ELECTROPHYSIOLOGY | Facility: CLINIC | Age: 84
End: 2019-11-06
Payer: MEDICARE

## 2019-11-06 PROCEDURE — 93296 REM INTERROG EVL PM/IDS: CPT

## 2019-11-06 PROCEDURE — 93295 DEV INTERROG REMOTE 1/2/MLT: CPT

## 2019-11-21 ENCOUNTER — APPOINTMENT (OUTPATIENT)
Dept: GERIATRICS | Facility: CLINIC | Age: 84
End: 2019-11-21
Payer: MEDICARE

## 2019-11-21 VITALS
HEIGHT: 65 IN | SYSTOLIC BLOOD PRESSURE: 132 MMHG | WEIGHT: 104 LBS | TEMPERATURE: 97.8 F | DIASTOLIC BLOOD PRESSURE: 82 MMHG | HEART RATE: 72 BPM | BODY MASS INDEX: 17.33 KG/M2 | RESPIRATION RATE: 16 BRPM | OXYGEN SATURATION: 99 %

## 2019-11-21 PROCEDURE — 99214 OFFICE O/P EST MOD 30 MIN: CPT

## 2019-11-21 NOTE — REVIEW OF SYSTEMS
[Feeling Tired] : feeling tired [Recent Weight Loss (___ Lbs)] : recent [unfilled] ~Ulb weight loss [Constipation] : constipation [Negative] : Heme/Lymph [Fever] : no fever [Chills] : no chills [Feeling Poorly] : not feeling poorly [Dysuria] : no dysuria [Incontinence] : no incontinence [Pelvic Pain] : no pelvic pain [Vaginal Discharge] : no vaginal discharge

## 2019-11-21 NOTE — HISTORY OF PRESENT ILLNESS
[FreeTextEntry1] : Mrs. Zabala is an 83-year-old presenting for followup, accompanied by her granddaughter Ngoc who assists with history. Overall the patient has been doing well. Her main complaint is loss of appetite. Her granddaughter states that she eats small meals. She used to take more supplements but now is only taking one Ensure a day. She also notes some constipation but states that she has bowel movements at least every other day and uses prune juice and occasional Dulcolax. She is experiencing some fatigue. She is scheduled to have defibrillator interrogation and echocardiogram tomorrow.

## 2019-11-21 NOTE — PHYSICAL EXAM
[General Appearance - Alert] : alert [General Appearance - In No Acute Distress] : in no acute distress [FreeTextEntry1] : Frail but non-ill-appearing [Sclera] : the sclera and conjunctiva were normal [PERRL With Normal Accommodation] : pupils were equal in size, round, and reactive to light [Extraocular Movements] : extraocular movements were intact [Neck Cervical Mass (___cm)] : no neck mass was observed [Jugular Venous Distention Increased] : there was no jugular-venous distention [Auscultation Breath Sounds / Voice Sounds] : lungs were clear to auscultation bilaterally [Heart Sounds] : normal S1 and S2 [Edema] : there was no peripheral edema [Cervical Lymph Nodes Enlarged Posterior Bilaterally] : posterior cervical [Cervical Lymph Nodes Enlarged Anterior Bilaterally] : anterior cervical [Supraclavicular Lymph Nodes Enlarged Bilaterally] : supraclavicular [Axillary Lymph Nodes Enlarged Bilaterally] : axillary [No CVA Tenderness] : no ~M costovertebral angle tenderness [No Spinal Tenderness] : no spinal tenderness [Involuntary Movements] : no involuntary movements were seen [] : no rash [No Focal Deficits] : no focal deficits [Affect] : the affect was normal

## 2019-11-22 ENCOUNTER — APPOINTMENT (OUTPATIENT)
Dept: CARDIOLOGY | Facility: CLINIC | Age: 84
End: 2019-11-22
Payer: MEDICARE

## 2019-11-22 VITALS
HEART RATE: 75 BPM | BODY MASS INDEX: 17.37 KG/M2 | OXYGEN SATURATION: 100 % | DIASTOLIC BLOOD PRESSURE: 87 MMHG | HEIGHT: 65 IN | SYSTOLIC BLOOD PRESSURE: 135 MMHG

## 2019-11-22 VITALS — BODY MASS INDEX: 17.37 KG/M2 | WEIGHT: 104.38 LBS

## 2019-11-22 PROCEDURE — 99214 OFFICE O/P EST MOD 30 MIN: CPT

## 2019-11-22 RX ORDER — FERROUS SULFATE 325(65) MG
325 (65 FE) TABLET ORAL DAILY
Qty: 90 | Refills: 1 | Status: DISCONTINUED | COMMUNITY
Start: 2018-12-28 | End: 2019-11-22

## 2019-11-24 NOTE — ASSESSMENT
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 Currently endorses class I-II symptoms and appears euvolemic. \par \par 1. HFrecEF - resolved Takotsubo\par - Decrease from toprol XL 50 mg daily to 25mg to see if it would alleviate fatigue \par \par 2. HTN - slightly elevated today\par - goal <130\par - will increase lisinopril to 7.5 mg daily; will repeat labs\par - keep log of BP\par \par 3. Non-obstructive CAD\par - on ASA 81 mg daily and Lipitor 40 mg daily \par \par 4. Fatigue - unclear etiology; possibly medication induced; has iron def anemia\par - iron therapy\par - further w/u with primary care\par \par RTC 6 months

## 2019-11-24 NOTE — HISTORY OF PRESENT ILLNESS
[FreeTextEntry1] : 84 y/o F w/ h/o R renal artery stenosis (since 2015), HTN, HL, microcytic anemia who presents for f/u evaluation Takotsubo in 8/18 (now recovered) where she had Vfib arrest and received ICD for secondary prevention. Accompanied by granddaughter.\par \par Since last visit, has been reportedly doing well. She is able to do light chores and walk with a cane without shortness of breath but she still reports chronic fatigue which hasn't worsened since May.  Has been able to walk up the stairs w/o difficulty. Denies orthopnea/PND/LE edema. Tolerating medications and did not increase lisinopril to 7.5mg as recommended last time. BP at home range 120-130. \par \par Still reports poor appetite. Has had some weight loss (7lbs since May clinic visit). Seen by geriatrics who ordered some basic labs.

## 2019-12-03 LAB — TSH SERPL-ACNC: 1.71 UIU/ML

## 2020-01-06 NOTE — ED PROVIDER NOTE - HISTORY ATTESTATION, MLM
Adequate: hears normal conversation without difficulty I have reviewed and confirmed nurses' notes...

## 2020-01-10 ENCOUNTER — APPOINTMENT (OUTPATIENT)
Dept: UROLOGY | Facility: CLINIC | Age: 85
End: 2020-01-10
Payer: MEDICARE

## 2020-01-10 PROCEDURE — 99213 OFFICE O/P EST LOW 20 MIN: CPT

## 2020-01-10 NOTE — HISTORY OF PRESENT ILLNESS
[FreeTextEntry1] : 83yo female with cc of urinary retention. Pt with cardiac arrest on 8/1 witnessed and CPR and shocked in the field. Went to [facility] and was admitted and had defibrillator placed. She had issues with post procedure ICU delirium. She failed a VT and had amaral replaced (? 1100 cc came out). No rehab needed, pt up and moving around. At baseline, no urinary complaints. Not a ray. She drinks a lot of water. Amaral removed previously and while pt was voiding on her own with no complaints of straining or feelings of incomplete emptying, her PVR was significantly elevated at 650. Pt was taught to CIC. \par \par She was told to do BID-TID. For a time she was not consistently doing and had elevated PVR. Pt last seen 6mo ago and PVR much improved at 177cc. PVR today similar at 173-200cc. No changes. Still getting up at night. Trying to do double void.

## 2020-01-10 NOTE — ASSESSMENT
[FreeTextEntry1] : Urinary retention. Multifactorial with multiple medical issues at the time of hospitalization. Passed VT but significant PVR. SHe has been refusing CIC. EMptying however, is much improved. Discussed risks of UTI with residual but this is a manageable volume. Nocturia likely from residual\par --COnt double voiding\par --Curb fluids 2h prior to bed\par --RTC in 9mo\par

## 2020-01-10 NOTE — PHYSICAL EXAM
[General Appearance - Well Nourished] : well nourished [General Appearance - In No Acute Distress] : no acute distress [General Appearance - Well Developed] : well developed [Abdomen Tenderness] : non-tender [Abdomen Soft] : soft [Costovertebral Angle Tenderness] : no ~M costovertebral angle tenderness [Skin Color & Pigmentation] : normal skin color and pigmentation [Edema] : no peripheral edema [] : no respiratory distress [Oriented To Time, Place, And Person] : oriented to person, place, and time [No Focal Deficits] : no focal deficits [Normal Station and Gait] : the gait and station were normal for the patient's age

## 2020-01-17 ENCOUNTER — APPOINTMENT (OUTPATIENT)
Dept: NEPHROLOGY | Facility: CLINIC | Age: 85
End: 2020-01-17
Payer: MEDICARE

## 2020-01-17 VITALS
OXYGEN SATURATION: 99 % | BODY MASS INDEX: 17.16 KG/M2 | WEIGHT: 103 LBS | DIASTOLIC BLOOD PRESSURE: 75 MMHG | HEIGHT: 65 IN | SYSTOLIC BLOOD PRESSURE: 150 MMHG | HEART RATE: 75 BPM

## 2020-01-17 PROCEDURE — 99214 OFFICE O/P EST MOD 30 MIN: CPT

## 2020-01-17 NOTE — PHYSICAL EXAM
[General Appearance - Alert] : alert [General Appearance - In No Acute Distress] : in no acute distress [Oropharynx] : the oropharynx was normal [Neck Appearance] : the appearance of the neck was normal [Jugular Venous Distention Increased] : there was no jugular-venous distention [Auscultation Breath Sounds / Voice Sounds] : lungs were clear to auscultation bilaterally [Heart Rate And Rhythm] : heart rate was normal and rhythm regular [Heart Sounds] : normal S1 and S2 [Heart Sounds Gallop] : no gallops [Heart Sounds Pericardial Friction Rub] : no pericardial rub [Arterial Pulses Carotid] : carotid pulses were normal with no bruits [Edema] : there was no peripheral edema [Bowel Sounds] : normal bowel sounds [Abdomen Soft] : soft [Abdomen Tenderness] : non-tender [Cervical Lymph Nodes Enlarged Posterior Bilaterally] : posterior cervical [Cervical Lymph Nodes Enlarged Anterior Bilaterally] : anterior cervical [Supraclavicular Lymph Nodes Enlarged Bilaterally] : supraclavicular [No CVA Tenderness] : no ~M costovertebral angle tenderness [No Spinal Tenderness] : no spinal tenderness [Abnormal Walk] : normal gait [Nail Clubbing] : no clubbing  or cyanosis of the fingernails [Skin Color & Pigmentation] : normal skin color and pigmentation [] : no rash [FreeTextEntry1] : responsive [Oriented To Time, Place, And Person] : oriented to person, place, and time [Impaired Insight] : insight and judgment were intact [Affect] : the affect was normal [Mood] : the mood was normal

## 2020-01-17 NOTE — ASSESSMENT
[FreeTextEntry1] : 84 years old woman with a history of renal artery stenosis here for follow up.\par \par Renal Artery Stenosis -- .  Most recent doppler is stable.  Will continue to monitor blood pressure and blood work.  The patient's granddaughter reports that she took her medication late today but usually in the morning her systolic blood pressure is closer to 120 to 130.  Will continue to monitor without medication changes at this time.  Will check renal panel again now that she has been started up on lisinopril.  Will check dopplers periodically.\par \par Anemia -- Will recheck CBC today\par \par Hypertension -- as above will not make changes just yet.  continue to check BP daily at home

## 2020-01-17 NOTE — REVIEW OF SYSTEMS
[Fever] : no fever [Feeling Poorly] : not feeling poorly [Chills] : no chills [Feeling Tired] : not feeling tired [Eyesight Problems] : no eyesight problems [Nosebleeds] : no nosebleeds [Chest Pain] : no chest pain [Palpitations] : no palpitations [Leg Claudication] : no intermittent leg claudication [Shortness Of Breath] : no shortness of breath [Lower Ext Edema] : no lower extremity edema [Wheezing] : no wheezing [Abdominal Pain] : no abdominal pain [Vomiting] : no vomiting [Dysuria] : no dysuria [Incontinence] : no incontinence [Joint Swelling] : no joint swelling [Joint Stiffness] : no joint stiffness [Dizziness] : no dizziness [Fainting] : no fainting [Muscle Weakness] : muscle weakness [Easy Bleeding] : no tendency for easy bleeding [Easy Bruising] : no tendency for easy bruising

## 2020-01-17 NOTE — HISTORY OF PRESENT ILLNESS
[FreeTextEntry1] : Today I had the pleasure of seeing Tatiana Chauhan.  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  She is here today accompanied by two of her grand daughters.  She has a history of hypertension secondary to renal artery stenosis.  She used to follow up with Dr. Fay who has since retried.  Over the summer she had a cardiac arrest in [place] and was diagnosed with a new cardiomyopathy.  She also has some issues with chronic urinary retention.  After the cardiac arrest some of her BP meds were held and now she is starting to have her bp creep up and is 150 to 160s systolic at times.  On evaluation today her only complaint is fatigue.\par \par 4/19/19 -- Since our last visit Tatiana has continued to feel fatigued she's lost weight and she has a very poor appetite.  Her diuretics were recently stopped.\par \par 1/17/20 -- I saw Tatiana today and she is feeling better overall.  She has no chest pain no shortness of breath appetite and energy levels are good.

## 2020-01-20 ENCOUNTER — MOBILE ON CALL (OUTPATIENT)
Age: 85
End: 2020-01-20

## 2020-01-21 LAB
ALBUMIN SERPL ELPH-MCNC: 3.5 G/DL
ANION GAP SERPL CALC-SCNC: 10 MMOL/L
BASOPHILS # BLD AUTO: 0.04 K/UL
BASOPHILS NFR BLD AUTO: 0.9 %
BUN SERPL-MCNC: 16 MG/DL
CALCIUM SERPL-MCNC: 9.2 MG/DL
CHLORIDE SERPL-SCNC: 105 MMOL/L
CO2 SERPL-SCNC: 24 MMOL/L
CREAT SERPL-MCNC: 1.03 MG/DL
CREAT SPEC-SCNC: 78 MG/DL
CREAT/PROT UR: 1.8 RATIO
EOSINOPHIL # BLD AUTO: 0.12 K/UL
EOSINOPHIL NFR BLD AUTO: 2.6 %
FERRITIN SERPL-MCNC: 39 NG/ML
GLUCOSE SERPL-MCNC: 90 MG/DL
HCT VFR BLD CALC: 39.1 %
HGB BLD-MCNC: 12.1 G/DL
IMM GRANULOCYTES NFR BLD AUTO: 0.2 %
IRON SATN MFR SERPL: 39 %
IRON SERPL-MCNC: 90 UG/DL
LYMPHOCYTES # BLD AUTO: 1.78 K/UL
LYMPHOCYTES NFR BLD AUTO: 38 %
MAN DIFF?: NORMAL
MCHC RBC-ENTMCNC: 23.4 PG
MCHC RBC-ENTMCNC: 30.9 GM/DL
MCV RBC AUTO: 75.6 FL
MONOCYTES # BLD AUTO: 0.41 K/UL
MONOCYTES NFR BLD AUTO: 8.8 %
NEUTROPHILS # BLD AUTO: 2.32 K/UL
NEUTROPHILS NFR BLD AUTO: 49.5 %
PHOSPHATE SERPL-MCNC: 3.4 MG/DL
PLATELET # BLD AUTO: 268 K/UL
POTASSIUM SERPL-SCNC: 4.2 MMOL/L
PROT UR-MCNC: 140 MG/DL
RBC # BLD: 5.17 M/UL
RBC # FLD: 17.1 %
SODIUM SERPL-SCNC: 140 MMOL/L
TIBC SERPL-MCNC: 233 UG/DL
UIBC SERPL-MCNC: 143 UG/DL
WBC # FLD AUTO: 4.68 K/UL

## 2020-01-21 RX ORDER — LISINOPRIL 5 MG/1
5 TABLET ORAL
Qty: 90 | Refills: 1 | Status: DISCONTINUED | COMMUNITY
Start: 2018-09-14 | End: 2020-01-21

## 2020-01-21 RX ORDER — LISINOPRIL 2.5 MG/1
2.5 TABLET ORAL
Qty: 90 | Refills: 1 | Status: DISCONTINUED | COMMUNITY
Start: 2019-11-22 | End: 2020-01-21

## 2020-02-05 ENCOUNTER — APPOINTMENT (OUTPATIENT)
Dept: ELECTROPHYSIOLOGY | Facility: CLINIC | Age: 85
End: 2020-02-05
Payer: MEDICARE

## 2020-02-05 PROCEDURE — 93295 DEV INTERROG REMOTE 1/2/MLT: CPT

## 2020-02-05 PROCEDURE — 93296 REM INTERROG EVL PM/IDS: CPT

## 2020-04-03 ENCOUNTER — TRANSCRIPTION ENCOUNTER (OUTPATIENT)
Age: 85
End: 2020-04-03

## 2020-04-14 DIAGNOSIS — R31.0 GROSS HEMATURIA: ICD-10-CM

## 2020-04-15 NOTE — PROGRESS NOTE ADULT - PROBLEM/PLAN-1
What Type Of Note Output Would You Prefer (Optional)?: Standard Output
How Severe Is Your Rash?: mild
Is This A New Presentation, Or A Follow-Up?: Rash
DISPLAY PLAN FREE TEXT

## 2020-04-16 LAB
APPEARANCE: ABNORMAL
BACTERIA: ABNORMAL
BILIRUBIN URINE: NEGATIVE
BLOOD URINE: ABNORMAL
COLOR: ABNORMAL
GLUCOSE QUALITATIVE U: NEGATIVE
HYALINE CASTS: 0 /LPF
KETONES URINE: NEGATIVE
LEUKOCYTE ESTERASE URINE: ABNORMAL
MICROSCOPIC-UA: NORMAL
NITRITE URINE: NEGATIVE
PH URINE: 6.5
PROTEIN URINE: ABNORMAL
RED BLOOD CELLS URINE: 548 /HPF
SPECIFIC GRAVITY URINE: 1.01
SQUAMOUS EPITHELIAL CELLS: 2 /HPF
UROBILINOGEN URINE: NORMAL
WHITE BLOOD CELLS URINE: >720 /HPF

## 2020-04-17 LAB — BACTERIA UR CULT: NORMAL

## 2020-05-22 ENCOUNTER — APPOINTMENT (OUTPATIENT)
Dept: GERIATRICS | Facility: CLINIC | Age: 85
End: 2020-05-22
Payer: MEDICARE

## 2020-05-22 ENCOUNTER — NON-APPOINTMENT (OUTPATIENT)
Age: 85
End: 2020-05-22

## 2020-05-22 ENCOUNTER — APPOINTMENT (OUTPATIENT)
Dept: CARDIOLOGY | Facility: CLINIC | Age: 85
End: 2020-05-22
Payer: MEDICARE

## 2020-05-22 ENCOUNTER — APPOINTMENT (OUTPATIENT)
Dept: ELECTROPHYSIOLOGY | Facility: CLINIC | Age: 85
End: 2020-05-22
Payer: MEDICARE

## 2020-05-22 VITALS
HEIGHT: 65 IN | TEMPERATURE: 98.7 F | DIASTOLIC BLOOD PRESSURE: 60 MMHG | OXYGEN SATURATION: 99 % | RESPIRATION RATE: 14 BRPM | SYSTOLIC BLOOD PRESSURE: 120 MMHG | WEIGHT: 99 LBS | BODY MASS INDEX: 16.5 KG/M2 | HEART RATE: 62 BPM

## 2020-05-22 VITALS — TEMPERATURE: 98.3 F | HEART RATE: 74 BPM | HEIGHT: 65 IN | OXYGEN SATURATION: 99 % | BODY MASS INDEX: 16.5 KG/M2

## 2020-05-22 VITALS — SYSTOLIC BLOOD PRESSURE: 129 MMHG | DIASTOLIC BLOOD PRESSURE: 86 MMHG

## 2020-05-22 VITALS — BODY MASS INDEX: 16.5 KG/M2 | WEIGHT: 99.13 LBS

## 2020-05-22 DIAGNOSIS — R63.4 ABNORMAL WEIGHT LOSS: ICD-10-CM

## 2020-05-22 PROCEDURE — 93283 PRGRMG EVAL IMPLANTABLE DFB: CPT

## 2020-05-22 PROCEDURE — 99214 OFFICE O/P EST MOD 30 MIN: CPT

## 2020-05-22 PROCEDURE — 93000 ELECTROCARDIOGRAM COMPLETE: CPT

## 2020-05-22 RX ORDER — CEPHALEXIN 500 MG/1
500 CAPSULE ORAL 3 TIMES DAILY
Qty: 21 | Refills: 0 | Status: DISCONTINUED | COMMUNITY
Start: 2020-04-15 | End: 2020-05-22

## 2020-05-22 NOTE — REVIEW OF SYSTEMS
[Feeling Poorly] : not feeling poorly [Fever] : no fever [Feeling Tired] : feeling tired [Recent Weight Loss (___ Lbs)] : recent [unfilled] ~Ulb weight loss [Heart Rate Is Slow] : the heart rate was not slow [Heart Rate Is Fast] : the heart rate was not fast [Chest Pain] : no chest pain [Palpitations] : no palpitations [Confused] : confusion [Negative] : Heme/Lymph

## 2020-05-22 NOTE — HISTORY OF PRESENT ILLNESS
[FreeTextEntry1] : Mrs. Zabala presents for followup accompanied by her granddaughter Ngoc. Overall the patient is doing well. She was seen earlier today by cardiology. Patient has been complaining of fatigue. Cardiology has made some adjustments to medications. Further weight loss noted. Patient complains of poor appetite. Has not been receptive to nutritional supplements.

## 2020-05-22 NOTE — PHYSICAL EXAM
[General Appearance - Alert] : alert [General Appearance - In No Acute Distress] : in no acute distress [FreeTextEntry1] : Frail but non-ill-appearing [Extraocular Movements] : extraocular movements were intact [Strabismus] : no strabismus was seen [Examination Of The Oral Cavity] : the lips and gums were normal [Jugular Venous Distention Increased] : there was no jugular-venous distention [Auscultation Breath Sounds / Voice Sounds] : lungs were clear to auscultation bilaterally [Heart Sounds] : normal S1 and S2 [Edema] : there was no peripheral edema [Abdomen Soft] : soft [Abdomen Tenderness] : non-tender [Cervical Lymph Nodes Enlarged Posterior Bilaterally] : posterior cervical [Axillary Lymph Nodes Enlarged Bilaterally] : axillary [Supraclavicular Lymph Nodes Enlarged Bilaterally] : supraclavicular [Cervical Lymph Nodes Enlarged Anterior Bilaterally] : anterior cervical [Involuntary Movements] : no involuntary movements were seen [No CVA Tenderness] : no ~M costovertebral angle tenderness [No Spinal Tenderness] : no spinal tenderness [No Focal Deficits] : no focal deficits [] : no rash

## 2020-05-25 NOTE — HISTORY OF PRESENT ILLNESS
[FreeTextEntry1] : 85 y/o F w/ h/o R renal artery stenosis (since 2015), HTN, HL, microcytic anemia who presents for f/u evaluation [facility]ots[facility] in 8/18 (now recovered) where she had Vfib arrest and received ICD for secondary prevention. Accompanied by granddaughter, Ngoc.\par \par Since last visit, has been reportedly doing well. Has been taking appropriate precautions. Has chronic fatigue; somewhat improved since toprol was reduced. She is able to do light chores and walk with a cane without shortness of breath but she still reports chronic fatigue. Has been able to walk up the stairs w/o difficulty. Denies orthopnea/PND/LE edema. Tolerating medications including higher lisinopril 10 mg daily. BP at home range 120-130. \par \par Still reports poor appetite. Continues to have some weight loss. Has difficulty sleeping as well at night. Denies depression.

## 2020-05-25 NOTE — ASSESSMENT
[FreeTextEntry1] : 83 y/o F w/ h/o R renal artery stenosis (since 2015), HTN, HL, with Takotsubo's cardiomyopathy (8/18; now recovered) presented with VT/Vfib arrest now s/p ICD who presents for f/u evaluation. Currently endorses class I-II symptoms and appears euvolemic. Still endorsing fatigue and poor appetite. \par \par 1. HFrecEF - resolved Takotsubo\par - c/w toprol xl 25 mg daily \par \par 2. HTN - normotensive\par - c/w lisinopril 10 mg daily \par - keep log of BP\par \par 3. Non-obstructive CAD\par - on ASA 81 mg daily and Lipitor 40 mg daily \par \par 4. Fatigue - unclear etiology; not depressed\par - further w/u with primary care; possible candidate for Remeron\par \par RTC 6 months

## 2020-07-23 ENCOUNTER — APPOINTMENT (OUTPATIENT)
Dept: NEPHROLOGY | Facility: CLINIC | Age: 85
End: 2020-07-23

## 2020-08-24 ENCOUNTER — APPOINTMENT (OUTPATIENT)
Dept: ELECTROPHYSIOLOGY | Facility: CLINIC | Age: 85
End: 2020-08-24
Payer: MEDICARE

## 2020-08-24 PROCEDURE — 93296 REM INTERROG EVL PM/IDS: CPT

## 2020-08-24 PROCEDURE — 93295 DEV INTERROG REMOTE 1/2/MLT: CPT

## 2020-09-03 ENCOUNTER — INPATIENT (INPATIENT)
Facility: HOSPITAL | Age: 85
LOS: 4 days | Discharge: HOME HEALTH | End: 2020-09-08
Attending: STUDENT IN AN ORGANIZED HEALTH CARE EDUCATION/TRAINING PROGRAM | Admitting: STUDENT IN AN ORGANIZED HEALTH CARE EDUCATION/TRAINING PROGRAM
Payer: MEDICARE

## 2020-09-03 VITALS
SYSTOLIC BLOOD PRESSURE: 143 MMHG | TEMPERATURE: 98 F | RESPIRATION RATE: 18 BRPM | DIASTOLIC BLOOD PRESSURE: 101 MMHG | OXYGEN SATURATION: 99 % | HEART RATE: 101 BPM

## 2020-09-03 DIAGNOSIS — E78.5 HYPERLIPIDEMIA, UNSPECIFIED: ICD-10-CM

## 2020-09-03 DIAGNOSIS — Z98.49 CATARACT EXTRACTION STATUS, UNSPECIFIED EYE: Chronic | ICD-10-CM

## 2020-09-03 DIAGNOSIS — Z90.49 ACQUIRED ABSENCE OF OTHER SPECIFIED PARTS OF DIGESTIVE TRACT: Chronic | ICD-10-CM

## 2020-09-03 DIAGNOSIS — R79.89 OTHER SPECIFIED ABNORMAL FINDINGS OF BLOOD CHEMISTRY: ICD-10-CM

## 2020-09-03 DIAGNOSIS — Z02.9 ENCOUNTER FOR ADMINISTRATIVE EXAMINATIONS, UNSPECIFIED: ICD-10-CM

## 2020-09-03 DIAGNOSIS — R00.2 PALPITATIONS: ICD-10-CM

## 2020-09-03 DIAGNOSIS — I42.9 CARDIOMYOPATHY, UNSPECIFIED: ICD-10-CM

## 2020-09-03 DIAGNOSIS — I10 ESSENTIAL (PRIMARY) HYPERTENSION: ICD-10-CM

## 2020-09-03 DIAGNOSIS — I15.0 RENOVASCULAR HYPERTENSION: ICD-10-CM

## 2020-09-03 DIAGNOSIS — I47.1 SUPRAVENTRICULAR TACHYCARDIA: ICD-10-CM

## 2020-09-03 DIAGNOSIS — I50.32 CHRONIC DIASTOLIC (CONGESTIVE) HEART FAILURE: ICD-10-CM

## 2020-09-03 DIAGNOSIS — Z29.9 ENCOUNTER FOR PROPHYLACTIC MEASURES, UNSPECIFIED: ICD-10-CM

## 2020-09-03 DIAGNOSIS — Z45.02 ENCOUNTER FOR ADJUSTMENT AND MANAGEMENT OF AUTOMATIC IMPLANTABLE CARDIAC DEFIBRILLATOR: ICD-10-CM

## 2020-09-03 LAB
ALBUMIN SERPL ELPH-MCNC: 3.9 G/DL — SIGNIFICANT CHANGE UP (ref 3.3–5)
ALP SERPL-CCNC: 115 U/L — SIGNIFICANT CHANGE UP (ref 40–120)
ALT FLD-CCNC: 19 U/L — SIGNIFICANT CHANGE UP (ref 4–33)
ANION GAP SERPL CALC-SCNC: 15 MMO/L — HIGH (ref 7–14)
ANION GAP SERPL CALC-SCNC: 16 MMO/L — HIGH (ref 7–14)
ANION GAP SERPL CALC-SCNC: 9 MMO/L — SIGNIFICANT CHANGE UP (ref 7–14)
APTT BLD: 31 SEC — SIGNIFICANT CHANGE UP (ref 27–36.3)
AST SERPL-CCNC: 44 U/L — HIGH (ref 4–32)
BASE EXCESS BLDV CALC-SCNC: 2.5 MMOL/L — SIGNIFICANT CHANGE UP
BASOPHILS # BLD AUTO: 0.03 K/UL — SIGNIFICANT CHANGE UP (ref 0–0.2)
BASOPHILS NFR BLD AUTO: 0.7 % — SIGNIFICANT CHANGE UP (ref 0–2)
BILIRUB SERPL-MCNC: 0.9 MG/DL — SIGNIFICANT CHANGE UP (ref 0.2–1.2)
BLD GP AB SCN SERPL QL: NEGATIVE — SIGNIFICANT CHANGE UP
BLOOD GAS VENOUS - CREATININE: 0.94 MG/DL — SIGNIFICANT CHANGE UP (ref 0.5–1.3)
BUN SERPL-MCNC: 15 MG/DL — SIGNIFICANT CHANGE UP (ref 7–23)
BUN SERPL-MCNC: 16 MG/DL — SIGNIFICANT CHANGE UP (ref 7–23)
BUN SERPL-MCNC: 17 MG/DL — SIGNIFICANT CHANGE UP (ref 7–23)
CALCIUM SERPL-MCNC: 9 MG/DL — SIGNIFICANT CHANGE UP (ref 8.4–10.5)
CALCIUM SERPL-MCNC: 9 MG/DL — SIGNIFICANT CHANGE UP (ref 8.4–10.5)
CALCIUM SERPL-MCNC: 9.2 MG/DL — SIGNIFICANT CHANGE UP (ref 8.4–10.5)
CHLORIDE BLDV-SCNC: 110 MMOL/L — HIGH (ref 96–108)
CHLORIDE SERPL-SCNC: 103 MMOL/L — SIGNIFICANT CHANGE UP (ref 98–107)
CHLORIDE SERPL-SCNC: 103 MMOL/L — SIGNIFICANT CHANGE UP (ref 98–107)
CHLORIDE SERPL-SCNC: 104 MMOL/L — SIGNIFICANT CHANGE UP (ref 98–107)
CO2 SERPL-SCNC: 16 MMOL/L — LOW (ref 22–31)
CO2 SERPL-SCNC: 20 MMOL/L — LOW (ref 22–31)
CO2 SERPL-SCNC: 25 MMOL/L — SIGNIFICANT CHANGE UP (ref 22–31)
CREAT SERPL-MCNC: 0.79 MG/DL — SIGNIFICANT CHANGE UP (ref 0.5–1.3)
CREAT SERPL-MCNC: 0.83 MG/DL — SIGNIFICANT CHANGE UP (ref 0.5–1.3)
CREAT SERPL-MCNC: 0.87 MG/DL — SIGNIFICANT CHANGE UP (ref 0.5–1.3)
EOSINOPHIL # BLD AUTO: 0.03 K/UL — SIGNIFICANT CHANGE UP (ref 0–0.5)
EOSINOPHIL NFR BLD AUTO: 0.7 % — SIGNIFICANT CHANGE UP (ref 0–6)
GAS PNL BLDV: 137 MMOL/L — SIGNIFICANT CHANGE UP (ref 136–146)
GLUCOSE BLDV-MCNC: 96 MG/DL — SIGNIFICANT CHANGE UP (ref 70–99)
GLUCOSE SERPL-MCNC: 126 MG/DL — HIGH (ref 70–99)
GLUCOSE SERPL-MCNC: 82 MG/DL — SIGNIFICANT CHANGE UP (ref 70–99)
GLUCOSE SERPL-MCNC: 92 MG/DL — SIGNIFICANT CHANGE UP (ref 70–99)
HCO3 BLDV-SCNC: 25 MMOL/L — SIGNIFICANT CHANGE UP (ref 20–27)
HCT VFR BLD CALC: 39.7 % — SIGNIFICANT CHANGE UP (ref 34.5–45)
HCT VFR BLDV CALC: 41.1 % — SIGNIFICANT CHANGE UP (ref 34.5–45)
HGB BLD-MCNC: 12.8 G/DL — SIGNIFICANT CHANGE UP (ref 11.5–15.5)
HGB BLDV-MCNC: 13.4 G/DL — SIGNIFICANT CHANGE UP (ref 11.5–15.5)
IMM GRANULOCYTES NFR BLD AUTO: 0.2 % — SIGNIFICANT CHANGE UP (ref 0–1.5)
INR BLD: 1.11 — SIGNIFICANT CHANGE UP (ref 0.88–1.16)
LACTATE BLDV-MCNC: 1.7 MMOL/L — SIGNIFICANT CHANGE UP (ref 0.5–2)
LYMPHOCYTES # BLD AUTO: 1.1 K/UL — SIGNIFICANT CHANGE UP (ref 1–3.3)
LYMPHOCYTES # BLD AUTO: 24.3 % — SIGNIFICANT CHANGE UP (ref 13–44)
MAGNESIUM SERPL-MCNC: 1.9 MG/DL — SIGNIFICANT CHANGE UP (ref 1.6–2.6)
MAGNESIUM SERPL-MCNC: 1.9 MG/DL — SIGNIFICANT CHANGE UP (ref 1.6–2.6)
MAGNESIUM SERPL-MCNC: 2 MG/DL — SIGNIFICANT CHANGE UP (ref 1.6–2.6)
MCHC RBC-ENTMCNC: 23.1 PG — LOW (ref 27–34)
MCHC RBC-ENTMCNC: 32.2 % — SIGNIFICANT CHANGE UP (ref 32–36)
MCV RBC AUTO: 71.8 FL — LOW (ref 80–100)
MONOCYTES # BLD AUTO: 0.33 K/UL — SIGNIFICANT CHANGE UP (ref 0–0.9)
MONOCYTES NFR BLD AUTO: 7.3 % — SIGNIFICANT CHANGE UP (ref 2–14)
NEUTROPHILS # BLD AUTO: 3.03 K/UL — SIGNIFICANT CHANGE UP (ref 1.8–7.4)
NEUTROPHILS NFR BLD AUTO: 66.8 % — SIGNIFICANT CHANGE UP (ref 43–77)
NRBC # FLD: 0 K/UL — SIGNIFICANT CHANGE UP (ref 0–0)
NT-PROBNP SERPL-SCNC: 557.3 PG/ML — SIGNIFICANT CHANGE UP
PCO2 BLDV: 49 MMHG — SIGNIFICANT CHANGE UP (ref 41–51)
PH BLDV: 7.37 PH — SIGNIFICANT CHANGE UP (ref 7.32–7.43)
PHOSPHATE SERPL-MCNC: 3 MG/DL — SIGNIFICANT CHANGE UP (ref 2.5–4.5)
PHOSPHATE SERPL-MCNC: 3.3 MG/DL — SIGNIFICANT CHANGE UP (ref 2.5–4.5)
PHOSPHATE SERPL-MCNC: 3.3 MG/DL — SIGNIFICANT CHANGE UP (ref 2.5–4.5)
PLATELET # BLD AUTO: 199 K/UL — SIGNIFICANT CHANGE UP (ref 150–400)
PMV BLD: 12.1 FL — SIGNIFICANT CHANGE UP (ref 7–13)
PO2 BLDV: 26 MMHG — LOW (ref 35–40)
POTASSIUM BLDV-SCNC: 5.6 MMOL/L — HIGH (ref 3.4–4.5)
POTASSIUM SERPL-MCNC: 4.7 MMOL/L — SIGNIFICANT CHANGE UP (ref 3.5–5.3)
POTASSIUM SERPL-MCNC: SIGNIFICANT CHANGE UP MMOL/L (ref 3.5–5.3)
POTASSIUM SERPL-MCNC: SIGNIFICANT CHANGE UP MMOL/L (ref 3.5–5.3)
POTASSIUM SERPL-SCNC: 4.7 MMOL/L — SIGNIFICANT CHANGE UP (ref 3.5–5.3)
POTASSIUM SERPL-SCNC: SIGNIFICANT CHANGE UP MMOL/L (ref 3.5–5.3)
POTASSIUM SERPL-SCNC: SIGNIFICANT CHANGE UP MMOL/L (ref 3.5–5.3)
PROT SERPL-MCNC: 7.7 G/DL — SIGNIFICANT CHANGE UP (ref 6–8.3)
PROTHROM AB SERPL-ACNC: 12.7 SEC — SIGNIFICANT CHANGE UP (ref 10.6–13.6)
RBC # BLD: 5.53 M/UL — HIGH (ref 3.8–5.2)
RBC # FLD: 17.2 % — HIGH (ref 10.3–14.5)
RH IG SCN BLD-IMP: POSITIVE — SIGNIFICANT CHANGE UP
SAO2 % BLDV: 37.9 % — LOW (ref 60–85)
SARS-COV-2 RNA SPEC QL NAA+PROBE: SIGNIFICANT CHANGE UP
SODIUM SERPL-SCNC: 134 MMOL/L — LOW (ref 135–145)
SODIUM SERPL-SCNC: 138 MMOL/L — SIGNIFICANT CHANGE UP (ref 135–145)
SODIUM SERPL-SCNC: 139 MMOL/L — SIGNIFICANT CHANGE UP (ref 135–145)
TROPONIN T, HIGH SENSITIVITY: 163 NG/L — CRITICAL HIGH (ref ?–14)
TROPONIN T, HIGH SENSITIVITY: 287 NG/L — CRITICAL HIGH (ref ?–14)
TROPONIN T, HIGH SENSITIVITY: 51 NG/L — SIGNIFICANT CHANGE UP (ref ?–14)
WBC # BLD: 4.53 K/UL — SIGNIFICANT CHANGE UP (ref 3.8–10.5)
WBC # FLD AUTO: 4.53 K/UL — SIGNIFICANT CHANGE UP (ref 3.8–10.5)

## 2020-09-03 PROCEDURE — 99222 1ST HOSP IP/OBS MODERATE 55: CPT | Mod: GC

## 2020-09-03 PROCEDURE — 99232 SBSQ HOSP IP/OBS MODERATE 35: CPT

## 2020-09-03 PROCEDURE — 99285 EMERGENCY DEPT VISIT HI MDM: CPT

## 2020-09-03 PROCEDURE — 71045 X-RAY EXAM CHEST 1 VIEW: CPT | Mod: 26

## 2020-09-03 PROCEDURE — 99223 1ST HOSP IP/OBS HIGH 75: CPT | Mod: GC

## 2020-09-03 RX ORDER — MAGNESIUM SULFATE 500 MG/ML
1 VIAL (ML) INJECTION ONCE
Refills: 0 | Status: COMPLETED | OUTPATIENT
Start: 2020-09-03 | End: 2020-09-03

## 2020-09-03 RX ORDER — LISINOPRIL 2.5 MG/1
10 TABLET ORAL DAILY
Refills: 0 | Status: DISCONTINUED | OUTPATIENT
Start: 2020-09-03 | End: 2020-09-03

## 2020-09-03 RX ORDER — AMLODIPINE BESYLATE 2.5 MG/1
5 TABLET ORAL DAILY
Refills: 0 | Status: DISCONTINUED | OUTPATIENT
Start: 2020-09-03 | End: 2020-09-03

## 2020-09-03 RX ORDER — ACETAMINOPHEN 500 MG
650 TABLET ORAL EVERY 6 HOURS
Refills: 0 | Status: DISCONTINUED | OUTPATIENT
Start: 2020-09-03 | End: 2020-09-08

## 2020-09-03 RX ORDER — HYDRALAZINE HCL 50 MG
10 TABLET ORAL ONCE
Refills: 0 | Status: COMPLETED | OUTPATIENT
Start: 2020-09-03 | End: 2020-09-03

## 2020-09-03 RX ORDER — SENNA PLUS 8.6 MG/1
2 TABLET ORAL AT BEDTIME
Refills: 0 | Status: DISCONTINUED | OUTPATIENT
Start: 2020-09-03 | End: 2020-09-08

## 2020-09-03 RX ORDER — METOPROLOL TARTRATE 50 MG
25 TABLET ORAL ONCE
Refills: 0 | Status: COMPLETED | OUTPATIENT
Start: 2020-09-03 | End: 2020-09-03

## 2020-09-03 RX ORDER — METOPROLOL TARTRATE 50 MG
5 TABLET ORAL ONCE
Refills: 0 | Status: COMPLETED | OUTPATIENT
Start: 2020-09-03 | End: 2020-09-03

## 2020-09-03 RX ORDER — HYDRALAZINE HCL 50 MG
10 TABLET ORAL
Refills: 0 | Status: DISCONTINUED | OUTPATIENT
Start: 2020-09-03 | End: 2020-09-03

## 2020-09-03 RX ORDER — ATORVASTATIN CALCIUM 80 MG/1
40 TABLET, FILM COATED ORAL AT BEDTIME
Refills: 0 | Status: DISCONTINUED | OUTPATIENT
Start: 2020-09-03 | End: 2020-09-04

## 2020-09-03 RX ORDER — METOPROLOL TARTRATE 50 MG
25 TABLET ORAL DAILY
Refills: 0 | Status: DISCONTINUED | OUTPATIENT
Start: 2020-09-03 | End: 2020-09-03

## 2020-09-03 RX ORDER — LANOLIN ALCOHOL/MO/W.PET/CERES
3 CREAM (GRAM) TOPICAL AT BEDTIME
Refills: 0 | Status: DISCONTINUED | OUTPATIENT
Start: 2020-09-03 | End: 2020-09-08

## 2020-09-03 RX ORDER — METOPROLOL TARTRATE 50 MG
25 TABLET ORAL
Refills: 0 | Status: DISCONTINUED | OUTPATIENT
Start: 2020-09-03 | End: 2020-09-03

## 2020-09-03 RX ORDER — NIFEDIPINE 30 MG
1 TABLET, EXTENDED RELEASE 24 HR ORAL
Qty: 0 | Refills: 0 | DISCHARGE

## 2020-09-03 RX ORDER — ASPIRIN/CALCIUM CARB/MAGNESIUM 324 MG
81 TABLET ORAL DAILY
Refills: 0 | Status: DISCONTINUED | OUTPATIENT
Start: 2020-09-03 | End: 2020-09-04

## 2020-09-03 RX ORDER — ENOXAPARIN SODIUM 100 MG/ML
40 INJECTION SUBCUTANEOUS DAILY
Refills: 0 | Status: DISCONTINUED | OUTPATIENT
Start: 2020-09-03 | End: 2020-09-04

## 2020-09-03 RX ADMIN — Medication 3 MILLIGRAM(S): at 21:59

## 2020-09-03 RX ADMIN — Medication 25 MILLIGRAM(S): at 20:55

## 2020-09-03 RX ADMIN — Medication 81 MILLIGRAM(S): at 23:36

## 2020-09-03 RX ADMIN — Medication 25 MILLIGRAM(S): at 12:31

## 2020-09-03 RX ADMIN — ENOXAPARIN SODIUM 40 MILLIGRAM(S): 100 INJECTION SUBCUTANEOUS at 23:36

## 2020-09-03 RX ADMIN — Medication 10 MILLIGRAM(S): at 20:14

## 2020-09-03 RX ADMIN — Medication 10 MILLIGRAM(S): at 16:22

## 2020-09-03 RX ADMIN — ATORVASTATIN CALCIUM 40 MILLIGRAM(S): 80 TABLET, FILM COATED ORAL at 21:59

## 2020-09-03 RX ADMIN — AMLODIPINE BESYLATE 5 MILLIGRAM(S): 2.5 TABLET ORAL at 20:13

## 2020-09-03 RX ADMIN — Medication 5 MILLIGRAM(S): at 17:24

## 2020-09-03 RX ADMIN — Medication 100 GRAM(S): at 23:36

## 2020-09-03 NOTE — CONSULT NOTE ADULT - SUBJECTIVE AND OBJECTIVE BOX
Date of Admission: 9/3/20    CHIEF COMPLAINT: ICD shock     HISTORY OF PRESENT ILLNESS:    83 y/o F w/ h/o R renal artery stenosis (since ), HTN, HL, microcytic anemia, hx of Takotsubo cardiomyopathy in 2018 (now recovered, LVEF 66%) where she had Vfib arrest and received ICD for secondary prevention. She follows with Dr. David Banks as an outpatient, last visit was on 20. BP at home range 120-130. She comes in to ED due to feeling lightheaded, dizzy and receiving a shock from her ICD.         Allergies    sulfa drugs (Hives)  sulfa drugs (Swelling)    Intolerances    	    MEDICATIONS:        PAST MEDICAL & SURGICAL HISTORY:  PAD (peripheral artery disease)  NSTEMI (non-ST elevation myocardial infarction)  Osteoporosis  HTN (hypertension)  RA (rheumatoid arthritis)  Poor circulation  Osteoporosis  CKD (chronic kidney disease)  HTN (hypertension)  No significant past surgical history  No significant past surgical history      FAMILY HISTORY:  No pertinent family history in first degree relatives  No pertinent family history in first degree relatives      SOCIAL HISTORY:    [ ] Non-smoker  [ ] Smoker  [ ] Alcohol      REVIEW OF SYSTEMS:  CONSTITUTIONAL: No fever, weight loss, or fatigue  EYES: No eye pain, visual disturbances, or discharge  ENMT:  No difficulty hearing, tinnitus, vertigo; No sinus or throat pain  NECK: No pain or stiffness  RESPIRATORY: No cough, wheezing, chills or hemoptysis; No Shortness of Breath  CARDIOVASCULAR: No chest pain, palpitations, passing out, dizziness, or leg swelling  GASTROINTESTINAL: No abdominal or epigastric pain. No nausea, vomiting, or hematemesis; No diarrhea or constipation. No melena or hematochezia.  GENITOURINARY: No dysuria, frequency, hematuria, or incontinence  NEUROLOGICAL: No headaches, memory loss, loss of strength, numbness, or tremors  SKIN: No itching, burning, rashes, or lesions   LYMPH Nodes: No enlarged glands  ENDOCRINE: No heat or cold intolerance; No hair loss  MUSCULOSKELETAL: No joint pain or swelling; No muscle, back, or extremity pain  PSYCHIATRIC: No depression, anxiety, mood swings, or difficulty sleeping  HEME/LYMPH: No easy bruising, or bleeding gums  ALLERY AND IMMUNOLOGIC: No hives or eczema	    [ ] All others negative	  [ ] Unable to obtain    PHYSICAL EXAM:  T(C): 36.6 (20 @ 11:24), Max: 36.7 (20 @ 10:56)  HR: 82 (20 @ 11:24) (82 - 101)  BP: 175/111 (20 @ 11:24) (143/101 - 175/111)  RR: 18 (20 @ 11:24) (18 - 18)  SpO2: 99% (20 @ 11:24) (99% - 99%)  Wt(kg): --  I&O's Summary      Appearance: Normal	  HEENT:   Normal oral mucosa, PERRL, EOMI	  Lymphatic: No lymphadenopathy  Cardiovascular: Normal S1 S2, No JVD, No murmurs, No edema  Respiratory: Lungs clear to auscultation	  Psychiatry: A & O x 3, Mood & affect appropriate  Gastrointestinal:  Soft, Non-tender, + BS	  Skin: No rashes, No ecchymoses, No cyanosis	  Neurologic: Non-focal  Extremities: Normal range of motion, No clubbing, cyanosis or edema  Vascular: Peripheral pulses palpable 2+ bilaterally        LABS:	 	    < from: Transthoracic Echocardiogram (19 @ 08:33) >  DIMENSIONS:  Dimensions:     Normal Values:  LA:     2.2 cm    2.0 - 4.0 cm  Ao:     2.8 cm    2.0 - 3.8 cm  SEPTUM: 0.9 cm    0.6 - 1.2 cm  PWT:    0.9 cm    0.6 - 1.1 cm  LVIDd:  3.1 cm    3.0 - 5.6 cm  LVIDs:  2.0 cm    1.8 - 4.0 cm  Derived Variables:  LVMI: 47 g/m2  RWT: 0.58  Fractional short: 35 %  Ejection Fraction (Teicholtz): 66 %  ------------------------------------------------------------------------  OBSERVATIONS:  Mitral Valve: Mitral annular calcification, otherwise  normal mitral valve.  Aortic Root: Normal aortic root.  Aortic Valve: Calcified trileaflet aortic valve with normal  opening. Minimal aortic regurgitation.  Left Atrium: Normal left atrium.  LA volume index = 11  cc/m2.  Left Ventricle: Normal left ventricular systolic function.  No segmental wall motion abnormalities. Normal left  ventricular internal dimensions and wall thicknesses.  Right Heart: Normal right atrium. Normal right ventricular  size and function. A device wire is noted in the right  heart. Normal tricuspid valve.  Minimal tricuspid  regurgitation. Normal pulmonic valve.  Pericardium/PleuraNormal pericardium with no pericardial  effusion. Right pleural effusion.  Hemodynamic: Estimated right ventricular systolic pressure  equals 38 mm Hg, assuming right atrial pressure equals 10  mm Hg, consistent with borderline pulmonary hypertension.    < end of copied text >    < from: Transthoracic Echocardiogram (18 @ 02:07) >  DIMENSIONS:  Dimensions:     Normal Values:  LA:     2.6 cm    2.0 - 4.0 cm  Ao:     2.9 cm    2.0 - 3.8 cm  SEPTUM: 0.8 cm    0.6 - 1.2 cm  PWT:    0.8 cm    0.6 - 1.1 cm  LVIDd:  4.0 cm    3.0 - 5.6 cm  LVIDs:  3.5 cm    1.8 - 4.0 cm  Derived Variables:  LVMI: 60 g/m2  RWT: 0.40  Fractional short: 13 %  Ejection Fraction (Modified Meadows Rule): 28 %  ------------------------------------------------------------------------  OBSERVATIONS:  Mitral Valve: Mitral annular calcification, otherwise  normal mitral valve. Minimal mitral regurgitation.  Aortic Root: Normal size aortic root. (Ao:2.9 cm).  Aortic Valve: Calcified trileaflet aortic valve with normal  opening. Minimal aortic regurgitation.  Left Atrium: Normal left atrium.  LA volume index = 26  cc/m2.  Left Ventricle: Severe segmental left ventricular systolic  dysfunction.  Hypokinesis of the apex, mid to distal  septum, and mid to distal anterior wall. Normal left  ventricular internal dimensions and wall thicknesses.  Right Heart: Normal right atrium. Normal right ventricular  size and function. Normal tricuspid valve.   Mild-moderate  tricuspid regurgitation. Normal pulmonic valve. Minimal  pulmonic regurgitation.  Pericardium/PleuraNormal pericardium with no pericardial  effusion.  Hemodynamic: Estimated right ventricular systolic pressure  equals 45 mm Hg, assuming right atrial pressure equals 10  mm Hg, consistent with mild pulmonary hypertension.    < end of copied text >      < from: Cardiac Cath Lab - Adult (08.10.18 @ 15:06) >  LM:   --  LM: Normal.  LAD:   --  Proximal LAD: There was a discrete 30 % stenosis at a site with no  prior intervention. The lesion was eccentric. There was STEF grade 3 flow  through the vessel (brisk flow). --  D1: The vessel was medium sized.  Angiography showed mild atherosclerosis with no flow limiting lesions.  CX:   --  Circumflex: Angiography showed mild atherosclerosis with no flow  limiting lesions.  RCA:   --  RCA: The vessel was large sized (dominant). Angiography showed mild  atherosclerosis with no flow limiting lesions. --  RPDA: Angiography showed  mild atherosclerosis with no flow limiting lesions. --  RPLS: Angiography  showed mild atherosclerosis with no flow limiting lesions.  COMPLICATIONS: No complications occurred during the cath lab visit.  DIAGNOSTIC IMPRESSIONS: Mild non-obstructive CAD  DIAGNOSTIC RECOMMENDATIONS: Medical therapy  EPS evaluation  INTERVENTIONAL IMPRESSIONS: Mild non-obstructive CAD  INTERVENTIONAL RECOMMENDATIONS: Medical therapy  EPS evaluation  Prepared and signed by  Meg Sutton M.D.  Signed 2018 18:16:29  HEMODYNAMIC TABLES  Pressures:  Baseline  Pressures:  - HR: 76  Pressures:  - Rhythm:  Pressures:  -- Aortic Pressure (S/D/M): 133/61/88  Pressures:  -- Left Ventricle (s/edp): 140/9/--  Pressures:  Post Angio  Pressures:  - HR: 75  Pressures:  - Rhythm:  Pressures:  -- Aortic Pressure (S/D/M): 130/60/90  Pressures:  -- Left Ventricle (s/edp): 138/9/--  Outputs:  Baseline  Outputs:  -- CALCULATIONS[age]  Outputs:  -- CALCULATIONS: Body Surface Area: 1.48  Outputs:  -- CALCULATIONS: Height in cm: 157.00  Outputs:  -- CALCULATIONS: Sex: Female  Outputs:  -- CALCULATIONS: Weight in k.30  Outputs:  -- OUTPUTS: O2 consumption: 185.55  Outputs:  -- OUTPUTS: Vo2 Indexed: 125.00  Outputs:  Post Angio  Outputs:  -- OUTPUTS: O2 consumption: 185.55  Outputs:  -- OUTPUTS: Vo2 Indexed: 125.00    < end of copied text > Date of Admission: 9/3/20    CHIEF COMPLAINT: ICD shock     HISTORY OF PRESENT ILLNESS:    85 y/o F w/ h/o R renal artery stenosis (since ), HTN, HL, microcytic anemia, hx of Takotsubo cardiomyopathy in 2018 (now recovered, LVEF 66%) where she had Vfib arrest and received ICD for secondary prevention. She follows with Dr. David Banks as an outpatient, last visit was on 20. She comes in to ED due to feeling lightheaded, dizzy and receiving a shock x3 from her ICD and was found to be hypertensive. ICD interrogation revealed that patient had a rapid atrial rate and was shocked.  BP at home range 120-130, however patient has not taken her home meds. She states she has no CP, palpitations, dizziness, SOB, GOMEZ, abdominal pain. She admits to taking all her home medications and is compliant with her low salt diet.       Allergies    sulfa drugs (Hives)  sulfa drugs (Swelling)    Intolerances    	    MEDICATIONS:        PAST MEDICAL & SURGICAL HISTORY:  PAD (peripheral artery disease)  NSTEMI (non-ST elevation myocardial infarction)  Osteoporosis  HTN (hypertension)  RA (rheumatoid arthritis)  Poor circulation  Osteoporosis  CKD (chronic kidney disease)  HTN (hypertension)  No significant past surgical history  No significant past surgical history      FAMILY HISTORY:  No pertinent family history in first degree relatives  No pertinent family history in first degree relatives      SOCIAL HISTORY:    Former smoker        REVIEW OF SYSTEMS:  CONSTITUTIONAL: No fever, weight loss, or fatigue  EYES: No eye pain, visual disturbances, or discharge  ENMT:  No difficulty hearing, tinnitus, vertigo; No sinus or throat pain  NECK: No pain or stiffness  RESPIRATORY: No cough, wheezing, chills or hemoptysis; no Shortness of Breath  CARDIOVASCULAR: No chest pain, palpitations, passing out, dizziness, or leg swelling  GASTROINTESTINAL: No abdominal or epigastric pain. No nausea, vomiting, or hematemesis; No diarrhea or constipation. No melena or hematochezia.  GENITOURINARY: No dysuria, frequency, hematuria, or incontinence  NEUROLOGICAL: No headaches, memory loss, loss of strength, numbness, or tremors  SKIN: No itching, burning, rashes, or lesions   LYMPH Nodes: No enlarged glands  ENDOCRINE: No heat or cold intolerance; No hair loss  MUSCULOSKELETAL: No joint pain or swelling; No muscle, back, or extremity pain  PSYCHIATRIC: No depression, anxiety, mood swings, or difficulty sleeping  HEME/LYMPH: No easy bruising, or bleeding gums  ALLERY AND IMMUNOLOGIC: No hives or eczema	    [ ] All others negative	  [ ] Unable to obtain    PHYSICAL EXAM:  T(C): 36.6 (20 @ 11:24), Max: 36.7 (20 @ 10:56)  HR: 82 (20 @ 11:24) (82 - 101)  BP: 175/111 (20 @ 11:24) (143/101 - 175/111)  RR: 18 (20 @ 11:24) (18 - 18)  SpO2: 99% (20 @ 11:24) (99% - 99%)  Wt(kg): --  I&O's Summary      Appearance: Normal	  HEENT:   Normal oral mucosa, PERRL, EOMI	  Lymphatic: No lymphadenopathy  Cardiovascular: Normal S1 S2, mildly elevated JVP about 10 cm, No murmurs, No edema  Respiratory: Lungs clear to auscultation	  Psychiatry: A & O x 3, Mood & affect appropriate  Gastrointestinal:  Soft, Non-tender, + BS	  Skin: No rashes, No ecchymoses, No cyanosis	  Neurologic: Non-focal  Extremities: Normal range of motion, No clubbing, cyanosis or edema  Vascular: Peripheral pulses palpable 2+ bilaterally        LABS:	 	    < from: Transthoracic Echocardiogram (19 @ 08:33) >  DIMENSIONS:  Dimensions:     Normal Values:  LA:     2.2 cm    2.0 - 4.0 cm  Ao:     2.8 cm    2.0 - 3.8 cm  SEPTUM: 0.9 cm    0.6 - 1.2 cm  PWT:    0.9 cm    0.6 - 1.1 cm  LVIDd:  3.1 cm    3.0 - 5.6 cm  LVIDs:  2.0 cm    1.8 - 4.0 cm  Derived Variables:  LVMI: 47 g/m2  RWT: 0.58  Fractional short: 35 %  Ejection Fraction (Teicholtz): 66 %  ------------------------------------------------------------------------  OBSERVATIONS:  Mitral Valve: Mitral annular calcification, otherwise  normal mitral valve.  Aortic Root: Normal aortic root.  Aortic Valve: Calcified trileaflet aortic valve with normal  opening. Minimal aortic regurgitation.  Left Atrium: Normal left atrium.  LA volume index = 11  cc/m2.  Left Ventricle: Normal left ventricular systolic function.  No segmental wall motion abnormalities. Normal left  ventricular internal dimensions and wall thicknesses.  Right Heart: Normal right atrium. Normal right ventricular  size and function. A device wire is noted in the right  heart. Normal tricuspid valve.  Minimal tricuspid  regurgitation. Normal pulmonic valve.  Pericardium/PleuraNormal pericardium with no pericardial  effusion. Right pleural effusion.  Hemodynamic: Estimated right ventricular systolic pressure  equals 38 mm Hg, assuming right atrial pressure equals 10  mm Hg, consistent with borderline pulmonary hypertension.    < end of copied text >    < from: Transthoracic Echocardiogram (18 @ 02:07) >  DIMENSIONS:  Dimensions:     Normal Values:  LA:     2.6 cm    2.0 - 4.0 cm  Ao:     2.9 cm    2.0 - 3.8 cm  SEPTUM: 0.8 cm    0.6 - 1.2 cm  PWT:    0.8 cm    0.6 - 1.1 cm  LVIDd:  4.0 cm    3.0 - 5.6 cm  LVIDs:  3.5 cm    1.8 - 4.0 cm  Derived Variables:  LVMI: 60 g/m2  RWT: 0.40  Fractional short: 13 %  Ejection Fraction (Modified Meadows Rule): 28 %  ------------------------------------------------------------------------  OBSERVATIONS:  Mitral Valve: Mitral annular calcification, otherwise  normal mitral valve. Minimal mitral regurgitation.  Aortic Root: Normal size aortic root. (Ao:2.9 cm).  Aortic Valve: Calcified trileaflet aortic valve with normal  opening. Minimal aortic regurgitation.  Left Atrium: Normal left atrium.  LA volume index = 26  cc/m2.  Left Ventricle: Severe segmental left ventricular systolic  dysfunction.  Hypokinesis of the apex, mid to distal  septum, and mid to distal anterior wall. Normal left  ventricular internal dimensions and wall thicknesses.  Right Heart: Normal right atrium. Normal right ventricular  size and function. Normal tricuspid valve.   Mild-moderate  tricuspid regurgitation. Normal pulmonic valve. Minimal  pulmonic regurgitation.  Pericardium/PleuraNormal pericardium with no pericardial  effusion.  Hemodynamic: Estimated right ventricular systolic pressure  equals 45 mm Hg, assuming right atrial pressure equals 10  mm Hg, consistent with mild pulmonary hypertension.    < end of copied text >      < from: Cardiac Cath Lab - Adult (08.10.18 @ 15:06) >  LM:   --  LM: Normal.  LAD:   --  Proximal LAD: There was a discrete 30 % stenosis at a site with no  prior intervention. The lesion was eccentric. There was STEF grade 3 flow  through the vessel (brisk flow). --  D1: The vessel was medium sized.  Angiography showed mild atherosclerosis with no flow limiting lesions.  CX:   --  Circumflex: Angiography showed mild atherosclerosis with no flow  limiting lesions.  RCA:   --  RCA: The vessel was large sized (dominant). Angiography showed mild  atherosclerosis with no flow limiting lesions. --  RPDA: Angiography showed  mild atherosclerosis with no flow limiting lesions. --  RPLS: Angiography  showed mild atherosclerosis with no flow limiting lesions.  COMPLICATIONS: No complications occurred during the cath lab visit.  DIAGNOSTIC IMPRESSIONS: Mild non-obstructive CAD  DIAGNOSTIC RECOMMENDATIONS: Medical therapy  EPS evaluation  INTERVENTIONAL IMPRESSIONS: Mild non-obstructive CAD  INTERVENTIONAL RECOMMENDATIONS: Medical therapy  EPS evaluation  Prepared and signed by  Meg Sutton M.D.  Signed 2018 18:16:29  HEMODYNAMIC TABLES  Pressures:  Baseline  Pressures:  - HR: 76  Pressures:  - Rhythm:  Pressures:  -- Aortic Pressure (S/D/M): 133/61/88  Pressures:  -- Left Ventricle (s/edp): 140/9/--  Pressures:  Post Angio  Pressures:  - HR: 75  Pressures:  - Rhythm:  Pressures:  -- Aortic Pressure (S/D/M): 130/60/90  Pressures:  -- Left Ventricle (s/edp): 138/9/--  Outputs:  Baseline  Outputs:  -- CALCULATIONS[age]  Outputs:  -- CALCULATIONS: Body Surface Area: 1.48  Outputs:  -- CALCULATIONS: Height in cm: 157.00  Outputs:  -- CALCULATIONS: Sex: Female  Outputs:  -- CALCULATIONS: Weight in k.30  Outputs:  -- OUTPUTS: O2 consumption: 185.55  Outputs:  -- OUTPUTS: Vo2 Indexed: 125.00  Outputs:  Post Angio  Outputs:  -- OUTPUTS: O2 consumption: 185.55  Outputs:  -- OUTPUTS: Vo2 Indexed: 125.00    < end of copied text >

## 2020-09-03 NOTE — CONSULT NOTE ADULT - PROBLEM SELECTOR RECOMMENDATION 3
Ordered her home dose lisinopril 10 mg daily to be given now.   Increased Toprol dose to 25 mg po BID.   If BP still elevated, may need additional IV antihypertensives. Ordered her home dose lisinopril 10 mg daily to be given now.   Increased Toprol dose to 25 mg po BID.

## 2020-09-03 NOTE — ED PROVIDER NOTE - PROGRESS NOTE DETAILS
Jason EM/IM PGY3: Pt evaluated by EP at bedside, interrogated AICD, pt more likely with SVT @ ~200BPM, received shock x8, no evidence of VT or VF. Will have Dr. Sterling evaluate pt, and recommending giving dose of metoprolol tartrate 25mg PO at this time. Spoke with cardiology fellow, recommends consulting HF team as pt is followed by Dr. David Banks as an outpatient. Spoke with HF team, will evaluate pt in the ER. Jason EM/IM PGY3: Followed up with EP, no need for CCU at this time, will admit to medicine on tele.

## 2020-09-03 NOTE — ED PROVIDER NOTE - CARE PLAN
Principal Discharge DX:	Palpitations Principal Discharge DX:	Palpitations  Secondary Diagnosis:	ICD (implantable cardioverter-defibrillator) discharge

## 2020-09-03 NOTE — H&P ADULT - ATTENDING COMMENTS
Patient was seen and examined personally by me on 9/3/2020. I have discussed the plan and reviewed the resident's note and agree with the above physical exam findings including assessment and plan except as indicated below.    84 year old woman with PMH of HTN, renal artery stenosis, Takatsubo cardiomyopathy (most recent echo EF 66%) c/b vfib arrest s/p AICD presented after multiple AICD shocks at home with associated dizziness. Found to have SVT (atrial tach) on AICD interrogation, elevated troponin, and uncontrolled hypertension. R/O ACS    Patient was comfortable on room air on exam  Appearing euvolemic, lungs clear b/l and no LE edema  s1/s2, no murmurs were appreciated  Abd soft, NT, ND    Toprol increased to 25mg BID per HF and EP recs. Possible EP study if no improvement with medication management  Trops elevated likely in setting of AICD shocks and atrial tachycardia. Patient denied chest pain, SOB. Cardiology planning cath 9/4 AM, r/o ACS  Trend trops till downtrending  BP elevated, goal SBP in 180s in first 24 hours. Continue Toprol, ACEi, received one dose PO Norvasc.   Continue high intensity statin

## 2020-09-03 NOTE — ED ADULT NURSE NOTE - CHPI ED NUR SYMPTOMS NEG
no congestion/no vomiting/no back pain/no shortness of breath/no nausea/no syncope/no dizziness/no chest pain/no chills/no diaphoresis/no fever

## 2020-09-03 NOTE — H&P ADULT - NSHPLABSRESULTS_GEN_ALL_CORE
.  LABS:                         12.8   4.53  )-----------( 199      ( 03 Sep 2020 12:00 )             39.7     09-03    134<L>  |  103  |  15  ----------------------------<  126<H>  x         |  16<L>  |  0.79    Ca    9.0      03 Sep 2020 17:36  Phos  3.0     09-03  Mg     1.9     09-03    TPro  7.7  /  Alb  3.9  /  TBili  0.9  /  DBili  x   /  AST  44<H>  /  ALT  19  /  AlkPhos  115  09-03      PT/INR - ( 03 Sep 2020 12:00 )   PT: 12.7 SEC;   INR: 1.11     PTT - ( 03 Sep 2020 12:00 )  PTT:31.0 SEC        Serum Pro-Brain Natriuretic Peptide: 557.3 pg/mL (09-03 @ 12:00)    Troponin T, High Sensitivity: 163: Delta: 51           RADIOLOGY, EKG & ADDITIONAL TESTS: Reviewed.   EKG: NSR with few PVCs  CXR: clear lungs   AICD interrogation: SVT (atach) 8 shocks .  LABS:                         12.8   4.53  )-----------( 199      ( 03 Sep 2020 12:00 )             39.7     09-03    134<L>  |  103  |  15  ----------------------------<  126<H>  x         |  16<L>  |  0.79    Ca    9.0      03 Sep 2020 17:36  Phos  3.0     09-03  Mg     1.9     09-03    TPro  7.7  /  Alb  3.9  /  TBili  0.9  /  DBili  x   /  AST  44<H>  /  ALT  19  /  AlkPhos  115  09-03      PT/INR - ( 03 Sep 2020 12:00 )   PT: 12.7 SEC;   INR: 1.11     PTT - ( 03 Sep 2020 12:00 )  PTT:31.0 SEC        Serum Pro-Brain Natriuretic Peptide: 557.3 pg/mL (09-03 @ 12:00)    Troponin T, High Sensitivity: 163: Delta: 51           RADIOLOGY, EKG & ADDITIONAL TESTS: personally Reviewed.   EKG: NSR with few PVCs  CXR: clear lungs   AICD interrogation: SVT (atach) 8 shocks

## 2020-09-03 NOTE — H&P ADULT - NSICDXPASTMEDICALHX_GEN_ALL_CORE_FT
PAST MEDICAL HISTORY:  Cardiomyopathy, unspecified type H/o Takasubo cardiomyopathy c/b vfib arrest s/p AICD implantation. CMP resolved with EF 66% (from 30%) in Jan 2019    History of renal artery stenosis     HTN (hypertension)     Hyperlipidemia     Osteoporosis

## 2020-09-03 NOTE — ED ADULT TRIAGE NOTE - CHIEF COMPLAINT QUOTE
Patient brought to ER from home by EMS after her defibrillator fired three times this morning. Patient brought to ER from home by EMS after her defibrillator fired three times this morning.  Granddaughter: Ngoc: [phone]

## 2020-09-03 NOTE — ED ADULT NURSE REASSESSMENT NOTE - NS ED NURSE REASSESS COMMENT FT1
Pt c/o shocks from defibrillator after bathroom. HR 130s-150s, EKG in progress. MD Tam at bedside. 5mg of metoprolol IV given by MD Tam. MAR at bedside. Med team [id number] notified. Pt placed on zoll/pads. Awaiting further orders.

## 2020-09-03 NOTE — H&P ADULT - PROBLEM SELECTOR PLAN 2
History of LUIS FERNANOD and hypertension although with baseline BPs in 120s/80s, well controlled on lisinopril 10 qd. Patient with hypertension since admission with BPs in 220s/120s, improved with IV hydralazine to 170s.  Patient denying chest pain, headache, vision change. Labs without elevated creatinine but with uptrending troponins.    Plan  -goal BP 180s  -c/w lisinopril 10  -starting norvasc  -can give PO/IV hydralazine as needed

## 2020-09-03 NOTE — CONSULT NOTE ADULT - PROBLEM SELECTOR RECOMMENDATION 2
Not in [facility].   However is hypertensive.   Ordered her home dose lisinopril 10 mg daily to be given now.   Increased Toprol dose to 25 mg po BID.   If BP still elevated, may need additional IV antihypertensives. Not in [facility].   However is hypertensive.   Ordered her home dose lisinopril 10 mg daily to be given now.   Increased Toprol dose to 25 mg po BID.   Please get TTE.

## 2020-09-03 NOTE — CONSULT NOTE ADULT - ASSESSMENT
83 y/o F w/ h/o R renal artery stenosis (since 2015), HTN, HL, microcytic anemia, hx of Takotsubo cardiomyopathy in 8/2018 (now recovered, LVEF 66%) where she had Vfib arrest and received ICD for secondary prevention. She follows with Dr. David Banks as an outpatient, last visit was on 5/22/20. She comes in to ED due to feeling lightheaded, dizzy and receiving a shock x3 from her ICD and was found to be hypertensive. ICD interrogation revealed that patient had a rapid atrial rate and was shocked.

## 2020-09-03 NOTE — CONSULT NOTE ADULT - ASSESSMENT
83 y/o F w/ h/o R renal artery stenosis (since 2015), HTN, HL, microcytic anemia, hx of Takotsubo cardiomyopathy in 8/2018 (now recovered, LVEF 66%) where she had Vfib arrest and received ICD for secondary prevention presents with 8 ICD shocks. Interrogation shows fast atrial rate with V conduction without widening of the QRS, this is likely SVT, however given her prior VT events, it cannot be ruled out.    Plan:  -monitor electrolytes, Mg >2, K >4  -restart patients metoprolol and uptitrate as needed   -monitor on Tele and admit  -will discuss with Dr. Sterling for possible EP study    Stacey Ruggiero MD  Cardiology Fellow 85 y/o F w/ h/o R renal artery stenosis (since 2015), HTN, HL, microcytic anemia, hx of Takotsubo cardiomyopathy in 8/2018 (now recovered, LVEF 66%) where she had Vfib arrest and received ICD for secondary prevention presents with 8 ICD shocks. Interrogation shows fast atrial rate with V conduction without widening of the QRS, this is likely SVT. Most likely Atrial tachycardia    Plan:  -monitor electrolytes, Mg >2, K >4, TSH  -restart patients home metoprolol lopressor 25mg, she is not tachycardic right now.  Monitor HR and add another dose of 25mg after 8 hours if she become tachycardic  -monitor on Tele and admit  -If she continues to have AT episodes that is refractory to medication therapy can consider EP study.  Discussed with Dr. Hallie Ruggiero MD  Cardiology Fellow

## 2020-09-03 NOTE — H&P ADULT - PROBLEM SELECTOR PLAN 1
Admitted due to multiple AICD shocks in the setting of SVT (atach). Patient on lopressor 25 qd at home.    Plan:  -admit to tele  -appreciate EP recs  -will increase lopressor to 50 qd   -if unable to control heart rate will need EP procedure  -if patient has more AICD shocks evaluate at bedside, get vitals, can give IV metoprolol 5mg  for rate control as needed. if unstable or not responding call CCU

## 2020-09-03 NOTE — ED PROVIDER NOTE - EKG ADDITIONAL INFORMATION FREE TEXT
Jake: Has ICD. Fired 3 times this morning after she had palpitations and dizziness. We will interrogate ICD and check EKG, trop, and electrolytes.

## 2020-09-03 NOTE — ED PROVIDER NOTE - ATTENDING CONTRIBUTION TO CARE
I performed a face-to-face evaluation of the patient and performed a history and physical examination. I agree with the history and physical examination.    Has ICD. Fired 3 times this morning after she had palpitations and dizziness. We will interrogate ICD and check EKG, trop, and electrolytes.

## 2020-09-03 NOTE — CONSULT NOTE ADULT - SUBJECTIVE AND OBJECTIVE BOX
Stacey Ruggiero MD  Cardiology Fellow  [phone]  All Cardiology service information can be found 24/7 on [email or web address], password: [email or web address]    Patient seen and evaluated at bedside    Chief Complaint: ICD shock    HPI:  85 y/o F w/ h/o R renal artery stenosis (since 2015), HTN, HL, microcytic anemia, hx of Takotsubo cardiomyopathy in 8/2018 (now recovered, LVEF 66%) where she had Vfib arrest and received ICD for secondary prevention. Patient was easting breakfast when she started feeling lightheaded and dizzy. She tried to get up but felt the first shock. She rested in the chair and then got up again and tried to go upstairs. While on the stairs she got shocked another 2-3 times per patient. She called her children who came to her and brought her to the ED. She did not have chest pain, SOB, PND or orthopnea. She was well well before this, she was never shocked by the device prior to this when it was placed in 8/2018. No medication changes, falls, fevers, chills. She feels well right now.     PMHx:   PAD (peripheral artery disease)  NSTEMI (non-ST elevation myocardial infarction)  Osteoporosis  HTN (hypertension)  RA (rheumatoid arthritis)  Poor circulation  Osteoporosis  CKD (chronic kidney disease)  HTN (hypertension)      PSHx:   No significant past surgical history  No significant past surgical history      Allergies:  sulfa drugs (Hives)  sulfa drugs (Swelling)      Home Meds:  Patient does not remember her medications, her grand daughter give it to her  prior outpatient meds: aspirin 81mg, atorva 40mg, Furosemide, metoprolol     Current Medications:       FAMILY HISTORY:  No pertinent family history in first degree relatives  No pertinent family history in first degree relatives      Social History:  Smoking History:  Alcohol Use:  Drug Use:    REVIEW OF SYSTEMS:  CONSTITUTIONAL: No weakness, fevers or chills  EYES/ENT: No visual changes;  No dysphagia  NECK: No pain or stiffness  RESPIRATORY: No cough, wheezing, hemoptysis; No shortness of breath  CARDIOVASCULAR: No chest pain or palpitations; No lower extremity edema  GASTROINTESTINAL: No abdominal or epigastric pain. No nausea, vomiting, or hematemesis; No diarrhea or constipation. No melena or hematochezia.  BACK: No back pain  GENITOURINARY: No dysuria, frequency or hematuria  NEUROLOGICAL: No numbness or weakness  SKIN: No itching, burning, rashes, or lesions   All other review of systems is negative unless indicated above.    Physical Exam:  T(F): 97.8 (09-03), Max: 98 (09-03)  HR: 82 (09-03) (82 - 101)  BP: 175/111 (09-03) (143/101 - 175/111)  RR: 18 (09-03)  SpO2: 99% (09-03)  GENERAL: No acute distress, well-developed  HEAD:  Atraumatic, Normocephalic  ENT: EOMI, PERRLA, conjunctiva and sclera clear, Neck supple, No JVD, moist mucosa  CHEST/LUNG: Clear to auscultation bilaterally; No wheeze, equal breath sounds bilaterally   BACK: No spinal tenderness  HEART: Regular rate and rhythm; No murmurs, rubs, or gallops  ABDOMEN: Soft, Nontender, Nondistended; Bowel sounds present  EXTREMITIES:  No clubbing, cyanosis, or edema  PSYCH: Nl behavior, nl affect  NEUROLOGY: AAOx3, non-focal, cranial nerves intact  SKIN: Normal color, No rashes or lesions  LINES:    Cardiovascular Diagnostic Testing:    ECG: Personally reviewed: NSR at 83      CXR: Personally reviewed    Labs: Personally reviewed                        12.8   4.53  )-----------( 199      ( 03 Sep 2020 12:00 )             39.7           PT/INR - ( 03 Sep 2020 12:00 )   PT: 12.7 SEC;   INR: 1.11          PTT - ( 03 Sep 2020 12:00 )  PTT:31.0 SEC      ICD interrogation showed:  Battery: 10.5 year  Normal lead impedance: A 662, V 415  Amplitude A 5.4, V 15.7  Thresholds not done  A paced <1%  V paced <1%  VF ATP zone 200  VT monitor zone 170    Event: 9/3/2020 at 9: 41 for 6 minutes  total of 8 shocks delivered  EMG shows fast atrial rate without widening of V complex, consistent with SVT Stacey Ruggiero MD  Cardiology Fellow  [phone]  All Cardiology service information can be found 24/7 on [email or web address], password: [email or web address]    Patient seen and evaluated at bedside    Chief Complaint: ICD shock    HPI:  83 y/o F w/ h/o R renal artery stenosis (since 2015), HTN, HL, microcytic anemia, hx of Takotsubo cardiomyopathy in 8/2018 (now recovered, LVEF 66%) where she had Vfib arrest and received ICD for secondary prevention. Patient was easting breakfast when she started feeling lightheaded and dizzy. She tried to get up but felt the first shock. She rested in the chair and then got up again and tried to go upstairs. While on the stairs she got shocked another 2-3 times per patient. She called her children who came to her and brought her to the ED. She did not have chest pain, SOB, PND or orthopnea. She was well well before this, she was never shocked by the device prior to this when it was placed in 8/2018. No medication changes, falls, fevers, chills. She feels well right now.     PMHx:   PAD (peripheral artery disease)  NSTEMI (non-ST elevation myocardial infarction)  Osteoporosis  HTN (hypertension)  RA (rheumatoid arthritis)  Poor circulation  Osteoporosis  CKD (chronic kidney disease)  HTN (hypertension)      PSHx:   No significant past surgical history  No significant past surgical history      Allergies:  sulfa drugs (Hives)  sulfa drugs (Swelling)      Home Meds:  Patient does not remember her medications, her grand daughter give it to her  prior outpatient meds: aspirin 81mg, atorva 40mg, Furosemide, metoprolol     Current Medications:       FAMILY HISTORY:  No pertinent family history in first degree relatives  No pertinent family history in first degree relatives    REVIEW OF SYSTEMS:  CONSTITUTIONAL: No weakness, fevers or chills  EYES/ENT: No visual changes;  No dysphagia  NECK: No pain or stiffness  RESPIRATORY: No cough, wheezing, hemoptysis; No shortness of breath  CARDIOVASCULAR: No chest pain or palpitations; No lower extremity edema  GASTROINTESTINAL: No abdominal or epigastric pain. No nausea, vomiting, or hematemesis; No diarrhea or constipation. No melena or hematochezia.  BACK: No back pain  GENITOURINARY: No dysuria, frequency or hematuria  NEUROLOGICAL: No numbness or weakness  SKIN: No itching, burning, rashes, or lesions   All other review of systems is negative unless indicated above.    Physical Exam:  T(F): 97.8 (09-03), Max: 98 (09-03)  HR: 82 (09-03) (82 - 101)  BP: 175/111 (09-03) (143/101 - 175/111)  RR: 18 (09-03)  SpO2: 99% (09-03)  GENERAL: No acute distress, well-developed  HEAD:  Atraumatic, Normocephalic  ENT: EOMI, PERRLA, conjunctiva and sclera clear, Neck supple, No JVD, moist mucosa  CHEST/LUNG: Clear to auscultation bilaterally; No wheeze, equal breath sounds bilaterally   BACK: No spinal tenderness  HEART: Regular rate and rhythm; No murmurs, rubs, or gallops  ABDOMEN: Soft, Nontender, Nondistended; Bowel sounds present  EXTREMITIES:  No clubbing, cyanosis, or edema  PSYCH: Nl behavior, nl affect  NEUROLOGY: AAOx3, non-focal, cranial nerves intact  SKIN: Normal color, No rashes or lesions  LINES:    Cardiovascular Diagnostic Testing:    ECG: Personally reviewed: NSR at 83      CXR: Personally reviewed    Labs: Personally reviewed                        12.8   4.53  )-----------( 199      ( 03 Sep 2020 12:00 )             39.7           PT/INR - ( 03 Sep 2020 12:00 )   PT: 12.7 SEC;   INR: 1.11          PTT - ( 03 Sep 2020 12:00 )  PTT:31.0 SEC      ICD interrogation showed:  Battery: 10.5 year  Normal lead impedance: A 662, V 415  Amplitude A 5.4, V 15.7  Thresholds not done  A paced <1%  V paced <1%  VF ATP zone 200  VT monitor zone 170    Event: 9/3/2020 at 9: 41 for 6 minutes  total of 8 shocks delivered  EMG shows fast atrial rate without widening of V complex, consistent with SVT

## 2020-09-03 NOTE — ED PROVIDER NOTE - OBJECTIVE STATEMENT
Jake: Going upstairs this AM. Felt palpitations and lightheaded. ICD shocked her x 3. Has EF 66% after Takotsubo's and sudden cardiac arrest. Feels better now.

## 2020-09-03 NOTE — ED ADULT NURSE REASSESSMENT NOTE - NS ED NURSE REASSESS COMMENT FT1
Patient remains oriented. Troponin level result is elevated. Hands are visibly shaking. Denies chest pain or discomfort. remains on cardiac monitor. housestaff 1 aware.

## 2020-09-03 NOTE — CONSULT NOTE ADULT - PROBLEM SELECTOR RECOMMENDATION 9
EP interrogation and consult appreciated.   Ordered Toprol 25  mg po BID (home dose was Toprol 25 mg po qd).

## 2020-09-03 NOTE — H&P ADULT - HISTORY OF PRESENT ILLNESS
85 y/o woman with PMH of Takotsubo cardiomyopathy in 8/2018 diagnosed with V-fib arrest s/p defibrillation x2 and s/p AICD placement, now resolved with LVEF 66% (01/2019), renal artery stenosis, HTN, and HLD presenting after multiple AICD shocks at home. Patient reports that this morning she was feeling tired and when she was eating breakfast she began to have a headache, dizziness, and some palpitations. She was walking up her stairs when she felt an AICD shock. After resting on the stairs, she decided to stand up and felt 2 more shocks prompting her to call her grandchildren and she was brought to the ED by EMS. She has never been shocked by the AICD before. The patient denies any recent changes to medication regimen, new foods, recent illness and has been feeling well prior to this. Per the granddaughter she has been having increased stress recently due to family issues. She also has had a low appetite recently. The patient denies any fever, chills, chest pain, shortness of breath, left-arm pain, abdominal pain, nausea, vomiting, diarrhea, constipation, changes in urination, hematuria, melena, hematochezia, visual changes, numbness, tingling, or weakness.    She is followed by cardiologist, Dr. David Banks, and her AICD was placed by Dr. Rashaad Sterling. Her PCP is Dr. Emre Perez and her HTN is treated by Dr. Carlos Zabala.      Admission vitals were T 98, , /101, 18, 99% RA.    In the ED, patient became progressively hypertensive at 221/106 and a respiratory rate of 22 with oxygen saturation of 99% on RA. Laboratory workup was significant for uptrending troponin T from 51 to 163. Pro-BNP of 557.3. Initial CMP was hemolyzed. Chest x-ray showed AICD in place with clear lungs. Patient was assessed by electrophysiology; AICD interrogation showed patient had SVT likely A-tach, and had a total of 8 shocks. Patient was given metoprolol 25 mg PO, lisinopril 10 mg PO, hydralazine 10 mg IV While in the ED, patient had another shock after going to the bathroom with heart rate in 130s-150s and was given metoprolol 5 mg IV.

## 2020-09-03 NOTE — ED ADULT NURSE NOTE - OBJECTIVE STATEMENT
84 year old female, alert and oriented x3, breathing with ease on room air, reports going up the stairs, took two steps and felt a jolt, took another two steps and felt a second jolt, then got up to the stairs and rochelle a third jolt. Patient with a defibrillator to left chest wall, denies any chest pains at this time, denies difficulty breathing, visual changes, headaches or dizziness, N/V/D, fevers or chills. 84 year old female, alert and oriented x3, breathing with ease on room air, reports going up the stairs, took two steps and felt a jolt, took another two steps and felt a second jolt, then got up to the stairs and rochelle a third jolt. Patient with a defibrillator to left chest wall, denies any chest pains at this time, denies difficulty breathing, visual changes, headaches or dizziness, N/V/D, fevers or chills, denies syncope, fall or LOC.

## 2020-09-03 NOTE — H&P ADULT - PROBLEM SELECTOR PLAN 3
Suspect secondary to AICD shocks although NSTEMI still a possibility    Plan:  -trend troponins q3 hours until peak  -monitor closely for signs of ischemia (chest pain, SOB, nausea/vomiting, diaphoresis)

## 2020-09-03 NOTE — H&P ADULT - NSHPPHYSICALEXAM_GEN_ALL_CORE
*PHYSICAL EXAM:*    Constitutional: WDWN resting comfortably in bed; NAD; anxious  Head: NC/AT  Eyes: PERRL, EOMI, anicteric sclera  ENT: no nasal discharge; uvula midline, no oropharyngeal erythema or exudates; MMM  Respiratory: CTA B/L; no W/R/R, no retractions  Cardiac: +S1/S2; irregular rhythm; no M/R/G  Gastrointestinal: abdomen soft, NT/ND; no rebound or guarding; +BSx4  Extremities: WWP, no clubbing or cyanosis; no peripheral edema  Vascular: 2+ DP/PT pulses B/L  Dermatologic: scar from AICD, skin warm, dry and intact; no rashes, wounds  Lymphatic: no submandibular or cervical LAD  Neurologic: AAOx3; no focal deficits; moving all extremities  Psychiatric: affect and characteristics of appearance, verbalizations, behaviors are appropriate Vital Signs Last 24 Hrs  T(C): 36.8 (04 Sep 2020 05:00), Max: 36.9 (03 Sep 2020 20:35)  T(F): 98.3 (04 Sep 2020 05:00), Max: 98.4 (03 Sep 2020 20:35)  HR: 80 (04 Sep 2020 05:00) (72 - 132)  BP: 140/80 (04 Sep 2020 05:00) (125/75 - 222/114)  BP(mean): --  RR: 17 (04 Sep 2020 05:00) (15 - 24)  SpO2: 100% (04 Sep 2020 05:00) (98% - 100%)    Constitutional: WDWN resting comfortably in bed; NAD; anxious  Head: NC/AT  Eyes: PERRL, EOMI, anicteric sclera  ENT: no nasal discharge; uvula midline, no oropharyngeal erythema or exudates; MMM  Respiratory: CTA B/L; no W/R/R, no retractions, palpable AICD in left pectoral region  Cardiac: +S1/S2; irregular rhythm; no M/R/G  Gastrointestinal: abdomen soft, NT/ND; no rebound or guarding; +BSx4  Extremities: WWP, no clubbing or cyanosis; no peripheral edema  Vascular: 2+ DP/PT pulses B/L  Dermatologic: scar from AICD, skin warm, dry and intact; no rashes, wounds  Lymphatic: no submandibular or cervical LAD  Neurologic: AAOx3; no focal deficits; moving all extremities  Psychiatric: affect and characteristics of appearance, verbalizations, behaviors are appropriate

## 2020-09-03 NOTE — H&P ADULT - PROBLEM SELECTOR PLAN 4
Takasubo cardiomyopathy diagnosed in 2018 after vfib arrest. EF recovered from 30% to 66% by January of 2019.    Plan:  -appreciate HF recs  -can consider echo during this admission

## 2020-09-03 NOTE — H&P ADULT - ASSESSMENT
84 year old woman with PMH of Takasubo cardiomyopathy (most recent echo EF 66%) c/b vfib arrest s/p AICD presented after multiple AICD shocks at home. Admitted for management of arrhythmia.

## 2020-09-03 NOTE — ED ADULT NURSE REASSESSMENT NOTE - NS ED NURSE REASSESS COMMENT FT1
Report received from RN. Patient is awake, oriented to name, month, location, and situation .No acute respiratory distress. Patient has 20 gauge IV in the left hand with no redness or swelling observed, flushes without difficulty, blood return present. Continues on cardiac monitor, sinus rhythm. Stretcher in lowest position, wheels locked, side rails up as per protocol. Call bell in reach. Will continue to monitor.

## 2020-09-03 NOTE — ED PROVIDER NOTE - PHYSICAL EXAMINATION
Well appearing, well nourished, awake, alert, oriented to person, place, time/situation and in no apparent distress.    Airway patent    Eyes without scleral injection. No jaundice.    Strong pulse.    Respirations unlabored.    Abdomen soft, non-tender, no guarding.    Spine appears normal, range of motion is not limited, no muscle or joint tenderness.    Alert and oriented, no gross motor or sensory deficits.    Skin normal color for race, warm, dry and intact. No evidence of rash.    No SI/HI.

## 2020-09-04 DIAGNOSIS — I51.81 TAKOTSUBO SYNDROME: ICD-10-CM

## 2020-09-04 DIAGNOSIS — Z86.79 PERSONAL HISTORY OF OTHER DISEASES OF THE CIRCULATORY SYSTEM: ICD-10-CM

## 2020-09-04 LAB
ALBUMIN SERPL ELPH-MCNC: 3.7 G/DL — SIGNIFICANT CHANGE UP (ref 3.3–5)
ALP SERPL-CCNC: 107 U/L — SIGNIFICANT CHANGE UP (ref 40–120)
ALT FLD-CCNC: 19 U/L — SIGNIFICANT CHANGE UP (ref 4–33)
ANION GAP SERPL CALC-SCNC: 14 MMO/L — SIGNIFICANT CHANGE UP (ref 7–14)
AST SERPL-CCNC: 50 U/L — HIGH (ref 4–32)
BILIRUB SERPL-MCNC: 0.9 MG/DL — SIGNIFICANT CHANGE UP (ref 0.2–1.2)
BUN SERPL-MCNC: 15 MG/DL — SIGNIFICANT CHANGE UP (ref 7–23)
CALCIUM SERPL-MCNC: 8.9 MG/DL — SIGNIFICANT CHANGE UP (ref 8.4–10.5)
CHLORIDE SERPL-SCNC: 102 MMOL/L — SIGNIFICANT CHANGE UP (ref 98–107)
CHOLEST SERPL-MCNC: 160 MG/DL — SIGNIFICANT CHANGE UP (ref 120–199)
CK MB BLD-MCNC: 19.07 NG/ML — HIGH (ref 1–4.7)
CK MB BLD-MCNC: 2.6 — HIGH (ref 0–2.5)
CK SERPL-CCNC: 725 U/L — HIGH (ref 25–170)
CO2 SERPL-SCNC: 21 MMOL/L — LOW (ref 22–31)
CREAT SERPL-MCNC: 0.83 MG/DL — SIGNIFICANT CHANGE UP (ref 0.5–1.3)
GLUCOSE SERPL-MCNC: 111 MG/DL — HIGH (ref 70–99)
HBA1C BLD-MCNC: 5.3 % — SIGNIFICANT CHANGE UP (ref 4–5.6)
HCT VFR BLD CALC: 39.3 % — SIGNIFICANT CHANGE UP (ref 34.5–45)
HDLC SERPL-MCNC: 69 MG/DL — HIGH (ref 45–65)
HGB BLD-MCNC: 12.7 G/DL — SIGNIFICANT CHANGE UP (ref 11.5–15.5)
LIPID PNL WITH DIRECT LDL SERPL: 94 MG/DL — SIGNIFICANT CHANGE UP
MAGNESIUM SERPL-MCNC: 2.2 MG/DL — SIGNIFICANT CHANGE UP (ref 1.6–2.6)
MCHC RBC-ENTMCNC: 22.4 PG — LOW (ref 27–34)
MCHC RBC-ENTMCNC: 32.3 % — SIGNIFICANT CHANGE UP (ref 32–36)
MCV RBC AUTO: 69.2 FL — LOW (ref 80–100)
NRBC # FLD: 0 K/UL — SIGNIFICANT CHANGE UP (ref 0–0)
PHOSPHATE SERPL-MCNC: 4.3 MG/DL — SIGNIFICANT CHANGE UP (ref 2.5–4.5)
PLATELET # BLD AUTO: 174 K/UL — SIGNIFICANT CHANGE UP (ref 150–400)
PMV BLD: 11.8 FL — SIGNIFICANT CHANGE UP (ref 7–13)
POTASSIUM SERPL-MCNC: 3.4 MMOL/L — LOW (ref 3.5–5.3)
POTASSIUM SERPL-SCNC: 3.4 MMOL/L — LOW (ref 3.5–5.3)
PROT SERPL-MCNC: 7 G/DL — SIGNIFICANT CHANGE UP (ref 6–8.3)
RBC # BLD: 5.68 M/UL — HIGH (ref 3.8–5.2)
RBC # FLD: 15.9 % — HIGH (ref 10.3–14.5)
SODIUM SERPL-SCNC: 137 MMOL/L — SIGNIFICANT CHANGE UP (ref 135–145)
TRIGL SERPL-MCNC: 37 MG/DL — SIGNIFICANT CHANGE UP (ref 10–149)
TROPONIN T, HIGH SENSITIVITY: 189 NG/L — CRITICAL HIGH (ref ?–14)
TROPONIN T, HIGH SENSITIVITY: 259 NG/L — CRITICAL HIGH (ref ?–14)
TSH SERPL-MCNC: 0.44 UIU/ML — SIGNIFICANT CHANGE UP (ref 0.27–4.2)
WBC # BLD: 8.37 K/UL — SIGNIFICANT CHANGE UP (ref 3.8–10.5)
WBC # FLD AUTO: 8.37 K/UL — SIGNIFICANT CHANGE UP (ref 3.8–10.5)

## 2020-09-04 PROCEDURE — 93306 TTE W/DOPPLER COMPLETE: CPT | Mod: 26

## 2020-09-04 PROCEDURE — 93010 ELECTROCARDIOGRAM REPORT: CPT

## 2020-09-04 PROCEDURE — 99223 1ST HOSP IP/OBS HIGH 75: CPT

## 2020-09-04 PROCEDURE — 99233 SBSQ HOSP IP/OBS HIGH 50: CPT | Mod: GC

## 2020-09-04 PROCEDURE — 93571 IV DOP VEL&/PRESS C FLO 1ST: CPT | Mod: 26,LD

## 2020-09-04 PROCEDURE — 93458 L HRT ARTERY/VENTRICLE ANGIO: CPT | Mod: 26

## 2020-09-04 PROCEDURE — 99152 MOD SED SAME PHYS/QHP 5/>YRS: CPT

## 2020-09-04 RX ORDER — CLOPIDOGREL BISULFATE 75 MG/1
75 TABLET, FILM COATED ORAL DAILY
Refills: 0 | Status: DISCONTINUED | OUTPATIENT
Start: 2020-09-04 | End: 2020-09-04

## 2020-09-04 RX ORDER — METOPROLOL TARTRATE 50 MG
50 TABLET ORAL ONCE
Refills: 0 | Status: COMPLETED | OUTPATIENT
Start: 2020-09-04 | End: 2020-09-04

## 2020-09-04 RX ORDER — METOPROLOL TARTRATE 50 MG
25 TABLET ORAL
Refills: 0 | Status: DISCONTINUED | OUTPATIENT
Start: 2020-09-04 | End: 2020-09-04

## 2020-09-04 RX ORDER — SODIUM CHLORIDE 9 MG/ML
500 INJECTION INTRAMUSCULAR; INTRAVENOUS; SUBCUTANEOUS
Refills: 0 | Status: DISCONTINUED | OUTPATIENT
Start: 2020-09-04 | End: 2020-09-04

## 2020-09-04 RX ORDER — HEPARIN SODIUM 5000 [USP'U]/ML
INJECTION INTRAVENOUS; SUBCUTANEOUS
Qty: 25000 | Refills: 0 | Status: DISCONTINUED | OUTPATIENT
Start: 2020-09-04 | End: 2020-09-04

## 2020-09-04 RX ORDER — ASPIRIN/CALCIUM CARB/MAGNESIUM 324 MG
81 TABLET ORAL DAILY
Refills: 0 | Status: DISCONTINUED | OUTPATIENT
Start: 2020-09-04 | End: 2020-09-08

## 2020-09-04 RX ORDER — ASPIRIN/CALCIUM CARB/MAGNESIUM 324 MG
325 TABLET ORAL ONCE
Refills: 0 | Status: DISCONTINUED | OUTPATIENT
Start: 2020-09-04 | End: 2020-09-04

## 2020-09-04 RX ORDER — CLOPIDOGREL BISULFATE 75 MG/1
600 TABLET, FILM COATED ORAL ONCE
Refills: 0 | Status: COMPLETED | OUTPATIENT
Start: 2020-09-04 | End: 2020-09-04

## 2020-09-04 RX ORDER — HEPARIN SODIUM 5000 [USP'U]/ML
2900 INJECTION INTRAVENOUS; SUBCUTANEOUS EVERY 6 HOURS
Refills: 0 | Status: DISCONTINUED | OUTPATIENT
Start: 2020-09-04 | End: 2020-09-04

## 2020-09-04 RX ORDER — HEPARIN SODIUM 5000 [USP'U]/ML
5000 INJECTION INTRAVENOUS; SUBCUTANEOUS EVERY 12 HOURS
Refills: 0 | Status: DISCONTINUED | OUTPATIENT
Start: 2020-09-05 | End: 2020-09-08

## 2020-09-04 RX ORDER — METOPROLOL TARTRATE 50 MG
50 TABLET ORAL
Refills: 0 | Status: DISCONTINUED | OUTPATIENT
Start: 2020-09-04 | End: 2020-09-05

## 2020-09-04 RX ORDER — CLOPIDOGREL BISULFATE 75 MG/1
600 TABLET, FILM COATED ORAL ONCE
Refills: 0 | Status: DISCONTINUED | OUTPATIENT
Start: 2020-09-04 | End: 2020-09-04

## 2020-09-04 RX ORDER — POTASSIUM CHLORIDE 20 MEQ
20 PACKET (EA) ORAL
Refills: 0 | Status: COMPLETED | OUTPATIENT
Start: 2020-09-04 | End: 2020-09-04

## 2020-09-04 RX ORDER — ATORVASTATIN CALCIUM 80 MG/1
80 TABLET, FILM COATED ORAL AT BEDTIME
Refills: 0 | Status: DISCONTINUED | OUTPATIENT
Start: 2020-09-04 | End: 2020-09-08

## 2020-09-04 RX ORDER — ACETAMINOPHEN 500 MG
650 TABLET ORAL ONCE
Refills: 0 | Status: COMPLETED | OUTPATIENT
Start: 2020-09-04 | End: 2020-09-04

## 2020-09-04 RX ORDER — METOPROLOL TARTRATE 50 MG
50 TABLET ORAL
Refills: 0 | Status: DISCONTINUED | OUTPATIENT
Start: 2020-09-04 | End: 2020-09-04

## 2020-09-04 RX ORDER — HEPARIN SODIUM 5000 [USP'U]/ML
2900 INJECTION INTRAVENOUS; SUBCUTANEOUS ONCE
Refills: 0 | Status: DISCONTINUED | OUTPATIENT
Start: 2020-09-04 | End: 2020-09-04

## 2020-09-04 RX ADMIN — ATORVASTATIN CALCIUM 80 MILLIGRAM(S): 80 TABLET, FILM COATED ORAL at 21:29

## 2020-09-04 RX ADMIN — CLOPIDOGREL BISULFATE 600 MILLIGRAM(S): 75 TABLET, FILM COATED ORAL at 08:43

## 2020-09-04 RX ADMIN — Medication 81 MILLIGRAM(S): at 08:43

## 2020-09-04 RX ADMIN — Medication 650 MILLIGRAM(S): at 12:41

## 2020-09-04 RX ADMIN — Medication 50 MILLIGRAM(S): at 13:24

## 2020-09-04 RX ADMIN — Medication 650 MILLIGRAM(S): at 11:53

## 2020-09-04 RX ADMIN — Medication 3 MILLIGRAM(S): at 21:29

## 2020-09-04 RX ADMIN — Medication 20 MILLIEQUIVALENT(S): at 08:43

## 2020-09-04 RX ADMIN — Medication 63.75 MILLIMOLE(S): at 00:44

## 2020-09-04 RX ADMIN — HEPARIN SODIUM 600 UNIT(S)/HR: 5000 INJECTION INTRAVENOUS; SUBCUTANEOUS at 08:11

## 2020-09-04 RX ADMIN — SODIUM CHLORIDE 100 MILLILITER(S): 9 INJECTION INTRAMUSCULAR; INTRAVENOUS; SUBCUTANEOUS at 11:54

## 2020-09-04 RX ADMIN — Medication 20 MILLIEQUIVALENT(S): at 12:23

## 2020-09-04 NOTE — PROGRESS NOTE ADULT - SUBJECTIVE AND OBJECTIVE BOX
Patient seen in [facility] post cardiac cath earlier today. She was feeling well, denying chest pain shortness of breath, palpitations or lightheadedness. She told me that she had been shocked by her ICD last night .    Interrogation of her device showed an episode of atrial tachycardia at 190-207 bpm.  She received ATP (unsuccessful) and 8 inappropriate 41 J ICD shocks with no termination of the arrhythmia. The arrhythmia eventually self terminated.  Patient had cardiac cath for ischemic evaluation.  Her LAD lesion progressed from 30-50%.  No interventions.   Her device VT/VF detection zone was increased to 220 bpm to hopefully avoid additional inappropriate shocks. Scheduled for echocardiogram while in [facility].   She is on metoprolol but did not receive her morning dose.          Vital Signs Last 24 Hrs  T(C): 36.9 (04 Sep 2020 19:20), Max: 36.9 (03 Sep 2020 20:35)  T(F): 98.4 (04 Sep 2020 19:20), Max: 98.4 (03 Sep 2020 20:35)  HR: 71 (04 Sep 2020 19:20) (70 - 104)  BP: 158/82 (04 Sep 2020 19:20) (125/75 - 169/132)  BP(mean): --  RR: 16 (04 Sep 2020 19:20) (15 - 21)  SpO2: 100% (04 Sep 2020 19:20) (98% - 100%)        Telemetry:  Normal sinus rhythm 76 bpm Rare PVC's.   MEDICATIONS  (STANDING):  aspirin  chewable 81 milliGRAM(s) Oral daily  atorvastatin 80 milliGRAM(s) Oral at bedtime  melatonin 3 milliGRAM(s) Oral at bedtime  metoprolol tartrate 50 milliGRAM(s) Oral two times a day    MEDICATIONS  (PRN):  acetaminophen    Suspension .. 650 milliGRAM(s) Oral every 6 hours PRN Temp greater or equal to 38C (100.4F), Mild Pain (1 - 3)  senna 2 Tablet(s) Oral at bedtime PRN Constipation          Physical exam:   Gen- well developed well nourished. In NAD.  Appears comfortable. Not anxious appearing.   Resp- decreased breath sounds at bases  CV- S1 and S2 RRR. No murmurs, gallops or rubs  ABD- soft nontender + bowel sounds  EXT- no edema or calf tenderness  Neuro- grossly nonfocal                            12.7   8.37  )-----------( 174      ( 04 Sep 2020 06:00 )             39.3     PT/INR - ( 03 Sep 2020 12:00 )   PT: 12.7 SEC;   INR: 1.11          PTT - ( 03 Sep 2020 12:00 )  PTT:31.0 SEC  09-04    137  |  102  |  15  ----------------------------<  111<H>  3.4<L>   |  21<L>  |  0.83    Ca    8.9      04 Sep 2020 06:00  Phos  4.3     09-04  Mg     2.2     09-04    TPro  7.0  /  Alb  3.7  /  TBili  0.9  /  DBili  x   /  AST  50<H>  /  ALT  19  /  AlkPhos  107  09-04    CARDIAC MARKERS ( 03 Sep 2020 23:37 )  x     / x     / 725 u/L / 19.07 ng/mL / x              ECHOCARDIOGRAM;  < from: Transthoracic Echocardiogram (09.04.20 @ 13:12) >  Patient name: PHYLLIS ANNE  YOB: 1935   Age: 84 (F)   MR#: [id number]  Study Date: 9/4/2020  Location: [id number]: DK Navarrete  Study quality: Technically Difficult  Referring Physician: Salvatore Alves MD  Blood Pressure: 140/80 mmHg  Height: 170 cm  Weight: 46 kg  BSA: 1.5 m2  ------------------------------------------------------------------------  PROCEDURE: Transthoracic echocardiogram with 2-D, M-Mode  and complete spectral and color flow Doppler.  INDICATION: Cardiomyopathy, unspecified (I42.9)  ------------------------------------------------------------------------  OBSERVATIONS:  Mitral Valve: Mitral annular calcification, otherwise  normal mitral valve. Minimal mitral regurgitation.  Aortic Root:Normal aortic root.  Aortic Valve: Calcified trileaflet aortic valve with normal  opening.  Left Atrium: Normal left atrium.  Left Ventricle: Normal left ventricular systolic function.  No segmental wall motion abnormalities. Mild diastolic  dysfunction (Stage I).  Right Heart: Normal right atrium. The right ventricle is  not well visualized; grossly normal right ventricular  systolic function.  A device wire is noted in the right  heart. Normal tricuspid valve.  Mild tricuspid  regurgitation. Normal pulmonic valve.  Pericardium/PleuraNormal pericardium with no pericardial  effusion.  Hemodynamic: Estimated right ventricular systolic pressure  equals 41 mm Hg, assuming right atrial pressure equals 10  mm Hg, consistent with mild pulmonary hypertension.  ------------------------------------------------------------------------  CONCLUSIONS:  1. Mitral annular calcification, otherwise normal mitral  valve. Minimal mitral regurgitation.  2. Normal left ventricular systolic function. No segmental  wall motion abnormalities.  3. Mild diastolic dysfunction (Stage I).  4. The right ventricle is not well visualized; grossly  normal right ventricular systolic function.  A device wire  is noted in the right heart.  5. Estimated right ventricularsystolic pressure equals 41  mm Hg, assuming right atrial pressure equals 10 mm Hg,    < from: Transthoracic Echocardiogram (09.04.20 @ 13:12) >  consistent with mild pulmonary hypertension.  ------------------------------------------------------------------------  Confirmed on  9/4/2020 - 15:41:47 by Luther Renner M.D.  ------------------------------------------------------------------------

## 2020-09-04 NOTE — PROGRESS NOTE ADULT - SUBJECTIVE AND OBJECTIVE BOX
Medicine Progress Note    Patient is a 84y old  Female who presents with a chief complaint of Arrhythmia (04 Sep 2020 01:31)      SUBJECTIVE / OVERNIGHT EVENTS:  Patient assessed in AM.     ADDITIONAL REVIEW OF SYSTEMS:    MEDICATIONS  (STANDING):  aspirin  chewable 81 milliGRAM(s) Oral daily  atorvastatin 80 milliGRAM(s) Oral at bedtime  heparin  Infusion.  Unit(s)/Hr (6 mL/Hr) IV Continuous <Continuous>  melatonin 3 milliGRAM(s) Oral at bedtime  metoprolol succinate ER 25 milliGRAM(s) Oral two times a day    MEDICATIONS  (PRN):  acetaminophen    Suspension .. 650 milliGRAM(s) Oral every 6 hours PRN Temp greater or equal to 38C (100.4F), Mild Pain (1 - 3)  senna 2 Tablet(s) Oral at bedtime PRN Constipation    CAPILLARY BLOOD GLUCOSE        I&O's Summary      PHYSICAL EXAM:  Vital Signs Last 24 Hrs  T(C): 36.8 (04 Sep 2020 05:00), Max: 36.9 (03 Sep 2020 20:35)  T(F): 98.3 (04 Sep 2020 05:00), Max: 98.4 (03 Sep 2020 20:35)  HR: 80 (04 Sep 2020 05:00) (72 - 132)  BP: 140/80 (04 Sep 2020 05:00) (125/75 - 222/114)  BP(mean): --  RR: 17 (04 Sep 2020 05:00) (15 - 24)  SpO2: 100% (04 Sep 2020 05:00) (98% - 100%)  CONSTITUTIONAL: Well appearing. NAD.  HEENT: NCAT. PERRLA. Eyes tracking. Anicteric sclera. Moist mucous membrane. No pharyngeal injection or exudates. Normal dentition.  Neck: Supple. No JVD.  RESPIRATORY: No respiratory distress. Lungs are clear to auscultation bilaterally. No wheezes, rales, or rhonchi.  CARDIOVASCULAR: Regular rate and rhythm. Normal S1 and S2. No murmur/rub/gallop. <2 sec cap refill. Peripheral pulses are 2+ bilaterally.  ABDOMEN: Soft. Nondistended. Normoactive bowel sounds. Nontender to percussion/palpation. No rebound or guarding. No hepatosplenomegaly. No CVA tenderness.  MSK: Moving all extremities. No peripheral edema.  SKIN: No rashes. No lesions. No jaundice.  NEUROLOGY: Alert. A&Ox3. No gross sensory deficits.  PSYCH: Appropriate affect.      LABS:                        12.7   8.37  )-----------( 174      ( 04 Sep 2020 06:00 )             39.3     09-03    134<L>  |  103  |  15  ----------------------------<  126<H>  Test not performed SPECIMEN SEVERELY HEMOLYZED   |  16<L>  |  0.79    Ca    9.0      03 Sep 2020 17:36  Phos  3.0     09-03  Mg     1.9     09-03    TPro  7.7  /  Alb  3.9  /  TBili  0.9  /  DBili  x   /  AST  44<H>  /  ALT  19  /  AlkPhos  115  09-03    PT/INR - ( 03 Sep 2020 12:00 )   PT: 12.7 SEC;   INR: 1.11          PTT - ( 03 Sep 2020 12:00 )  PTT:31.0 SEC  CARDIAC MARKERS ( 03 Sep 2020 23:37 )  x     / x     / 725 u/L / 19.07 ng/mL / x              COVID-19 PCR: NotDetec (03 Sep 2020 16:53)      RADIOLOGY & ADDITIONAL TESTS:  Imaging from Last 24 Hours:    Electrocardiogram/QTc Interval:    Assessment:    Plan: Medicine Progress Note    Patient is a 84y old  Female who presents with a chief complaint of Arrhythmia (04 Sep 2020 01:31)      SUBJECTIVE / OVERNIGHT EVENTS:  Patient assessed in AM. Telemetry showed 3 incidences of V-tach that self-resolved at 03:56, 06:19, and 07:07. The episodes at 03:56 and 07:07 lasted ~1 sec while the episode at 06:19 lasted 4 sec. Patient states that she feels well and denies any symptoms. Currently denies chest pain, palpitations, shortness of breath, cough, nausea, vomiting, headache, dizziness, numbness, tingling, or weakness.    ADDITIONAL REVIEW OF SYSTEMS:    MEDICATIONS  (STANDING):  aspirin  chewable 81 milliGRAM(s) Oral daily  atorvastatin 80 milliGRAM(s) Oral at bedtime  heparin  Infusion.  Unit(s)/Hr (6 mL/Hr) IV Continuous <Continuous>  melatonin 3 milliGRAM(s) Oral at bedtime  metoprolol succinate ER 25 milliGRAM(s) Oral two times a day    MEDICATIONS  (PRN):  acetaminophen    Suspension .. 650 milliGRAM(s) Oral every 6 hours PRN Temp greater or equal to 38C (100.4F), Mild Pain (1 - 3)  senna 2 Tablet(s) Oral at bedtime PRN Constipation    CAPILLARY BLOOD GLUCOSE        I&O's Summary      PHYSICAL EXAM:  Vital Signs Last 24 Hrs  T(C): 36.8 (04 Sep 2020 05:00), Max: 36.9 (03 Sep 2020 20:35)  T(F): 98.3 (04 Sep 2020 05:00), Max: 98.4 (03 Sep 2020 20:35)  HR: 80 (04 Sep 2020 05:00) (72 - 132)  BP: 140/80 (04 Sep 2020 05:00) (125/75 - 222/114)  BP(mean): --  RR: 17 (04 Sep 2020 05:00) (15 - 24)  SpO2: 100% (04 Sep 2020 05:00) (98% - 100%)  CONSTITUTIONAL: Well appearing. NAD.  HEENT: NCAT. PERRLA. Eyes tracking. Anicteric sclera. Moist mucous membrane. No pharyngeal injection or exudates. Poor dentition.  Neck: Supple.   RESPIRATORY: No respiratory distress. Lungs are clear to auscultation bilaterally. No wheezes, rales, or rhonchi.  CARDIOVASCULAR: Regular rate and rhythm. Normal S1 and S2. No murmur/rub/gallop. <2 sec cap refill.   ABDOMEN: Soft. Nondistended. Normoactive bowel sounds. No abdominal bruit. Nontender to percussion/palpation. No rebound or guarding. No hepatosplenomegaly. No CVA tenderness.  : Primafit in place draining clear light yellow urine.  MSK: IV line in left hand. Moving all extremities. No peripheral edema.  SKIN: No rashes. No lesions. No jaundice.  NEUROLOGY: Alert. Oriented to person, place, and event. Not oriented to time. CN II-XII intact. No gross sensory deficits.  PSYCH: Appropriate affect.      LABS:                        12.7   8.37  )-----------( 174      ( 04 Sep 2020 06:00 )             39.3     09-03    134<L>  |  103  |  15  ----------------------------<  126<H>  Test not performed SPECIMEN SEVERELY HEMOLYZED   |  16<L>  |  0.79    Ca    9.0      03 Sep 2020 17:36  Phos  3.0     09-03  Mg     1.9     09-03    TPro  7.7  /  Alb  3.9  /  TBili  0.9  /  DBili  x   /  AST  44<H>  /  ALT  19  /  AlkPhos  115  09-03    PT/INR - ( 03 Sep 2020 12:00 )   PT: 12.7 SEC;   INR: 1.11          PTT - ( 03 Sep 2020 12:00 )  PTT:31.0 SEC  CARDIAC MARKERS ( 03 Sep 2020 23:37 )  x     / x     / 725 u/L / 19.07 ng/mL / x              COVID-19 PCR: NotDetec (03 Sep 2020 16:53)      RADIOLOGY & ADDITIONAL TESTS:  Imaging from Last 24 Hours: Medicine Progress Note    Patient is a 84y old  Female who presents with a chief complaint of Arrhythmia (04 Sep 2020 01:31)      SUBJECTIVE / OVERNIGHT EVENTS:  Patient assessed in AM. Telemetry showed 3 incidences of ectopic beats with narrow QRS complexes that self-resolved at 03:56, 06:19, and 07:07. Patient states that she feels well and denies any symptoms. Currently denies chest pain, palpitations, shortness of breath, cough, nausea, vomiting, headache, dizziness, numbness, tingling, or weakness.    ADDITIONAL REVIEW OF SYSTEMS:    MEDICATIONS  (STANDING):  aspirin  chewable 81 milliGRAM(s) Oral daily  atorvastatin 80 milliGRAM(s) Oral at bedtime  heparin  Infusion.  Unit(s)/Hr (6 mL/Hr) IV Continuous <Continuous>  melatonin 3 milliGRAM(s) Oral at bedtime  metoprolol succinate ER 25 milliGRAM(s) Oral two times a day    MEDICATIONS  (PRN):  acetaminophen    Suspension .. 650 milliGRAM(s) Oral every 6 hours PRN Temp greater or equal to 38C (100.4F), Mild Pain (1 - 3)  senna 2 Tablet(s) Oral at bedtime PRN Constipation    CAPILLARY BLOOD GLUCOSE        I&O's Summary      PHYSICAL EXAM:  Vital Signs Last 24 Hrs  T(C): 36.8 (04 Sep 2020 05:00), Max: 36.9 (03 Sep 2020 20:35)  T(F): 98.3 (04 Sep 2020 05:00), Max: 98.4 (03 Sep 2020 20:35)  HR: 80 (04 Sep 2020 05:00) (72 - 132)  BP: 140/80 (04 Sep 2020 05:00) (125/75 - 222/114)  BP(mean): --  RR: 17 (04 Sep 2020 05:00) (15 - 24)  SpO2: 100% (04 Sep 2020 05:00) (98% - 100%)  CONSTITUTIONAL: Well appearing. NAD.  HEENT: NCAT. PERRLA. Eyes tracking. Anicteric sclera. Moist mucous membrane. No pharyngeal injection or exudates. Poor dentition.  Neck: Supple. JVD present.  RESPIRATORY: No respiratory distress. Lungs are clear to auscultation bilaterally. No wheezes, rales, or rhonchi.  CARDIOVASCULAR: Regular rate. Irregular rhythm. Normal S1 and S2. No murmur/rub/gallop. <2 sec cap refill.   ABDOMEN: Soft. Nondistended. Normoactive bowel sounds. No abdominal bruit. Nontender to percussion/palpation. No rebound or guarding. No hepatosplenomegaly. No CVA tenderness.  : Primafit in place draining clear light yellow urine.  MSK: IV line in left hand. Moving all extremities. No peripheral edema.  SKIN: No rashes. No lesions. No jaundice.  NEUROLOGY: Alert. Oriented to person, place, and event. Not oriented to time. CN II-XII intact. No gross sensory deficits.  PSYCH: Appropriate affect.      LABS:                        12.7   8.37  )-----------( 174      ( 04 Sep 2020 06:00 )             39.3     09-03    134<L>  |  103  |  15  ----------------------------<  126<H>  Test not performed SPECIMEN SEVERELY HEMOLYZED   |  16<L>  |  0.79    Ca    9.0      03 Sep 2020 17:36  Phos  3.0     09-03  Mg     1.9     09-03    TPro  7.7  /  Alb  3.9  /  TBili  0.9  /  DBili  x   /  AST  44<H>  /  ALT  19  /  AlkPhos  115  09-03    PT/INR - ( 03 Sep 2020 12:00 )   PT: 12.7 SEC;   INR: 1.11          PTT - ( 03 Sep 2020 12:00 )  PTT:31.0 SEC  CARDIAC MARKERS ( 03 Sep 2020 23:37 )  x     / x     / 725 u/L / 19.07 ng/mL / x              COVID-19 PCR: NotDetec (03 Sep 2020 16:53)      RADIOLOGY & ADDITIONAL TESTS:  Imaging from Last 24 Hours:

## 2020-09-04 NOTE — PROGRESS NOTE ADULT - ATTENDING COMMENTS
Patient was seen and examined personally by me on 9/4/2020. I have discussed the plan and reviewed the resident's note and agree with the above physical exam findings including assessment and plan except as indicated below.    84F with hx of Takatsubo cardiomyopathy (most recent echo EF 66%) c/b vfib arrest s/p AICD, renal artery stenosis, HTN, who presented after multiple AICD shocks at home with associated dizziness. Found to have SVT (atrial tach) on AICD interrogation, elevated troponin, and uncontrolled hypertension. r/o ACS - s/p cath 9/4 with no significant CAD.     Patient was comfortable on room air on exam  Appearing euvolemic, lungs clear b/l and no LE edema  s1/s2, no murmurs were appreciated  Abd soft, NT, ND    #Atrial Tachycardia - hx of Takotsubo cardiomyopathy in 8/2018 (now recovered, LVEF 66%) where she had Vfib arrest and received ICD for secondary prevention presents with 8 ICD shocks. Interrogation shows fast atrial rate with V conduction without widening of the QRS likely atrial tachycardia. Toprol increased by HF to 25mg BID from once daily, will clarify dosing. Replete lytes K to 4, and Mg to 2. EP and HF recs appreciated.     #r/o ACS - trops elevated likely in setting of AICD shocks and atrial tachycardia now downtrending. s/p cath 9/4 with no significant CAD, continue with medical management (asa, BB), will start ACEi if Cr. stable after cath. Cardiology recs appreciated.     #Takatsubo cardiomyopathy (most recent echo EF 66%) - euvolemic on exam. clarify BB dosing with HF, will start ACEi if Cr. stable after cath. repeat TTE. HF recs appreciated.     #HTN in setting of renal artery stenosis - BP elevated on admission, goal SBP 180s in first 24 hours. Continue with Toprol, hold ACEi as patient received contrast with cath today, if Cr remains stable will start tomorrow. If BP persistent elevated will consider changing Toprol to Coreg. Nephrology recs appreciated.

## 2020-09-04 NOTE — PROGRESS NOTE ADULT - PROBLEM SELECTOR PLAN 3
- appreciate HF recs  - scheduled for TTE - hx of Takotsubo (2018) with last EF 66%  - appreciate HF recs  - scheduled for TTE today

## 2020-09-04 NOTE — PROGRESS NOTE ADULT - SUBJECTIVE AND OBJECTIVE BOX
*******************************************************  Samir Rivera MD PGY-1  Internal Medicine  Pager [phone] / [phone]  *******************************************************  Patient is a 84y old  Female who presents with a chief complaint of Arrhythmia (04 Sep 2020 14:04)        SUBJECTIVE / OVERNIGHT EVENTS:  - No acute events overnight.   - Patient seen and evaluated at bedside.  - Patient with no complaints, feeling ok  - Denies fevers/chills, headache, SOB at rest, cough, chest pain, palpitations, abdominal pain, nausea/vomiting/diarrhea/constipation, melena/hematochezia, dysuria, and hematuria    ------------------------------------------------------------------------------------------------------------    MEDICATIONS  (STANDING):  aspirin  chewable 81 milliGRAM(s) Oral daily  atorvastatin 80 milliGRAM(s) Oral at bedtime  melatonin 3 milliGRAM(s) Oral at bedtime  metoprolol succinate ER 25 milliGRAM(s) Oral two times a day  sodium chloride 0.9%. 500 milliLiter(s) (100 mL/Hr) IV Continuous <Continuous>    MEDICATIONS  (PRN):  acetaminophen    Suspension .. 650 milliGRAM(s) Oral every 6 hours PRN Temp greater or equal to 38C (100.4F), Mild Pain (1 - 3)  senna 2 Tablet(s) Oral at bedtime PRN Constipation      ------------------------------------------------------------------------------------------------------------    OBJECTIVE:      PHYSICAL EXAM:  T(F): 98.3, Max: 98.4 (09-03-20 @ 20:35)  HR: 80 (72 - 132)  BP: 140/80 (125/75 - 222/114)  RR: 17 (15 - 24)  SpO2: 100% (98% - 100%)      Constitutional: WDWN resting comfortably in bed; NAD  Head: NC/AT  Eyes: PERRL, EOMI, anicteric sclera  ENT: no nasal discharge; uvula midline, no oropharyngeal erythema or exudates; MMM  Neck: no JVD noted but + hepatojugular reflex  Respiratory: CTA B/L; no W/R/R, no retractions, palpable AICD in left pectoral region  Cardiac: +S1/S2; irregular rhythm; no M/R/G  Gastrointestinal: abdomen soft, NT/ND; no rebound or guarding; +BSx4  Extremities: WWP, no clubbing or cyanosis; no peripheral edema  Vascular: 2+ DP/PT pulses B/L  Dermatologic: scar from AICD, skin warm, dry and intact; no rashes, wounds  Lymphatic: no submandibular or cervical LAD  Neurologic: A&O to person, place, and situation, but not time; no focal deficits; moving all extremities  Psychiatric: affect and characteristics of appearance, verbalizations, behaviors are appropriate    ------------------------------------------------------------------------------------------------------------  LABS:                        12.7   8.37  )-----------( 174      ( 04 Sep 2020 06:00 )             39.3     09-04    137  |  102  |  15  ----------------------------<  111<H>  3.4<L>   |  21<L>  |  0.83    Ca    8.9      04 Sep 2020 06:00  Phos  4.3     09-04  Mg     2.2     09-04    TPro  7.0  /  Alb  3.7  /  TBili  0.9  /  DBili  x   /  AST  50<H>  /  ALT  19  /  AlkPhos  107  09-04    PT/INR - ( 03 Sep 2020 12:00 )   PT: 12.7 SEC;   INR: 1.11          PTT - ( 03 Sep 2020 12:00 )  PTT:31.0 SEC  CARDIAC MARKERS ( 03 Sep 2020 23:37 )  x     / x     / 725 u/L / 19.07 ng/mL / x                RADIOLOGY & ADDITIONAL TESTS:  Results Reviewed:     Imaging Personally Reviewed:  Electrocardiogram Personally Reviewed:      COORDINATION OF CARE:  Care Discussed with Consultants/Other Providers [Y] Cardiology, Nephrology  ------------------------------------------------------------------------------------------------------------

## 2020-09-04 NOTE — PROGRESS NOTE ADULT - PROBLEM SELECTOR PLAN 1
- Patient has been having rising troponin despite resolution of shocks with EKG showing Q waves and ST depression. Patient assessed by cardiology and planned for [facility] today.  - appreciate cards/EP recs  - diet: NPO  - f/u LHC results  - monitor electrolytes  - monitor serial EKG  - trend cardiac enzyme (troponin and CKMB)  - ASA 81 mg PO, clopidogrel 600 mg PO bolus, 75 mg PO, heparin 25,000 U at 6 ml/hr, atorvastatin 80 mg PO, metoprolol succinate 25 mg PO BID - Patient initially had uprising troponin despite resolution of shocks with EKG showing Q waves and ST depression. Troponin now downtrending (51 -> 163 -> 287 -> 259 -> 189). Patient planned for C today.  - appreciate cards/EP recs  - diet: NPO  - f/u LHC results  - monitor electrolytes  - monitor serial EKG  - trend cardiac enzyme (troponin and CKMB)  - ASA 81 mg PO, clopidogrel 600 mg PO bolus and 75 mg PO QD, heparin 25,000 U at 6 ml/hr, atorvastatin 80 mg PO, metoprolol succinate 25 mg PO BID - Patient initially had uprising troponin despite resolution of shocks with EKG showing Q waves and ST depression. Troponin now downtrending (51 -> 163 -> 287 -> 259 -> 189). Patient planned for C today.  - appreciate cards/EP recs  - diet: NPO  - f/u LHC results  - monitor electrolytes  - ASA 81 mg PO, clopidogrel 600 mg PO bolus and 75 mg PO QD, heparin 25,000 U at 6 ml/hr, atorvastatin 80 mg PO, metoprolol succinate 25 mg PO BID  - replete K+ (3.4) with 20 KCl

## 2020-09-04 NOTE — PROVIDER CONTACT NOTE (OTHER) - BACKGROUND
pt s/p cath dressing c/d/i +2 radial and pedal pulses, denies any pain numbess or tingling no hematoma/bleeding present.

## 2020-09-04 NOTE — PROGRESS NOTE ADULT - PROBLEM SELECTOR PLAN 2
History of LUIS FERNANDO and hypertension although with baseline BPs in 120s/80s, well controlled on lisinopril 10 qd. Patient with hypertension since admission with BPs in 220s/120s, improved with IV hydralazine to 170s.  Patient denying chest pain, headache, vision change. Labs without elevated creatinine but with uptrending troponins.    Plan:  -appreciate nephrology recs  -if creatinine stable add back lisinopril 10 tomorrow (will likely be all that's needed to correct the RVHTN)  -consider using coreg for rate control AND BP control  -if patient asymptomatic and less than  avoid giving any antihypertensives.  -if asymptomatic or >200 can use small doses of IV hydralizine

## 2020-09-04 NOTE — PROGRESS NOTE ADULT - PROBLEM SELECTOR PLAN 4
Takasubo cardiomyopathy diagnosed in 2018 after vfib arrest. EF recovered from 30% to 66% by January of 2019.    Plan:  -appreciate HF recs  -f/u echo results

## 2020-09-04 NOTE — PROGRESS NOTE ADULT - ASSESSMENT
83 y/o F with PMH of Takotsubo cardiomyopathy (most recent echo EF 66%) c/b V-fib arrest s/p AICD placement, renal artery stenosis, HTN, HLD, presenting after multiple AICD shocks with interrogation showing SVT. Pt developed EKG changes with uptrending troponin overnight despite resolution of shocks, possible secondary to AICD shocks or NSTEMI. Planned for LHC with interventional cardiology today. 85 y/o F with PMH of Takotsubo cardiomyopathy (most recent echo EF 66%) c/b V-fib arrest s/p AICD placement, renal artery stenosis, HTN, HLD, presenting after multiple AICD shocks with interrogation showing SVT. Pt developed EKG changes with initially uptrending troponin overnight despite resolution of shocks. Troponin are now downtrending - possible secondary to AICD shocks or NSTEMI. Planned for LHC and TTE today. 85 y/o F with PMH of Takotsubo cardiomyopathy (most recent echo EF 66%) c/b V-fib arrest s/p AICD placement, renal artery stenosis, HTN, HLD, presenting after multiple AICD shocks with interrogation showing atrial tachycardia. Pt developed EKG changes overnight with initially uptrending troponin overnight despite resolution of shocks. although troponin are now downtrending. Planned for LHC and TTE today.

## 2020-09-04 NOTE — PROGRESS NOTE ADULT - PROBLEM SELECTOR PLAN 3
Suspect secondary to AICD shocks although NSTEMI still a possibility    Plan:  -peaked at 10PM last night in 260s  -patient went for LHC today. LAD occlusion increased from 30 to 50 but no intervention needed  -monitor cath site for hematoma  -troponin elevation in the setting of AICD shock common, ACS now ruled out

## 2020-09-04 NOTE — PROGRESS NOTE ADULT - PROBLEM SELECTOR PLAN 1
Admitted due to multiple AICD shocks in the setting of SVT (atach). Patient on lopressor 25 qd at home.    Plan:  -admit to tele  -appreciate EP recs  -patient will likely need metoprolol tartrate (lopressor) 25 tid or 50 bid  -sensing algorithm increased capture rate to 220 to avoid further inappropriate shocks for SVT (Atach)  -if unable to control heart rate will need EP procedure  -replete K>4, Mg>2, Ph>4  -if patient has more AICD shocks evaluate at bedside, get vitals, can give IV metoprolol 5mg  for rate control as needed. if unstable or not responding call CCU

## 2020-09-04 NOTE — PROGRESS NOTE ADULT - PROBLEM SELECTOR PLAN 2
baseline BP in 120s/80s and was hypertensive at 220s/120s in the ED improved to 170s with hydralazine. Continues to deny symptoms  - goal BP to 180s  - c/w lisinopril 10  - amlodipine  - hydralazine PO/IV PRN baseline BP in 120s/80s and was hypertensive at 220s/120s in the ED, now 140/80.  - resolved  - c/w metoprolol succinate 25 mg PO BID

## 2020-09-04 NOTE — CONSULT NOTE ADULT - ATTENDING COMMENTS
84/F with AICD shocks for Atach(no VT per EP interrogation of ICD). Does report more fatigue and GOMEZ lately. hs Trop continued to rise even after resolution of shocks. EKG with anterior Q waves, and ST dep with TWI in aVL and V5-V6 (new/worse from prior). Discussed options of ischemia eval with pt-agrees for LHC, and requests information to be passed on to granddaughter.    Plan:  1. NPO-planned LHC in AM.  2. Start ASA 81 mg/d and clopidogrel 75 mg/d and IV heparin.  3. PO BB, dose per EP recs.    Thanks for the opportunity of participating in the care of this pt. Please call [phone] for further questions/issues.    Mauro Tang MD  Interventional cardiologist  Coverage information @ [email or web address] ,  pw:  [email or web address]
83 y/o F w/ h/o R renal artery stenosis (since 2015), HTN, HL, microcytic anemia, hx of Takotsubo cardiomyopathy in 8/2018 (now recovered, LVEF 66%) where she had Vfib arrest and received ICD for secondary prevention presents with 8 ICD shocks. Interrogation shows fast atrial rate with V conduction without widening of the QRS, this is likely SVT. Most likely Atrial tachycardia    Plan:  -monitor electrolytes, Mg >2, K >4, TSH  -restart patients home metoprolol lopressor 25mg, she is not tachycardic right now.  Monitor HR and add another dose of 25mg after 8 hours if she become tachycardic  -monitor on Tele and admit  -If she continues to have AT episodes that is refractory to medication therapy can consider EP study.  Discussed with Dr. Sterling
Patient seen and examined 9/4/20.    She is admitted with inappropriate shocks related to SVT. Overnight she had rise in troponins and questionable EKG changes for which an angiogram was performed revealing non-obstructive CAD. She has no signs of volume overload on exam and most recent TTE revealed recovered LV function.    I discussed with EP if any adjustments could be made to her ICD to minimize inappropriate shocks and current plan is to increase therapy zone to 220 bpm. I agree with increasing beta blocker in setting of paroxsysmal SVT as well. Resume lisinopril at home dose 10 mg daily for now. Repeat TTE with Dopplers. If LV function remains normal could also consider removing tachy therapies as risk of VT/VF from recovered Takatsubo's is likely lower than inappropriate shocks in this 85 YO admitted with multiple inappropriate shocks.

## 2020-09-04 NOTE — PROGRESS NOTE ADULT - ASSESSMENT
85 y/o F w/ h/o R renal artery stenosis (since 2015), HTN, HLD, microcytic anemia, hx of Takotsubo cardiomyopathy in 8/2018 (now recovered, LVEF 66%) where she had Vfib arrest and received ICD for secondary prevention presented with 8 inappropriate  ICD for fast atrial tachycardia.  Unfortunately, she received 8 additional inappropriate ICD shocks for AT last night while getting up to the bathroom.  Metoprolol increased to 50mg bid today.  Tolerating the medication well. Home dose -> metoprolol 25 mg daily. Low threshold for slowly titrating metoprolol to 100mg bid if necessary for suppression of AT. If she is refractory to BB, can consider antiarrhythmic therapy or ablation. When patient is more stable, will need to evaluate her heart rate during ambulation.  However, please keep her at bedrest for now.   ICD detection increased to 220 bpm to hopefully avoid further ICD therapies for AT. Patient asked if the device could be removed given the results of her echo (normal EF).  I explained she is still at risk for sudden cardiac death given her history of VFib arrest and she should not consider removal of the ICD.

## 2020-09-04 NOTE — PROGRESS NOTE ADULT - ASSESSMENT
84 year old woman with PMH of Takasubo cardiomyopathy (most recent echo EF 66%) c/b vfib arrest s/p AICD presented after multiple AICD shocks at home. Admitted for management of arrhythmia. Had uptrending troponins so went for LHC and workup for NSTEMI which was negative.

## 2020-09-04 NOTE — CONSULT NOTE ADULT - PROBLEM SELECTOR RECOMMENDATION 9
HTN 2/2 to renal artery stenosis. BP has been controlled. Please continue home BP regimen including lisinopril 10 mg PO daily and metoprolol succinate 25 mg PO daily.    Case seen and discussed with attending    Candy Alcantara   Nephrology Fellow  [facility] Pager: [phone]  [facility] Pager: [phone] HTN 2/2 to renal artery stenosis. BP has been controlled but was labile last night.  Can consider changing metoprolol to carvedilol.  Hold lisinopril for now given that patient got contrast.  If creatinine stable tomorrow morning would restart lisinopril.  Can give IV medications if needed for systolic > 200.  Discussed with primary team.      Case seen and discussed with attending    Candy Alcantara   Nephrology Fellow  [facility] Pager: [phone]  [facility] Pager: [phone]

## 2020-09-04 NOTE — CONSULT NOTE ADULT - SUBJECTIVE AND OBJECTIVE BOX
Pt is an 85 y/o F w PMH of takotsubo cardiomyopathy in 8/2018 w v-fib arrest s/p defib x2, s/p AICD placement, last LVEF was 66%, renal artery stenosis, HTN and HLD presented to [facility] on 9/3/20 after multiple [facility] DIVISION OF KIDNEY DISEASES AND HYPERTENSION -- [phone]  -- INITIAL CONSULT NOTE  --------------------------------------------------------------------------------  HPI: Pt is an 83 y/o F w PMH of takotsubo cardiomyopathy in 8/2018 w v-fib arrest s/p defib x2, s/p AICD placement, last LVEF was 66%, renal artery stenosis, HTN and HLD presented to [facility] on 9/3/20 after multiple AICD shocks at home. Reported feeling tired and endorse having multiple episodes of palpitations. Admitted to medicine for further management. Nephrology consulted for HTN.     Pt was seen and examined at bedside. She was NAD. Reported feeling well. Denies CP, SOB, dysuria, LE edema.       PAST HISTORY  --------------------------------------------------------------------------------  PAST MEDICAL & SURGICAL HISTORY:  History of renal artery stenosis  Hyperlipidemia  Cardiomyopathy, unspecified type: H/o Takasubo cardiomyopathy c/b vfib arrest s/p AICD implantation. CMP resolved with EF 66% (from 30%) in Jan 2019  Osteoporosis  HTN (hypertension)  S/P cataract surgery  S/P appendectomy    FAMILY HISTORY:  FH: HTN (hypertension)  FH: CAD (coronary artery disease)    ALLERGIES & MEDICATIONS  --------------------------------------------------------------------------------  Allergies    sulfa drugs (Hives)  sulfa drugs (Swelling)    Intolerances      Standing Inpatient Medications  aspirin  chewable 81 milliGRAM(s) Oral daily  atorvastatin 80 milliGRAM(s) Oral at bedtime  melatonin 3 milliGRAM(s) Oral at bedtime  metoprolol succinate ER 25 milliGRAM(s) Oral two times a day  metoprolol tartrate 50 milliGRAM(s) Oral once  potassium chloride    Tablet ER 20 milliEquivalent(s) Oral every 2 hours  sodium chloride 0.9%. 500 milliLiter(s) IV Continuous <Continuous>    PRN Inpatient Medications  acetaminophen    Suspension .. 650 milliGRAM(s) Oral every 6 hours PRN  senna 2 Tablet(s) Oral at bedtime PRN      REVIEW OF SYSTEMS  --------------------------------------------------------------------------------  Gen: No fevers/chills  Respiratory: No dyspnea, cough  CV: No chest pain  : No dysuria, hematuria  MSK: No  edema    All other systems were reviewed and are negative, except as noted.    VITALS/PHYSICAL EXAM  --------------------------------------------------------------------------------  T(C): 36.8 (09-04-20 @ 05:00), Max: 36.9 (09-03-20 @ 20:35)  HR: 80 (09-04-20 @ 05:00) (72 - 132)  BP: 140/80 (09-04-20 @ 05:00) (125/75 - 222/114)  RR: 17 (09-04-20 @ 05:00) (15 - 24)  SpO2: 100% (09-04-20 @ 05:00) (98% - 100%)  Wt(kg): --    Weight (kg): 45.9 (09-04-20 @ 02:30)      Physical Exam:  	Gen: NAD  	HEENT: MMM  	Pulm: CTA B/L, no crackles or wheezing   	CV: S1S2+, RRR   	Abd: Soft, +BS   	Ext: No LE edema B/L  	Neuro: Awake  	Skin: Warm and dry    LABS/STUDIES  --------------------------------------------------------------------------------              12.7   8.37  >-----------<  174      [09-04-20 @ 06:00]              39.3     137  |  102  |  15  ----------------------------<  111      [09-04-20 @ 06:00]  3.4   |  21  |  0.83        Ca     8.9     [09-04-20 @ 06:00]      Mg     2.2     [09-04-20 @ 06:00]      Phos  4.3     [09-04-20 @ 06:00]    TPro  7.0  /  Alb  3.7  /  TBili  0.9  /  DBili  x   /  AST  50  /  ALT  19  /  AlkPhos  107  [09-04-20 @ 06:00]    PT/INR: PT 12.7 , INR 1.11       [09-03-20 @ 12:00]  PTT: 31.0       [09-03-20 @ 12:00]          [09-03-20 @ 23:37]    Creatinine Trend:  SCr 0.83 [09-04 @ 06:00]  SCr 0.79 [09-03 @ 17:36]  SCr 0.83 [09-03 @ 15:00]  SCr 0.87 [09-03 @ 12:00]    Urinalysis - [08-03-18 @ 16:44]      Color YELLOW / Appearance CLEAR / SG 1.017 / pH 6.0      Gluc NEGATIVE / Ketone NEGATIVE  / Bili NEGATIVE / Urobili NORMAL       Blood TRACE / Protein 30 / Leuk Est LARGE / Nitrite NEGATIVE      RBC 10-25 / WBC 25-50 / Hyaline  / Gran  / Sq Epi OCC / Non Sq Epi  / Bacteria       HbA1c 5.7      [01-11-19 @ 08:17]  TSH 0.44      [09-04-20 @ 06:00]  Lipid: chol 160, TG 37, HDL 69, LDL 94      [09-04-20 @ 06:00]

## 2020-09-04 NOTE — CONSULT NOTE ADULT - ASSESSMENT
83 y/o F w/ h/o R renal artery stenosis (since 2015), HTN, HL, microcytic anemia, hx of Takotsubo cardiomyopathy in 8/2018 (now recovered, LVEF 66%) where she had Vfib arrest and received ICD for secondary prevention presents with 8 ICD shocks. Interrogation shows fast atrial rate with V conduction without widening of the QRS, this is likely SVT. Most likely Atrial tachycardia.  Cardiology called overnight for rising troponin.    # r/o ACS  Patient with elevated CE from AICD shock vs. ACS, chest pain free, EKG with ST changes  Load with ASA, Plavix 600 mg PO x 1 and start heparin gtt as per ACS protocol  Serial EKG PRN chest pain to assess for ST changes  Continuous cardiac monitoring on tele for arrhythmias  CBC, CMP, coags, HbA1C, TSH, lipids for comorbidities, Trend cardiac enzymes  Aspirin 81 PO daily, Clopidogrel 75 PO daily, Lipitor 80 mg PO daily  If persistent chest pain with EKG changes, will plan for emergent cath   Continue metoprolol  Hold ACE for now in anticipation of possible cath.   Low fat DASH diet  ECHO: TTE in am    Thank you, if any questions or clinical situation changes please call [facility] #[phone].

## 2020-09-04 NOTE — CONSULT NOTE ADULT - SUBJECTIVE AND OBJECTIVE BOX
HISTORY OF PRESENT ILLNESS:  85 y/o woman with PMH of Takotsubo cardiomyopathy in 8/2018 diagnosed with V-fib arrest s/p defibrillation x2 and s/p AICD placement, now resolved with LVEF 66% (01/2019), renal artery stenosis, HTN, and HLD presenting after multiple AICD shocks at home. Patient reports that this morning she was feeling tired and when she was eating breakfast she began to have a headache, dizziness, and some palpitations. She was walking up her stairs when she felt an AICD shock. After resting on the stairs, she decided to stand up and felt 2 more shocks prompting her to call her grandchildren and she was brought to the ED by EMS. She has never been shocked by the AICD before. The patient denies any recent changes to medication regimen, new foods, recent illness and has been feeling well prior to this. Per the granddaughter she has been having increased stress recently due to family issues. She also has had a low appetite recently. The patient denies any fever, chills, chest pain, shortness of breath, left-arm pain, abdominal pain, nausea, vomiting, diarrhea, constipation, changes in urination, hematuria, melena, hematochezia, visual changes, numbness, tingling, or weakness.    She is followed by cardiologist, Dr. David Banks, and her AICD was placed by Dr. Rashaad Sterling. Her PCP is Dr. Emre Perez and her HTN is treated by Dr. Carlos Zabala.    Admission vitals were T 98, , /101, 18, 99% RA.    In the ED, patient became progressively hypertensive at 221/106 and a respiratory rate of 22 with oxygen saturation of 99% on RA. Laboratory workup was significant for uptrending troponin T from 51 to 163. Pro-BNP of 557.3. Initial CMP was hemolyzed. Chest x-ray showed AICD in place with clear lungs. Patient was assessed by electrophysiology; AICD interrogation showed patient had SVT likely A-tach, and had a total of 8 shocks. Patient was given metoprolol 25 mg PO, lisinopril 10 mg PO, hydralazine 10 mg IV While in the ED, patient had another shock after going to the bathroom with heart rate in 130s-150s and was given metoprolol 5 mg IV. (03 Sep 2020 19:07)    Allergies  sulfa drugs (Hives)  sulfa drugs (Swelling)  	  MEDICATIONS:  aspirin enteric coated 81 milliGRAM(s) Oral daily  enoxaparin Injectable 40 milliGRAM(s) SubCutaneous daily  acetaminophen    Suspension .. 650 milliGRAM(s) Oral every 6 hours PRN  melatonin 3 milliGRAM(s) Oral at bedtime  senna 2 Tablet(s) Oral at bedtime PRN  atorvastatin 40 milliGRAM(s) Oral at bedtime    PAST MEDICAL & SURGICAL HISTORY:  History of renal artery stenosis  Hyperlipidemia  Cardiomyopathy, unspecified type: H/o Takasubo cardiomyopathy c/b vfib arrest s/p AICD implantation. CMP resolved with EF 66% (from 30%) in Jan 2019  Osteoporosis  HTN (hypertension)  S/P cataract surgery  S/P appendectomy    REVIEW OF SYSTEMS:  CONSTITUTIONAL: No fevers, No chills, No fatigue, No weight gain  EYES: No vision changes   ENT: No congestion, No ear pain, No sore throat.  NECK: No pain, No stiffness  RESPIRATORY: No shortness of breath, No cough, No wheezing, No hemoptysis  CARDIOVASCULAR: No chest pain. No palpitations, No GOMEZ, No orthopnea, No paroxysmal nocturnal dyspnea, No pleuritic pain  GASTROINTESTINAL: No abdominal pain, No nausea, No vomiting, No hematemesis, No diarrhea No constipation. No melena  GENITOURINARY: No dysuria, No frequency, No incontinence, No hematuria  NEUROLOGICAL: No dizziness, No lightheadedness, No syncope, No LOC, No headache, No numbness or weakness  EXTREMITIES: No Edema, No joint pain, No joint swelling.  PSYCHIATRIC: No anxiety, No depression  SKIN: No diaphoresis. No itching, No rashes, No pressure ulcers  HEME/LYMPH: No easy bruising, or bleeding gums  ALLERGY AND IMMUNOLOGIC: No hives or eczema  All other review of systems is negative unless indicated above.    T(C): 36.7 (09-03-20 @ 22:35), Max: 36.9 (09-03-20 @ 20:35)  HR: 87 (09-03-20 @ 22:35) (72 - 132)  BP: 132/83 (09-03-20 @ 22:35) (125/75 - 222/114)  RR: 17 (09-03-20 @ 22:35) (15 - 24)  SpO2: 98% (09-03-20 @ 22:35) (98% - 100%)    Physical Exam:  General: NAD  Cardiovascular: Normal S1 S2, No JVD, + murmurs, No edema  Respiratory: Lungs clear to auscultation	  Gastrointestinal:  Soft, Non-tender, + BS	  Skin: warm and dry, No rashes, No ecchymoses, No cyanosis	  Extremities:  No clubbing, cyanosis or edema  Vascular: Peripheral pulses palpable 2+ bilaterally    CBC Full  -  ( 03 Sep 2020 12:00 )  WBC Count : 4.53 K/uL  Hemoglobin : 12.8 g/dL  Hematocrit : 39.7 %  Platelet Count - Automated : 199 K/uL  Mean Cell Volume : 71.8 fL  Mean Cell Hemoglobin : 23.1 pg  Mean Cell Hemoglobin Concentration : 32.2 %  Auto Neutrophil # : 3.03 K/uL  Auto Lymphocyte # : 1.10 K/uL  Auto Monocyte # : 0.33 K/uL  Auto Eosinophil # : 0.03 K/uL  Auto Basophil # : 0.03 K/uL  Auto Neutrophil % : 66.8 %  Auto Lymphocyte % : 24.3 %  Auto Monocyte % : 7.3 %  Auto Eosinophil % : 0.7 %  Auto Basophil % : 0.7 %    09-03    134<L>  |  103  |  15  ----------------------------<  126<H>  Test not performed SPECIMEN SEVERELY HEMOLYZED   |  16<L>  |  0.79  09-03    139  |  103  |  16  ----------------------------<  82  4.7   |  20<L>  |  0.83    Ca    9.0      03 Sep 2020 17:36  Ca    9.2      03 Sep 2020 15:00  Phos  3.0     09-03  Phos  3.3     09-03  Mg     1.9     09-03  Mg     1.9     09-03    TPro  7.7  /  Alb  3.9  /  TBili  0.9  /  DBili  x   /  AST  44<H>  /  ALT  19  /  AlkPhos  115  09-03    proBNP: Serum Pro-Brain Natriuretic Peptide: 557.3 pg/mL (09-03 @ 12:00)    CARDIAC MARKERS:  51, 163, 287    CKMB: 19.07 ng/mL (09-03 @ 23:37)      PREVIOUS DIAGNOSTIC TESTING:    Echocardiogram: < from: Transthoracic Echocardiogram (01.17.19 @ 08:33) >  CONCLUSIONS:  1. Mitral annular calcification, otherwise normal mitral  valve.  2. Normal left ventricular internal dimensions and wall  thicknesses.  3. Normal left ventricular systolic function. No segmental  wall motion abnormalities.  4. Normal right ventricular size and function. A device  wire is noted in the right heart.  5. Right pleural effusion.  *** Compared with echocardiogram of 8/2/2018, LV function  has improved.    < end of copied text >  < from: Transthoracic Echocardiogram (01.17.19 @ 08:33) >  Ejection Fraction (Teicholtz): 66 %    < end of copied text >    Catheterization:< from: Cardiac Cath Lab - Adult (08.10.18 @ 15:06) >  ORONARY VESSELS: The coronary circulation is right dominant.  LM:   --  LM: Normal.  LAD:   --  Proximal LAD: There was a discrete 30 % stenosis at a site with no  prior intervention. The lesion was eccentric. There was STEF grade 3 flow  through the vessel (brisk flow). --  D1: The vessel was medium sized.  Angiography showed mild atherosclerosis with no flow limiting lesions.  CX:   --  Circumflex: Angiography showed mild atherosclerosis with no flow  limiting lesions.  RCA:   --  RCA: The vessel was large sized (dominant). Angiography showed mild  atherosclerosis with no flow limiting lesions. --  RPDA: Angiography showed  mild atherosclerosis with no flow limiting lesions. --  RPLS: Angiography  showed mild atherosclerosis with no flow limiting lesions.  COMPLICATIONS: No complications occurred during the cath lab visit.  DIAGNOSTIC IMPRESSIONS: Mild non-obstructive CAD  DIAGNOSTIC RECOMMENDATIONS: Medical therapy    < end of copied text >    Stress Test:

## 2020-09-05 LAB
ANION GAP SERPL CALC-SCNC: 15 MMO/L — HIGH (ref 7–14)
BUN SERPL-MCNC: 15 MG/DL — SIGNIFICANT CHANGE UP (ref 7–23)
CALCIUM SERPL-MCNC: 8.7 MG/DL — SIGNIFICANT CHANGE UP (ref 8.4–10.5)
CHLORIDE SERPL-SCNC: 104 MMOL/L — SIGNIFICANT CHANGE UP (ref 98–107)
CO2 SERPL-SCNC: 19 MMOL/L — LOW (ref 22–31)
CREAT SERPL-MCNC: 0.79 MG/DL — SIGNIFICANT CHANGE UP (ref 0.5–1.3)
GLUCOSE BLDC GLUCOMTR-MCNC: 89 MG/DL — SIGNIFICANT CHANGE UP (ref 70–99)
GLUCOSE SERPL-MCNC: 82 MG/DL — SIGNIFICANT CHANGE UP (ref 70–99)
HCT VFR BLD CALC: 37.9 % — SIGNIFICANT CHANGE UP (ref 34.5–45)
HGB BLD-MCNC: 12.3 G/DL — SIGNIFICANT CHANGE UP (ref 11.5–15.5)
MAGNESIUM SERPL-MCNC: 2 MG/DL — SIGNIFICANT CHANGE UP (ref 1.6–2.6)
MCHC RBC-ENTMCNC: 23.2 PG — LOW (ref 27–34)
MCHC RBC-ENTMCNC: 32.5 % — SIGNIFICANT CHANGE UP (ref 32–36)
MCV RBC AUTO: 71.5 FL — LOW (ref 80–100)
NRBC # FLD: 0 K/UL — SIGNIFICANT CHANGE UP (ref 0–0)
PHOSPHATE SERPL-MCNC: 2.4 MG/DL — LOW (ref 2.5–4.5)
PLATELET # BLD AUTO: 171 K/UL — SIGNIFICANT CHANGE UP (ref 150–400)
PMV BLD: 12.2 FL — SIGNIFICANT CHANGE UP (ref 7–13)
POTASSIUM SERPL-MCNC: 3.9 MMOL/L — SIGNIFICANT CHANGE UP (ref 3.5–5.3)
POTASSIUM SERPL-SCNC: 3.9 MMOL/L — SIGNIFICANT CHANGE UP (ref 3.5–5.3)
RBC # BLD: 5.3 M/UL — HIGH (ref 3.8–5.2)
RBC # FLD: 16.6 % — HIGH (ref 10.3–14.5)
SODIUM SERPL-SCNC: 138 MMOL/L — SIGNIFICANT CHANGE UP (ref 135–145)
WBC # BLD: 4.52 K/UL — SIGNIFICANT CHANGE UP (ref 3.8–10.5)
WBC # FLD AUTO: 4.52 K/UL — SIGNIFICANT CHANGE UP (ref 3.8–10.5)

## 2020-09-05 PROCEDURE — 99232 SBSQ HOSP IP/OBS MODERATE 35: CPT

## 2020-09-05 PROCEDURE — 99233 SBSQ HOSP IP/OBS HIGH 50: CPT | Mod: GC

## 2020-09-05 RX ORDER — LISINOPRIL 2.5 MG/1
10 TABLET ORAL DAILY
Refills: 0 | Status: DISCONTINUED | OUTPATIENT
Start: 2020-09-05 | End: 2020-09-07

## 2020-09-05 RX ORDER — METOPROLOL TARTRATE 50 MG
75 TABLET ORAL EVERY 12 HOURS
Refills: 0 | Status: DISCONTINUED | OUTPATIENT
Start: 2020-09-05 | End: 2020-09-07

## 2020-09-05 RX ORDER — POTASSIUM CHLORIDE 20 MEQ
20 PACKET (EA) ORAL ONCE
Refills: 0 | Status: COMPLETED | OUTPATIENT
Start: 2020-09-05 | End: 2020-09-05

## 2020-09-05 RX ADMIN — Medication 3 MILLIGRAM(S): at 21:06

## 2020-09-05 RX ADMIN — Medication 85 MILLIMOLE(S): at 10:52

## 2020-09-05 RX ADMIN — ATORVASTATIN CALCIUM 80 MILLIGRAM(S): 80 TABLET, FILM COATED ORAL at 21:06

## 2020-09-05 RX ADMIN — Medication 20 MILLIEQUIVALENT(S): at 10:53

## 2020-09-05 RX ADMIN — Medication 50 MILLIGRAM(S): at 06:08

## 2020-09-05 RX ADMIN — HEPARIN SODIUM 5000 UNIT(S): 5000 INJECTION INTRAVENOUS; SUBCUTANEOUS at 18:08

## 2020-09-05 RX ADMIN — HEPARIN SODIUM 5000 UNIT(S): 5000 INJECTION INTRAVENOUS; SUBCUTANEOUS at 06:06

## 2020-09-05 RX ADMIN — Medication 81 MILLIGRAM(S): at 10:53

## 2020-09-05 RX ADMIN — Medication 75 MILLIGRAM(S): at 18:08

## 2020-09-05 NOTE — PROGRESS NOTE ADULT - PROBLEM SELECTOR PLAN 2
History of LUIS FERNANDO and hypertension although with baseline BPs in 120s/80s, well controlled on lisinopril 10 qd. Patient with hypertension since admission with BPs in 220s/120s, improved with IV hydralazine to 170s.  Patient denying chest pain, headache, vision change. Labs without elevated creatinine but with uptrending troponins.    Plan:  -appreciate nephrology recs  -if creatinine stable add back lisinopril 10 tomorrow (will likely be all that's needed to correct the RVHTN)  -consider using coreg for rate control AND BP control  -if patient asymptomatic and less than  avoid giving any antihypertensives.  -if asymptomatic or >200 can use small doses of IV hydralazine History of LUIS FERNANDO and hypertension although with baseline BPs in 120s/80s, well controlled on lisinopril 10 qd. Patient with hypertension since admission with BPs in 220s/120s, improved with IV hydralazine to 170s.  Patient denying chest pain, headache, vision change. Labs without elevated creatinine but with uptrending troponins.    Plan:  -appreciate nephrology recs  -lisinopril restrted  -consider using coreg for rate control AND BP control if still hypertensive on lisinopril  -if patient asymptomatic and less than  avoid giving any antihypertensives.  -if asymptomatic or >200 can use small doses of IV hydralazine

## 2020-09-05 NOTE — PHYSICAL THERAPY INITIAL EVALUATION ADULT - ADDITIONAL COMMENTS
Pt. left supine in bed with all tubes/lines intact, call bell in reach and in NAD.       Physical Therapy Recommendations: pt. seen for skilled physical therapy initial evaluation. KYLAH PRUETT present throughout session current activity order BEDREST, confirmed with medical team for continued bedrest order. At end of therapy session, medical team 1 providing verbal orders to therapist to HOLD further therapy session at this time until further notice until medically cleared. please reconsult when appropriate/feasible.

## 2020-09-05 NOTE — PROGRESS NOTE ADULT - SUBJECTIVE AND OBJECTIVE BOX
Incomplete  *******************************************************  Samir Rivera MD PGY-1  Internal Medicine  Pager [phone] / [phone]  *******************************************************  Patient is a 84y old  Female who presents with a chief complaint of Arrhythmia (04 Sep 2020 19:58)          SUBJECTIVE / OVERNIGHT EVENTS:  - No acute events overnight.   - Patient seen and evaluated at bedside.  -   - Denies fevers/chills, headache, SOB at rest, cough, chest pain, palpitations, abdominal pain, nausea/vomiting/diarrhea/constipation, melena/hematochezia, dysuria, and hematuria    ------------------------------------------------------------------------------------------------------------    MEDICATIONS  (STANDING):  aspirin  chewable 81 milliGRAM(s) Oral daily  atorvastatin 80 milliGRAM(s) Oral at bedtime  heparin   Injectable 5000 Unit(s) SubCutaneous every 12 hours  melatonin 3 milliGRAM(s) Oral at bedtime  metoprolol tartrate 50 milliGRAM(s) Oral two times a day  potassium chloride    Tablet ER 20 milliEquivalent(s) Oral once  sodium phosphate IVPB 30 milliMole(s) IV Intermittent once    MEDICATIONS  (PRN):  acetaminophen    Suspension .. 650 milliGRAM(s) Oral every 6 hours PRN Temp greater or equal to 38C (100.4F), Mild Pain (1 - 3)  senna 2 Tablet(s) Oral at bedtime PRN Constipation      ------------------------------------------------------------------------------------------------------------    OBJECTIVE:    I&O's Summary    04 Sep 2020 07:01  -  05 Sep 2020 07:00  --------------------------------------------------------  IN: 450 mL / OUT: 900 mL / NET: -450 mL      Daily Height in cm: 160.02 (04 Sep 2020 16:42)    Daily     PHYSICAL EXAM:  T(F): 98, Max: 98.4 (09-04-20 @ 19:20)  HR: 70 (70 - 77)  BP: 166/87 (144/78 - 166/87)  RR: 16 (16 - 16)  SpO2: 99% (99% - 100%)        ------------------------------------------------------------------------------------------------------------  LABS:                        12.3   4.52  )-----------( 171      ( 05 Sep 2020 06:57 )             37.9     09-05    138  |  104  |  15  ----------------------------<  82  3.9   |  19<L>  |  0.79    Ca    8.7      05 Sep 2020 06:48  Phos  2.4     09-05  Mg     2.0     09-05    TPro  7.0  /  Alb  3.7  /  TBili  0.9  /  DBili  x   /  AST  50<H>  /  ALT  19  /  AlkPhos  107  09-04    PT/INR - ( 03 Sep 2020 12:00 )   PT: 12.7 SEC;   INR: 1.11          PTT - ( 03 Sep 2020 12:00 )  PTT:31.0 SEC  CARDIAC MARKERS ( 03 Sep 2020 23:37 )  x     / x     / 725 u/L / 19.07 ng/mL / x                RADIOLOGY & ADDITIONAL TESTS:  Results Reviewed:     Imaging Personally Reviewed:  Electrocardiogram Personally Reviewed:      COORDINATION OF CARE:  Care Discussed with Consultants/Other Providers [Y/N]:   Prior or Outpatient Records Reviewed [Y/N]:   ------------------------------------------------------------------------------------------------------------ *******************************************************  Samir Rivera MD PGY-1  Internal Medicine  Pager [phone] / [phone]  *******************************************************  Patient is a 84y old  Female who presents with a chief complaint of Arrhythmia (04 Sep 2020 19:58)          SUBJECTIVE / OVERNIGHT EVENTS:  - No acute events overnight.   - Patient seen and evaluated at bedside.  - Patient with no complaints this morning, no shocks overnight  - Denies fevers/chills, headache, SOB at rest, cough, chest pain, palpitations, abdominal pain, nausea/vomiting/diarrhea/constipation, melena/hematochezia, dysuria, and hematuria    ------------------------------------------------------------------------------------------------------------    MEDICATIONS  (STANDING):  aspirin  chewable 81 milliGRAM(s) Oral daily  atorvastatin 80 milliGRAM(s) Oral at bedtime  heparin   Injectable 5000 Unit(s) SubCutaneous every 12 hours  melatonin 3 milliGRAM(s) Oral at bedtime  metoprolol tartrate 50 milliGRAM(s) Oral two times a day  potassium chloride    Tablet ER 20 milliEquivalent(s) Oral once  sodium phosphate IVPB 30 milliMole(s) IV Intermittent once    MEDICATIONS  (PRN):  acetaminophen    Suspension .. 650 milliGRAM(s) Oral every 6 hours PRN Temp greater or equal to 38C (100.4F), Mild Pain (1 - 3)  senna 2 Tablet(s) Oral at bedtime PRN Constipation      ------------------------------------------------------------------------------------------------------------    OBJECTIVE:    I&O's Summary    04 Sep 2020 07:01  -  05 Sep 2020 07:00  --------------------------------------------------------  IN: 450 mL / OUT: 900 mL / NET: -450 mL      Daily Height in cm: 160.02 (04 Sep 2020 16:42)    Daily     PHYSICAL EXAM:  T(F): 98, Max: 98.4 (09-04-20 @ 19:20)  HR: 70 (70 - 77)  BP: 166/87 (144/78 - 166/87)  RR: 16 (16 - 16)  SpO2: 99% (99% - 100%)    TELE: 1st degree AV block, several paired PVCs    Constitutional: WDWN resting comfortably in bed; NAD  Head: NC/AT  Eyes: PERRL, EOMI, anicteric sclera  ENT: no nasal discharge; uvula midline, no oropharyngeal erythema or exudates; MMM  Respiratory: CTA B/L; no W/R/R, no retractions, palpable AICD in left pectoral region  Cardiac: +S1/S2; regular rhythm; no M/R/G  Gastrointestinal: abdomen soft, NT/ND; no rebound or guarding; +BSx4  Extremities: WWP, no clubbing or cyanosis; no peripheral edema  Vascular: 2+ DP/PT pulses B/L  Dermatologic: scar from AICD, skin warm, dry and intact; no rashes, wounds  Neurologic: A&O to person, place, and situation, but not time; no focal deficits; moving all extremities  Psychiatric: affect and characteristics of appearance, verbalizations, behaviors are appropriate    ------------------------------------------------------------------------------------------------------------  LABS:                        12.3   4.52  )-----------( 171      ( 05 Sep 2020 06:57 )             37.9     09-05    138  |  104  |  15  ----------------------------<  82  3.9   |  19<L>  |  0.79    Ca    8.7      05 Sep 2020 06:48  Phos  2.4     09-05  Mg     2.0     09-05    TPro  7.0  /  Alb  3.7  /  TBili  0.9  /  DBili  x   /  AST  50<H>  /  ALT  19  /  AlkPhos  107  09-04    PT/INR - ( 03 Sep 2020 12:00 )   PT: 12.7 SEC;   INR: 1.11          PTT - ( 03 Sep 2020 12:00 )  PTT:31.0 SEC  CARDIAC MARKERS ( 03 Sep 2020 23:37 )  x     / x     / 725 u/L / 19.07 ng/mL / x              COORDINATION OF CARE:  Care Discussed with Consultants/Other Providers [Y]: cardiology   ------------------------------------------------------------------------------------------------------------

## 2020-09-05 NOTE — PROGRESS NOTE ADULT - PROBLEM SELECTOR PLAN 3
Suspect secondary to AICD shocks although NSTEMI still a possibility    Plan:  -peaked at 10PM last night in 260s  -patient went for [facility] 9/4 LAD occlusion increased from 30 to 50% but no intervention needed  -monitor cath site for hematoma  -troponin elevation in the setting of AICD shock common, ACS now ruled out RESOLVED    -LHC with 50% stenosis in LAD - no interventions  - c/w aspirin, statin, bb, acei

## 2020-09-05 NOTE — PHYSICAL THERAPY INITIAL EVALUATION ADULT - PATIENT PROFILE REVIEW, REHAB EVAL
Pt. received seated at edge of bed with KYLAH PRUETT present at bedside. Current activity order BEDREST, confirmed orders with medical team 1 prior to session, bedrest continues to be activity order./yes

## 2020-09-05 NOTE — PROGRESS NOTE ADULT - PROBLEM SELECTOR PLAN 4
Takasubo cardiomyopathy diagnosed in 2018 after vfib arrest. EF recovered from 30% to 66% by January of 2019.    Plan:  -appreciate HF recs  -echo with normal LV systolic function, mild diastolic dysfunction Takasubo cardiomyopathy diagnosed in 2018 after vfib arrest. EF recovered from 30% to 66% by January of 2019.    Plan:  -appreciate HF recs  -echo with normal LV systolic function, mild diastolic dysfunction  -still needs AICD bc history of vfib arrest req defib

## 2020-09-05 NOTE — PROGRESS NOTE ADULT - SUBJECTIVE AND OBJECTIVE BOX
Patient seen and examined at bedside.    Overnight Events:   No acute events overnight.     REVIEW OF SYSTEMS:  Constitutional:     [ ] negative [ ] fevers [ ] chills [ ] weight loss [ ] weight gain  HEENT:                  [ ] negative [ ] dry eyes [ ] eye irritation [ ] postnasal drip [ ] nasal congestion  CV:                         [ ] negative  [ ] chest pain [ ] orthopnea [ ] palpitations [ ] murmur  Resp:                     [ ] negative [ ] cough [ ] shortness of breath [ ] dyspnea [ ] wheezing [ ] sputum [ ]hemoptysis  GI:                          [ ] negative [ ] nausea [ ] vomiting [ ] diarrhea [ ] constipation [ ] abd pain [ ] dysphagia   :                        [ ] negative [ ] dysuria [ ] nocturia [ ] hematuria [ ] increased urinary frequency  Musculoskeletal: [ ] negative [ ] back pain [ ] myalgias [ ] arthralgias [ ] fracture  Skin:                       [ ] negative [ ] rash [ ] itch  Neurological:        [ ] negative [ ] headache [ ] dizziness [ ] syncope [ ] weakness [ ] numbness  Psychiatric:           [ ] negative [ ] anxiety [ ] depression  Endocrine:            [ ] negative [ ] diabetes [ ] thyroid problem  Heme/Lymph:      [ ] negative [ ] anemia [ ] bleeding problem  Allergic/Immune: [ ] negative [ ] itchy eyes [ ] nasal discharge [ ] hives [ ] angioedema    [x ] All other systems negative  [ ] Unable to assess ROS due to    Current Meds:  acetaminophen    Suspension .. 650 milliGRAM(s) Oral every 6 hours PRN  aspirin  chewable 81 milliGRAM(s) Oral daily  atorvastatin 80 milliGRAM(s) Oral at bedtime  heparin   Injectable 5000 Unit(s) SubCutaneous every 12 hours  lisinopril 10 milliGRAM(s) Oral daily  melatonin 3 milliGRAM(s) Oral at bedtime  metoprolol tartrate 50 milliGRAM(s) Oral two times a day  potassium chloride    Tablet ER 20 milliEquivalent(s) Oral once  senna 2 Tablet(s) Oral at bedtime PRN  sodium phosphate IVPB 30 milliMole(s) IV Intermittent once      PAST MEDICAL & SURGICAL HISTORY:  History of renal artery stenosis  Hyperlipidemia  Cardiomyopathy, unspecified type: H/o Takasubo cardiomyopathy c/b vfib arrest s/p AICD implantation. CMP resolved with EF 66% (from 30%) in Jan 2019  Osteoporosis  HTN (hypertension)  S/P cataract surgery  S/P appendectomy      Vitals:  T(F): 98 (09-05), Max: 98.4 (09-04)  HR: 75 (09-05) (70 - 77)  BP: 144/88 (09-05) (144/78 - 166/87)  RR: 17 (09-05)  SpO2: 99% (09-05)  I&O's Summary    04 Sep 2020 07:01  -  05 Sep 2020 07:00  --------------------------------------------------------  IN: 450 mL / OUT: 900 mL / NET: -450 mL        Physical Exam:  Appearance: No acute distress; well appearing  Eyes: PERRL, EOMI, pink conjunctiva  HENT: Normal oral mucosa  Cardiovascular: RRR, S1, S2, no murmurs, rubs, or gallops; no edema; no JVD  Respiratory: Clear to auscultation bilaterally  Gastrointestinal: soft, non-tender, non-distended with normal bowel sounds  Musculoskeletal: No clubbing; no joint deformity   Neurologic: Non-focal  Lymphatic: No lymphadenopathy  Psychiatry: AAOx3, mood & affect appropriate  Skin: No rashes, ecchymoses, or cyanosis                          12.3   4.52  )-----------( 171      ( 05 Sep 2020 06:57 )             37.9     09-05    138  |  104  |  15  ----------------------------<  82  3.9   |  19<L>  |  0.79    Ca    8.7      05 Sep 2020 06:48  Phos  2.4     09-05  Mg     2.0     09-05    TPro  7.0  /  Alb  3.7  /  TBili  0.9  /  DBili  x   /  AST  50<H>  /  ALT  19  /  AlkPhos  107  09-04    PT/INR - ( 03 Sep 2020 12:00 )   PT: 12.7 SEC;   INR: 1.11          PTT - ( 03 Sep 2020 12:00 )  PTT:31.0 SEC  CARDIAC MARKERS ( 03 Sep 2020 23:37 )  x     / x     / 725 u/L / 19.07 ng/mL / x          Serum Pro-Brain Natriuretic Peptide: 557.3 pg/mL (09-03 @ 12:00)          New ECG(s): Personally reviewed    Interpretation of Telemetry: APCs, likely multifocal focus.

## 2020-09-05 NOTE — PROGRESS NOTE ADULT - ATTENDING COMMENTS
Patient was seen and examined personally by me on 9/4/2020. I have discussed the plan and reviewed the resident's note and agree with the above physical exam findings including assessment and plan except as indicated below.    84F with hx of Takatsubo cardiomyopathy (most recent echo EF 66%) c/b vfib arrest s/p AICD, renal artery stenosis, HTN, who presented after multiple AICD shocks at home with associated dizziness. Found to have SVT (atrial tach) on AICD interrogation, elevated troponin, and uncontrolled hypertension. r/o ACS - s/p cath 9/4 with no significant CAD.     Patient was comfortable on room air on exam  Appearing euvolemic, lungs clear b/l and no LE edema  s1/s2, no murmurs were appreciated  Abd soft, NT, ND    #Atrial Tachycardia - hx of Takotsubo cardiomyopathy in 8/2018 (now recovered, LVEF 66%) where she had Vfib arrest and received ICD for secondary prevention presents with 8 ICD shocks. Interrogation shows fast atrial rate with V conduction without widening of the QRS likely atrial tachycardia. Increasing metoprolol dosing per cards. Replete lytes K to 4, and Mg to 2. EP and HF recs appreciated. Bedrest per EP.    #r/o ACS - trops elevated likely in setting of AICD shocks and atrial tachycardia now downtrending. s/p cath 9/4 with no significant CAD, continue with medical management (asa, BB, acei). Cardiology recs appreciated.     #Takatsubo cardiomyopathy (most recent echo EF 66%) - euvolemic on exam. c/w BB and ACEi. repeat echo with normal LV systolic function, mild diastolic dysfunction. HF recs appreciated.     #HTN in setting of renal artery stenosis - BP elevated on admission, s/p goal SBP 180s in first 24 hours. increasing metoprolol per cards, resumed home lisinopril 10mg daily. If remains uncontrolled will consider switching metoprolol to coreg Patient was seen and examined personally by me. I have discussed the plan and reviewed the resident's note and agree with the above physical exam findings including assessment and plan except as indicated below.    84F with hx of Takatsubo cardiomyopathy (most recent echo EF 66%) c/b vfib arrest s/p AICD, renal artery stenosis, HTN, who presented after multiple AICD shocks at home with associated dizziness. Found to have SVT (atrial tach) on AICD interrogation, elevated troponin, and uncontrolled hypertension. r/o ACS - s/p cath 9/4 with no significant CAD.     Patient was comfortable on room air on exam  Appearing euvolemic, lungs clear b/l and no LE edema  s1/s2, no murmurs were appreciated  Abd soft, NT, ND    #Atrial Tachycardia - hx of Takotsubo cardiomyopathy in 8/2018 (now recovered, LVEF 66%) where she had Vfib arrest and received ICD for secondary prevention presents with 8 ICD shocks. Interrogation shows fast atrial rate with V conduction without widening of the QRS likely atrial tachycardia. Increasing metoprolol dosing per cards. Replete lytes K to 4, and Mg to 2. EP and HF recs appreciated. Bedrest per EP.    #r/o ACS - trops elevated likely in setting of AICD shocks and atrial tachycardia now downtrending. s/p cath 9/4 with no significant CAD, continue with medical management (asa, BB, acei). Cardiology recs appreciated.     #Takatsubo cardiomyopathy (most recent echo EF 66%) - euvolemic on exam. c/w BB and ACEi. repeat echo with normal LV systolic function, mild diastolic dysfunction. HF recs appreciated.     #HTN in setting of renal artery stenosis - BP elevated on admission, s/p goal SBP 180s in first 24 hours. increasing metoprolol per cards, resumed home lisinopril 10mg daily. If remains uncontrolled will consider switching metoprolol to coreg

## 2020-09-05 NOTE — PROGRESS NOTE ADULT - PROBLEM SELECTOR PLAN 1
Admitted due to multiple AICD shocks in the setting of SVT (atach). Patient on lopressor 25 qd at home.    Plan:  -c/w tele  -appreciate EP recs  -lopressor 50 bid, low threshold to increase to 100 bid  -sensing algorithm increased capture rate to 220 to avoid further inappropriate shocks for SVT (Atach)  -if unable to control heart rate will need EP procedure  -replete K>4, Mg>2, Ph>4 Admitted due to multiple AICD shocks in the setting of SVT (atach). Patient on lopressor 25 qd at home.    Plan:  -c/w tele  -appreciate EP recs  -lopressor 75 bid, low threshold to increase to 100 bid  -sensing algorithm increased capture rate to 220 to avoid further inappropriate shocks for SVT (Atach)  -if unable to control heart rate will need EP procedure  -replete K>4, Mg>2, Ph>4

## 2020-09-05 NOTE — PHYSICAL THERAPY INITIAL EVALUATION ADULT - DISCHARGE DISPOSITION, PT EVAL
At end of therapy session, medical team 1 providing verbal orders to therapist to HOLD further therapy session at this time until further notice until medically cleared. please reconsult when appropriate/feasible.

## 2020-09-05 NOTE — PROGRESS NOTE ADULT - ASSESSMENT
83 y/o F w/ h/o R renal artery stenosis (since 2015), HTN, HLD, microcytic anemia, hx of Takotsubo cardiomyopathy in 8/2018 (now recovered, LVEF 66%) where she had Vfib arrest and received ICD for secondary prevention presented with 8 inappropriate  ICD for fast atrial tachycardia.  Unfortunately, she received 8 additional inappropriate ICD shocks for AT last night while getting up to the bathroom.  Metoprolol increased to 50mg bid today.  Tolerating the medication well. Home dose -> metoprolol 25 mg daily. Low threshold for slowly titrating metoprolol to 100mg bid if necessary for suppression of AT. If she is refractory to BB, can consider antiarrhythmic therapy or ablation. When patient is more stable, will need to evaluate her heart rate during ambulation.  However, please keep her at bedrest for now.     ICD detection increased to 220 bpm to hopefully avoid further ICD therapies for AT. Patient asked if the device could be removed given the results of her echo (normal EF).  I explained she is still at risk for sudden cardiac death given her history of VFib arrest and she should not consider removal of the ICD.     - Increase metoprolol to 75 mg today.     Prasad Low MD  Cardiology Fellow - PGY 6  Text or Call: [phone]  For all New Consults and Questions:  [email or web address]   Login: [email or web address]

## 2020-09-05 NOTE — PHYSICAL THERAPY INITIAL EVALUATION ADULT - PERTINENT HX OF CURRENT PROBLEM, REHAB EVAL
Pt. is an 84 year old female admitted with Takotsubo cardiomyopathy in 8/2018 diagnosed with V-fib arrest s/p defibrillation x2 and s/p AICD placement, renal artery stenosis, HTN, and HLD presenting after multiple AICD shocks at home

## 2020-09-06 ENCOUNTER — TRANSCRIPTION ENCOUNTER (OUTPATIENT)
Age: 85
End: 2020-09-06

## 2020-09-06 LAB
ALBUMIN SERPL ELPH-MCNC: 3.1 G/DL — LOW (ref 3.3–5)
ALP SERPL-CCNC: 95 U/L — SIGNIFICANT CHANGE UP (ref 40–120)
ALT FLD-CCNC: 19 U/L — SIGNIFICANT CHANGE UP (ref 4–33)
ANION GAP SERPL CALC-SCNC: 13 MMO/L — SIGNIFICANT CHANGE UP (ref 7–14)
AST SERPL-CCNC: 31 U/L — SIGNIFICANT CHANGE UP (ref 4–32)
BILIRUB SERPL-MCNC: 0.8 MG/DL — SIGNIFICANT CHANGE UP (ref 0.2–1.2)
BUN SERPL-MCNC: 11 MG/DL — SIGNIFICANT CHANGE UP (ref 7–23)
CALCIUM SERPL-MCNC: 8.8 MG/DL — SIGNIFICANT CHANGE UP (ref 8.4–10.5)
CHLORIDE SERPL-SCNC: 106 MMOL/L — SIGNIFICANT CHANGE UP (ref 98–107)
CO2 SERPL-SCNC: 23 MMOL/L — SIGNIFICANT CHANGE UP (ref 22–31)
CREAT SERPL-MCNC: 0.81 MG/DL — SIGNIFICANT CHANGE UP (ref 0.5–1.3)
GLUCOSE SERPL-MCNC: 80 MG/DL — SIGNIFICANT CHANGE UP (ref 70–99)
HCT VFR BLD CALC: 37.3 % — SIGNIFICANT CHANGE UP (ref 34.5–45)
HGB BLD-MCNC: 11.8 G/DL — SIGNIFICANT CHANGE UP (ref 11.5–15.5)
MAGNESIUM SERPL-MCNC: 2.1 MG/DL — SIGNIFICANT CHANGE UP (ref 1.6–2.6)
MCHC RBC-ENTMCNC: 22.5 PG — LOW (ref 27–34)
MCHC RBC-ENTMCNC: 31.6 % — LOW (ref 32–36)
MCV RBC AUTO: 71 FL — LOW (ref 80–100)
NRBC # FLD: 0 K/UL — SIGNIFICANT CHANGE UP (ref 0–0)
PHOSPHATE SERPL-MCNC: 2.8 MG/DL — SIGNIFICANT CHANGE UP (ref 2.5–4.5)
PLATELET # BLD AUTO: 176 K/UL — SIGNIFICANT CHANGE UP (ref 150–400)
PMV BLD: SIGNIFICANT CHANGE UP FL (ref 7–13)
POTASSIUM SERPL-MCNC: 4 MMOL/L — SIGNIFICANT CHANGE UP (ref 3.5–5.3)
POTASSIUM SERPL-SCNC: 4 MMOL/L — SIGNIFICANT CHANGE UP (ref 3.5–5.3)
PROT SERPL-MCNC: 6.4 G/DL — SIGNIFICANT CHANGE UP (ref 6–8.3)
RBC # BLD: 5.25 M/UL — HIGH (ref 3.8–5.2)
RBC # FLD: 16.2 % — HIGH (ref 10.3–14.5)
SODIUM SERPL-SCNC: 142 MMOL/L — SIGNIFICANT CHANGE UP (ref 135–145)
WBC # BLD: 4.47 K/UL — SIGNIFICANT CHANGE UP (ref 3.8–10.5)
WBC # FLD AUTO: 4.47 K/UL — SIGNIFICANT CHANGE UP (ref 3.8–10.5)

## 2020-09-06 PROCEDURE — 99232 SBSQ HOSP IP/OBS MODERATE 35: CPT

## 2020-09-06 PROCEDURE — 99233 SBSQ HOSP IP/OBS HIGH 50: CPT | Mod: GC

## 2020-09-06 RX ORDER — HYDRALAZINE HCL 50 MG
5 TABLET ORAL ONCE
Refills: 0 | Status: COMPLETED | OUTPATIENT
Start: 2020-09-06 | End: 2020-09-06

## 2020-09-06 RX ADMIN — Medication 75 MILLIGRAM(S): at 04:51

## 2020-09-06 RX ADMIN — Medication 81 MILLIGRAM(S): at 11:41

## 2020-09-06 RX ADMIN — Medication 75 MILLIGRAM(S): at 17:23

## 2020-09-06 RX ADMIN — HEPARIN SODIUM 5000 UNIT(S): 5000 INJECTION INTRAVENOUS; SUBCUTANEOUS at 17:23

## 2020-09-06 RX ADMIN — Medication 5 MILLIGRAM(S): at 21:00

## 2020-09-06 RX ADMIN — ATORVASTATIN CALCIUM 80 MILLIGRAM(S): 80 TABLET, FILM COATED ORAL at 21:00

## 2020-09-06 RX ADMIN — Medication 3 MILLIGRAM(S): at 21:00

## 2020-09-06 RX ADMIN — HEPARIN SODIUM 5000 UNIT(S): 5000 INJECTION INTRAVENOUS; SUBCUTANEOUS at 04:51

## 2020-09-06 RX ADMIN — LISINOPRIL 10 MILLIGRAM(S): 2.5 TABLET ORAL at 04:51

## 2020-09-06 NOTE — PROGRESS NOTE ADULT - ATTENDING COMMENTS
Patient was seen and examined personally by me. I have discussed the plan and reviewed the resident's note and agree with the above physical exam findings including assessment and plan except as indicated below.    84F with hx of Takatsubo cardiomyopathy (most recent echo EF 66%) c/b vfib arrest s/p AICD, renal artery stenosis, HTN, who presented after multiple AICD shocks at home with associated dizziness. Found to have SVT (atrial tach) on AICD interrogation, elevated troponin, and uncontrolled hypertension. r/o ACS - s/p cath 9/4 with no significant CAD.     Patient was comfortable on room air on exam, bradycardic overnight  Appearing euvolemic, lungs clear b/l and no LE edema  s1/s2, no murmurs were appreciated  Abd soft, NT, ND    #Atrial Tachycardia - hx of Takotsubo cardiomyopathy in 8/2018 (now recovered, LVEF 66%) where she had Vfib arrest and received ICD for secondary prevention presents with 8 ICD shocks. Interrogation shows fast atrial rate with V conduction without widening of the QRS likely atrial tachycardia. Metoprolol Tartrate 75mg BID. Replete lytes K to 4, and Mg to 2. Will ambulate today to see how HR responds. HF/cards/EP recs appreciated.     #r/o ACS - trops elevated likely in setting of AICD shocks and atrial tachycardia now downtrending. s/p cath 9/4 with no significant CAD, continue with medical management (asa, BB, acei). Cardiology recs appreciated.     #Takatsubo cardiomyopathy (most recent echo EF 66%) - euvolemic on exam. c/w BB and ACEi. repeat echo with normal LV systolic function, mild diastolic dysfunction. HF recs appreciated.     #HTN in setting of renal artery stenosis - BP elevated on admission, s/p goal SBP 180s in first 24 hours. c/w metoprolol, resumed home lisinopril 10mg daily. If remains uncontrolled will consider switching metoprolol to coreg

## 2020-09-06 NOTE — DISCHARGE NOTE PROVIDER - NSDCFUSCHEDAPPT_GEN_ALL_CORE_FT
PHYLLIS ANNE ; 09/25/2020 ; [facility] Geriatrics [address] [facility]  PHYLLIS ANNE ; 09/28/2020 ; NPP Med Nephro [address] PHYLLIS Lynn ; 10/01/2020 ; NPP Ophthal [address] [facility] [address]  PHYLLIS ANNE ; 10/02/2020 ; NPP Urology [address]  PHYLLIS ANNE ; 11/20/2020 ; NPP Cardio Electro [address]  PHYLLIS ANNE ; 11/20/2020 ; NPP Cardio [address] PHYLLIS ANNE ; 09/25/2020 ; NPP Geriatrics [address] [facility]  PHYLLIS ANNE ; 09/28/2020 ; NPP Med Nephro [address] PHYLLIS Lynn ; 10/01/2020 ; NPP Ophthal [address] [facility] [address]  PHYLLIS ANNE ; 10/02/2020 ; NPP Urology [address]  PHYLLIS ANNE ; 11/20/2020 ; NPP Cardio Electro [address]  PHYLLIS ANNE ; 11/20/2020 ; NPP Cardio [address] PHYLLIS ANNE ; 09/25/2020 ; NPP Geriatrics [address] [facility]  PHYLLIS ANNE ; 09/28/2020 ; NPP Med Nephro [address] PHYLLIS Lynn ; 10/01/2020 ; NPP Ophthal [address] [facility] [address]  PHYLLIS ANNE ; 10/02/2020 ; NPP Urology [address]  PHYLLIS ANNE ; 11/20/2020 ; NPP Cardio Electro [address]  PHYLLIS ANNE ; 11/20/2020 ; NPP Cardio [address] PHYLLIS ANNE ; 09/25/2020 ; NPP Geriatrics [address] [facility]  PHYLLIS ANNE ; 09/28/2020 ; NPP Med Nephro [address] PHYLLIS Lynn ; 10/01/2020 ; NPP Ophthal [address] [facility] [address]  PHYLLIS ANNE ; 10/02/2020 ; NPP Urology [address]  PHYLLIS ANNE ; 11/20/2020 ; NPP Cardio Electro [address]  PHYLLIS ANNE ; 11/20/2020 ; NPP Cardio [address] PHYLLIS ANNE ; 09/25/2020 ; NPP Geriatrics [address] [facility]  PHYLLIS ANNE ; 09/28/2020 ; NPP Med Nephro [address] PHYLLIS Lynn ; 10/01/2020 ; NPP Ophthal [address] [facility] [address]  PHYLLIS ANNE ; 10/02/2020 ; NPP Urology [address]  PHYLLIS ANNE ; 11/20/2020 ; NPP Cardio Electro [address]  PHYLLIS ANNE ; 11/20/2020 ; NPP Cardio [address] PHYLLIS ANNE ; 09/25/2020 ; NPP Geriatrics [address] [facility]  PHYLLIS ANNE ; 09/28/2020 ; NPP Med Nephro [address] PHYLLIS Lynn ; 10/01/2020 ; NPP Ophthal [address] [facility] [address]  PHYLLIS ANNE ; 10/02/2020 ; NPP Urology [address]  PHYLLIS ANNE ; 11/20/2020 ; NPP Cardio Electro [address]  PHYLLIS ANNE ; 11/20/2020 ; NPP Cardio [address] PHYLLIS ANNE ; 09/25/2020 ; NPP Geriatrics [address] [facility]  PHYLLIS ANNE ; 09/29/2020 ; NPP Med Nephro [address] PHYLLIS Lynn ; 10/01/2020 ; NPP Ophthal [address] [facility] [address]  PHYLLIS ANNE ; 10/02/2020 ; NPP Urology [address]  PHYLLIS ANNE ; 11/20/2020 ; NPP Cardio Electro [address]  PHYLLIS ANNE ; 11/20/2020 ; NPP Cardio [address]

## 2020-09-06 NOTE — DISCHARGE NOTE PROVIDER - NSDCMRMEDTOKEN_GEN_ALL_CORE_FT
Aspirin Enteric Coated 81 mg oral delayed release tablet: 1 tab(s) orally once a day  atorvastatin 40 mg oral tablet: 1 tab(s) orally once a day   lisinopril 10 mg oral tablet: 1 tab(s) orally once a day  metoprolol succinate 25 mg oral tablet, extended release: 1 tab(s) orally once a day Aspirin Enteric Coated 81 mg oral delayed release tablet: 1 tab(s) orally once a day  atorvastatin 40 mg oral tablet: 1 tab(s) orally once a day   lisinopril 20 mg oral tablet: 1 tab(s) orally once a day  metoprolol tartrate 100 mg oral tablet: 1 tab(s) orally 2 times a day Aspirin Enteric Coated 81 mg oral delayed release tablet: 1 tab(s) orally once a day  atorvastatin 40 mg oral tablet: 1 tab(s) orally once a day   lisinopril 20 mg oral tablet: 1 tab(s) orally once a day  metoprolol succinate 200 mg oral tablet, extended release: 1 tab(s) orally once a day

## 2020-09-06 NOTE — DISCHARGE NOTE PROVIDER - HOSPITAL COURSE
84 year old woman with PMH of Takasubo cardiomyopathy diagnosed in 2018 when she had v-fib arrest requiring defibrillation and subsequent implantation of AICD. Her cardiomyopathy improved and now has normal systolic function. She presented after multiple AICD shocks while at home. This had never happened before. Her AICD was interrogated and showed shocks for SVT (inappropriate shocks). She had rising troponins and ECG signs of ischemia so she underwent LHC on 9/4 which showed only 50% stenosis of LAD and no intervention was performed. EP has her on oppressor 75mg bid for rate control and increased the shock threshold on her device to 220 BPM so that it won’t shock SVT although it might now miss a slower Vtach. Plan is to rate control effectively and then switch back the shock threshold. She also has renovascular hypertension and had labile BP on admission. Her BP has been better controlled since restarting home lisinopril which was held for the LHC. HCP is granddaughter Ngoc who is the one who knows all of the medical history. 84 year old woman with PMH of Takasubo cardiomyopathy diagnosed in 2018 when she had v-fib arrest requiring defibrillation and subsequent implantation of AICD. Her cardiomyopathy improved and now has normal systolic function. She presented after multiple AICD shocks while at home. This had never happened before. Her AICD was interrogated and showed shocks for SVT (inappropriate shocks). She had rising troponins and ECG signs of ischemia so she underwent LHC on 9/4 which showed only 50% stenosis of LAD and no intervention was performed. She was on Lopressor 75mg, then 100mg, bid for rate control. The shock threshold on her device was increased to 220 BPM so that it won’t shock SVT. She was trialed with walking, and her HR remained in the 70's with sinus rhythm. The patient also has renovascular hypertension and had labile BP on admission. Her BP has been better controlled since restarting home lisinopril 10 mg which was held for the LHC. She had one episode of HTN overnight, which was treated with IV hydralazine. 84 year old woman with PMH of Takasubo cardiomyopathy diagnosed in 2018 when she had v-fib arrest requiring defibrillation and subsequent implantation of AICD. Her cardiomyopathy improved and now has normal systolic function. She presented after multiple AICD shocks while at home. This had never happened before. Her AICD was interrogated and showed shocks for SVT (inappropriate shocks). She had rising troponins and ECG signs of ischemia so she underwent LHC on 9/4 which showed only 50% stenosis of LAD and no intervention was performed. She was on Lopressor 75mg, then 100mg, bid for rate control. The shock threshold on her device was increased to 220 BPM so that it won’t shock SVT. She was trialed with walking, and her HR remained in the 70's with sinus rhythm. The patient also has renovascular hypertension and had labile BP on admission. Her BP has been better controlled since restarting home lisinopril, which was increased to 20.     She is now stable to go home with follow up with Dr. Sterling. The patient is an 84 year old woman with PMH of Takasubo cardiomyopathy diagnosed in 2018 when she had v-fib arrest requiring defibrillation and subsequent implantation of AICD. She presented after multiple AICD shocks while at home. This had never happened before. Her AICD was interrogated and showed shocks for SVT (inappropriate shocks). She had rising troponins and ECG signs of ischemia so she underwent LHC on 9/4 which showed only 50% stenosis of LAD and no intervention was performed. She was on Lopressor 75mg, then 100mg, bid for rate control. The shock threshold on her device was increased to 220 BPM so that it won’t shock SVT. She was trialed with walking, and her HR remained in the 70's with sinus rhythm. The patient also has renovascular hypertension and had labile BP on admission. Her BP has been better controlled since restarting home lisinopril, which was increased from 10 to 20QS.     She is now stable to go home with close follow up with outpatient EP Cardiology, Dr. Sterling.

## 2020-09-06 NOTE — PROGRESS NOTE ADULT - PROBLEM SELECTOR PLAN 1
Admitted due to multiple AICD shocks in the setting of SVT (atach). Patient on lopressor 25 qd at home.  -c/w tele  -appreciate EP recs  -lopressor 75 bid, low threshold to increase to 100 bid. HR recorded at 40's overnight, so will consider holding at this dose.   -sensing algorithm increased capture rate to 220 to avoid further inappropriate shocks for SVT (Atach)  -if unable to control heart rate will need EP procedure  -replete K>4, Mg>2, Ph>4 Admitted due to multiple AICD shocks in the setting of SVT (atach). Patient on lopressor 25 qd at home.  -c/w tele  -appreciate EP recs  -lopressor 75 bid, low threshold to increase to 100 bid. HR recorded at 50's overnight, so will consider holding at this dose; will walk today and monitor HR.   -sensing algorithm increased capture rate to 220 to avoid further inappropriate shocks for SVT (Atach)  -if unable to control heart rate will need EP procedure  -replete K>4, Mg>2, Ph>4

## 2020-09-06 NOTE — DISCHARGE NOTE PROVIDER - CARE PROVIDER_API CALL
Rashaad Sterling  CARDIAC ELECTROPHYSIOLOGY  [address] [address] [place] [address], [address] [address]  [place] [address]  Phone: [phone]  Fax: [phone]  Follow Up Time: 1 week

## 2020-09-06 NOTE — PROGRESS NOTE ADULT - SUBJECTIVE AND OBJECTIVE BOX
Patient seen and examined at bedside.    Overnight Events: No acute events overnight.       REVIEW OF SYSTEMS:  Constitutional:     [ ] negative [ ] fevers [ ] chills [ ] weight loss [ ] weight gain  HEENT:                  [ ] negative [ ] dry eyes [ ] eye irritation [ ] postnasal drip [ ] nasal congestion  CV:                         [ ] negative  [ ] chest pain [ ] orthopnea [ ] palpitations [ ] murmur  Resp:                     [ ] negative [ ] cough [ ] shortness of breath [ ] dyspnea [ ] wheezing [ ] sputum [ ]hemoptysis  GI:                          [ ] negative [ ] nausea [ ] vomiting [ ] diarrhea [ ] constipation [ ] abd pain [ ] dysphagia   :                        [ ] negative [ ] dysuria [ ] nocturia [ ] hematuria [ ] increased urinary frequency  Musculoskeletal: [ ] negative [ ] back pain [ ] myalgias [ ] arthralgias [ ] fracture  Skin:                       [ ] negative [ ] rash [ ] itch  Neurological:        [ ] negative [ ] headache [ ] dizziness [ ] syncope [ ] weakness [ ] numbness  Psychiatric:           [ ] negative [ ] anxiety [ ] depression  Endocrine:            [ ] negative [ ] diabetes [ ] thyroid problem  Heme/Lymph:      [ ] negative [ ] anemia [ ] bleeding problem  Allergic/Immune: [ ] negative [ ] itchy eyes [ ] nasal discharge [ ] hives [ ] angioedema    [x ] All other systems negative  [ ] Unable to assess ROS due to    Current Meds:  acetaminophen    Suspension .. 650 milliGRAM(s) Oral every 6 hours PRN  aspirin  chewable 81 milliGRAM(s) Oral daily  atorvastatin 80 milliGRAM(s) Oral at bedtime  heparin   Injectable 5000 Unit(s) SubCutaneous every 12 hours  lisinopril 10 milliGRAM(s) Oral daily  melatonin 3 milliGRAM(s) Oral at bedtime  metoprolol tartrate 75 milliGRAM(s) Oral every 12 hours  senna 2 Tablet(s) Oral at bedtime PRN      PAST MEDICAL & SURGICAL HISTORY:  History of renal artery stenosis  Hyperlipidemia  Cardiomyopathy, unspecified type: H/o Takasubo cardiomyopathy c/b vfib arrest s/p AICD implantation. CMP resolved with EF 66% (from 30%) in Jan 2019  Osteoporosis  HTN (hypertension)  S/P cataract surgery  S/P appendectomy      Vitals:  T(F): 98.1 (09-06), Max: 98.5 (09-05)  HR: 62 (09-06) (61 - 78)  BP: 156/89 (09-06) (129/61 - 156/89)  RR: 18 (09-06)  SpO2: 100% (09-06)  I&O's Summary    05 Sep 2020 07:01  -  06 Sep 2020 07:00  --------------------------------------------------------  IN: 200 mL / OUT: 0 mL / NET: 200 mL        Physical Exam:  Appearance: No acute distress; well appearing  Eyes: PERRL, EOMI, pink conjunctiva  HENT: Normal oral mucosa  Cardiovascular: RRR, S1, S2, no murmurs, rubs, or gallops; no edema; no JVD  Respiratory: Clear to auscultation bilaterally  Gastrointestinal: soft, non-tender, non-distended with normal bowel sounds  Musculoskeletal: No clubbing; no joint deformity   Neurologic: Non-focal  Lymphatic: No lymphadenopathy  Psychiatry: AAOx3, mood & affect appropriate  Skin: No rashes, ecchymoses, or cyanosis                          11.8   4.47  )-----------( 176      ( 06 Sep 2020 06:40 )             37.3     09-06    142  |  106  |  11  ----------------------------<  80  4.0   |  23  |  0.81    Ca    8.8      06 Sep 2020 06:40  Phos  2.8     09-06  Mg     2.1     09-06    TPro  6.4  /  Alb  3.1<L>  /  TBili  0.8  /  DBili  x   /  AST  31  /  ALT  19  /  AlkPhos  95  09-06          Serum Pro-Brain Natriuretic Peptide: 557.3 pg/mL (09-03 @ 12:00)          New ECG(s): Personally reviewed    Echo:  < from: Transthoracic Echocardiogram (09.04.20 @ 13:12) >  1. Mitral annular calcification, otherwise normal mitral  valve. Minimal mitral regurgitation.  2. Normal left ventricular systolic function. No segmental  wall motion abnormalities.  3. Mild diastolic dysfunction (Stage I).  4. The right ventricle is not well visualized; grossly  normal right ventricular systolic function.  A device wire  is noted in the right heart.  5. Estimated right ventricularsystolic pressure equals 41  mm Hg, assuming right atrial pressure equals 10 mm Hg,  consistent with mild pulmonary hypertension.    < end of copied text >    Interpretation of Telemetry: SR 80s

## 2020-09-06 NOTE — PROVIDER CONTACT NOTE (OTHER) - BACKGROUND
pt was admitted due to palpatations and AICD going off at home. she had an angiogram through the right wrist.

## 2020-09-06 NOTE — DISCHARGE NOTE PROVIDER - NSDCCPCAREPLAN_GEN_ALL_CORE_FT
PRINCIPAL DISCHARGE DIAGNOSIS  Diagnosis: Palpitations  Assessment and Plan of Treatment: You were admitted due to heart palpitations, as well as shocks from your ICD device. Your home heart rate medications were increased to control your heart rate and rythm, and your ICD device settings were changed to avoid shocking you unecessarily. Please follow up closely with outpatient cardiology.      SECONDARY DISCHARGE DIAGNOSES  Diagnosis: ICD (implantable cardioverter-defibrillator) discharge  Assessment and Plan of Treatment: PRINCIPAL DISCHARGE DIAGNOSIS  Diagnosis: Palpitations  Assessment and Plan of Treatment: You were admitted due to heart palpitations, as well as shocks from your ICD device. Your home heart rate medications were increased to control your heart rate and rythm, and your ICD device settings were changed to avoid shocking you unecessarily. Please follow up closely with outpatient cardiology (Dr. Sterling).      SECONDARY DISCHARGE DIAGNOSES  Diagnosis: ICD (implantable cardioverter-defibrillator) discharge  Assessment and Plan of Treatment:

## 2020-09-06 NOTE — PROGRESS NOTE ADULT - SUBJECTIVE AND OBJECTIVE BOX
Geovani Arreaga, pgy 2  [facility] Team 1  [id number]  After 7pm; page night float    Patient is a 84y old  Female who presents with a chief complaint of Arrhythmia (05 Sep 2020 10:09)      SUBJECTIVE / OVERNIGHT EVENTS:  ADDITIONAL REVIEW OF SYSTEMS:    MEDICATIONS  (STANDING):  aspirin  chewable 81 milliGRAM(s) Oral daily  atorvastatin 80 milliGRAM(s) Oral at bedtime  heparin   Injectable 5000 Unit(s) SubCutaneous every 12 hours  lisinopril 10 milliGRAM(s) Oral daily  melatonin 3 milliGRAM(s) Oral at bedtime  metoprolol tartrate 75 milliGRAM(s) Oral every 12 hours    MEDICATIONS  (PRN):  acetaminophen    Suspension .. 650 milliGRAM(s) Oral every 6 hours PRN Temp greater or equal to 38C (100.4F), Mild Pain (1 - 3)  senna 2 Tablet(s) Oral at bedtime PRN Constipation      CAPILLARY BLOOD GLUCOSE      POCT Blood Glucose.: 89 mg/dL (05 Sep 2020 08:30)    I&O's Summary    05 Sep 2020 07:01  -  06 Sep 2020 07:00  --------------------------------------------------------  IN: 200 mL / OUT: 0 mL / NET: 200 mL        PHYSICAL EXAM:  Vital Signs Last 24 Hrs  T(C): 36.9 (06 Sep 2020 04:49), Max: 36.9 (05 Sep 2020 21:00)  T(F): 98.4 (06 Sep 2020 04:49), Max: 98.5 (05 Sep 2020 21:00)  HR: 61 (06 Sep 2020 04:49) (61 - 78)  BP: 149/79 (06 Sep 2020 04:49) (129/61 - 149/79)  BP(mean): --  RR: 18 (06 Sep 2020 04:49) (17 - 18)  SpO2: 99% (06 Sep 2020 04:49) (97% - 100%)  CONSTITUTIONAL: NAD, well-developed, well-groomed  EYES: PERRLA; conjunctiva and sclera clear  ENMT: Moist oral mucosa, no pharyngeal injection or exudates; normal dentition  NECK: Supple, no palpable masses; no thyromegaly  RESPIRATORY: Normal respiratory effort; lungs are clear to auscultation bilaterally  CARDIOVASCULAR: Regular rate and rhythm, normal S1 and S2, no murmur/rub/gallop; No lower extremity edema; Peripheral pulses are 2+ bilaterally  ABDOMEN: Nontender to palpation, normoactive bowel sounds, no rebound/guarding; No hepatosplenomegaly  MUSCULOSKELETAL:  Normal gait; no clubbing or cyanosis of digits; no joint swelling or tenderness to palpation  PSYCH: A+O to person, place, and time; affect appropriate  NEUROLOGY: CN 2-12 are intact and symmetric; no gross sensory deficits   SKIN: No rashes; no palpable lesions    LABS:                        12.3   4.52  )-----------( 171      ( 05 Sep 2020 06:57 )             37.9     09-05    138  |  104  |  15  ----------------------------<  82  3.9   |  19<L>  |  0.79    Ca    8.7      05 Sep 2020 06:48  Phos  2.4     09-05  Mg     2.0     09-05                  RADIOLOGY & ADDITIONAL TESTS:  Results Reviewed:   Imaging Personally Reviewed:  Electrocardiogram Personally Reviewed:    COORDINATION OF CARE:  Care Discussed with Consultants/Other Providers [Y/N]:  Prior or Outpatient Records Reviewed [Y/N]: Geovani Arreaga, pgy 2  [facility] Team 1  [id number]  After 7pm; page night float    Patient is a 84y old  Female who presents with a chief complaint of Arrhythmia (05 Sep 2020 10:09)      SUBJECTIVE / OVERNIGHT EVENTS: Pt seen and examined at bedside. Very pleasant. NO shocks overnight. Tele saying 50's sinus. No chest pain, sob, fevers, chills, nausea, vomiting. mentating well, no headaches or problems with bowel habits.   ADDITIONAL REVIEW OF SYSTEMS:    MEDICATIONS  (STANDING):  aspirin  chewable 81 milliGRAM(s) Oral daily  atorvastatin 80 milliGRAM(s) Oral at bedtime  heparin   Injectable 5000 Unit(s) SubCutaneous every 12 hours  lisinopril 10 milliGRAM(s) Oral daily  melatonin 3 milliGRAM(s) Oral at bedtime  metoprolol tartrate 75 milliGRAM(s) Oral every 12 hours    MEDICATIONS  (PRN):  acetaminophen    Suspension .. 650 milliGRAM(s) Oral every 6 hours PRN Temp greater or equal to 38C (100.4F), Mild Pain (1 - 3)  senna 2 Tablet(s) Oral at bedtime PRN Constipation      CAPILLARY BLOOD GLUCOSE      POCT Blood Glucose.: 89 mg/dL (05 Sep 2020 08:30)    I&O's Summary    05 Sep 2020 07:01  -  06 Sep 2020 07:00  --------------------------------------------------------  IN: 200 mL / OUT: 0 mL / NET: 200 mL        PHYSICAL EXAM:  Vital Signs Last 24 Hrs  T(C): 36.9 (06 Sep 2020 04:49), Max: 36.9 (05 Sep 2020 21:00)  T(F): 98.4 (06 Sep 2020 04:49), Max: 98.5 (05 Sep 2020 21:00)  HR: 61 (06 Sep 2020 04:49) (61 - 78)  BP: 149/79 (06 Sep 2020 04:49) (129/61 - 149/79)  BP(mean): --  RR: 18 (06 Sep 2020 04:49) (17 - 18)  SpO2: 99% (06 Sep 2020 04:49) (97% - 100%)  CONSTITUTIONAL: cachectic, pleasant   EYES: PERRLA; conjunctiva and sclera clear  ENMT: Moist oral mucosa, no pharyngeal injection or exudates; normal dentition  NECK: Supple, no palpable masses; no thyromegaly  RESPIRATORY: Normal respiratory effort; lungs are clear to auscultation bilaterally  CARDIOVASCULAR: Regular rate and rhythm, normal S1 and S2, no murmur/rub/gallop; No lower extremity edema; Peripheral pulses are 2+ bilaterally  ABDOMEN: Nontender to palpation, normoactive bowel sounds, no rebound/guarding; No hepatosplenomegaly  MUSCULOSKELETAL:  Normal gait; no clubbing or cyanosis of digits; no joint swelling or tenderness to palpation      LABS:                        12.3   4.52  )-----------( 171      ( 05 Sep 2020 06:57 )             37.9     09-05    138  |  104  |  15  ----------------------------<  82  3.9   |  19<L>  |  0.79    Ca    8.7      05 Sep 2020 06:48  Phos  2.4     09-05  Mg     2.0     09-05                  RADIOLOGY & ADDITIONAL TESTS:  Results Reviewed:   Imaging Personally Reviewed:  Electrocardiogram Personally Reviewed:    COORDINATION OF CARE:  Care Discussed with Consultants/Other Providers [Y/N]:  Prior or Outpatient Records Reviewed [Y/N]:

## 2020-09-06 NOTE — PROGRESS NOTE ADULT - PROBLEM SELECTOR PLAN 2
History of LUIS FERNANDO and hypertension although with baseline BPs in 120s/80s, well controlled on lisinopril 10 qd. Patient with hypertension since admission with BPs in 220s/120s, improved with IV hydralazine to 170s.  -appreciate nephrology recs  - lisinopril   -consider using coreg for rate control AND BP control if still hypertensive on lisinopril  -if patient asymptomatic and less than  avoid giving any antihypertensives.  -if asymptomatic or >200 can use small doses of IV hydralazine

## 2020-09-06 NOTE — PROGRESS NOTE ADULT - ASSESSMENT
85 y/o F w/ h/o R renal artery stenosis (since 2015), HTN, HLD, microcytic anemia, hx of Takotsubo cardiomyopathy in 8/2018 (now recovered, LVEF 66%) where she had Vfib arrest and received ICD for secondary prevention presented with 8 inappropriate  ICD for fast atrial tachycardia.  Unfortunately, she received 8 additional inappropriate ICD shocks for AT last night while getting up to the bathroom.  Metoprolol increased to 50mg bid today.  Tolerating the medication well. Home dose -> metoprolol 25 mg daily. Low threshold for slowly titrating metoprolol to 100mg bid if necessary for suppression of AT. If she is refractory to BB, can consider antiarrhythmic therapy or ablation.       ICD detection increased to 220 bpm to hopefully avoid further ICD therapies for AT. Patient asked if the device could be removed given the results of her echo (normal EF).  I explained she is still at risk for sudden cardiac death given her history of VFib arrest and she should not consider removal of the ICD.     Inappropriate ICD shock for Atrial Tach   - Metoprolol increased to 75. Pt with some bradycardia today, however, asymptomatic   - Would try and ambulate today freely to see what heart rate goes up to.     Prasad Low MD  Cardiology Fellow - PGY 6  Text or Call: [phone]  For all New Consults and Questions:  [email or web address]   Login: [email or web address]

## 2020-09-06 NOTE — PROVIDER CONTACT NOTE (OTHER) - ACTION/TREATMENT ORDERED:
provider will come to see patient soon vitals stable will continue to monitor
continue to monitor. give IV hydralzine
will continue to monitor

## 2020-09-06 NOTE — PROGRESS NOTE ADULT - PROBLEM SELECTOR PLAN 4
Takasubo cardiomyopathy diagnosed in 2018 after vfib arrest. EF recovered from 30% to 66% by January of 2019.  -appreciate HF recs  -echo with normal LV systolic function, mild diastolic dysfunction  -still needs AICD bc history of vfib arrest req defib

## 2020-09-07 LAB
ANION GAP SERPL CALC-SCNC: 12 MMO/L — SIGNIFICANT CHANGE UP (ref 7–14)
BUN SERPL-MCNC: 10 MG/DL — SIGNIFICANT CHANGE UP (ref 7–23)
CALCIUM SERPL-MCNC: 8.8 MG/DL — SIGNIFICANT CHANGE UP (ref 8.4–10.5)
CHLORIDE SERPL-SCNC: 107 MMOL/L — SIGNIFICANT CHANGE UP (ref 98–107)
CO2 SERPL-SCNC: 19 MMOL/L — LOW (ref 22–31)
CREAT SERPL-MCNC: 0.83 MG/DL — SIGNIFICANT CHANGE UP (ref 0.5–1.3)
GLUCOSE SERPL-MCNC: 97 MG/DL — SIGNIFICANT CHANGE UP (ref 70–99)
HCT VFR BLD CALC: 39.8 % — SIGNIFICANT CHANGE UP (ref 34.5–45)
HGB BLD-MCNC: 13.1 G/DL — SIGNIFICANT CHANGE UP (ref 11.5–15.5)
MAGNESIUM SERPL-MCNC: 2 MG/DL — SIGNIFICANT CHANGE UP (ref 1.6–2.6)
MCHC RBC-ENTMCNC: 23.1 PG — LOW (ref 27–34)
MCHC RBC-ENTMCNC: 32.9 % — SIGNIFICANT CHANGE UP (ref 32–36)
MCV RBC AUTO: 70.1 FL — LOW (ref 80–100)
NRBC # FLD: 0 K/UL — SIGNIFICANT CHANGE UP (ref 0–0)
PHOSPHATE SERPL-MCNC: 2.7 MG/DL — SIGNIFICANT CHANGE UP (ref 2.5–4.5)
PLATELET # BLD AUTO: 192 K/UL — SIGNIFICANT CHANGE UP (ref 150–400)
PMV BLD: SIGNIFICANT CHANGE UP FL (ref 7–13)
POTASSIUM SERPL-MCNC: 4.4 MMOL/L — SIGNIFICANT CHANGE UP (ref 3.5–5.3)
POTASSIUM SERPL-SCNC: 4.4 MMOL/L — SIGNIFICANT CHANGE UP (ref 3.5–5.3)
RBC # BLD: 5.68 M/UL — HIGH (ref 3.8–5.2)
RBC # FLD: 16.2 % — HIGH (ref 10.3–14.5)
SODIUM SERPL-SCNC: 138 MMOL/L — SIGNIFICANT CHANGE UP (ref 135–145)
WBC # BLD: 4.3 K/UL — SIGNIFICANT CHANGE UP (ref 3.8–10.5)
WBC # FLD AUTO: 4.3 K/UL — SIGNIFICANT CHANGE UP (ref 3.8–10.5)

## 2020-09-07 PROCEDURE — 99232 SBSQ HOSP IP/OBS MODERATE 35: CPT | Mod: GC

## 2020-09-07 PROCEDURE — 99232 SBSQ HOSP IP/OBS MODERATE 35: CPT

## 2020-09-07 PROCEDURE — 99233 SBSQ HOSP IP/OBS HIGH 50: CPT | Mod: GC

## 2020-09-07 RX ORDER — METOPROLOL TARTRATE 50 MG
100 TABLET ORAL
Refills: 0 | Status: DISCONTINUED | OUTPATIENT
Start: 2020-09-07 | End: 2020-09-07

## 2020-09-07 RX ORDER — LISINOPRIL 2.5 MG/1
20 TABLET ORAL DAILY
Refills: 0 | Status: DISCONTINUED | OUTPATIENT
Start: 2020-09-08 | End: 2020-09-08

## 2020-09-07 RX ORDER — METOPROLOL TARTRATE 50 MG
100 TABLET ORAL
Refills: 0 | Status: DISCONTINUED | OUTPATIENT
Start: 2020-09-07 | End: 2020-09-08

## 2020-09-07 RX ADMIN — Medication 75 MILLIGRAM(S): at 04:44

## 2020-09-07 RX ADMIN — LISINOPRIL 10 MILLIGRAM(S): 2.5 TABLET ORAL at 04:44

## 2020-09-07 RX ADMIN — HEPARIN SODIUM 5000 UNIT(S): 5000 INJECTION INTRAVENOUS; SUBCUTANEOUS at 17:00

## 2020-09-07 RX ADMIN — Medication 100 MILLIGRAM(S): at 17:00

## 2020-09-07 RX ADMIN — Medication 3 MILLIGRAM(S): at 22:01

## 2020-09-07 RX ADMIN — HEPARIN SODIUM 5000 UNIT(S): 5000 INJECTION INTRAVENOUS; SUBCUTANEOUS at 04:44

## 2020-09-07 RX ADMIN — Medication 81 MILLIGRAM(S): at 11:50

## 2020-09-07 RX ADMIN — ATORVASTATIN CALCIUM 80 MILLIGRAM(S): 80 TABLET, FILM COATED ORAL at 22:01

## 2020-09-07 NOTE — PROGRESS NOTE ADULT - ASSESSMENT
83 y/o F w/ h/o R renal artery stenosis (since 2015), HTN, HLD, microcytic anemia, hx of Takotsubo cardiomyopathy in 8/2018 (now recovered, LVEF 66%) where she had Vfib arrest and received ICD for secondary prevention presented with 8 inappropriate  ICD for fast atrial tachycardia.  Unfortunately, she received 8 additional inappropriate ICD shocks for AT last night while getting up to the bathroom.  Metoprolol increased to 50mg bid today.  Tolerating the medication well. Home dose -> metoprolol 25 mg daily. If she is refractory to BB, can consider antiarrhythmic therapy or ablation.   ICD detection increased to 220 bpm to hopefully avoid further ICD therapies for AT. Patient asked if the device could be removed given the results of her echo (normal EF).  I explained she is still at risk for sudden cardiac death given her history of VFib arrest and she should not consider removal of the ICD.     Hx of stress induced CM with VT  - recovered EF  - C/w BB and lisinopril   - Blood pressures elevated. Would consider increased lisinopril dose.     Inappropriate ICD shock for Atrial Tach  - Would increase metoprolol to 100 BID, as pt is tolerating 75mg BID and she had an episode of AT to 150 this morning    - Although ambulating HR was in the 70s         Prasad Low MD  Cardiology Fellow - PGY 6  Text or Call: [phone]  For all New Consults and Questions:  [email or web address]   Login: [email or web address]

## 2020-09-07 NOTE — PROGRESS NOTE ADULT - ATTENDING COMMENTS
Patient was seen and examined personally by me. I have discussed the plan and reviewed the resident's note and agree with the above physical exam findings including assessment and plan except as indicated below.    84F with hx of Takatsubo cardiomyopathy (most recent echo EF 66%) c/b vfib arrest s/p AICD, renal artery stenosis, HTN, who presented after multiple AICD shocks at home with associated dizziness. Found to have SVT (atrial tach) on AICD interrogation, elevated troponin, and uncontrolled hypertension. r/o ACS - s/p cath 9/4 with no significant CAD.     HTN overnight required hydral 5mg IV x 1. Patient was comfortable on room air on exam  Appearing euvolemic, lungs clear b/l and no LE edema  s1/s2, no murmurs were appreciated  Abd soft, NT, ND    #Atrial Tachycardia - hx of Takotsubo cardiomyopathy in 8/2018 (now recovered, LVEF 66%) where she had Vfib arrest and received ICD for secondary prevention presents with 8 ICD shocks. Interrogation shows fast atrial rate with V conduction without widening of the QRS likely atrial tachycardia. Increasing Metoprolol Tartrate to 100mg BID per EP. Replete lytes K to 4, and Mg to 2. EP recs appreciated.     #r/o ACS - trops elevated likely in setting of AICD shocks and atrial tachycardia now downtrending. s/p cath 9/4 with no significant CAD, continue with medical management (asa, BB, acei). Cardiology recs appreciated.     #Takatsubo cardiomyopathy (most recent echo EF 66%) - euvolemic on exam. c/w BB and ACEi. repeat echo with normal LV systolic function, mild diastolic dysfunction. HF recs appreciated.     #HTN in setting of renal artery stenosis - BP elevated on admission, s/p goal SBP 180s in first 24 hours. c/w metoprolol, increase lisinopril to 20mg daily.    #Dispo: PT shea

## 2020-09-07 NOTE — PROGRESS NOTE ADULT - PROBLEM SELECTOR PLAN 7
Spoke to patient's cardiologist Dr. David Banks  Patient's granddaughter Ngoc is point of contact Spoke to patient's cardiologist Dr. David Banks  Patient's granddaughter Ngoc is point of contact  Waiting on PT discharge recs

## 2020-09-07 NOTE — PROGRESS NOTE ADULT - PROBLEM SELECTOR PLAN 1
Admitted due to multiple AICD shocks in the setting of SVT (atach). Patient on lopressor 25 qd at home.  -c/w tele  -appreciate EP recs  -lopressor 75 bid, low threshold to increase to 100 bid.  -sensing algorithm increased capture rate to 220 to avoid further inappropriate shocks for SVT (Atach)  -if unable to control heart rate will need EP procedure  -replete K>4, Mg>2, Ph>4 Admitted due to multiple AICD shocks in the setting of SVT (atach). Patient on lopressor 25 qd at home. Walked yesterday and HR remained in 70's, sinus rhythm.   -c/w tele  -appreciate EP recs  -Lopressor increased 75-->100 today   -sensing algorithm increased capture rate to 220 to avoid further inappropriate shocks for SVT (Atach)  -replete K>4, Mg>2, Ph>4  -Close follow up with Dr. Sterling outpatient

## 2020-09-07 NOTE — PROGRESS NOTE ADULT - SUBJECTIVE AND OBJECTIVE BOX
Nilda Joseph PGY1  [phone]    Patient is a 84y old  Female who presents with a chief complaint of Arrhythmia (06 Sep 2020 15:39)    SUBJECTIVE / OVERNIGHT EVENTS:  Patient seen and examined at bedside.  HTN to 190/106 O/N, though asymptomatic at the time.   No complaints overnight. No dizziness, blurry vision, palpitations chest pain, SOB. Eager to go home.     Other Review of Systems Negative.    MEDICATIONS  (STANDING):  aspirin  chewable 81 milliGRAM(s) Oral daily  atorvastatin 80 milliGRAM(s) Oral at bedtime  heparin   Injectable 5000 Unit(s) SubCutaneous every 12 hours  lisinopril 10 milliGRAM(s) Oral daily  melatonin 3 milliGRAM(s) Oral at bedtime  metoprolol tartrate 75 milliGRAM(s) Oral every 12 hours    MEDICATIONS  (PRN):  acetaminophen    Suspension .. 650 milliGRAM(s) Oral every 6 hours PRN Temp greater or equal to 38C (100.4F), Mild Pain (1 - 3)  senna 2 Tablet(s) Oral at bedtime PRN Constipation      OBJECTIVE:    Vital Signs Last 24 Hrs  T(C): 37.1 (07 Sep 2020 04:42), Max: 37.1 (07 Sep 2020 04:42)  T(F): 98.7 (07 Sep 2020 04:42), Max: 98.7 (07 Sep 2020 04:42)  HR: 65 (07 Sep 2020 04:42) (61 - 81)  BP: 140/89 (07 Sep 2020 04:42) (138/72 - 190/106)  BP(mean): --  RR: 16 (07 Sep 2020 04:42) (16 - 18)  SpO2: 98% (07 Sep 2020 04:42) (97% - 100%)    CAPILLARY BLOOD GLUCOSE        I&O's Summary    06 Sep 2020 07:01  -  07 Sep 2020 07:00  --------------------------------------------------------  IN: 658 mL / OUT: 560 mL / NET: 98 mL      PHYSICAL EXAM:  GENERAL: NAD, thin and frail appearing. Very pleasant.   HEAD:  Atraumatic, Normocephalic. Poor dentition.   EYES: EOMI, PERRLA, conjunctiva and sclera clear  NECK: Supple, No JVD  CHEST/LUNG: Clear to auscultation bilaterally; No wheeze  HEART: Regular rate and rhythm; No murmurs, rubs, or gallops  ABDOMEN: Soft, Nontender, Nondistended; Bowel sounds present  EXTREMITIES:  No clubbing, cyanosis, or edema  PSYCH: AAOx3      LABS:                        13.1   4.30  )-----------( 192      ( 07 Sep 2020 06:31 )             39.8     Auto Eosinophil # x     / Auto Eosinophil % x     / Auto Neutrophil # x     / Auto Neutrophil % x     / BANDS % x                            11.8   4.47  )-----------( 176      ( 06 Sep 2020 06:40 )             37.3     Auto Eosinophil # x     / Auto Eosinophil % x     / Auto Neutrophil # x     / Auto Neutrophil % x     / BANDS % x        09-07    138  |  107  |  10  ----------------------------<  97  4.4   |  19<L>  |  0.83  09-06    142  |  106  |  11  ----------------------------<  80  4.0   |  23  |  0.81    Ca    8.8      07 Sep 2020 06:31  Mg     2.0     09-07  Phos  2.7     09-07  TPro  6.4  /  Alb  3.1<L>  /  TBili  0.8  /  DBili  x   /  AST  31  /  ALT  19  /  AlkPhos  95  09-06    Care Discussed with Consultants/Other Providers:    RADIOLOGY & ADDITIONAL TESTS:  (Imaging Personally Reviewed)

## 2020-09-07 NOTE — PROGRESS NOTE ADULT - SUBJECTIVE AND OBJECTIVE BOX
Patient seen and examined at bedside.    Overnight Events: No acute events overnight.       REVIEW OF SYSTEMS:  Constitutional:     [ ] negative [ ] fevers [ ] chills [ ] weight loss [ ] weight gain  HEENT:                  [ ] negative [ ] dry eyes [ ] eye irritation [ ] postnasal drip [ ] nasal congestion  CV:                         [ ] negative  [ ] chest pain [ ] orthopnea [ ] palpitations [ ] murmur  Resp:                     [ ] negative [ ] cough [ ] shortness of breath [ ] dyspnea [ ] wheezing [ ] sputum [ ]hemoptysis  GI:                          [ ] negative [ ] nausea [ ] vomiting [ ] diarrhea [ ] constipation [ ] abd pain [ ] dysphagia   :                        [ ] negative [ ] dysuria [ ] nocturia [ ] hematuria [ ] increased urinary frequency  Musculoskeletal: [ ] negative [ ] back pain [ ] myalgias [ ] arthralgias [ ] fracture  Skin:                       [ ] negative [ ] rash [ ] itch  Neurological:        [ ] negative [ ] headache [ ] dizziness [ ] syncope [ ] weakness [ ] numbness  Psychiatric:           [ ] negative [ ] anxiety [ ] depression  Endocrine:            [ ] negative [ ] diabetes [ ] thyroid problem  Heme/Lymph:      [ ] negative [ ] anemia [ ] bleeding problem  Allergic/Immune: [ ] negative [ ] itchy eyes [ ] nasal discharge [ ] hives [ ] angioedema    [x ] All other systems negative  [ ] Unable to assess ROS due to    Current Meds:  acetaminophen    Suspension .. 650 milliGRAM(s) Oral every 6 hours PRN  aspirin  chewable 81 milliGRAM(s) Oral daily  atorvastatin 80 milliGRAM(s) Oral at bedtime  heparin   Injectable 5000 Unit(s) SubCutaneous every 12 hours  lisinopril 10 milliGRAM(s) Oral daily  melatonin 3 milliGRAM(s) Oral at bedtime  metoprolol tartrate 75 milliGRAM(s) Oral every 12 hours  senna 2 Tablet(s) Oral at bedtime PRN      PAST MEDICAL & SURGICAL HISTORY:  History of renal artery stenosis  Hyperlipidemia  Cardiomyopathy, unspecified type: H/o Takasubo cardiomyopathy c/b vfib arrest s/p AICD implantation. CMP resolved with EF 66% (from 30%) in Jan 2019  Osteoporosis  HTN (hypertension)  S/P cataract surgery  S/P appendectomy      Vitals:  T(F): 97.7 (09-07), Max: 98.7 (09-07)  HR: 67 (09-07) (61 - 81)  BP: 137/77 (09-07) (137/77 - 190/106)  RR: 17 (09-07)  SpO2: 100% (09-07)  I&O's Summary    06 Sep 2020 07:01  -  07 Sep 2020 07:00  --------------------------------------------------------  IN: 658 mL / OUT: 560 mL / NET: 98 mL    07 Sep 2020 07:01  -  07 Sep 2020 10:41  --------------------------------------------------------  IN: 118 mL / OUT: 260 mL / NET: -142 mL        Physical Exam:  Appearance: No acute distress; well appearing  Eyes: PERRL, EOMI, pink conjunctiva  HENT: Normal oral mucosa  Cardiovascular: RRR, S1, S2, no murmurs, rubs, or gallops; no edema; no JVD  Respiratory: Clear to auscultation bilaterally  Gastrointestinal: soft, non-tender, non-distended with normal bowel sounds  Musculoskeletal: No clubbing; no joint deformity   Neurologic: Non-focal  Lymphatic: No lymphadenopathy  Psychiatry: AAOx3, mood & affect appropriate  Skin: No rashes, ecchymoses, or cyanosis                          13.1   4.30  )-----------( 192      ( 07 Sep 2020 06:31 )             39.8     09-07    138  |  107  |  10  ----------------------------<  97  4.4   |  19<L>  |  0.83    Ca    8.8      07 Sep 2020 06:31  Phos  2.7     09-07  Mg     2.0     09-07    TPro  6.4  /  Alb  3.1<L>  /  TBili  0.8  /  DBili  x   /  AST  31  /  ALT  19  /  AlkPhos  95  09-06          Serum Pro-Brain Natriuretic Peptide: 557.3 pg/mL (09-03 @ 12:00)          New ECG(s): Personally reviewed    Echo:  < from: Transthoracic Echocardiogram (09.04.20 @ 13:12) >  1. Mitral annular calcification, otherwise normal mitral  valve. Minimal mitral regurgitation.  2. Normal left ventricular systolic function. No segmental  wall motion abnormalities.  3. Mild diastolic dysfunction (Stage I).  4. The right ventricle is not well visualized; grossly  normal right ventricular systolic function.  A device wire  is noted in the right heart.  5. Estimated right ventricularsystolic pressure equals 41  mm Hg, assuming right atrial pressure equals 10 mm Hg,  consistent with mild pulmonary hypertension.    < end of copied text >    Interpretation of Telemetry: Sinus 70s as high as 150 briefly

## 2020-09-07 NOTE — PROGRESS NOTE ADULT - PROBLEM SELECTOR PLAN 2
History of LUIS FERNANDO and hypertension although with baseline BPs in 120s/80s, well controlled on lisinopril 10 qd. Patient with hypertension since admission with BPs in 220s/120s, improved with IV hydralazine to 170s.  -appreciate nephrology recs  -cw lisinopril   -consider using coreg for rate control AND BP control if still hypertensive on lisinopril History of LUIS FERNANDO and hypertension although with baseline BPs in 120s/80s, well controlled on lisinopril 10 qd. Patient with hypertension since admission with BPs in 220s/120s, improved with IV hydralazine to 170s.  -appreciate nephrology recs  -cw lisinopril

## 2020-09-07 NOTE — PROGRESS NOTE ADULT - ASSESSMENT
83 y/o F w/ h/o R renal artery stenosis (since 2015), HTN, HLD, microcytic anemia, hx of Takotsubo cardiomyopathy in 8/2018 (now recovered, LVEF 66%) where she had Vfib arrest and received ICD for secondary prevention presented with 8 inappropriate  ICD for fast atrial tachycardia.  Unfortunately, she received 8 additional inappropriate ICD shocks for AT last night while getting up to the bathroom.  Metoprolol increased to 50mg bid today.  Tolerating the medication well. Home dose -> metoprolol 25 mg daily. If she is refractory to BB, can consider antiarrhythmic therapy or ablation.   ICD detection increased to 220 bpm to hopefully avoid further ICD therapies for AT. Patient asked if the device could be removed given the results of her echo (normal EF).  I explained she is still at risk for sudden cardiac death given her history of VFib arrest and she should not consider removal of the ICD.     Inappropriate ICD shock for Atrial Tach  - Would increase metoprolol to 100 BID, as pt is tolerating 75mg BID and she had an episode of AT to 150 this morning    - Although ambulating HR was in the 70s   - From and EP stand point it is okay for her to go home on 100 MG BID  - She was explained that she is still at risk for VT at rates less then 220, and for those VTs she would not get therapy from her device.   - She expressed understanding, but after extensive conversation I am not confident she understands everything.   - She showed some signs of cognitive impairment, likely related to age, and differed decisions to her granddaughter.  - She was told ideally she should stay one more day so that her HR could be monitoring and ICD therapies could be bought down to a lower rate, but it would be okay if she went home as long as she has close follow up with Dr Sterling.      Hx of stress induced CM with VT  - recovered EF  - C/w BB and lisinopril   - Blood pressures elevated. Would consider increased lisinopril dose.     Prasad Low MD  Cardiology Fellow - PGY 6  Text or Call: [phone]  For all New Consults and Questions:  [email or web address]   Login: [email or web address]

## 2020-09-08 ENCOUNTER — TRANSCRIPTION ENCOUNTER (OUTPATIENT)
Age: 85
End: 2020-09-08

## 2020-09-08 ENCOUNTER — FORM ENCOUNTER (OUTPATIENT)
Age: 85
End: 2020-09-08

## 2020-09-08 VITALS
RESPIRATION RATE: 18 BRPM | TEMPERATURE: 97 F | SYSTOLIC BLOOD PRESSURE: 136 MMHG | HEART RATE: 57 BPM | OXYGEN SATURATION: 100 % | DIASTOLIC BLOOD PRESSURE: 71 MMHG

## 2020-09-08 LAB
ANION GAP SERPL CALC-SCNC: 9 MMO/L — SIGNIFICANT CHANGE UP (ref 7–14)
BUN SERPL-MCNC: 14 MG/DL — SIGNIFICANT CHANGE UP (ref 7–23)
CALCIUM SERPL-MCNC: 8.6 MG/DL — SIGNIFICANT CHANGE UP (ref 8.4–10.5)
CHLORIDE SERPL-SCNC: 112 MMOL/L — HIGH (ref 98–107)
CO2 SERPL-SCNC: 21 MMOL/L — LOW (ref 22–31)
CREAT SERPL-MCNC: 0.93 MG/DL — SIGNIFICANT CHANGE UP (ref 0.5–1.3)
GLUCOSE SERPL-MCNC: 92 MG/DL — SIGNIFICANT CHANGE UP (ref 70–99)
HCT VFR BLD CALC: 37.7 % — SIGNIFICANT CHANGE UP (ref 34.5–45)
HGB BLD-MCNC: 11.9 G/DL — SIGNIFICANT CHANGE UP (ref 11.5–15.5)
MAGNESIUM SERPL-MCNC: 1.9 MG/DL — SIGNIFICANT CHANGE UP (ref 1.6–2.6)
MCHC RBC-ENTMCNC: 22.3 PG — LOW (ref 27–34)
MCHC RBC-ENTMCNC: 31.6 % — LOW (ref 32–36)
MCV RBC AUTO: 70.7 FL — LOW (ref 80–100)
NRBC # FLD: 0 K/UL — SIGNIFICANT CHANGE UP (ref 0–0)
PHOSPHATE SERPL-MCNC: 2.8 MG/DL — SIGNIFICANT CHANGE UP (ref 2.5–4.5)
PLATELET # BLD AUTO: 191 K/UL — SIGNIFICANT CHANGE UP (ref 150–400)
PMV BLD: 11.7 FL — SIGNIFICANT CHANGE UP (ref 7–13)
POTASSIUM SERPL-MCNC: 3.9 MMOL/L — SIGNIFICANT CHANGE UP (ref 3.5–5.3)
POTASSIUM SERPL-SCNC: 3.9 MMOL/L — SIGNIFICANT CHANGE UP (ref 3.5–5.3)
RBC # BLD: 5.33 M/UL — HIGH (ref 3.8–5.2)
RBC # FLD: 16.2 % — HIGH (ref 10.3–14.5)
SODIUM SERPL-SCNC: 142 MMOL/L — SIGNIFICANT CHANGE UP (ref 135–145)
WBC # BLD: 4.74 K/UL — SIGNIFICANT CHANGE UP (ref 3.8–10.5)
WBC # FLD AUTO: 4.74 K/UL — SIGNIFICANT CHANGE UP (ref 3.8–10.5)

## 2020-09-08 PROCEDURE — 99233 SBSQ HOSP IP/OBS HIGH 50: CPT

## 2020-09-08 PROCEDURE — 99232 SBSQ HOSP IP/OBS MODERATE 35: CPT | Mod: GC

## 2020-09-08 RX ORDER — LISINOPRIL 2.5 MG/1
1 TABLET ORAL
Qty: 0 | Refills: 0 | DISCHARGE

## 2020-09-08 RX ORDER — METOPROLOL TARTRATE 50 MG
1 TABLET ORAL
Qty: 60 | Refills: 0
Start: 2020-09-08 | End: 2020-10-07

## 2020-09-08 RX ORDER — METOPROLOL TARTRATE 50 MG
1 TABLET ORAL
Qty: 0 | Refills: 0 | DISCHARGE

## 2020-09-08 RX ORDER — METOPROLOL TARTRATE 50 MG
1 TABLET ORAL
Qty: 60 | Refills: 0
Start: 2020-09-08

## 2020-09-08 RX ORDER — LISINOPRIL 2.5 MG/1
1 TABLET ORAL
Qty: 60 | Refills: 0
Start: 2020-09-08

## 2020-09-08 RX ORDER — LISINOPRIL 2.5 MG/1
1 TABLET ORAL
Qty: 0 | Refills: 0 | DISCHARGE
Start: 2020-09-08

## 2020-09-08 RX ORDER — METOPROLOL TARTRATE 50 MG
1 TABLET ORAL
Qty: 0 | Refills: 0 | DISCHARGE
Start: 2020-09-08

## 2020-09-08 RX ADMIN — HEPARIN SODIUM 5000 UNIT(S): 5000 INJECTION INTRAVENOUS; SUBCUTANEOUS at 04:51

## 2020-09-08 RX ADMIN — LISINOPRIL 20 MILLIGRAM(S): 2.5 TABLET ORAL at 04:51

## 2020-09-08 RX ADMIN — Medication 100 MILLIGRAM(S): at 04:51

## 2020-09-08 NOTE — PROGRESS NOTE ADULT - PROBLEM SELECTOR PLAN 1
Admitted due to multiple AICD shocks in the setting of SVT (atach). Patient on lopressor 25 qd at home. Walked yesterday and HR remained in 70's, sinus rhythm.   -c/w tele  -appreciate EP recs  -Lopressor increased 75-->100 today   -sensing algorithm increased capture rate to 220 to avoid further inappropriate shocks for SVT (Atach)  -replete K>4, Mg>2, Ph>4  -Close follow up with Dr. Sterling outpatient

## 2020-09-08 NOTE — PROGRESS NOTE ADULT - SUBJECTIVE AND OBJECTIVE BOX
Medicine Progress Note    Patient is a 84y old  Female who presents with a chief complaint of Arrhythmia (08 Sep 2020 06:57)      SUBJECTIVE / OVERNIGHT EVENTS:  No O/N events and no further shocks. Patient continues to feel well but is worried about having future shocks. Denies fever, chills, chest pain, shortness of breath, nausea, vomiting, headache, visual changes, numbness/tingling/weakness.    ADDITIONAL REVIEW OF SYSTEMS:    MEDICATIONS  (STANDING):  aspirin  chewable 81 milliGRAM(s) Oral daily  atorvastatin 80 milliGRAM(s) Oral at bedtime  heparin   Injectable 5000 Unit(s) SubCutaneous every 12 hours  lisinopril 20 milliGRAM(s) Oral daily  melatonin 3 milliGRAM(s) Oral at bedtime  metoprolol tartrate 100 milliGRAM(s) Oral two times a day    MEDICATIONS  (PRN):  acetaminophen    Suspension .. 650 milliGRAM(s) Oral every 6 hours PRN Temp greater or equal to 38C (100.4F), Mild Pain (1 - 3)  senna 2 Tablet(s) Oral at bedtime PRN Constipation    CAPILLARY BLOOD GLUCOSE        I&O's Summary    07 Sep 2020 07:01  -  08 Sep 2020 07:00  --------------------------------------------------------  IN: 468 mL / OUT: 760 mL / NET: -292 mL        PHYSICAL EXAM:  Vital Signs Last 24 Hrs  T(C): 36.7 (08 Sep 2020 04:45), Max: 36.7 (07 Sep 2020 12:45)  T(F): 98 (08 Sep 2020 04:45), Max: 98.1 (07 Sep 2020 12:45)  HR: 70 (08 Sep 2020 04:45) (65 - 73)  BP: 136/70 (08 Sep 2020 04:45) (128/67 - 162/95)  BP(mean): --  RR: 16 (08 Sep 2020 04:45) (16 - 17)  SpO2: 100% (08 Sep 2020 04:45) (97% - 100%)  CONSTITUTIONAL: Well appearing. NAD.  HEENT: NCAT. PERRLA. Eyes tracking. Anicteric sclera. Moist mucous membrane. No pharyngeal injection or exudates. Poor dentition.  Neck: Supple. No JVD.  RESPIRATORY: No respiratory distress. Lungs are clear to auscultation bilaterally. No wheezes, rales, or rhonchi.  CARDIOVASCULAR: Regular rate and rhythm. Normal S1 and S2. No murmur/rub/gallop. <2 sec cap refill. Peripheral pulses are 2+ bilaterally.  ABDOMEN: Soft. Nondistended. Normoactive bowel sounds. Nontender to percussion/palpation. No rebound or guarding. No hepatosplenomegaly. No CVA tenderness.  MSK: Moving all extremities. No peripheral edema.  SKIN: No rashes. No lesions. No jaundice.  NEUROLOGY: A&Ox3. CN II-XII grossly intact. No gross sensory deficits.  PSYCH: Appropriate affect.      LABS:                        11.9   4.74  )-----------( 191      ( 08 Sep 2020 06:43 )             37.7     09-08    142  |  112<H>  |  14  ----------------------------<  92  3.9   |  21<L>  |  0.93    Ca    8.6      08 Sep 2020 06:43  Phos  2.8     09-08  Mg     1.9     09-08                COVID-19 PCR: NotDetec (03 Sep 2020 16:53)

## 2020-09-08 NOTE — PROGRESS NOTE ADULT - PROBLEM SELECTOR PLAN 4
Takotsubo resolved with recent echo with EF of 66% and echo here showing normal LV systolic function and mild diastolic dysfunction  - will not remove AICD due to history of v-fib arrest requiring defib  - appreciate HF recs

## 2020-09-08 NOTE — PROGRESS NOTE ADULT - PROBLEM SELECTOR PROBLEM 4
Cardiomyopathy, unspecified type

## 2020-09-08 NOTE — PROGRESS NOTE ADULT - PROBLEM SELECTOR PLAN 7
Spoke to patient's cardiologist Dr. David Banks  Patient's granddaughter Ngoc is point of contact  Waiting on PT discharge recs

## 2020-09-08 NOTE — PROGRESS NOTE ADULT - ATTENDING COMMENTS
appreciate EP recs - pt without chest pain or shocks.  discharge planning and coordination ~ 45mins.

## 2020-09-08 NOTE — PROGRESS NOTE ADULT - ASSESSMENT
85 y/o F with hx of Takotsubo cardiomyopathy (recent echo 66%) c/b v-fib arrest s/p AICD placement who presented after multiple AICD shocks at home and was admitted for management of arrhythmia and uptrending troponin, found to have LHC negative for NSTEMI. Patient continues to do well with no further shocks overnight.

## 2020-09-08 NOTE — PROGRESS NOTE ADULT - PROBLEM SELECTOR PLAN 5
- c/w atorvastatin 80 mg PO
-c/w atorvastatin 80  -c/w ASA 81
-c/w atorvastatin 40  -c/w ASA 81

## 2020-09-08 NOTE — PROGRESS NOTE ADULT - PROBLEM SELECTOR PLAN 1
Patient had 8 inappropriate AICD shocks prehospital and had another 8 inappropriate AICD shocks 2 days ago overnight. Metoprolol tartrate has been increased to 100 mg PO BID and ICD detection increased to 220 with no further shocks.  - f/u Dr. Sterling outpatient  - appreciate EP recs

## 2020-09-08 NOTE — PROGRESS NOTE ADULT - PROBLEM SELECTOR PLAN 2
Blood pressure normally controlled in 120s/80s. Pt had pressure of 220s/110s in the ED and been decreased to 170s systolic with hydralazine. Patient currently on lisinopril 20 mg PO with blood pressure ranging 136-153/70-89 over the last 12 hours.  - c/w Lisinopril 20 mg PO

## 2020-09-08 NOTE — PROGRESS NOTE ADULT - SUBJECTIVE AND OBJECTIVE BOX
Nilda Joseph PGY1  [phone]    Patient is a 84y old  Female who presents with a chief complaint of Arrhythmia (07 Sep 2020 10:48)      SUBJECTIVE / OVERNIGHT EVENTS:  Patient seen and examined at bedside.    Other Review of Systems Negative.    MEDICATIONS  (STANDING):  aspirin  chewable 81 milliGRAM(s) Oral daily  atorvastatin 80 milliGRAM(s) Oral at bedtime  heparin   Injectable 5000 Unit(s) SubCutaneous every 12 hours  lisinopril 20 milliGRAM(s) Oral daily  melatonin 3 milliGRAM(s) Oral at bedtime  metoprolol tartrate 100 milliGRAM(s) Oral two times a day    MEDICATIONS  (PRN):  acetaminophen    Suspension .. 650 milliGRAM(s) Oral every 6 hours PRN Temp greater or equal to 38C (100.4F), Mild Pain (1 - 3)  senna 2 Tablet(s) Oral at bedtime PRN Constipation      OBJECTIVE:    Vital Signs Last 24 Hrs  T(C): 36.7 (08 Sep 2020 04:45), Max: 36.7 (07 Sep 2020 12:45)  T(F): 98 (08 Sep 2020 04:45), Max: 98.1 (07 Sep 2020 12:45)  HR: 70 (08 Sep 2020 04:45) (65 - 73)  BP: 136/70 (08 Sep 2020 04:45) (128/67 - 162/95)  BP(mean): --  RR: 16 (08 Sep 2020 04:45) (16 - 17)  SpO2: 100% (08 Sep 2020 04:45) (97% - 100%)    CAPILLARY BLOOD GLUCOSE        I&O's Summary    06 Sep 2020 07:01  -  07 Sep 2020 07:00  --------------------------------------------------------  IN: 658 mL / OUT: 560 mL / NET: 98 mL    07 Sep 2020 07:01  -  08 Sep 2020 06:58  --------------------------------------------------------  IN: 468 mL / OUT: 760 mL / NET: -292 mL        PHYSICAL EXAM:  GENERAL: NAD, well-developed  HEAD:  Atraumatic, Normocephalic  EYES: EOMI, PERRLA, conjunctiva and sclera clear  NECK: Supple, No JVD  CHEST/LUNG: Clear to auscultation bilaterally; No wheeze  HEART: Regular rate and rhythm; No murmurs, rubs, or gallops  ABDOMEN: Soft, Nontender, Nondistended; Bowel sounds present  EXTREMITIES:  2+ Peripheral Pulses, No clubbing, cyanosis, or edema  PSYCH: AAOx3  NEUROLOGY: non-focal  SKIN: No rashes or lesions    LABS:                        13.1   4.30  )-----------( 192      ( 07 Sep 2020 06:31 )             39.8     Auto Eosinophil # x     / Auto Eosinophil % x     / Auto Neutrophil # x     / Auto Neutrophil % x     / BANDS % x        09-07    138  |  107  |  10  ----------------------------<  97  4.4   |  19<L>  |  0.83    Ca    8.8      07 Sep 2020 06:31  Mg     2.0     09-07  Phos  2.7     09-07        Care Discussed with Consultants/Other Providers:    RADIOLOGY & ADDITIONAL TESTS:  (Imaging Personally Reviewed)

## 2020-09-08 NOTE — PROGRESS NOTE ADULT - PROBLEM SELECTOR PROBLEM 3
Elevated troponin
Takotsubo cardiomyopathy
Elevated troponin

## 2020-09-08 NOTE — DISCHARGE NOTE NURSING/CASE MANAGEMENT/SOCIAL WORK - PATIENT PORTAL LINK FT
You can access the FollowMyHealth Patient Portal offered by [facility] by registering at the following website: [email or web address]. By joining [facility]’s FollowMyHealth portal, you will also be able to view your health information using other applications (apps) compatible with our system.

## 2020-09-08 NOTE — PROGRESS NOTE ADULT - ASSESSMENT
83 y/o F w/ h/o R renal artery stenosis (since 2015), HTN, HL, microcytic anemia, hx of Takotsubo cardiomyopathy in 8/2018 (now recovered, LVEF 66%) where she had Vfib arrest and received ICD for secondary prevention presents with 8 ICD shocks. Interrogation showed fast atrial rate with V conduction without widening of the QRS, this is likely SVT. Most likely Atrial tachycardia. ICD detection increased to 220 bpm to hopefully avoid further ICD therapies for AT. Got LHC on this admission, which was negative.     Inappropriate ICD shock for Atrial Tach  - On metoprolol to 100 BID. No tachycardic episodes overnight and this morning  - From and EP stand point it is okay for her to go home on 100 MG BID  - She was explained that she is still at risk for VT at rates less then 220, and for those VTs she would not get therapy from her device.     Stacey Ruggiero MD  Cardiology Fellow

## 2020-09-08 NOTE — PROGRESS NOTE ADULT - PROBLEM SELECTOR PROBLEM 1
Supraventricular tachycardia

## 2020-09-08 NOTE — DISCHARGE NOTE NURSING/CASE MANAGEMENT/SOCIAL WORK - NSSCNAMETXT_GEN_ALL_CORE
GLEN AT HOME for Registered Nurse day after discharge, followed by Physical Therapist, and evaluation for home aide services.

## 2020-09-08 NOTE — PROGRESS NOTE ADULT - SUBJECTIVE AND OBJECTIVE BOX
Stacey Ruggiero MD  Cardiology Fellow  [phone]  All Cardiology service information can be found 24/7 on [email or web address], password: [email or web address]    Patient seen and examined at bedside.    Overnight Events: No events. She is doing well, no complaints. She wishes to go home soon.     Review Of Systems: No chest pain, shortness of breath, or palpitations            Current Meds:  acetaminophen    Suspension .. 650 milliGRAM(s) Oral every 6 hours PRN  aspirin  chewable 81 milliGRAM(s) Oral daily  atorvastatin 80 milliGRAM(s) Oral at bedtime  heparin   Injectable 5000 Unit(s) SubCutaneous every 12 hours  lisinopril 20 milliGRAM(s) Oral daily  melatonin 3 milliGRAM(s) Oral at bedtime  metoprolol tartrate 100 milliGRAM(s) Oral two times a day  senna 2 Tablet(s) Oral at bedtime PRN      Vitals:  T(F): 97.1 (09-08), Max: 98.1 (09-07)  HR: 60 (09-08) (60 - 73)  BP: 152/72 (09-08) (128/67 - 162/95)  RR: 17 (09-08)  SpO2: 100% (09-08)  I&O's Summary    07 Sep 2020 07:01  -  08 Sep 2020 07:00  --------------------------------------------------------  IN: 468 mL / OUT: 760 mL / NET: -292 mL        Physical Exam:  Appearance: No acute distress; well appearing  Eyes: PERRL, EOMI, pink conjunctiva  HEENT: Normal oral mucosa  Cardiovascular: RRR, S1, S2, no murmurs, rubs, or gallops; no edema; no JVD  Respiratory: Clear to auscultation bilaterally  Gastrointestinal: soft, non-tender, non-distended with normal bowel sounds  Musculoskeletal: No clubbing; no joint deformity   Neurologic: Non-focal  Lymphatic: No lymphadenopathy  Psychiatry: AAOx3, mood & affect appropriate  Skin: No rashes, ecchymoses, or cyanosis                          11.9   4.74  )-----------( 191      ( 08 Sep 2020 06:43 )             37.7     09-08    142  |  112<H>  |  14  ----------------------------<  92  3.9   |  21<L>  |  0.93    Ca    8.6      08 Sep 2020 06:43  Phos  2.8     09-08  Mg     1.9     09-08        CARDIAC MARKERS ( 03 Sep 2020 23:37 )  x     / x     / x     / 725 u/L / 19.07 ng/mL / x          Serum Pro-Brain Natriuretic Peptide: 557.3 pg/mL (09-03 @ 12:00)

## 2020-09-08 NOTE — PROGRESS NOTE ADULT - PROBLEM SELECTOR PROBLEM 2
Renovascular hypertension

## 2020-09-08 NOTE — PROGRESS NOTE ADULT - REASON FOR ADMISSION
Arrhythmia

## 2020-09-08 NOTE — PROGRESS NOTE ADULT - PROBLEM SELECTOR PLAN 2
History of LUIS FERNANDO and hypertension although with baseline BPs in 120s/80s, well controlled on lisinopril 10 qd. Patient with hypertension since admission with BPs in 220s/120s, improved with IV hydralazine to 170s.  -appreciate nephrology recs  -cw lisinopril History of LUIS FERNANDO and hypertension although with baseline BPs in 120s/80s, well controlled on lisinopril 10 qd. Patient with hypertension since admission with BPs in 220s/120s, improved with IV hydralazine to 170s.  -appreciate nephrology recs  -cw lisinopril, increased to 20

## 2020-09-16 ENCOUNTER — TRANSCRIPTION ENCOUNTER (OUTPATIENT)
Age: 85
End: 2020-09-16

## 2020-09-21 PROBLEM — E78.5 HYPERLIPIDEMIA, UNSPECIFIED: Chronic | Status: ACTIVE | Noted: 2020-09-03

## 2020-09-21 PROBLEM — I42.9 CARDIOMYOPATHY, UNSPECIFIED: Chronic | Status: ACTIVE | Noted: 2020-09-03

## 2020-09-21 PROBLEM — Z86.79 PERSONAL HISTORY OF OTHER DISEASES OF THE CIRCULATORY SYSTEM: Chronic | Status: ACTIVE | Noted: 2020-09-03

## 2020-09-25 ENCOUNTER — APPOINTMENT (OUTPATIENT)
Dept: GERIATRICS | Facility: CLINIC | Age: 85
End: 2020-09-25
Payer: MEDICARE

## 2020-09-25 VITALS
HEIGHT: 65 IN | HEART RATE: 56 BPM | BODY MASS INDEX: 17.69 KG/M2 | TEMPERATURE: 97.2 F | DIASTOLIC BLOOD PRESSURE: 90 MMHG | WEIGHT: 106.2 LBS | RESPIRATION RATE: 15 BRPM | OXYGEN SATURATION: 99 % | SYSTOLIC BLOOD PRESSURE: 122 MMHG

## 2020-09-25 DIAGNOSIS — M12.9 ARTHROPATHY, UNSPECIFIED: ICD-10-CM

## 2020-09-25 PROCEDURE — 99214 OFFICE O/P EST MOD 30 MIN: CPT | Mod: GC

## 2020-09-25 NOTE — REVIEW OF SYSTEMS
[Fever] : no fever [Chills] : no chills [Chest Pain] : no chest pain [Palpitations] : no palpitations [Lower Ext Edema] : no lower extremity edema [Shortness Of Breath] : no shortness of breath [Cough] : no cough [SOB on Exertion] : no shortness of breath during exertion [Orthopnea] : no orthopnea [Abdominal Pain] : no abdominal pain [Vomiting] : no vomiting [Diarrhea] : no diarrhea

## 2020-09-25 NOTE — HISTORY OF PRESENT ILLNESS
[FreeTextEntry1] : 85 yo F with PMH of Takatsubo cardiomyopathy (dx 2018 s/p v-fib arrest), AICD, CHF, dementia seen post-hospital discharge. Pt was admitted after having multiple AICD shocks while at home for SVT to a HR of 210. Pt denied any sxs at home prior to the shocks but felt the shocks prompting presentation to the ER. During admission, pt had signs of ichemia prompting LHC on 9/4 which showed only 50% stenosis of LAD and no intervention was performed. Her AICD settings were changed and she was discharged on an increased dose of her lopressor to 100mg BID and lisinopril 20mg daily.\par \par Pt also endorsing new pain in her R shoulder limiting motion. She is unable to lift it up laterally 2/2 pain.

## 2020-09-25 NOTE — PHYSICAL EXAM
[General Appearance - Alert] : alert [General Appearance - In No Acute Distress] : in no acute distress [Sclera] : the sclera and conjunctiva were normal [Neck Appearance] : the appearance of the neck was normal [Heart Rate And Rhythm] : heart rate was normal and rhythm regular [Heart Sounds] : normal S1 and S2 [Murmurs] : no murmurs [Heart Sounds Pericardial Friction Rub] : no pericardial rub [Abdomen Soft] : soft [Abdomen Tenderness] : non-tender [Abdomen Mass (___ Cm)] : no abdominal mass palpated [Affect] : the affect was normal [Mood] : the mood was normal [Normal Oral Mucosa] : normal oral mucosa [Outer Ear] : the ears and nose were normal in appearance [Auscultation Breath Sounds / Voice Sounds] : lungs were clear to auscultation bilaterally [Edema] : there was no peripheral edema [Cervical Lymph Nodes Enlarged Posterior Bilaterally] : posterior cervical [Cervical Lymph Nodes Enlarged Anterior Bilaterally] : anterior cervical [Supraclavicular Lymph Nodes Enlarged Bilaterally] : supraclavicular [Axillary Lymph Nodes Enlarged Bilaterally] : axillary [No CVA Tenderness] : no ~M costovertebral angle tenderness [No Spinal Tenderness] : no spinal tenderness [Involuntary Movements] : no involuntary movements were seen [] : no rash [No Focal Deficits] : no focal deficits

## 2020-09-25 NOTE — REASON FOR VISIT
[Post Hospitalization] : a post hospitalization visit [Family Member] : family member [FreeTextEntry1] : Hospitalized for multiple AICD discharges

## 2020-09-25 NOTE — END OF VISIT
[] : Fellow [FreeTextEntry3] : Mrs. Zabala was seen with Dr. Martinez, geriatric fellow. I obtained a detailed recent history and examined the patient. I discussed my findings and plan with the patient, her granddaughter and the fellow. I reviewed Dr. Martinez's note and agree with the assessment and plan.\par Briefly the patient is status post a brief hospital stay after her AICD discharged on multiple occasions. On interrogation it was determined that the AICD discharged inappropriately with SVT and was reprogrammed. Left heart catheter revealed nonocclusive disease and advised medical management. Lopressor dose is adjusted and renovascular hypertension was managed with lisinopril. Patient has renal Dopplers and nephrology followup pending. On exam the patient is frail but her otherwise non-ill-appearing. Vital signs are stable and cardiac exam is at baseline without evidence of volume overload. Recent lab work from the hospital was reviewed and essentially normal. Patient declines immunizations despite counseling. The patient has a chronic shoulder arthropathy and a prescription for physical therapy was given. [Time Spent: ___ minutes] : I have spent [unfilled] minutes of time on the encounter.

## 2020-09-28 RX ORDER — METOPROLOL SUCCINATE 200 MG/1
200 TABLET, EXTENDED RELEASE ORAL DAILY
Qty: 1 | Refills: 0 | Status: DISCONTINUED | COMMUNITY
Start: 2018-08-16 | End: 2020-09-28

## 2020-09-29 ENCOUNTER — APPOINTMENT (OUTPATIENT)
Dept: NEPHROLOGY | Facility: CLINIC | Age: 85
End: 2020-09-29

## 2020-09-30 ENCOUNTER — NON-APPOINTMENT (OUTPATIENT)
Age: 85
End: 2020-09-30

## 2020-09-30 ENCOUNTER — APPOINTMENT (OUTPATIENT)
Dept: CARDIOLOGY | Facility: CLINIC | Age: 85
End: 2020-09-30
Payer: MEDICARE

## 2020-09-30 VITALS — WEIGHT: 104 LBS | BODY MASS INDEX: 17.31 KG/M2

## 2020-09-30 VITALS
BODY MASS INDEX: 17.31 KG/M2 | HEART RATE: 68 BPM | TEMPERATURE: 97 F | OXYGEN SATURATION: 100 % | SYSTOLIC BLOOD PRESSURE: 185 MMHG | HEIGHT: 65 IN | DIASTOLIC BLOOD PRESSURE: 90 MMHG

## 2020-09-30 VITALS — SYSTOLIC BLOOD PRESSURE: 178 MMHG | DIASTOLIC BLOOD PRESSURE: 79 MMHG

## 2020-09-30 PROCEDURE — 93000 ELECTROCARDIOGRAM COMPLETE: CPT

## 2020-09-30 PROCEDURE — 99214 OFFICE O/P EST MOD 30 MIN: CPT

## 2020-10-01 ENCOUNTER — APPOINTMENT (OUTPATIENT)
Dept: OPHTHALMOLOGY | Facility: CLINIC | Age: 85
End: 2020-10-01
Payer: MEDICARE

## 2020-10-01 ENCOUNTER — NON-APPOINTMENT (OUTPATIENT)
Age: 85
End: 2020-10-01

## 2020-10-01 PROCEDURE — 92004 COMPRE OPH EXAM NEW PT 1/>: CPT

## 2020-10-01 PROCEDURE — 92134 CPTRZ OPH DX IMG PST SGM RTA: CPT

## 2020-10-02 ENCOUNTER — APPOINTMENT (OUTPATIENT)
Dept: UROLOGY | Facility: CLINIC | Age: 85
End: 2020-10-02
Payer: MEDICARE

## 2020-10-02 VITALS — TEMPERATURE: 97.3 F

## 2020-10-02 PROCEDURE — 99213 OFFICE O/P EST LOW 20 MIN: CPT

## 2020-10-02 NOTE — HISTORY OF PRESENT ILLNESS
[FreeTextEntry1] : 83 y/o F w/ h/o R renal artery stenosis (since 2015), HTN, HL, microcytic anemia who presents for f/u evaluation Takotsubo in 8/18 (now recovered) where she had Vfib arrest and received ICD for secondary prevention. Accompanied by granddaughter, Ngoc.\par \par Since last visit, had been reportedly doing well. Has been taking appropriate precautions. Has chronic fatigue; somewhat improved since toprol was reduced. She had tachycardia with ICD shocks on 9/3 and was admitted to [facility]. Found to have atrial tachycardia so had inappropriate ICD shocks. Her toprol was escalated to 200 mg daily. She had mild troponin elevation and had a LHC which was non-obstructive. Her device settings were made more conservative since. TTE showed normal LV function. \par \par She is able to do light chores and walk with a cane without shortness of breath but she still reports chronic fatigue. Has been able to walk up the stairs w/o difficulty. Denies orthopnea/PND/LE edema. Tolerating medications including higher lisinopril 10 mg daily. BP at home range 120-130. Recently her toprol was stopped as there was uncertainty about her dose and she had some bradycardia in the 50s.

## 2020-10-02 NOTE — ASSESSMENT
[FreeTextEntry1] : 85 y/o F w/ h/o R renal artery stenosis (since 2015), HTN, HL, with Takotsubo's cardiomyopathy (8/18; now recovered) presented with VT/Vfib arrest now s/p ICD, atrial tachycardia who presents for f/u evaluation. Currently endorses class I-II symptoms and appears euvolemic. Still endorsing fatigue and poor appetite. \par \par 1. HFrecEF - resolved Takotsubo\par - resume toprol at 100 mg daily \par \par 2. HTN - normotensive\par - c/w lisinopril 20 mg daily \par - keep log of BP\par \par 3. Non-obstructive CAD\par - on ASA 81 mg daily and Lipitor 40 mg daily \par \par 4. Fatigue - unclear etiology; not depressed\par - further w/u with primary care; possible candidate for Remeron\par \par RTC 6 months

## 2020-10-02 NOTE — HISTORY OF PRESENT ILLNESS
[FreeTextEntry1] : 83yo female with cc of urinary retention. Pt with cardiac arrest on 8/1 witnessed and CPR and shocked in the field. Went to [facility] and was admitted and had defibrillator placed. She had issues with post procedure ICU delirium. She failed a VT and had amaral replaced (? 1100 cc came out). No rehab needed, pt up and moving around. At baseline, no urinary complaints. Not a ray. She drinks a lot of water. Amaral removed previously and while pt was voiding on her own with no complaints of straining or feelings of incomplete emptying, her PVR was significantly elevated at 650. Pt was taught to CIC. She was told to do BID-TID. For a time she was not consistently doing and had elevated PVR. Pt now has been in the 175-200cc. No changes. Still getting up at night. Trying to do double void. PVR today elevated at almost 400cc. Had sinfle UTI back in April. \par \par She has been doing well. Had episode of defibrillator firing earlier this year and is seeing cards and having medications changed. Still getting up 3-4 times. No issues with constipation.

## 2020-10-02 NOTE — PHYSICAL EXAM
[General Appearance - Well Developed] : well developed [General Appearance - Well Nourished] : well nourished [General Appearance - In No Acute Distress] : no acute distress [Abdomen Soft] : soft [Abdomen Tenderness] : non-tender [Costovertebral Angle Tenderness] : no ~M costovertebral angle tenderness [Skin Color & Pigmentation] : normal skin color and pigmentation [Edema] : no peripheral edema [] : no respiratory distress [Oriented To Time, Place, And Person] : oriented to person, place, and time [Normal Station and Gait] : the gait and station were normal for the patient's age [No Focal Deficits] : no focal deficits [FreeTextEntry1] : thin

## 2020-10-02 NOTE — ASSESSMENT
[FreeTextEntry1] : Urinary retention. Multifactorial with multiple medical issues at the time of hospitalization. Passed VT but significant PVR. SHe has been refusing CIC. Residual increased today relative to prior. Discussed risks of UTI with residual but this is a manageable volume. Nocturia likely from residual. \par --Renal US to r/o hydro. pt already scheduled to have one in coming weeks\par --COnt double voiding\par --Curb fluids 2h prior to bed\par --RTC in 4mo\par

## 2020-10-12 ENCOUNTER — APPOINTMENT (OUTPATIENT)
Dept: ULTRASOUND IMAGING | Facility: IMAGING CENTER | Age: 85
End: 2020-10-12
Payer: MEDICARE

## 2020-10-12 ENCOUNTER — OUTPATIENT (OUTPATIENT)
Dept: OUTPATIENT SERVICES | Facility: HOSPITAL | Age: 85
LOS: 1 days | End: 2020-10-12
Payer: MEDICARE

## 2020-10-12 DIAGNOSIS — Z98.49 CATARACT EXTRACTION STATUS, UNSPECIFIED EYE: Chronic | ICD-10-CM

## 2020-10-12 DIAGNOSIS — Z90.49 ACQUIRED ABSENCE OF OTHER SPECIFIED PARTS OF DIGESTIVE TRACT: Chronic | ICD-10-CM

## 2020-10-12 DIAGNOSIS — I10 ESSENTIAL (PRIMARY) HYPERTENSION: ICD-10-CM

## 2020-10-12 PROCEDURE — 93975 VASCULAR STUDY: CPT | Mod: 26

## 2020-10-12 PROCEDURE — 93975 VASCULAR STUDY: CPT

## 2020-11-12 ENCOUNTER — TRANSCRIPTION ENCOUNTER (OUTPATIENT)
Age: 85
End: 2020-11-12

## 2020-11-20 ENCOUNTER — APPOINTMENT (OUTPATIENT)
Dept: ELECTROPHYSIOLOGY | Facility: CLINIC | Age: 85
End: 2020-11-20

## 2020-11-20 ENCOUNTER — APPOINTMENT (OUTPATIENT)
Dept: CARDIOLOGY | Facility: CLINIC | Age: 85
End: 2020-11-20

## 2020-11-25 NOTE — BEHAVIORAL HEALTH ASSESSMENT NOTE - ACCESS TO FIREARM
Referred by: Toby Bledsoe MD; Medical Diagnosis (from order):    Diagnosis Information      Diagnosis    812.40 (ICD-9-CM) - S42.402A (ICD-10-CM) - Closed fracture of left distal humerus    955.3 (ICD-9-CM) - S54.22XA (ICD-10-CM) - Injury of left radial nerve                Occupational Therapy -  Daily Treatment Note    Visit:  2     SUBJECTIVE                                                                                                             Pt with steristrips at humerus incision intact from last week pt reports    Pain / Symptoms:  Pain rating (out of 10): ; Worst: 9    OBJECTIVE                                                                                                                     Incision/Wound:   - Location: steristrips intact beginning to loosen         TREATMENT                                                                                                                  Therapeutic Exercise:  Overhead reciprocol pullys   UBE for AROM no restistance 3.5 min F and 3.5 min B  Stretch by OTR flex/ext elbow and sup    Therapeutic Activity:  Wall washes    Ortho check wrist support pre tamika    Skilled input: verbal instruction/cues, tactile instruction/cues and posture correction    Home Exercise Program: Advised pt to cont. To wear wrist support during day and at night (removing 3x a day for PROM wrist)      ASSESSMENT                                                                                                             OT vended pt D Spandagrip for his L humerus-good fit noted to cover stristrips which are beginning to loosen aty edges and pt indicates they were placed last week when he saw . Pt presents wearing his soft wrist cock up and it is with good fit.    Patient Education:   Results of above outlined education: Verbalizes understanding and Demonstrates understanding          Procedures and total treatment time documented Time Entry flowsheet.  
No

## 2020-12-18 ENCOUNTER — APPOINTMENT (OUTPATIENT)
Dept: CARDIOLOGY | Facility: CLINIC | Age: 85
End: 2020-12-18

## 2020-12-18 ENCOUNTER — APPOINTMENT (OUTPATIENT)
Dept: GERIATRICS | Facility: CLINIC | Age: 85
End: 2020-12-18

## 2021-01-13 ENCOUNTER — APPOINTMENT (OUTPATIENT)
Dept: GERIATRICS | Facility: CLINIC | Age: 86
End: 2021-01-13
Payer: MEDICARE

## 2021-01-13 VITALS
HEIGHT: 65 IN | HEART RATE: 77 BPM | WEIGHT: 109 LBS | BODY MASS INDEX: 18.16 KG/M2 | RESPIRATION RATE: 15 BRPM | TEMPERATURE: 98 F | DIASTOLIC BLOOD PRESSURE: 100 MMHG | OXYGEN SATURATION: 97 % | SYSTOLIC BLOOD PRESSURE: 180 MMHG

## 2021-01-13 VITALS — SYSTOLIC BLOOD PRESSURE: 160 MMHG | DIASTOLIC BLOOD PRESSURE: 82 MMHG

## 2021-01-13 DIAGNOSIS — I49.9 CARDIAC ARRHYTHMIA, UNSPECIFIED: ICD-10-CM

## 2021-01-13 DIAGNOSIS — Z00.00 ENCOUNTER FOR GENERAL ADULT MEDICAL EXAMINATION W/OUT ABNORMAL FINDINGS: ICD-10-CM

## 2021-01-13 PROCEDURE — 99214 OFFICE O/P EST MOD 30 MIN: CPT | Mod: GC

## 2021-01-13 PROCEDURE — 99072 ADDL SUPL MATRL&STAF TM PHE: CPT

## 2021-01-13 NOTE — REASON FOR VISIT
[Follow-Up] : a follow-up visit [Family Member] : family member [FreeTextEntry1] : f/u HTN, CHF, dementia

## 2021-01-13 NOTE — END OF VISIT
[] : Fellow [FreeTextEntry3] : Mrs. Zabala was seen with Dr. Dc, geriatric fellow. I obtained a detailed interval history and personally examined the patient. I discussed my findings and plan with the patient and her granddaughter Ngoc, reviewed the fellow's note and agree with assessment and plan.the patient is doing well. BP slightly elevated at today's visit but granddaughter assures me that he she checks patient's blood pressure regularly and it has been in the normal range. Advised strict salt restriction and compliance with medications.Will update labs.\par

## 2021-01-13 NOTE — ASSESSMENT
[FreeTextEntry1] : - Ordered TSH, CBC, CMP, UA, Lipid Panel\par - Continue current medical management\par - Provided contact information with SW for further assistance at home for patient\par - Follow up in 4 months

## 2021-01-13 NOTE — HISTORY OF PRESENT ILLNESS
[Mild] : Stage: Mild [Stable] : Status: Stable [FreeTextEntry1] : Patient is a 86 y/o F w/ PMH of Takatsubo cardiomyopathy (dx 2018 s/p v-fib arrest), AICD, CHF, renal artery stenosis who presented to the clinic for follow up. Patient presented with her daughter.\par \par Per daughter, patient has been doing well, has urinary symptoms last month however was not given any antibiotics at that time. Patient today, denies any dysuira or polyuria. Patient has been following up with urology, nephrology and cardio regularly. Denies any chest pain, AICD firing, SOB, abdominal pain, or urinary symptoms. \par \par Daughter states patient has been compliant with home medications, took medications a little late today as they woke up later. Usually is the one who gives the patient her medications. States she is noticing her memory is worsening.

## 2021-01-13 NOTE — PHYSICAL EXAM
[General Appearance - Alert] : alert [General Appearance - In No Acute Distress] : in no acute distress [General Appearance - Well Developed] : well developed [General Appearance - Well-Appearing] : healthy appearing [Sclera] : the sclera and conjunctiva were normal [Extraocular Movements] : extraocular movements were intact [Outer Ear] : the ears and nose were normal in appearance [Neck Appearance] : the appearance of the neck was normal [Neck Cervical Mass (___cm)] : no neck mass was observed [Respiration, Rhythm And Depth] : normal respiratory rhythm and effort [Exaggerated Use Of Accessory Muscles For Inspiration] : no accessory muscle use [Auscultation Breath Sounds / Voice Sounds] : lungs were clear to auscultation bilaterally [Heart Rate And Rhythm] : heart rate was normal and rhythm regular [Heart Sounds] : normal S1 and S2 [Edema] : there was no peripheral edema [Veins - Varicosity Changes] : there were no varicosital changes [Bowel Sounds] : normal bowel sounds [Abdomen Soft] : soft [Abdomen Tenderness] : non-tender [Abdomen Mass (___ Cm)] : no abdominal mass palpated [No CVA Tenderness] : no ~M costovertebral angle tenderness [No Spinal Tenderness] : no spinal tenderness [Abnormal Walk] : normal gait [Involuntary Movements] : no involuntary movements were seen [Motor Tone] : muscle strength and tone were normal [Skin Color & Pigmentation] : normal skin color and pigmentation [Skin Turgor] : normal skin turgor [] : no rash [Motor Exam] : the motor exam was normal [No Focal Deficits] : no focal deficits [Affect] : the affect was normal [Mood] : the mood was normal [FreeTextEntry1] : 2/6 systolic murmur in the aortic region [Cervical Lymph Nodes Enlarged Posterior Bilaterally] : posterior cervical [Cervical Lymph Nodes Enlarged Anterior Bilaterally] : anterior cervical [Supraclavicular Lymph Nodes Enlarged Bilaterally] : supraclavicular [Axillary Lymph Nodes Enlarged Bilaterally] : axillary [TWNoteComboBox1] : Positive: 1-2 + Abnormal CDT

## 2021-01-16 LAB
ALBUMIN SERPL ELPH-MCNC: 3.7 G/DL
ALP BLD-CCNC: 141 U/L
ALT SERPL-CCNC: 17 U/L
ANION GAP SERPL CALC-SCNC: 12 MMOL/L
AST SERPL-CCNC: 27 U/L
BASOPHILS # BLD AUTO: 0.04 K/UL
BASOPHILS NFR BLD AUTO: 0.8 %
BILIRUB SERPL-MCNC: 0.8 MG/DL
BUN SERPL-MCNC: 17 MG/DL
CALCIUM SERPL-MCNC: 9.2 MG/DL
CHLORIDE SERPL-SCNC: 105 MMOL/L
CHOLEST SERPL-MCNC: 169 MG/DL
CO2 SERPL-SCNC: 23 MMOL/L
CREAT SERPL-MCNC: 1.06 MG/DL
EOSINOPHIL # BLD AUTO: 0.09 K/UL
EOSINOPHIL NFR BLD AUTO: 1.8 %
GLUCOSE SERPL-MCNC: 80 MG/DL
HCT VFR BLD CALC: 41.1 %
HDLC SERPL-MCNC: 66 MG/DL
HGB BLD-MCNC: 12.5 G/DL
IMM GRANULOCYTES NFR BLD AUTO: 0.2 %
IRON SATN MFR SERPL: 36 %
IRON SERPL-MCNC: 103 UG/DL
LDLC SERPL CALC-MCNC: 86 MG/DL
LYMPHOCYTES # BLD AUTO: 1.99 K/UL
LYMPHOCYTES NFR BLD AUTO: 39.8 %
MAN DIFF?: NORMAL
MCHC RBC-ENTMCNC: 22.9 PG
MCHC RBC-ENTMCNC: 30.4 GM/DL
MCV RBC AUTO: 75.4 FL
MONOCYTES # BLD AUTO: 0.55 K/UL
MONOCYTES NFR BLD AUTO: 11 %
NEUTROPHILS # BLD AUTO: 2.32 K/UL
NEUTROPHILS NFR BLD AUTO: 46.4 %
NONHDLC SERPL-MCNC: 104 MG/DL
PLATELET # BLD AUTO: 154 K/UL
POTASSIUM SERPL-SCNC: 3.9 MMOL/L
PROT SERPL-MCNC: 6.9 G/DL
RBC # BLD: 5.45 M/UL
RBC # FLD: 16.9 %
SODIUM SERPL-SCNC: 140 MMOL/L
TIBC SERPL-MCNC: 290 UG/DL
TRIGL SERPL-MCNC: 88 MG/DL
TSH SERPL-ACNC: 1.2 UIU/ML
UIBC SERPL-MCNC: 187 UG/DL
WBC # FLD AUTO: 5 K/UL

## 2021-02-05 ENCOUNTER — NON-APPOINTMENT (OUTPATIENT)
Age: 86
End: 2021-02-05

## 2021-02-05 ENCOUNTER — APPOINTMENT (OUTPATIENT)
Dept: CARDIOLOGY | Facility: CLINIC | Age: 86
End: 2021-02-05
Payer: MEDICARE

## 2021-02-05 VITALS
TEMPERATURE: 97.7 F | SYSTOLIC BLOOD PRESSURE: 146 MMHG | HEART RATE: 66 BPM | HEIGHT: 60 IN | DIASTOLIC BLOOD PRESSURE: 76 MMHG | BODY MASS INDEX: 21.48 KG/M2 | OXYGEN SATURATION: 98 %

## 2021-02-05 VITALS — BODY MASS INDEX: 18.31 KG/M2 | WEIGHT: 110 LBS

## 2021-02-05 PROCEDURE — 93000 ELECTROCARDIOGRAM COMPLETE: CPT

## 2021-02-05 PROCEDURE — 99072 ADDL SUPL MATRL&STAF TM PHE: CPT

## 2021-02-05 PROCEDURE — 99215 OFFICE O/P EST HI 40 MIN: CPT

## 2021-02-05 NOTE — ASSESSMENT
[FreeTextEntry1] : 84 y/o F w/ h/o R renal artery stenosis (since 2015), HTN, HL, with Takotsubo's cardiomyopathy (8/18; now recovered) presented with VT/Vfib arrest now s/p ICD, atrial tachycardia (currently in sinus) who presents for f/u evaluation. Currently endorses class I-II symptoms and appears euvolemic. Still endorsing fatigue but improved appetite. Notably hypertensive in office (symmetrical). \par \par 1. HFrecEF - resolved Takotsubo\par - c/w toprol 100 mg daily \par \par 2. HTN - normotensive\par - on lisinopril 20 mg daily; recent labs reviewed with normal renal function and potassium; will increase to 40 mg daily \par - keep log of BP; inform us if BP >140 after 1 week\par - repeat labs in 2 weeks\par \par 3. Non-obstructive CAD\par - on ASA 81 mg daily and Lipitor 40 mg daily \par \par RTC 2 months with NP, 4 months with me

## 2021-02-05 NOTE — HISTORY OF PRESENT ILLNESS
[FreeTextEntry1] : 86 y/o F w/ h/o R renal artery stenosis (since 2015), HTN, HL, microcytic anemia who presents for f/u evaluation Takotsubo in 8/18 (now recovered) where she had Vfib arrest and received ICD for secondary prevention. Accompanied by granddaughter, Ngoc.\par \par Since last visit, had been reportedly doing well. Has been taking appropriate precautions. Has chronic fatigue; initially improved when toprol was reduced but due to atrial tachycardia leading to ICD shocks, toprol was escalated to 200 mg daily then discontinued at some point. Resumed at 100 mg daily which she's tolerating. \par \par She is able to do light chores and walk with a cane without shortness of breath but she still reports chronic fatigue. Has been able to walk up the stairs w/o difficulty. Denies orthopnea/PND/LE edema. Tolerating medications including higher lisinopril 20 mg daily. \par \par Had notable BP in 160-180 on outpatient visits; home BP range 140-150. Denies visual disturbances, headaches. \par Repeat /89 on R and 170/90 on L.

## 2021-02-18 ENCOUNTER — NON-APPOINTMENT (OUTPATIENT)
Age: 86
End: 2021-02-18

## 2021-02-18 LAB
ALBUMIN SERPL ELPH-MCNC: 3.6 G/DL
ALP BLD-CCNC: 145 U/L
ALT SERPL-CCNC: 21 U/L
ANION GAP SERPL CALC-SCNC: 10 MMOL/L
AST SERPL-CCNC: 29 U/L
BILIRUB SERPL-MCNC: 0.6 MG/DL
BUN SERPL-MCNC: 23 MG/DL
CALCIUM SERPL-MCNC: 9.1 MG/DL
CHLORIDE SERPL-SCNC: 106 MMOL/L
CO2 SERPL-SCNC: 24 MMOL/L
CREAT SERPL-MCNC: 1.03 MG/DL
GLUCOSE SERPL-MCNC: 102 MG/DL
NT-PROBNP SERPL-MCNC: 229 PG/ML
POTASSIUM SERPL-SCNC: 3.5 MMOL/L
PROT SERPL-MCNC: 6.7 G/DL
SODIUM SERPL-SCNC: 141 MMOL/L

## 2021-02-22 ENCOUNTER — APPOINTMENT (OUTPATIENT)
Dept: UROLOGY | Facility: CLINIC | Age: 86
End: 2021-02-22

## 2021-02-23 ENCOUNTER — NON-APPOINTMENT (OUTPATIENT)
Age: 86
End: 2021-02-23

## 2021-02-23 ENCOUNTER — APPOINTMENT (OUTPATIENT)
Dept: ELECTROPHYSIOLOGY | Facility: CLINIC | Age: 86
End: 2021-02-23
Payer: MEDICARE

## 2021-02-23 PROCEDURE — 93296 REM INTERROG EVL PM/IDS: CPT

## 2021-02-23 PROCEDURE — 93295 DEV INTERROG REMOTE 1/2/MLT: CPT

## 2021-03-22 RX ORDER — HYDROCHLOROTHIAZIDE 12.5 MG/1
12.5 CAPSULE ORAL DAILY
Qty: 30 | Refills: 3 | Status: DISCONTINUED | COMMUNITY
Start: 2021-02-11 | End: 2021-03-22

## 2021-04-02 ENCOUNTER — APPOINTMENT (OUTPATIENT)
Dept: OPHTHALMOLOGY | Facility: CLINIC | Age: 86
End: 2021-04-02

## 2021-04-02 ENCOUNTER — NON-APPOINTMENT (OUTPATIENT)
Age: 86
End: 2021-04-02

## 2021-04-02 DIAGNOSIS — N39.0 URINARY TRACT INFECTION, SITE NOT SPECIFIED: ICD-10-CM

## 2021-04-05 ENCOUNTER — APPOINTMENT (OUTPATIENT)
Dept: UROLOGY | Facility: CLINIC | Age: 86
End: 2021-04-05
Payer: MEDICARE

## 2021-04-05 DIAGNOSIS — R33.9 RETENTION OF URINE, UNSPECIFIED: ICD-10-CM

## 2021-04-05 LAB
APPEARANCE: CLEAR
BACTERIA UR CULT: NORMAL
BACTERIA: NEGATIVE
BILIRUBIN URINE: NEGATIVE
BLOOD URINE: NEGATIVE
COLOR: YELLOW
GLUCOSE QUALITATIVE U: NEGATIVE
HYALINE CASTS: 0 /LPF
KETONES URINE: NEGATIVE
LEUKOCYTE ESTERASE URINE: ABNORMAL
MICROSCOPIC-UA: NORMAL
NITRITE URINE: NEGATIVE
PH URINE: 6
PROTEIN URINE: NORMAL
RED BLOOD CELLS URINE: 4 /HPF
SPECIFIC GRAVITY URINE: 1.02
SQUAMOUS EPITHELIAL CELLS: 9 /HPF
UROBILINOGEN URINE: NORMAL
WHITE BLOOD CELLS URINE: 32 /HPF

## 2021-04-05 PROCEDURE — 99213 OFFICE O/P EST LOW 20 MIN: CPT

## 2021-04-05 PROCEDURE — 99072 ADDL SUPL MATRL&STAF TM PHE: CPT

## 2021-04-05 NOTE — HISTORY OF PRESENT ILLNESS
[FreeTextEntry1] : 83yo female with cc of urinary retention. Pt with cardiac arrest on 8/1 witnessed and CPR and shocked in the field. Went to [facility] and was admitted and had defibrillator placed. She had issues with post procedure ICU delirium. She failed a VT and had amaral replaced (? 1100 cc came out). No rehab needed, pt up and moving around. At baseline, no urinary complaints. Not a ray. She drinks a lot of water. Amaral removed previously and while pt was voiding on her own with no complaints of straining or feelings of incomplete emptying, her PVR was significantly elevated at 650. Pt was taught to CIC. She was told to do BID-TID. For a time she was not consistently doing and had elevated PVR. Pt now has been in the 175-200cc. No changes. Still getting up at night. Trying to do double void. PVR at last visit elevated at almost 400cc. Had single UTI back in April. \par \par She has been doing well. She was having intermittent dysuria over the course of the week. Called on Friday and was empirically started on abx with keflex. She is feeling improved. Her UA had many epis and some pyuria. UCx was contaminated. No issues with constipation. PVR today is 300.

## 2021-04-05 NOTE — ASSESSMENT
[FreeTextEntry1] : Urinary retention. Multifactorial with multiple medical issues at the time of hospitalization. Passed VT but significant PVR. SHe has been refusing CIC. Residual increased today relative to prior. Discussed risks of UTI with residual but this is a manageable volume. Nocturia likely from residual. PVR slightly improved today\par --Renal US in Oct\par --COnt double voiding\par --Complete keflex\par

## 2021-04-07 ENCOUNTER — APPOINTMENT (OUTPATIENT)
Dept: CARDIOLOGY | Facility: CLINIC | Age: 86
End: 2021-04-07
Payer: MEDICARE

## 2021-04-07 VITALS
HEART RATE: 59 BPM | DIASTOLIC BLOOD PRESSURE: 106 MMHG | HEIGHT: 60 IN | OXYGEN SATURATION: 10 % | SYSTOLIC BLOOD PRESSURE: 188 MMHG | BODY MASS INDEX: 21.48 KG/M2

## 2021-04-07 VITALS — DIASTOLIC BLOOD PRESSURE: 83 MMHG | SYSTOLIC BLOOD PRESSURE: 182 MMHG

## 2021-04-07 VITALS — BODY MASS INDEX: 21.48 KG/M2 | WEIGHT: 110 LBS

## 2021-04-07 VITALS — DIASTOLIC BLOOD PRESSURE: 81 MMHG | SYSTOLIC BLOOD PRESSURE: 190 MMHG

## 2021-04-07 VITALS — DIASTOLIC BLOOD PRESSURE: 90 MMHG | SYSTOLIC BLOOD PRESSURE: 190 MMHG

## 2021-04-07 VITALS — HEART RATE: 54 BPM

## 2021-04-07 PROCEDURE — 99214 OFFICE O/P EST MOD 30 MIN: CPT

## 2021-04-07 PROCEDURE — 93000 ELECTROCARDIOGRAM COMPLETE: CPT

## 2021-04-07 PROCEDURE — 99072 ADDL SUPL MATRL&STAF TM PHE: CPT

## 2021-04-07 PROCEDURE — 36415 COLL VENOUS BLD VENIPUNCTURE: CPT

## 2021-04-07 RX ORDER — ASPIRIN ENTERIC COATED TABLETS 81 MG 81 MG/1
81 TABLET, DELAYED RELEASE ORAL
Qty: 90 | Refills: 3 | Status: ACTIVE | COMMUNITY
Start: 2018-08-16 | End: 1900-01-01

## 2021-04-07 RX ORDER — BLOOD PRESSURE TEST KIT-MEDIUM
KIT MISCELLANEOUS
Qty: 1 | Refills: 0 | Status: ACTIVE | COMMUNITY
Start: 2021-04-07 | End: 1900-01-01

## 2021-04-07 NOTE — REASON FOR VISIT
[Heart Failure] : congestive heart failure [Follow-Up - Clinic] : a clinic follow-up of [Other: _____] : [unfilled]

## 2021-04-08 LAB
ALBUMIN SERPL ELPH-MCNC: 3.9 G/DL
ALP BLD-CCNC: 154 U/L
ALT SERPL-CCNC: 18 U/L
ANION GAP SERPL CALC-SCNC: 10 MMOL/L
AST SERPL-CCNC: 26 U/L
BILIRUB SERPL-MCNC: 0.9 MG/DL
BUN SERPL-MCNC: 12 MG/DL
CALCIUM SERPL-MCNC: 9.3 MG/DL
CHLORIDE SERPL-SCNC: 104 MMOL/L
CO2 SERPL-SCNC: 25 MMOL/L
CREAT SERPL-MCNC: 0.98 MG/DL
POTASSIUM SERPL-SCNC: 3.9 MMOL/L
PROT SERPL-MCNC: 7.5 G/DL
SODIUM SERPL-SCNC: 139 MMOL/L

## 2021-04-12 ENCOUNTER — TRANSCRIPTION ENCOUNTER (OUTPATIENT)
Age: 86
End: 2021-04-12

## 2021-04-12 ENCOUNTER — NON-APPOINTMENT (OUTPATIENT)
Age: 86
End: 2021-04-12

## 2021-04-13 LAB — NT-PROBNP SERPL-MCNC: 670 PG/ML

## 2021-04-13 NOTE — ASSESSMENT
[FreeTextEntry1] : 84 y/o F w/ h/o R renal artery stenosis (since 2015), HTN, HL, with Takotsubo's cardiomyopathy (8/18; now recovered) presented with VT/Vfib arrest now s/p ICD, atrial tachycardia (currently in sinus) who presents for f/u evaluation. Currently endorses class I-II symptoms and appears euvolemic and hypertensive in office \par \par 1. HFrecEF - resolved Takotsubo\par - c/w toprol 100 mg daily \par \par 2. HTN - hypertensive\par - restart HCTZ 25 mg daily\par - continue on lisinopril 40 mg daily; will check labs on increased dose\par - keep log of BP; inform us if BP >140 after 1 week\par - order another B/P monitor for home\par \par 3. Non-obstructive CAD\par - on ASA 81 mg daily and Lipitor 40 mg daily \par \par RTC  6 weeks and will call with labs and any further med changes.

## 2021-04-13 NOTE — HISTORY OF PRESENT ILLNESS
[FreeTextEntry1] : 86 y/o F w/ h/o R renal artery stenosis (since 2015), HTN, HL, microcytic anemia who presents for f/u evaluation Takotsubo in 8/18 (now recovered) where she had Vfib arrest and received ICD for secondary prevention. Accompanied by granddaughter, Ngoc.\par \par Last visit 2/5/21, lisinopril was increased from 20 mg to 40 mg daily. Grand daughter reported B/P was still elevated 173-103 and HCTZ was started at 12.5 mg daily and was further increased on 3/22/21 to 25 mg daily. Her B/P is high today 188/106 to manual 190/90 but reports she had run out of the HCTZ for one week. She has been taking other meds including metoprolol 100 mg daily and lisinopril 40 mg. h\par She states she is doing well. Has been taking appropriate precautions but has not had the Covid vaccine yet. Previously, she had. chronic fatigue; initially improved when Toprol was reduced but due to atrial tachycardia leading to ICD shocks, Toprol was escalated to 200 mg daily then discontinued at some point. Resumed at 100 mg daily which she's tolerating with HR 54 and no reported dizziness or LH. \par \par She is able to do light chores and walk with a cane without shortness of breath but she still reports chronic fatigue. Has been able to walk up the stairs w/o difficulty. Denies chest pain, palpitations,orthopnea/PND/LE edema. \par \par States  home BP range 165-85/91 but has been off the HCTZ and weight is 107-110 lbs.. Denies visual disturbances, headaches. \par

## 2021-04-13 NOTE — ADDENDUM
[FreeTextEntry1] : Called grand daughter Ngoc with results of labs with K 3.9 and BUN/creat 12/0.98 on increased dose of lisinopril. She will call next week with B/P readings.

## 2021-04-13 NOTE — PHYSICAL EXAM
[General Appearance - Well Developed] : well developed [Normal Appearance] : normal appearance [Normal Conjunctiva] : the conjunctiva exhibited no abnormalities [Normal Oral Mucosa] : normal oral mucosa [] : no respiratory distress [Auscultation Breath Sounds / Voice Sounds] : lungs were clear to auscultation bilaterally [Heart Sounds] : normal S1 and S2 [Bowel Sounds] : normal bowel sounds [Abdomen Soft] : soft [Abdomen Tenderness] : non-tender [Abnormal Walk] : normal gait [Nail Clubbing] : no clubbing of the fingernails [Skin Color & Pigmentation] : normal skin color and pigmentation [Oriented To Time, Place, And Person] : oriented to person, place, and time [FreeTextEntry1] : clem

## 2021-05-12 ENCOUNTER — APPOINTMENT (OUTPATIENT)
Dept: GERIATRICS | Facility: CLINIC | Age: 86
End: 2021-05-12

## 2021-05-19 ENCOUNTER — APPOINTMENT (OUTPATIENT)
Dept: CARDIOLOGY | Facility: CLINIC | Age: 86
End: 2021-05-19
Payer: MEDICARE

## 2021-05-19 VITALS
HEIGHT: 60 IN | DIASTOLIC BLOOD PRESSURE: 78 MMHG | OXYGEN SATURATION: 99 % | BODY MASS INDEX: 21.79 KG/M2 | SYSTOLIC BLOOD PRESSURE: 160 MMHG | HEART RATE: 58 BPM | WEIGHT: 111 LBS

## 2021-05-19 VITALS — DIASTOLIC BLOOD PRESSURE: 72 MMHG | SYSTOLIC BLOOD PRESSURE: 158 MMHG

## 2021-05-19 PROCEDURE — 99072 ADDL SUPL MATRL&STAF TM PHE: CPT

## 2021-05-19 PROCEDURE — 93000 ELECTROCARDIOGRAM COMPLETE: CPT

## 2021-05-19 PROCEDURE — 99214 OFFICE O/P EST MOD 30 MIN: CPT

## 2021-05-19 NOTE — ASSESSMENT
[FreeTextEntry1] : 84 y/o F w/ h/o R renal artery stenosis (since 2015), HTN, HL, with Takotsubo's cardiomyopathy (8/18; now recovered) presented with VT/Vfib arrest now s/p ICD, atrial tachycardia (currently in sinus) who presents for f/u evaluation. Currently endorses class I-II symptoms and appears euvolemic. Still endorsing fatigue but improved appetite. Notably hypertensive in office (symmetrical). \par \par 1. HFrecEF - resolved Takotsubo\par - c/w Toprol 100 mg daily \par \par 2. HTN - elevated in office but home B/P range 130/75\par - on lisinopril 40 mg daily, 4/7/21 labs with K 3.9 and BUN/creat 12/0.98\par - Continue HCTZ 25 mg daily\par - keep log of BP; inform us if BP >140 \par - for labs with PCP in 2 weeks\par \par 3. Non-obstructive CAD\par - on ASA 81 mg daily and Lipitor 40 mg daily \par - no active chest pain or EKG changes\par \par 4. VT/ S/P ICD\par - Dual chamber [employer] ICD set at . If she has any dizziness, may consider increase in lower rate limit. \par \par RTC 3 months with Dr. Banks

## 2021-05-19 NOTE — REASON FOR VISIT
[Heart Failure] : congestive heart failure [Follow-Up - Clinic] : a clinic follow-up of [Hypertension] : hypertension

## 2021-05-19 NOTE — HISTORY OF PRESENT ILLNESS
[FreeTextEntry1] : 84 y/o F w/ h/o R renal artery stenosis (since 2015), HTN, HL, microcytic anemia who presents for f/u evaluation Takotsubo in 8/18 (now recovered) where she had Vfib arrest and received ICD for secondary prevention. Accompanied by granddaughter, Ngoc.\par \par Since last visit, had been reportedly doing well. Has been taking appropriate precautions. Has chronic fatigue; initially improved when Toprol was reduced but due to atrial tachycardia leading to ICD shocks, Toprol was escalated to 200 mg daily then discontinued at some point. Resumed at 100 mg daily which she's tolerating. \par \par She is able to do light chores and walk with a cane without shortness of breath. Has been able to walk up the stairs w/o difficulty. States she stays active during the day. Denies orthopnea/PND/LE edema. Tolerating medications including higher lisinopril 40 mg daily. Pt was treated for a UTI in April. \par \par Previously, had notable BP in 160-180 on outpatient visits and lisinopril was increased to 40 mg daily and HCTZ 25 mg daily was added; home BP range is in the range of 130/75-90. Denies visual disturbances, headaches. \par Of note, pt will be having labs with PCP in 2 weeks.

## 2021-05-19 NOTE — PHYSICAL EXAM
[General Appearance - Well Developed] : well developed [Normal Appearance] : normal appearance [Normal Conjunctiva] : the conjunctiva exhibited no abnormalities [Normal Oral Mucosa] : normal oral mucosa [] : no respiratory distress [Auscultation Breath Sounds / Voice Sounds] : lungs were clear to auscultation bilaterally [Heart Rate And Rhythm] : heart rate and rhythm were normal [Heart Sounds] : normal S1 and S2 [Bowel Sounds] : normal bowel sounds [Abdomen Soft] : soft [Abdomen Tenderness] : non-tender [Abnormal Walk] : normal gait [Nail Clubbing] : no clubbing of the fingernails [Skin Color & Pigmentation] : normal skin color and pigmentation [Oriented To Time, Place, And Person] : oriented to person, place, and time [FreeTextEntry1] : no edema

## 2021-05-25 NOTE — PROGRESS NOTE ADULT - PROBLEM SELECTOR PLAN 2
Assessment & Plan     Acute pain of left knee  DDX includes OA vs ligament tear vs meniscal pathology vs other. Some mild laxity felt on exam which is concerning. Will check XR today but I do feel like she needs a MRI to best characterize this laxity in her joint. Prescribed NSAID to help with pain in the interim. Sports med referral placed pending MRI results. Work restrictions given via letter.  - XR Knee Left 3 Views; Future  - diclofenac (VOLTAREN) 75 MG EC tablet; Take 1 tablet (75 mg) by mouth 2 times daily as needed for moderate pain  - Orthopedic & Spine  Referral; Future  - MR Knee Left w/o Contrast; Future    Need for vaccination  - TDAP VACCINE (Adacel, Boostrix)  [[id number]]    Return in about 3 months (around 8/25/2021) for Physical Exam.    Frank Tovar MD  [facility] HADLEY Morgan is a 45 year old who presents for the following health issues:    Musculoskeletal problem/pain  Onset/Duration:  2 years  Description  Location: knee - left  Joint Swelling: no  Redness: no  Pain: YES  Warmth: no  Intensity:  moderate  Progression of Symptoms:  worsening  Accompanying signs and symptoms:   Fevers: no  Numbness/tingling/weakness: no  History  Trauma to the area: YES, sprained it multiple times in the past, twisted it recently    Recent illness:  no  Previous similar problem: no  Previous evaluation:  no  Precipitating or alleviating factors:  Aggravating factors include: walking, climbing stairs, overuse and cold or damp weather  Therapies tried and outcome: tylenol    2-3 years ago suffered a 'sprain' where her knee popped and swelled up immediately. Was painful. She tells me she was living in [place] at that time. She went to a physician who apparently checked XR of the knee which were apparently reassuring (I do not have these records to review) and she was given a knee brace which she tells me helped a bit but has stopped working. 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 Her R knee is normal and pain free.    Review of Systems   Constitutional, MSK, derm systems are negative, except as otherwise noted.      Objective    BP (!) 132/92   Pulse 86   Temp 96.7  F (35.9  C) (Tympanic)   Wt 134.9 kg (297 lb 8 oz)   LMP 05/11/2021 (Exact Date)   SpO2 97%   There is no height or weight on file to calculate BMI.     Physical Exam   GENERAL: alert and in no distress.  EYES: conjunctivae/corneas clear. EOMs grossly intact  HENT: NC/AT, facies symmetric.  RESP: No iWOB.  CV: RRR to DP.  MSK: L knee: No joint swelling noted. No overlying erythema noted. Joint line mildly TTP over lateral and medial aspects. Anterior drawer with some mild laxity. Posterior drawer negative. Varus/valgus negative though pain elicited over the opposite side of the joint (where the pressure was being placed). No pain with patellar pressure. Strength 5/5 in all planes.  SKIN: No significant ulcers, lesions, or rashes on the visualized portions of the skin  NEURO: Alert. Oriented. Sensation grossly WNL.   No EKG changes. Denies any chest pain.   ?Demand ischemia due to elevated BP  s/p TTE - grade 2 diastolic dysfunction, mild TR  +NST. Plan to transfer to [facility] for cardiac cath today.   c/w ASA & BB & statin  Cardiology Dr. Ellis following.

## 2021-05-26 ENCOUNTER — NON-APPOINTMENT (OUTPATIENT)
Age: 86
End: 2021-05-26

## 2021-05-26 ENCOUNTER — APPOINTMENT (OUTPATIENT)
Dept: ELECTROPHYSIOLOGY | Facility: CLINIC | Age: 86
End: 2021-05-26
Payer: MEDICARE

## 2021-05-26 PROCEDURE — 93296 REM INTERROG EVL PM/IDS: CPT

## 2021-05-26 PROCEDURE — 93295 DEV INTERROG REMOTE 1/2/MLT: CPT

## 2021-05-28 ENCOUNTER — APPOINTMENT (OUTPATIENT)
Dept: CARDIOLOGY | Facility: CLINIC | Age: 86
End: 2021-05-28

## 2021-07-15 NOTE — BEHAVIORAL HEALTH ASSESSMENT NOTE - FAMILY HISTORY OF MEDICAL ILLNESS
DATE OF SERVICE: 07-15-21 @ 09:42    Subjective: Patient seen and examined. No new events except as noted.     SUBJECTIVE/ROS:  Pt seen and examined early this am       MEDICATIONS:  MEDICATIONS  (STANDING):      PHYSICAL EXAM:  T(C): 36.6 (07-15-21 @ 04:24), Max: 36.7 (07-14-21 @ 21:35)  HR: 65 (07-15-21 @ 04:24) (65 - 72)  BP: 109/66 (07-15-21 @ 04:24) (107/69 - 128/75)  RR: 18 (07-15-21 @ 04:24) (17 - 18)  SpO2: 96% (07-15-21 @ 04:24) (94% - 96%)  Wt(kg): --  I&O's Summary    14 Jul 2021 07:01  -  15 Jul 2021 07:00  --------------------------------------------------------  IN: 597 mL / OUT: 800 mL / NET: -203 mL      Height (cm): 157.5 (07-15 @ 03:47)  Weight (kg): 51.7 (07-15 @ 03:47)  BMI (kg/m2): 20.8 (07-15 @ 03:47)  BSA (m2): 1.51 (07-15 @ 03:47)      JVP: Normal  Neck: supple  Lung: clear   CV: S1 S2 , Murmur:  Abd: soft  Ext: No edema  neuro: Awake / alert  Psych: flat affect  Skin: normal``    LABS/DATA:    CARDIAC MARKERS:                                10.2   8.75  )-----------( 188      ( 15 Jul 2021 04:28 )             30.6     07-15    137  |  107  |  13  ----------------------------<  91  4.7   |  19<L>  |  1.21    Ca    9.3      15 Jul 2021 04:28  Phos  3.7     07-14  Mg     2.2     07-14      proBNP:   Lipid Profile:   HgA1c:   TSH:     TELE:  EKG:         None known

## 2021-08-12 NOTE — ED PROVIDER NOTE - CPE EDP CARDIAC NORM
Referral from CYNTHIA Sandoval for Skin infection.      Please call [phone] to discuss and schedule.   normal...

## 2021-08-25 ENCOUNTER — NON-APPOINTMENT (OUTPATIENT)
Age: 86
End: 2021-08-25

## 2021-08-25 ENCOUNTER — APPOINTMENT (OUTPATIENT)
Dept: CARDIOLOGY | Facility: CLINIC | Age: 86
End: 2021-08-25
Payer: MEDICARE

## 2021-08-25 ENCOUNTER — APPOINTMENT (OUTPATIENT)
Dept: ELECTROPHYSIOLOGY | Facility: CLINIC | Age: 86
End: 2021-08-25
Payer: MEDICARE

## 2021-08-25 VITALS
SYSTOLIC BLOOD PRESSURE: 159 MMHG | OXYGEN SATURATION: 100 % | HEART RATE: 51 BPM | DIASTOLIC BLOOD PRESSURE: 88 MMHG | HEIGHT: 60 IN | TEMPERATURE: 95.7 F | BODY MASS INDEX: 21.09 KG/M2

## 2021-08-25 VITALS — WEIGHT: 108 LBS | BODY MASS INDEX: 21.09 KG/M2

## 2021-08-25 DIAGNOSIS — Z95.810 PRESENCE OF AUTOMATIC (IMPLANTABLE) CARDIAC DEFIBRILLATOR: ICD-10-CM

## 2021-08-25 PROCEDURE — 93295 DEV INTERROG REMOTE 1/2/MLT: CPT

## 2021-08-25 PROCEDURE — 99214 OFFICE O/P EST MOD 30 MIN: CPT

## 2021-08-25 PROCEDURE — 36415 COLL VENOUS BLD VENIPUNCTURE: CPT

## 2021-08-25 PROCEDURE — 93000 ELECTROCARDIOGRAM COMPLETE: CPT

## 2021-08-25 PROCEDURE — 93296 REM INTERROG EVL PM/IDS: CPT

## 2021-08-26 ENCOUNTER — NON-APPOINTMENT (OUTPATIENT)
Age: 86
End: 2021-08-26

## 2021-08-26 LAB
25(OH)D3 SERPL-MCNC: 38.5 NG/ML
ALBUMIN SERPL ELPH-MCNC: 3.9 G/DL
ALP BLD-CCNC: 127 U/L
ALT SERPL-CCNC: 24 U/L
ANION GAP SERPL CALC-SCNC: 18 MMOL/L
AST SERPL-CCNC: 43 U/L
BASOPHILS # BLD AUTO: 0.04 K/UL
BASOPHILS NFR BLD AUTO: 0.8 %
BILIRUB SERPL-MCNC: 0.6 MG/DL
BUN SERPL-MCNC: 18 MG/DL
CALCIUM SERPL-MCNC: 10 MG/DL
CHLORIDE SERPL-SCNC: 94 MMOL/L
CHOLEST SERPL-MCNC: 183 MG/DL
CO2 SERPL-SCNC: 19 MMOL/L
CREAT SERPL-MCNC: 1.11 MG/DL
EOSINOPHIL # BLD AUTO: 0.1 K/UL
EOSINOPHIL NFR BLD AUTO: 2.1 %
ESTIMATED AVERAGE GLUCOSE: 126 MG/DL
HBA1C MFR BLD HPLC: 6 %
HCT VFR BLD CALC: 40.6 %
HDLC SERPL-MCNC: 65 MG/DL
HGB BLD-MCNC: 13.1 G/DL
IMM GRANULOCYTES NFR BLD AUTO: 0 %
LDLC SERPL CALC-MCNC: 105 MG/DL
LYMPHOCYTES # BLD AUTO: 1.95 K/UL
LYMPHOCYTES NFR BLD AUTO: 41.2 %
MAGNESIUM SERPL-MCNC: 1.9 MG/DL
MAN DIFF?: NORMAL
MCHC RBC-ENTMCNC: 23.6 PG
MCHC RBC-ENTMCNC: 32.3 GM/DL
MCV RBC AUTO: 73.3 FL
MONOCYTES # BLD AUTO: 0.58 K/UL
MONOCYTES NFR BLD AUTO: 12.3 %
NEUTROPHILS # BLD AUTO: 2.06 K/UL
NEUTROPHILS NFR BLD AUTO: 43.6 %
NONHDLC SERPL-MCNC: 118 MG/DL
NT-PROBNP SERPL-MCNC: 221 PG/ML
PLATELET # BLD AUTO: 189 K/UL
POTASSIUM SERPL-SCNC: 5.1 MMOL/L
PROT SERPL-MCNC: 7.9 G/DL
RBC # BLD: 5.54 M/UL
RBC # FLD: 15.5 %
SODIUM SERPL-SCNC: 131 MMOL/L
TRIGL SERPL-MCNC: 64 MG/DL
TSH SERPL-ACNC: 1.23 UIU/ML
WBC # FLD AUTO: 4.73 K/UL

## 2021-08-26 NOTE — HISTORY OF PRESENT ILLNESS
[FreeTextEntry1] : 86 y/o F w/ h/o R renal artery stenosis (since 2015), HTN, HL, microcytic anemia who presents for f/u evaluation Takotsubo in 8/18 (now recovered) where she had Vfib arrest and received ICD for secondary prevention. Accompanied by granddaughter, Ngoc.\par \par Since last visit, had been reportedly doing well. Has been taking appropriate precautions. Has chronic fatigue; initially improved when Toprol was reduced but due to atrial tachycardia leading to ICD shocks, Toprol was escalated to 200 mg daily then discontinued at some point. Resumed at 100 mg daily but has had some fatigue recently and HR found to be 50 today.. \par \par She is able to do light chores and walk with a cane without shortness of breath. Has been able to walk up the stairs w/o difficulty. States she stays active during the day. Denies orthopnea/PND/LE edema. Tolerating medications including higher lisinopril 40 mg daily. \par \par Previously, had notable BP in 160-180 on outpatient visits and lisinopril was increased to 40 mg daily and HCTZ 25 mg daily was added; home BP range is in the range of 130/75-90 with some ranging 140-150  Denies visual disturbances, headaches. Last TTE 9/4/20 with normal LV systolic function.\par Of note, pt will be seeing  PCP in 2 weeks. last labs with elevated alk phos 150, will repeat today.

## 2021-08-26 NOTE — ASSESSMENT
[FreeTextEntry1] : 86 y/o F w/ h/o R renal artery stenosis (since 2015), HTN, HL, with Takotsubo's cardiomyopathy (8/18; now recovered) presented with VT/Vfib arrest now s/p ICD, atrial tachycardia (currently in sinus) who presents for f/u evaluation. Currently endorses class I-II symptoms and appears euvolemic. Still endorsing some fatigue but improved appetite. Notably hypertensive in office with B/P 159/88\par \par 1. HFrecEF - resolved Takotsubo\par - pt with some fatigue with HR 50-54 bpm, will decrease Toprol  to 50 mg from 100 mg daily \par  -will keep a log of B/P and HR with goal HR > 55 and B/P < 130/80\par \par 2. HTN - elevated in [id number]  but home B/P range 130/ with episodes of 140-150 at times\par - on lisinopril 40 mg daily, 4/7/21 labs with K 3.9 and BUN/creat 12/0.98\par - Continue HCTZ 25 mg daily\par - keep log of BP; inform us if BP >140 \par - labs done today and will fax to PCP. Last with elevated alk phos, no abdominal pain\par \par 3. Non-obstructive CAD\par - on ASA 81 mg daily and Lipitor 40 mg daily \par - no active chest pain or EKG changes\par \par 4. VT/ S/P ICD\par - Dual chamber [employer] ICD set at . If she has any dizziness, may consider increase in lower rate limit. \par \par RTC 2 months with Dr. Banks, will call with results

## 2021-08-26 NOTE — ADDENDUM
[FreeTextEntry1] : Called with results of labs notable for improvement in alk phos to 127 from 150. Pt will up PCP.

## 2021-09-02 ENCOUNTER — APPOINTMENT (OUTPATIENT)
Dept: GERIATRICS | Facility: CLINIC | Age: 86
End: 2021-09-02

## 2021-10-22 ENCOUNTER — APPOINTMENT (OUTPATIENT)
Dept: CARDIOLOGY | Facility: CLINIC | Age: 86
End: 2021-10-22
Payer: MEDICARE

## 2021-10-22 VITALS
DIASTOLIC BLOOD PRESSURE: 82 MMHG | SYSTOLIC BLOOD PRESSURE: 151 MMHG | HEART RATE: 67 BPM | BODY MASS INDEX: 21.2 KG/M2 | RESPIRATION RATE: 14 BRPM | WEIGHT: 108 LBS | TEMPERATURE: 96.3 F | HEIGHT: 60 IN | OXYGEN SATURATION: 99 %

## 2021-10-22 PROCEDURE — 99214 OFFICE O/P EST MOD 30 MIN: CPT

## 2021-10-22 RX ORDER — FLUCONAZOLE 200 MG/1
200 TABLET ORAL
Qty: 1 | Refills: 1 | Status: DISCONTINUED | COMMUNITY
Start: 2021-04-12 | End: 2021-10-22

## 2021-10-22 RX ORDER — CEPHALEXIN 500 MG/1
500 CAPSULE ORAL TWICE DAILY
Qty: 14 | Refills: 0 | Status: DISCONTINUED | COMMUNITY
Start: 2021-04-02 | End: 2021-10-22

## 2021-10-24 NOTE — CARDIOLOGY SUMMARY
[de-identified] : \par 8/25/21 Sinus bradycardia 50 , NSST\par 5/19/21 Sinus bradycardia 54, NSST\par 2/5/21 - NSR, HR 62, nl axis, good RWP \par 9/30/20 - NSR, HR 64, nl axis, good RWP\par 5/17/19 - NSR, nl axis, PRWP\par 12/26/18 - NSR, no ST changes (ECG abnormalities resolved)\par 8/14/18 - NSR, 1st deg AV block (), TWI diffusely, good RWP [de-identified] : \par 9/4/20 - EF nl, nl RV function\par \par 1/17/19 - EF 66%, LVEDD 3.1 cm\par \par 8/2/18 - LVEDD 4 cm, mild MR, severe segmental LV dysfunction (HK of apex, mid-distal septum and mid-distal anterior wall); EF 28%\par  [de-identified] : \par 8/10/18 - mild non-obstructive CAD; LVEDP 9\par \par

## 2021-10-24 NOTE — HISTORY OF PRESENT ILLNESS
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 Accompanied by granddaughter, Ngoc.\par \par Since last visit, had been reportedly doing well. Has been taking appropriate precautions. Has chronic fatigue despite reduction in toprol to 50 mg daily. \par \par She is able to do light chores and walk with a cane without shortness of breath. Has been able to walk up the stairs w/o difficulty. States she stays active during the day. Denies orthopnea/PND/LE edema. Tolerating medications. \par \par Previously, had notable BP in 160-180 on outpatient visits and lisinopril was increased to 40 mg daily and HCTZ 25 mg daily was added; home BP range is in the range of 130-150  Denies visual disturbances, headaches. \par \par Going to see PCP in 2 weeks and will be getting Covid vaccine.

## 2021-10-24 NOTE — ASSESSMENT
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 Currently endorses class I-II symptoms and appears euvolemic. Still endorsing some fatigue but improved appetite. Notably hypertensive in office with B/P 159/88 but per granddaughter has improved BP at home. \par \par 1. HFrecEF - resolved Takotsubo\par - pt with some fatigue with HR 50-54 bpm, c/w toprol 50 mg daily \par  -will keep a log of B/P and HR with goal HR > 55 and B/P < 130/80\par \par 2. HTN - elevated in [id number]  but home B/P range 130/ with episodes of 140-150 at times\par - on lisinopril 40 mg daily, 4/7/21 labs with K 3.9 and BUN/creat 12/0.98\par - Continue HCTZ 25 mg daily\par - keep log of BP; inform us if BP >140 \par \par 3. Non-obstructive CAD\par - on ASA 81 mg daily and Lipitor 40 mg daily \par - no active chest pain or EKG changes\par \par 4. VT/ S/P ICD\par - Dual chamber [employer] ICD set at . If she has any dizziness, may consider increase in lower rate limit. \par \par RTC 6 months or sooner prn

## 2021-10-24 NOTE — PHYSICAL EXAM
[Well Developed] : well developed [Well Nourished] : well nourished [No Acute Distress] : no acute distress [Normal Conjunctiva] : normal conjunctiva [No Carotid Bruit] : no carotid bruit [Normal Venous Pressure] : normal venous pressure [Normal S1, S2] : normal S1, S2 [No Murmur] : no murmur [No Rub] : no rub [No Gallop] : no gallop [Clear Lung Fields] : clear lung fields [Good Air Entry] : good air entry [No Respiratory Distress] : no respiratory distress  [Soft] : abdomen soft [No Masses/organomegaly] : no masses/organomegaly [Non Tender] : non-tender [Normal Bowel Sounds] : normal bowel sounds [Normal Gait] : normal gait [No Edema] : no edema [No Cyanosis] : no cyanosis [No Clubbing] : no clubbing [No Varicosities] : no varicosities [No Rash] : no rash [No Skin Lesions] : no skin lesions [Moves all extremities] : moves all extremities [Normal Speech] : normal speech [No Focal Deficits] : no focal deficits [Alert and Oriented] : alert and oriented [Normal memory] : normal memory [de-identified] : slender

## 2021-12-07 ENCOUNTER — APPOINTMENT (OUTPATIENT)
Dept: ELECTROPHYSIOLOGY | Facility: CLINIC | Age: 86
End: 2021-12-07
Payer: MEDICARE

## 2021-12-07 ENCOUNTER — NON-APPOINTMENT (OUTPATIENT)
Age: 86
End: 2021-12-07

## 2021-12-07 PROCEDURE — 93296 REM INTERROG EVL PM/IDS: CPT

## 2021-12-07 PROCEDURE — 93295 DEV INTERROG REMOTE 1/2/MLT: CPT | Mod: NC

## 2021-12-14 ENCOUNTER — TRANSCRIPTION ENCOUNTER (OUTPATIENT)
Age: 86
End: 2021-12-14

## 2022-01-13 ENCOUNTER — APPOINTMENT (OUTPATIENT)
Dept: GERIATRICS | Facility: CLINIC | Age: 87
End: 2022-01-13

## 2022-02-16 ENCOUNTER — APPOINTMENT (OUTPATIENT)
Dept: GERIATRICS | Facility: CLINIC | Age: 87
End: 2022-02-16
Payer: MEDICARE

## 2022-02-16 VITALS
BODY MASS INDEX: 21.6 KG/M2 | RESPIRATION RATE: 16 BRPM | SYSTOLIC BLOOD PRESSURE: 120 MMHG | HEART RATE: 68 BPM | OXYGEN SATURATION: 98 % | DIASTOLIC BLOOD PRESSURE: 78 MMHG | TEMPERATURE: 97.5 F | HEIGHT: 60 IN | WEIGHT: 110 LBS

## 2022-02-16 PROCEDURE — G0439: CPT

## 2022-02-16 NOTE — PHYSICAL EXAM
[Alert] : alert [No Acute Distress] : in no acute distress [Both Tympanic Membranes Were Examined] : both tympanic membranes were normal [Edema] : edema was not present [Normal] : normal skin color and pigmentation [No Focal Deficits] : no focal deficits [de-identified] : no carotid bruits

## 2022-02-16 NOTE — HISTORY OF PRESENT ILLNESS
[Retired] : retired from work [None] : The patient has no concerns about alcohol abuse [Never] : has never used illicit drugs [Compliant with medications] : compliant with medications [Adequate] : adequate [Needs some help with ADLs] : need some help with ADLs [Does not drive] : does not drive [No history of falls] : no history of falls [Seatbelts] : seatbelts [Smoke Detectors] : smoke detectors [Carbon Monoxide Detector] : carbon monoxide detector [Over the Past 2 Weeks, Have You Felt Down, Depressed, or Hopeless?] : 1.) Over the past 2 weeks, have you felt down, depressed, or hopeless? No [Over the Past 2 Weeks, Have You Felt Little Interest or Pleasure Doing Things?] : 2.) Over the past 2 weeks, have you felt little interest or pleasure doing things? No [Advanced Directives Discussed] : discussed at today's visit [de-identified] : granddaughters [FreeTextEntry1] : Mrs. Zabala presents for followup accompanied by her granddaughter Ngoc. The patient is an 86 her old woman with a history of hypertension/LUIS FERNANDO, Takotsubo status post AICD following cardiac arrest and dementia. Her granddaughter states that cognitive impairments about the same but the patient has disordered sleep and suffers from insomnia. Melatonin has not been of much help. Patient is also experiencing some restlessness/anxiety and occasionally gets agitated. Family is looking for some help at home to assist with supervision and ADLs.\par Note the patient has been seeing cardiology regularly and restarted on hydrochlorothiazide last year. Mild hyponatremia noted on labs done in August. [No falls in past year] : Patient reported no falls in the past year [0] : 2) Feeling down, depressed, or hopeless: Not at all (0) [PHQ-2 Negative - No further assessment needed] : PHQ-2 Negative - No further assessment needed [[id number]] : 0 [Memory Lapses Or Loss] : stable memory impairment [Patient Observed To Be Agitated] : worsened agitation [Hostility Toward Caregivers] : denies aggression [Sleep Disturbances] : worsened sleep disturbances [] : denies wandering [Fixed Beliefs Contradicted By Reality (Delusions)] : denies delusions [Difficulty Finding Desired Words] : denies difficulty finding desired words

## 2022-02-17 LAB
ANION GAP SERPL CALC-SCNC: 14 MMOL/L
BUN SERPL-MCNC: 15 MG/DL
CALCIUM SERPL-MCNC: 9.2 MG/DL
CHLORIDE SERPL-SCNC: 98 MMOL/L
CO2 SERPL-SCNC: 24 MMOL/L
CREAT SERPL-MCNC: 1.2 MG/DL
GLUCOSE SERPL-MCNC: 80 MG/DL
POTASSIUM SERPL-SCNC: 3.4 MMOL/L
SODIUM SERPL-SCNC: 136 MMOL/L

## 2022-03-09 ENCOUNTER — APPOINTMENT (OUTPATIENT)
Dept: ELECTROPHYSIOLOGY | Facility: CLINIC | Age: 87
End: 2022-03-09
Payer: MEDICARE

## 2022-03-09 ENCOUNTER — NON-APPOINTMENT (OUTPATIENT)
Age: 87
End: 2022-03-09

## 2022-03-09 PROCEDURE — 93296 REM INTERROG EVL PM/IDS: CPT

## 2022-03-09 PROCEDURE — 93295 DEV INTERROG REMOTE 1/2/MLT: CPT

## 2022-03-21 NOTE — ED ADULT NURSE NOTE - OBJECTIVE STATEMENT
Pt arrived to room 12 reporting earlier CP that started around 18:40.  Pt reports she was sitting at rest when pain suddenly started.  Pt reports pain is now gone.  Pt has cardiac catheterization this past year but no blockage was found.  Pt has past medical hx of AICD, HTN, and High cholesterol.  20g iv placed to RT AC, labs drawn and sent.  Pt granddaughter at bedside.  Attending assessing pt. Trilobed Flap Text: The defect edges were debeveled with a #15 scalpel blade.  Given the location of the defect and the proximity to free margins a trilobed flap was deemed most appropriate.  Using a sterile surgical marker, an appropriate trilobed flap drawn around the defect.    The area thus outlined was incised deep to adipose tissue with a #15 scalpel blade.  The skin margins were undermined to an appropriate distance in all directions utilizing iris scissors.

## 2022-04-22 ENCOUNTER — APPOINTMENT (OUTPATIENT)
Dept: CARDIOLOGY | Facility: CLINIC | Age: 87
End: 2022-04-22
Payer: MEDICARE

## 2022-04-22 ENCOUNTER — NON-APPOINTMENT (OUTPATIENT)
Age: 87
End: 2022-04-22

## 2022-04-22 VITALS
SYSTOLIC BLOOD PRESSURE: 166 MMHG | HEART RATE: 68 BPM | TEMPERATURE: 97.8 F | DIASTOLIC BLOOD PRESSURE: 95 MMHG | OXYGEN SATURATION: 100 % | BODY MASS INDEX: 21.87 KG/M2 | HEIGHT: 60 IN

## 2022-04-22 VITALS — BODY MASS INDEX: 21.87 KG/M2 | WEIGHT: 112 LBS

## 2022-04-22 PROCEDURE — 93000 ELECTROCARDIOGRAM COMPLETE: CPT

## 2022-04-22 PROCEDURE — 36415 COLL VENOUS BLD VENIPUNCTURE: CPT

## 2022-04-22 PROCEDURE — 99214 OFFICE O/P EST MOD 30 MIN: CPT

## 2022-04-22 RX ORDER — HYDROCHLOROTHIAZIDE 25 MG/1
25 TABLET ORAL DAILY
Qty: 90 | Refills: 0 | Status: DISCONTINUED | COMMUNITY
Start: 2021-03-22 | End: 2022-04-22

## 2022-04-22 NOTE — PHYSICAL EXAM

## 2022-04-22 NOTE — CARDIOLOGY SUMMARY
[de-identified] : \par 4/22/22 - sinus bradycardia, Q waves septal \par 8/25/21 Sinus bradycardia 50 , NSST\par 5/19/21 Sinus bradycardia 54, NSST\par 2/5/21 - NSR, HR 62, nl axis, good RWP \par 9/30/20 - NSR, HR 64, nl axis, good RWP\par 5/17/19 - NSR, nl axis, PRWP\par 12/26/18 - NSR, no ST changes (ECG abnormalities resolved)\par 8/14/18 - NSR, 1st deg AV block (), TWI diffusely, good RWP [de-identified] : \par 9/4/20 - EF nl, nl RV function\par \par 1/17/19 - EF 66%, LVEDD 3.1 cm\par \par 8/2/18 - LVEDD 4 cm, mild MR, severe segmental LV dysfunction (HK of apex, mid-distal septum and mid-distal anterior wall); EF 28%\par  [de-identified] : \par 8/10/18 - mild non-obstructive CAD; LVEDP 9\par \par

## 2022-04-22 NOTE — HISTORY OF PRESENT ILLNESS
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 Accompanied by granddaughter, Ngoc.\par \par Since last visit, had been reportedly doing well but does endorse constipation (possibly since HCTZ was added). Has been taking prune juice. Goes 2-3 times/week. \par \par Has chronic fatigue despite reduction in Toprol 50 mg daily. \par \par She is able to do light chores and walk with a cane without shortness of breath. Has been able to walk up the stairs w/o difficulty. States she stays active during the day. Denies orthopnea/PND/LE edema. Tolerating medications. \par \par Previously, had notable BP in 130-140 on outpatient visits. Denies visual disturbances, headaches. \par \par Noted to have possible NSVT January 28 that did not require therapy although was more consistent with atrial tachycardia.

## 2022-04-22 NOTE — ASSESSMENT
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 Currently endorses class I-II symptoms and appears euvolemic. Still endorsing some fatigue but improved appetite. Notably hypertensive in office with B/P 159/88 but per granddaughter has improved BP at home. Endorsing constipation which may be secondary to dehydration from HCTZ. \par \par 1. HFrecEF - resolved Takotsubo\par - pt with some fatigue with HR 50-54 bpm, c/w toprol 50 mg daily \par  -will keep a log of B/P and HR with goal HR > 55 and B/P < 130/80\par \par 2. HTN - elevated in office \par - on lisinopril 40 mg daily\par - will d/c HCTZ and start hydral 25 mg q8h\par - keep log of BP; inform us if BP >140 \par \par 3. Non-obstructive CAD\par - on ASA 81 mg daily and Lipitor 40 mg daily \par - no active chest pain or EKG changes\par \par 4. VT/ S/P ICD\par - Dual chamber [facility] [employer] ICD\par - discussed deactivating defibrillator as her atach is nearing the VF threshold and her risk of VT/VF is low given recovery of her LV function; daughter will discuss further\par \par RTC 6 months or sooner prn

## 2022-05-02 LAB
ALBUMIN SERPL ELPH-MCNC: 3.8 G/DL
ALP BLD-CCNC: 103 U/L
ALT SERPL-CCNC: 13 U/L
ANION GAP SERPL CALC-SCNC: 15 MMOL/L
AST SERPL-CCNC: 24 U/L
BILIRUB SERPL-MCNC: 0.8 MG/DL
BUN SERPL-MCNC: 18 MG/DL
CALCIUM SERPL-MCNC: 9.2 MG/DL
CHLORIDE SERPL-SCNC: 100 MMOL/L
CO2 SERPL-SCNC: 24 MMOL/L
CREAT SERPL-MCNC: 1.14 MG/DL
EGFR: 47 ML/MIN/1.73M2
GLUCOSE SERPL-MCNC: 73 MG/DL
NT-PROBNP SERPL-MCNC: 405 PG/ML
POTASSIUM SERPL-SCNC: 3.6 MMOL/L
PROT SERPL-MCNC: 7.3 G/DL
SODIUM SERPL-SCNC: 139 MMOL/L

## 2022-06-08 ENCOUNTER — APPOINTMENT (OUTPATIENT)
Dept: ELECTROPHYSIOLOGY | Facility: CLINIC | Age: 87
End: 2022-06-08
Payer: MEDICARE

## 2022-06-08 ENCOUNTER — NON-APPOINTMENT (OUTPATIENT)
Age: 87
End: 2022-06-08

## 2022-06-08 PROCEDURE — 93295 DEV INTERROG REMOTE 1/2/MLT: CPT

## 2022-06-08 PROCEDURE — 93296 REM INTERROG EVL PM/IDS: CPT

## 2022-08-30 NOTE — DIETITIAN INITIAL EVALUATION ADULT. - PERTINENT LABORATORY DATA
(8/8) H/H 10.5/31.2 L, Na 134 L, Albumin 2.7 L, AST 92 H,  H Note Text (......Xxx Chief Complaint.): This diagnosis correlates with the Render Risk Assessment In Note?: no Other (Free Text): IPL Detail Level: Zone

## 2022-09-07 ENCOUNTER — APPOINTMENT (OUTPATIENT)
Dept: ELECTROPHYSIOLOGY | Facility: CLINIC | Age: 87
End: 2022-09-07

## 2022-10-14 ENCOUNTER — NON-APPOINTMENT (OUTPATIENT)
Age: 87
End: 2022-10-14

## 2022-10-14 ENCOUNTER — APPOINTMENT (OUTPATIENT)
Dept: ELECTROPHYSIOLOGY | Facility: CLINIC | Age: 87
End: 2022-10-14

## 2022-10-14 PROCEDURE — 93295 DEV INTERROG REMOTE 1/2/MLT: CPT

## 2022-10-14 PROCEDURE — 93296 REM INTERROG EVL PM/IDS: CPT

## 2022-10-26 ENCOUNTER — APPOINTMENT (OUTPATIENT)
Dept: CARDIOLOGY | Facility: CLINIC | Age: 87
End: 2022-10-26

## 2022-10-26 ENCOUNTER — APPOINTMENT (OUTPATIENT)
Dept: HEART FAILURE | Facility: CLINIC | Age: 87
End: 2022-10-26

## 2023-01-01 NOTE — ED ADULT TRIAGE NOTE - WEIGHT IN LBS
General:           Awake and active;   Head:		AFOF  Eyes:		Normally set bilaterally  Ears:		Patent bilaterally, no deformities  Nose/Mouth:	Nares patent, palate intact  Neck:		No masses  Chest/Lungs:      Breath sounds equal to auscultation. No retractions  CV:		+ Gr 1-2 murmur , normal pulses bilaterally  Abdomen:          Soft nontender nondistended, no masses, bowel sounds present  :		Normal for gestational age  Back:		Intact skin, no sacral dimples or tags  Anus:		Grossly patent  Extremities:	FROM  Skin:		No lesions  Neuro exam:	Appropriate tone, activity   119.9

## 2023-01-17 NOTE — PATIENT PROFILE ADULT. - BLOOD AVOIDANCE/RESTRICTIONS, PROFILE
none [FreeTextEntry1] : Blood work done in office today. Will follow up on results with patient.\par Extensive counseling provided on lifestyle modifications (healthy diet, daily exercise, routine screenings). \par Return for CPE in 1 year.\par derm referral given

## 2023-01-25 ENCOUNTER — APPOINTMENT (OUTPATIENT)
Dept: ELECTROPHYSIOLOGY | Facility: CLINIC | Age: 88
End: 2023-01-25
Payer: MEDICARE

## 2023-01-25 ENCOUNTER — NON-APPOINTMENT (OUTPATIENT)
Age: 88
End: 2023-01-25

## 2023-01-25 PROCEDURE — 93295 DEV INTERROG REMOTE 1/2/MLT: CPT

## 2023-01-25 PROCEDURE — 93296 REM INTERROG EVL PM/IDS: CPT

## 2023-02-07 ENCOUNTER — TRANSCRIPTION ENCOUNTER (OUTPATIENT)
Age: 88
End: 2023-02-07

## 2023-02-17 ENCOUNTER — APPOINTMENT (OUTPATIENT)
Dept: GERIATRICS | Facility: CLINIC | Age: 88
End: 2023-02-17
Payer: MEDICARE

## 2023-02-17 VITALS
BODY MASS INDEX: 21.6 KG/M2 | RESPIRATION RATE: 16 BRPM | OXYGEN SATURATION: 98 % | HEIGHT: 60 IN | WEIGHT: 110 LBS | HEART RATE: 68 BPM | TEMPERATURE: 98.1 F

## 2023-02-17 VITALS — DIASTOLIC BLOOD PRESSURE: 100 MMHG | SYSTOLIC BLOOD PRESSURE: 200 MMHG

## 2023-02-17 PROCEDURE — G0439: CPT

## 2023-02-17 RX ORDER — POTASSIUM CHLORIDE 750 MG/1
10 CAPSULE, EXTENDED RELEASE ORAL DAILY
Qty: 90 | Refills: 0 | Status: DISCONTINUED | COMMUNITY
Start: 2022-02-17 | End: 2023-02-17

## 2023-02-17 NOTE — HISTORY OF PRESENT ILLNESS
[PMH Reviewed and Updated] : past medical history reviewed and updated [PSH Reviewed and Updated] : past surgical history reviewed and updated [Family History Reviewed and Updated] : family history reviewed and updated [Medication and Allergies Reconciled] : medication and allergies reconciled [Over the Past 2 Weeks, Have You Felt Down, Depressed, or Hopeless?] : 1.) Over the past 2 weeks, have you felt down, depressed, or hopeless? Yes [Retired] : retired from work [None] : The patient has no concerns about alcohol abuse [Never] : has never used illicit drugs [Compliant with medications] : compliant with medications [Adequate] : adequate [Fully Independent] : fully independent [Does not drive] : does not drive [No history of falls] : no history of falls [Seatbelts] : seatbelts [Smoke Detectors] : smoke detectors [Hot Water Temp <120F] : hot water temp <120F [Bathroom Grab Bars] : bathroom grab bars [Fall Prevention Measures] : fall prevention measures [Advanced Directives Discussed] : discussed at today's visit [No falls in past year] : Patient reported no falls in the past year [Completely Independent] : Completely independent. [Independent] : housekeeping [Full assistance needed] : Assistance needed managing medications [] : Assistance needed managing finances. [Smoke Detector] : smoke detector [Carbon Monoxide Detector] : carbon monoxide detector [Grab Bars] : grab bars [Night Light] : night light [Wears Seat Belt] : wears seat belt [0] : 1) Little interest or pleasure doing things: Not at all (0) [1] : 2) Feeling down, depressed, or hopeless for several days (1) [PHQ-2 Negative - No further assessment needed] : PHQ-2 Negative - No further assessment needed [Designated Healthcare Proxy] : Designated healthcare proxy [Name: ___] : Health Care Proxy's Name: [unfilled]  [Over the Past 2 Weeks, Have You Felt Little Interest or Pleasure Doing Things?] : 2.) Over the past 2 weeks, have you felt little interest or pleasure doing things? No [de-identified] : vaughn [FreeTextEntry1] : Mrs. Tatiana Zabala is an 87-year-old woman presenting for her Medicare annual visit.  She is accompanied by her granddaughter Ngoc who assists with history.  The patient states that she feels well and offers no new complaints.  She lives with her granddaughter and is able to manage own personal self-care and light housekeeping activities.  She manages her own medications but her granddaughter states that she or her sister follows up daily to ensure medication compliance.  There are occasions where the patient has stated that she did not want to take her meds but granddaughter confirms that they make sure she takes medications.  This morning the patient did not take her medications until she arrived at the office.  Initial systolic blood pressure was 220, after about 45 minutes it was 200 mmHg.  The patient monitors her blood pressure at home and states that it is usually in the 120s to 130s mmHg systolic range. the patient denies headache, chest pain, palpitation or dyspnea. [Guns at Home] : no guns at home [Stair Lift] : no stair lift used in home [de-identified] : Does not drive [[id number]] : 1

## 2023-02-17 NOTE — REVIEW OF SYSTEMS
[Negative] : Heme/Lymph [Dizziness] : no dizziness [Fainting] : no fainting [Difficulty Walking] : no difficulty walking

## 2023-02-22 ENCOUNTER — LABORATORY RESULT (OUTPATIENT)
Age: 88
End: 2023-02-22

## 2023-02-27 NOTE — ED PROVIDER NOTE - ATTESTATION, MLM
Fall Risk
I have reviewed and confirmed nurses' notes for patient's medications, allergies, medical history, and surgical history.

## 2023-03-16 ENCOUNTER — APPOINTMENT (OUTPATIENT)
Dept: GERIATRICS | Facility: CLINIC | Age: 88
End: 2023-03-16

## 2023-04-17 NOTE — PROGRESS NOTE ADULT - PROBLEM SELECTOR PLAN 4
Pt with h/o rt LUIS FERNANDO- refusing imaging during hospitalization.  Renal Doppler US results faxed from Dr Fay office, attached in chart (Rt renal artery stenosis)  Avoid ACEi/ ARB use. details…

## 2023-04-19 ENCOUNTER — APPOINTMENT (OUTPATIENT)
Dept: HEART FAILURE | Facility: CLINIC | Age: 88
End: 2023-04-19
Payer: MEDICARE

## 2023-04-19 VITALS — WEIGHT: 112 LBS | BODY MASS INDEX: 21.87 KG/M2

## 2023-04-19 VITALS
HEART RATE: 69 BPM | OXYGEN SATURATION: 98 % | DIASTOLIC BLOOD PRESSURE: 92 MMHG | SYSTOLIC BLOOD PRESSURE: 195 MMHG | HEIGHT: 60 IN

## 2023-04-19 DIAGNOSIS — F02.80 OTHER ALZHEIMER'S DISEASE: ICD-10-CM

## 2023-04-19 DIAGNOSIS — I70.209 UNSPECIFIED ATHEROSCLEROSIS OF NATIVE ARTERIES OF EXTREMITIES, UNSPECIFIED EXTREMITY: ICD-10-CM

## 2023-04-19 DIAGNOSIS — G30.8 OTHER ALZHEIMER'S DISEASE: ICD-10-CM

## 2023-04-19 PROCEDURE — 99214 OFFICE O/P EST MOD 30 MIN: CPT

## 2023-04-19 NOTE — HISTORY OF PRESENT ILLNESS
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 Accompanied by granddaughter, Ngoc.\par \par Since last visit 4/22/23, has been feeling well and followed up with her PCP and repeated labs that were at baseline ( BUN 18, creat 1.11, K 4). Grand daughter state she has a follow up appt with nephrology Dr. Zabala  due to Renal artery stenosis and will have a renal ultrasound. Noted to have elevated B/P 200/100 to 195/92 today but was not taking the evening doses of meds including hydral 25 mg, ASA and statin ( forgets). She is taking am meds. \par \par She is able to do light chores and walk with a cane and goes shopping without shortness of breath. Has been able to walk up the stairs w/o difficulty. States she stays active during the day. She sleeps with 2 pillows with no orthopnea but has difficulty sleeping through the night. Denies orthopnea/PND/LE edema. Tolerating medications. \par \par Reports normal bowel and bladder function. Denies visual disturbances, headaches. No change in weight today. \par \par Noted to have possible NSVT January 28 that did not require therapy although was more consistent with atrial tachycardia. \par \par Denies chest pain, palpitations, dizziness/LH, syncope and no ICD shocks.

## 2023-04-19 NOTE — CARDIOLOGY SUMMARY
[de-identified] : \par 9/4/20 - EF nl, nl RV function\par \par 1/17/19 - EF 66%, LVEDD 3.1 cm\par \par 8/2/18 - LVEDD 4 cm, mild MR, severe segmental LV dysfunction (HK of apex, mid-distal septum and mid-distal anterior wall); EF 28%\par  [de-identified] : 4/22/22 - sinus bradycardia, Q waves septal \par 8/25/21 Sinus bradycardia 50 , NSST\par 5/19/21 Sinus bradycardia 54, NSST\par 2/5/21 - NSR, HR 62, nl axis, good RWP \par 9/30/20 - NSR, HR 64, nl axis, good RWP\par 5/17/19 - NSR, nl axis, PRWP\par 12/26/18 - NSR, no ST changes (ECG abnormalities resolved)\par 8/14/18 - NSR, 1st deg AV block (), TWI diffusely, good RWP [de-identified] : \par 8/10/18 - mild non-obstructive CAD; LVEDP 9\par \par

## 2023-04-19 NOTE — ASSESSMENT
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 Currently endorses class I-II symptoms and appears compensated but hypertensive. Pt stays active. Notably hypertensive in office with B/P 195/92 in the setting of not taking evening meds including hydral, ASA and statin. She forgets. \par \par 1. HFrecEF - resolved Takotsubo\par - c/w toprol 50 mg daily \par  -will keep a log of B/P and HR with goal HR > 55 and B/P < 130/80\par - schedule TTE the same day as next appt with Dr. Banks\par \par 2. HTN - elevated in office \par - on lisinopril 40 mg daily\par - will previously off d/c HCTZ and started hydral 25 mg q8h, pt is taking once a day in am\par - will increase to 50 mg bid, will put a note on refrigerator to take second dose with dinner\par - keep log of BP; inform us if BP >140 \par \par 3. Non-obstructive CAD\par - on ASA 81 mg daily and Lipitor 40 mg daily \par - no active chest pain or EKG changes\par - restart ASA and statin in am ( was forgetting to take when she leaves til evening) \par \par 4. VT/ S/P ICD\par - Dual chamber [employer] ICD\par - discussed deactivating defibrillator as her atach is nearing the VF threshold and her risk of VT/VF is low given recovery of her LV function; daughter will discuss further\par \par RTC 3 months with Dr. Banks, will call with B/P: readings

## 2023-04-19 NOTE — PHYSICAL EXAM
[Well Developed] : well developed [Well Nourished] : well nourished [No Acute Distress] : no acute distress [Normal Conjunctiva] : normal conjunctiva [No Carotid Bruit] : no carotid bruit [Normal S1, S2] : normal S1, S2 [No Murmur] : no murmur [No Rub] : no rub [No Gallop] : no gallop [Clear Lung Fields] : clear lung fields [Good Air Entry] : good air entry [No Respiratory Distress] : no respiratory distress  [Soft] : abdomen soft [Non Tender] : non-tender [No Masses/organomegaly] : no masses/organomegaly [Normal Bowel Sounds] : normal bowel sounds [Normal Gait] : normal gait [No Edema] : no edema [No Cyanosis] : no cyanosis [No Clubbing] : no clubbing [No Varicosities] : no varicosities [No Rash] : no rash [No Skin Lesions] : no skin lesions [Moves all extremities] : moves all extremities [No Focal Deficits] : no focal deficits [Normal Speech] : normal speech [Alert and Oriented] : alert and oriented [Normal memory] : normal memory [de-identified] : slender, elderly female [de-identified] : JVP 8 cm  [de-identified] : RRR

## 2023-04-21 ENCOUNTER — APPOINTMENT (OUTPATIENT)
Dept: CARDIOLOGY | Facility: CLINIC | Age: 88
End: 2023-04-21

## 2023-04-27 ENCOUNTER — NON-APPOINTMENT (OUTPATIENT)
Age: 88
End: 2023-04-27

## 2023-04-27 ENCOUNTER — APPOINTMENT (OUTPATIENT)
Dept: ELECTROPHYSIOLOGY | Facility: CLINIC | Age: 88
End: 2023-04-27
Payer: MEDICARE

## 2023-04-27 PROCEDURE — 93296 REM INTERROG EVL PM/IDS: CPT

## 2023-04-27 PROCEDURE — 93295 DEV INTERROG REMOTE 1/2/MLT: CPT

## 2023-06-02 ENCOUNTER — APPOINTMENT (OUTPATIENT)
Dept: NEPHROLOGY | Facility: CLINIC | Age: 88
End: 2023-06-02

## 2023-06-06 ENCOUNTER — NON-APPOINTMENT (OUTPATIENT)
Age: 88
End: 2023-06-06

## 2023-06-06 ENCOUNTER — APPOINTMENT (OUTPATIENT)
Dept: NEPHROLOGY | Facility: CLINIC | Age: 88
End: 2023-06-06
Payer: MEDICARE

## 2023-06-06 VITALS
TEMPERATURE: 97.6 F | HEART RATE: 74 BPM | DIASTOLIC BLOOD PRESSURE: 105 MMHG | HEIGHT: 60 IN | SYSTOLIC BLOOD PRESSURE: 233 MMHG | OXYGEN SATURATION: 99 % | WEIGHT: 110 LBS | BODY MASS INDEX: 21.6 KG/M2

## 2023-06-06 VITALS — DIASTOLIC BLOOD PRESSURE: 114 MMHG | SYSTOLIC BLOOD PRESSURE: 205 MMHG

## 2023-06-06 DIAGNOSIS — I10 ESSENTIAL (PRIMARY) HYPERTENSION: ICD-10-CM

## 2023-06-06 PROCEDURE — 99215 OFFICE O/P EST HI 40 MIN: CPT

## 2023-06-06 PROCEDURE — 99205 OFFICE O/P NEW HI 60 MIN: CPT

## 2023-06-06 NOTE — ASSESSMENT
[FreeTextEntry1] : 87 years old woman with a history of renal artery stenosis here for follow up.\par \par Renal Artery Stenosis/Renovascular Hypertension with Hypertensive Urgency --  The patient's blood pressure is severely elevated and she is at high risk for stroke and cardiovascular disease.  She is presently asymptomatic.  She has not had a doppler in some time so it is ordered and pending.  Optimal BP regimen for hypertension is ordinarily CCB, diuretic.  CCB not ideal in this patient with cardiomyopathy.  Therefore plan will be to start spironolactone.  She has previously had acceptable potassium.  I advised the patient and grand daughter today about the risks benefits of aldactone and the need to repeat blood work within two weeks if starting the medication.  Patient will trend home BP readings and report back to me later this week.  We will start aldactone 12.5 mg po daily for 3 days and if tolerating will go to full pill.  If BP continues to trend in 200's at home she will go to the ED.  If develops neurological symptoms will go to the ED.\par

## 2023-06-06 NOTE — HISTORY OF PRESENT ILLNESS
[FreeTextEntry1] : Today I had the pleasure of seeing Tatiana Chauhan.  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  She is here today accompanied by two of her grand daughters.  She has a history of hypertension secondary to renal artery stenosis.  She used to follow up with Dr. Fay who has since retried.  Over the summer she had a cardiac arrest in [place] and was diagnosed with a new cardiomyopathy.  She also has some issues with chronic urinary retention.  After the cardiac arrest some of her BP meds were held and now she is starting to have her bp creep up and is 150 to 160s systolic at times.  On evaluation today her only complaint is fatigue.\par \par 4/19/19 -- Since our last visit Tatiana has continued to feel fatigued she's lost weight and she has a very poor appetite.  Her diuretics were recently stopped.\par \par 1/17/20 -- I saw Tatiana today and she is feeling better overall.  She has no chest pain no shortness of breath appetite and energy levels are good.  \par \par 6/6/23 -- Since our last visit Tatiana feels well.  She has not been here to see me in many years.  She has no cp no sob no edema reports that she didn't take her medication until very late today.

## 2023-06-06 NOTE — REVIEW OF SYSTEMS
[Fever] : no fever [Chills] : no chills [Feeling Poorly] : not feeling poorly [Feeling Tired] : not feeling tired [Eyesight Problems] : no eyesight problems [Nosebleeds] : no nosebleeds [Chest Pain] : no chest pain [Palpitations] : no palpitations [Leg Claudication] : no intermittent leg claudication [Lower Ext Edema] : no lower extremity edema [Shortness Of Breath] : no shortness of breath [Wheezing] : no wheezing [Abdominal Pain] : no abdominal pain [Vomiting] : no vomiting [Dysuria] : no dysuria [Incontinence] : no incontinence [Joint Swelling] : no joint swelling [Joint Stiffness] : no joint stiffness [Dizziness] : no dizziness [Fainting] : no fainting [Muscle Weakness] : no muscle weakness [Easy Bleeding] : no tendency for easy bleeding [Easy Bruising] : no tendency for easy bruising

## 2023-06-06 NOTE — PHYSICAL EXAM
[General Appearance - Alert] : alert [General Appearance - In No Acute Distress] : in no acute distress [Oropharynx] : the oropharynx was normal [Neck Appearance] : the appearance of the neck was normal [Jugular Venous Distention Increased] : there was no jugular-venous distention [Auscultation Breath Sounds / Voice Sounds] : lungs were clear to auscultation bilaterally [Heart Rate And Rhythm] : heart rate was normal and rhythm regular [Heart Sounds] : normal S1 and S2 [Heart Sounds Gallop] : no gallops [Heart Sounds Pericardial Friction Rub] : no pericardial rub [Arterial Pulses Carotid] : carotid pulses were normal with no bruits [Edema] : there was no peripheral edema [Abdomen Soft] : soft [Bowel Sounds] : normal bowel sounds [Abdomen Tenderness] : non-tender [Abnormal Walk] : normal gait [Nail Clubbing] : no clubbing  or cyanosis of the fingernails [Skin Color & Pigmentation] : normal skin color and pigmentation [] : no rash [FreeTextEntry1] : responsive [Oriented To Time, Place, And Person] : oriented to person, place, and time [Impaired Insight] : insight and judgment were intact [Affect] : the affect was normal [Mood] : the mood was normal

## 2023-06-07 ENCOUNTER — NON-APPOINTMENT (OUTPATIENT)
Age: 88
End: 2023-06-07

## 2023-06-07 LAB
ALBUMIN SERPL ELPH-MCNC: 4 G/DL
ALDOSTERONE SERUM: 10.5 NG/DL
ANION GAP SERPL CALC-SCNC: 15 MMOL/L
BUN SERPL-MCNC: 18 MG/DL
CALCIUM SERPL-MCNC: 9.3 MG/DL
CHLORIDE SERPL-SCNC: 106 MMOL/L
CO2 SERPL-SCNC: 20 MMOL/L
CREAT SERPL-MCNC: 1.18 MG/DL
EGFR: 45 ML/MIN/1.73M2
GLUCOSE SERPL-MCNC: 87 MG/DL
PHOSPHATE SERPL-MCNC: 2.9 MG/DL
POTASSIUM SERPL-SCNC: 4.4 MMOL/L
SODIUM SERPL-SCNC: 141 MMOL/L

## 2023-06-14 ENCOUNTER — TRANSCRIPTION ENCOUNTER (OUTPATIENT)
Age: 88
End: 2023-06-14

## 2023-06-19 NOTE — PHYSICAL THERAPY INITIAL EVALUATION ADULT - ASSISTIVE DEVICE FOR TRANSFER: SIT/STAND, REHAB EVAL
[Gradual] : gradual [9] : 9 [7] : 7 [Dull/Aching] : dull/aching [Radiating] : radiating [Sharp] : sharp [Throbbing] : throbbing [Household chores] : household chores [Meds] : meds [de-identified] : 51F p/w garrick knee pain. She has been having worsening pain over the last year. She says the pain causes her to fall it is so severe. 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 She has been doing PT for the last month without any relief. She had CSIs without much relief. Using tylenol without relief. [] : no [FreeTextEntry1] : B/L knees  [FreeTextEntry5] : pt tripped forward, has been experiencing bilateral knee pain.  [FreeTextEntry7] : b/l legs  [FreeTextEntry9] : Tylenol  [de-identified] : Gregory  [de-identified] : [employer]  rolling walker

## 2023-06-23 LAB — RENIN ACTIVITY, PLASMA: 0.63 NG/ML/HR

## 2023-06-26 ENCOUNTER — APPOINTMENT (OUTPATIENT)
Dept: ULTRASOUND IMAGING | Facility: IMAGING CENTER | Age: 88
End: 2023-06-26
Payer: MEDICARE

## 2023-06-26 ENCOUNTER — OUTPATIENT (OUTPATIENT)
Dept: OUTPATIENT SERVICES | Facility: HOSPITAL | Age: 88
LOS: 1 days | End: 2023-06-26
Payer: MEDICARE

## 2023-06-26 DIAGNOSIS — Z90.49 ACQUIRED ABSENCE OF OTHER SPECIFIED PARTS OF DIGESTIVE TRACT: Chronic | ICD-10-CM

## 2023-06-26 DIAGNOSIS — Z00.8 ENCOUNTER FOR OTHER GENERAL EXAMINATION: ICD-10-CM

## 2023-06-26 DIAGNOSIS — Z98.49 CATARACT EXTRACTION STATUS, UNSPECIFIED EYE: Chronic | ICD-10-CM

## 2023-06-26 DIAGNOSIS — I10 ESSENTIAL (PRIMARY) HYPERTENSION: ICD-10-CM

## 2023-06-26 PROCEDURE — 93975 VASCULAR STUDY: CPT

## 2023-06-26 PROCEDURE — 93975 VASCULAR STUDY: CPT | Mod: 26

## 2023-07-26 ENCOUNTER — APPOINTMENT (OUTPATIENT)
Dept: HEART FAILURE | Facility: CLINIC | Age: 88
End: 2023-07-26
Payer: MEDICARE

## 2023-07-26 ENCOUNTER — OUTPATIENT (OUTPATIENT)
Dept: OUTPATIENT SERVICES | Facility: HOSPITAL | Age: 88
LOS: 1 days | End: 2023-07-26

## 2023-07-26 ENCOUNTER — APPOINTMENT (OUTPATIENT)
Dept: HEART FAILURE | Facility: CLINIC | Age: 88
End: 2023-07-26

## 2023-07-26 ENCOUNTER — APPOINTMENT (OUTPATIENT)
Dept: CV DIAGNOSITCS | Facility: HOSPITAL | Age: 88
End: 2023-07-26
Payer: MEDICARE

## 2023-07-26 ENCOUNTER — NON-APPOINTMENT (OUTPATIENT)
Age: 88
End: 2023-07-26

## 2023-07-26 VITALS
OXYGEN SATURATION: 92 % | BODY MASS INDEX: 21.01 KG/M2 | SYSTOLIC BLOOD PRESSURE: 220 MMHG | DIASTOLIC BLOOD PRESSURE: 110 MMHG | HEIGHT: 60 IN | WEIGHT: 107 LBS | HEART RATE: 91 BPM

## 2023-07-26 VITALS — DIASTOLIC BLOOD PRESSURE: 110 MMHG | SYSTOLIC BLOOD PRESSURE: 180 MMHG

## 2023-07-26 DIAGNOSIS — Z98.49 CATARACT EXTRACTION STATUS, UNSPECIFIED EYE: Chronic | ICD-10-CM

## 2023-07-26 DIAGNOSIS — I25.10 ATHEROSCLEROTIC HEART DISEASE OF NATIVE CORONARY ARTERY WITHOUT ANGINA PECTORIS: ICD-10-CM

## 2023-07-26 DIAGNOSIS — Z90.49 ACQUIRED ABSENCE OF OTHER SPECIFIED PARTS OF DIGESTIVE TRACT: Chronic | ICD-10-CM

## 2023-07-26 PROCEDURE — 93306 TTE W/DOPPLER COMPLETE: CPT | Mod: 26

## 2023-07-26 PROCEDURE — 93000 ELECTROCARDIOGRAM COMPLETE: CPT

## 2023-07-26 PROCEDURE — 99214 OFFICE O/P EST MOD 30 MIN: CPT | Mod: 25

## 2023-07-26 RX ORDER — HYDRALAZINE HYDROCHLORIDE 50 MG/1
50 TABLET ORAL
Qty: 90 | Refills: 3 | Status: DISCONTINUED | COMMUNITY
Start: 2022-04-22 | End: 2023-07-26

## 2023-07-26 NOTE — BEHAVIORAL HEALTH ASSESSMENT NOTE - AFFECT CONGRUENCE
Continued Stay / Assessment/Plan of Care Note       Active Substitute Decision Maker (SDM)    There are no active Substitute Decision Maker (SDM) on file.           Progress note:  Patient Care Rounds:  Significant Events: s/p lap appy, +UTI on IVABs      See SW/CM flowsheets for other objective data.    Disposition Recommendations:  Preliminary discharge destination: Planned Discharge Destination: Home/apartment  SW/ recommendation for discharge:      Discharge Plan/Needs:     Continued Care and Services - Admitted Since 7/25/2023    Coordination has not been started for this encounter.           Devices/ Equipment that need to be arranged for discharge: None at this time     Prior To Hospitalization:    Living Situation: Spouse and residing at House    .  Support Systems: Spouse, Family members   Home Devices/Equipment: None ,   ,    ,      Mobility Assist Devices: None   Type of Service Prior to Hospitalization: None ,   ,   ,   ,    ,       Patient/Family discharge goal (s):        Therapy Recommendations for Discharge:   PT:      Last Filed Values     None        OT:       Last Filed Values     None        SLP:    Last Filed Values     None            Barriers to Discharge  Identified Barriers to Discharge/Transition Planning: Medical necessity for acute care                 Congruent

## 2023-07-28 NOTE — CARDIOLOGY SUMMARY
[de-identified] : \par 7/26/23 - unchanged\par 4/22/22 - sinus bradycardia, Q waves septal \par 8/25/21 Sinus bradycardia 50 , NSST\par 5/19/21 Sinus bradycardia 54, NSST\par 2/5/21 - NSR, HR 62, nl axis, good RWP \par 9/30/20 - NSR, HR 64, nl axis, good RWP\par 5/17/19 - NSR, nl axis, PRWP\par 12/26/18 - NSR, no ST changes (ECG abnormalities resolved)\par 8/14/18 - NSR, 1st deg AV block (), TWI diffusely, good RWP [de-identified] : \par 9/4/20 - EF nl, nl RV function\par 1/17/19 - EF 66%, LVEDD 3.1 cm\par \par 8/2/18 - LVEDD 4 cm, mild MR, severe segmental LV dysfunction (HK of apex, mid-distal septum and mid-distal anterior wall); EF 28%\par  [de-identified] : \par 8/10/18 - mild non-obstructive CAD; LVEDP 9\par \par

## 2023-07-28 NOTE — PHYSICAL EXAM

## 2023-07-28 NOTE — HISTORY OF PRESENT ILLNESS
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 Accompanied by granddaughter, Ngoc.\par \par Since last visit, has been reportedly doing well. Notabley had elevated BP to 200s at visit with Dr. Zabala. Spironolactone was started and increased to 25 mg daily. Reportedly was taken off hydralazine since. Reports when takes lisinopril, spironolactone and metoprolol, BP has been 130s at home. \par \par Has chronic fatigue despite reduction in Toprol 50 mg daily. \par \par She is able to do light chores and walk with a cane without shortness of breath. Has been able to walk up the stairs w/o difficulty. States she stays active during the day. Denies orthopnea/PND/LE edema. Tolerating medications. \par \par BP today 180/110 but hadn't taken any of her medications today. Usually takes them (per grandduaghter) but does miss occasionally (2x/week). Denies visual disturbances, headaches. \par \par

## 2023-08-24 ENCOUNTER — APPOINTMENT (OUTPATIENT)
Dept: ELECTROPHYSIOLOGY | Facility: CLINIC | Age: 88
End: 2023-08-24

## 2023-09-01 ENCOUNTER — TRANSCRIPTION ENCOUNTER (OUTPATIENT)
Age: 88
End: 2023-09-01

## 2023-09-06 ENCOUNTER — EMERGENCY (EMERGENCY)
Facility: HOSPITAL | Age: 88
LOS: 1 days | Discharge: HOME | End: 2023-09-06
Attending: STUDENT IN AN ORGANIZED HEALTH CARE EDUCATION/TRAINING PROGRAM | Admitting: STUDENT IN AN ORGANIZED HEALTH CARE EDUCATION/TRAINING PROGRAM
Payer: MEDICARE

## 2023-09-06 VITALS
DIASTOLIC BLOOD PRESSURE: 98 MMHG | TEMPERATURE: 98 F | OXYGEN SATURATION: 98 % | SYSTOLIC BLOOD PRESSURE: 162 MMHG | RESPIRATION RATE: 20 BRPM | HEART RATE: 89 BPM

## 2023-09-06 VITALS
HEART RATE: 70 BPM | OXYGEN SATURATION: 100 % | RESPIRATION RATE: 18 BRPM | TEMPERATURE: 98 F | SYSTOLIC BLOOD PRESSURE: 155 MMHG | DIASTOLIC BLOOD PRESSURE: 77 MMHG

## 2023-09-06 DIAGNOSIS — Z90.49 ACQUIRED ABSENCE OF OTHER SPECIFIED PARTS OF DIGESTIVE TRACT: Chronic | ICD-10-CM

## 2023-09-06 DIAGNOSIS — Z98.49 CATARACT EXTRACTION STATUS, UNSPECIFIED EYE: Chronic | ICD-10-CM

## 2023-09-06 LAB
ALBUMIN SERPL ELPH-MCNC: 3.9 G/DL — SIGNIFICANT CHANGE UP (ref 3.3–5)
ALP SERPL-CCNC: 143 U/L — HIGH (ref 40–120)
ALT FLD-CCNC: 17 U/L — SIGNIFICANT CHANGE UP (ref 4–33)
ANION GAP SERPL CALC-SCNC: 14 MMOL/L — SIGNIFICANT CHANGE UP (ref 7–14)
APPEARANCE UR: CLEAR — SIGNIFICANT CHANGE UP
AST SERPL-CCNC: 26 U/L — SIGNIFICANT CHANGE UP (ref 4–32)
BACTERIA # UR AUTO: NEGATIVE /HPF — SIGNIFICANT CHANGE UP
BASOPHILS # BLD AUTO: 0.03 K/UL — SIGNIFICANT CHANGE UP (ref 0–0.2)
BASOPHILS NFR BLD AUTO: 0.5 % — SIGNIFICANT CHANGE UP (ref 0–2)
BILIRUB SERPL-MCNC: 0.7 MG/DL — SIGNIFICANT CHANGE UP (ref 0.2–1.2)
BILIRUB UR-MCNC: NEGATIVE — SIGNIFICANT CHANGE UP
BUN SERPL-MCNC: 24 MG/DL — HIGH (ref 7–23)
CALCIUM SERPL-MCNC: 9.4 MG/DL — SIGNIFICANT CHANGE UP (ref 8.4–10.5)
CAST: 1 /LPF — SIGNIFICANT CHANGE UP (ref 0–4)
CHLORIDE SERPL-SCNC: 100 MMOL/L — SIGNIFICANT CHANGE UP (ref 98–107)
CO2 SERPL-SCNC: 18 MMOL/L — LOW (ref 22–31)
COLOR SPEC: YELLOW — SIGNIFICANT CHANGE UP
CREAT SERPL-MCNC: 1.53 MG/DL — HIGH (ref 0.5–1.3)
DIFF PNL FLD: NEGATIVE — SIGNIFICANT CHANGE UP
EGFR: 33 ML/MIN/1.73M2 — LOW
EOSINOPHIL # BLD AUTO: 0.04 K/UL — SIGNIFICANT CHANGE UP (ref 0–0.5)
EOSINOPHIL NFR BLD AUTO: 0.6 % — SIGNIFICANT CHANGE UP (ref 0–6)
GLUCOSE SERPL-MCNC: 93 MG/DL — SIGNIFICANT CHANGE UP (ref 70–99)
GLUCOSE UR QL: NEGATIVE MG/DL — SIGNIFICANT CHANGE UP
HCT VFR BLD CALC: 35.8 % — SIGNIFICANT CHANGE UP (ref 34.5–45)
HGB BLD-MCNC: 11.5 G/DL — SIGNIFICANT CHANGE UP (ref 11.5–15.5)
IANC: 3.99 K/UL — SIGNIFICANT CHANGE UP (ref 1.8–7.4)
IMM GRANULOCYTES NFR BLD AUTO: 0.3 % — SIGNIFICANT CHANGE UP (ref 0–0.9)
KETONES UR-MCNC: NEGATIVE MG/DL — SIGNIFICANT CHANGE UP
LEUKOCYTE ESTERASE UR-ACNC: ABNORMAL
LYMPHOCYTES # BLD AUTO: 1.55 K/UL — SIGNIFICANT CHANGE UP (ref 1–3.3)
LYMPHOCYTES # BLD AUTO: 25.1 % — SIGNIFICANT CHANGE UP (ref 13–44)
MCHC RBC-ENTMCNC: 23 PG — LOW (ref 27–34)
MCHC RBC-ENTMCNC: 32.1 GM/DL — SIGNIFICANT CHANGE UP (ref 32–36)
MCV RBC AUTO: 71.5 FL — LOW (ref 80–100)
MONOCYTES # BLD AUTO: 0.55 K/UL — SIGNIFICANT CHANGE UP (ref 0–0.9)
MONOCYTES NFR BLD AUTO: 8.9 % — SIGNIFICANT CHANGE UP (ref 2–14)
NEUTROPHILS # BLD AUTO: 3.99 K/UL — SIGNIFICANT CHANGE UP (ref 1.8–7.4)
NEUTROPHILS NFR BLD AUTO: 64.6 % — SIGNIFICANT CHANGE UP (ref 43–77)
NITRITE UR-MCNC: NEGATIVE — SIGNIFICANT CHANGE UP
NRBC # BLD: 0 /100 WBCS — SIGNIFICANT CHANGE UP (ref 0–0)
NRBC # FLD: 0 K/UL — SIGNIFICANT CHANGE UP (ref 0–0)
PH UR: 6 — SIGNIFICANT CHANGE UP (ref 5–8)
PLATELET # BLD AUTO: 177 K/UL — SIGNIFICANT CHANGE UP (ref 150–400)
POTASSIUM SERPL-MCNC: 4.9 MMOL/L — SIGNIFICANT CHANGE UP (ref 3.5–5.3)
POTASSIUM SERPL-SCNC: 4.9 MMOL/L — SIGNIFICANT CHANGE UP (ref 3.5–5.3)
PROT SERPL-MCNC: 8.3 G/DL — SIGNIFICANT CHANGE UP (ref 6–8.3)
PROT UR-MCNC: NEGATIVE MG/DL — SIGNIFICANT CHANGE UP
RBC # BLD: 5.01 M/UL — SIGNIFICANT CHANGE UP (ref 3.8–5.2)
RBC # FLD: 16.2 % — HIGH (ref 10.3–14.5)
RBC CASTS # UR COMP ASSIST: 2 /HPF — SIGNIFICANT CHANGE UP (ref 0–4)
SODIUM SERPL-SCNC: 132 MMOL/L — LOW (ref 135–145)
SP GR SPEC: 1.01 — SIGNIFICANT CHANGE UP (ref 1–1.03)
SQUAMOUS # UR AUTO: 4 /HPF — SIGNIFICANT CHANGE UP (ref 0–5)
UROBILINOGEN FLD QL: 0.2 MG/DL — SIGNIFICANT CHANGE UP (ref 0.2–1)
WBC # BLD: 6.18 K/UL — SIGNIFICANT CHANGE UP (ref 3.8–10.5)
WBC # FLD AUTO: 6.18 K/UL — SIGNIFICANT CHANGE UP (ref 3.8–10.5)
WBC UR QL: 55 /HPF — HIGH (ref 0–5)

## 2023-09-06 PROCEDURE — 71046 X-RAY EXAM CHEST 2 VIEWS: CPT | Mod: 26

## 2023-09-06 PROCEDURE — 99284 EMERGENCY DEPT VISIT MOD MDM: CPT

## 2023-09-06 PROCEDURE — 93010 ELECTROCARDIOGRAM REPORT: CPT

## 2023-09-06 RX ORDER — CEFTRIAXONE 500 MG/1
1000 INJECTION, POWDER, FOR SOLUTION INTRAMUSCULAR; INTRAVENOUS ONCE
Refills: 0 | Status: COMPLETED | OUTPATIENT
Start: 2023-09-06 | End: 2023-09-06

## 2023-09-06 RX ORDER — CEFUROXIME AXETIL 250 MG
1 TABLET ORAL
Qty: 14 | Refills: 0
Start: 2023-09-06 | End: 2023-09-12

## 2023-09-06 RX ADMIN — CEFTRIAXONE 100 MILLIGRAM(S): 500 INJECTION, POWDER, FOR SOLUTION INTRAMUSCULAR; INTRAVENOUS at 23:05

## 2023-09-06 NOTE — ED PROVIDER NOTE - ATTENDING APP SHARED VISIT CONTRIBUTION OF CARE
86 y/o F w/ h/o R renal artery stenosis (since 2015), HTN, HL, microcytic anemia, hx of Takotsubo cardiomyopathy in 8/2018 (now recovered, LVEF 66%) where she had Vfib arrest and received ICD, now presents for concern for piece missing from her ICD. pt states she though tthat leonardoe was a piece that she needed to attach to her ICD externally and could not find it so she got concerned andw as asked to  be brought to hospital. pt has had this concern for several years. granddaughter states over last 2+ years has had a decline in her cognition and seems to be confused at times, not acutely worsened today  denies any ICD shocks, chest pain, sob, weakness, fevers chills or other complaints  likely cognitive decline/dementia, will check basic labs, xray ua to make sure no infection or metabolic encephalopathy but pt currently aox3

## 2023-09-06 NOTE — ED ADULT NURSE NOTE - OBJECTIVE STATEMENT
Received pt in bed A and Ox 3 in NAD resting comfortably, endorses no complaints reports  mistook left sided chest wall defibrillator for an unknown hardware, denies c/p, denies being shocked breathing is even and unlabored, defibrillator visualized no abnormality to skin o pt's anatomy noted, IV initiated labs drawn and sent, pt is visualized directly from nursing station.

## 2023-09-06 NOTE — ED ADULT TRIAGE NOTE - CHIEF COMPLAINT QUOTE
pt coming with weakness, stating something is loss from her defibrillator, as per grand daughter stated the machine is a monitor at the house, pt denies CP, no dizziness,

## 2023-09-06 NOTE — ED PROVIDER NOTE - CLINICAL SUMMARY MEDICAL DECISION MAKING FREE TEXT BOX
88 Y/O F PMH Takotsubo Cardiomyopathy with a V fib arrest AICD placement subsequent return of ED, Renal Artery stenosis, HLD PSH Appendectomy, Cataract SX presents accompanied by family with a concern regarding her AICD. Pt states she believed that her AICD required an external component and was not aware the device has been below her skin. Plan is labs to eval for anemia or electrolyte disturbance, UA and culture to eval for a UTI, will likely provide outpatient neurology follow up as pt's family endorses a gradual decline in the patient's condition.

## 2023-09-06 NOTE — ED PROVIDER NOTE - OBJECTIVE STATEMENT
86 Y/O F PMH Takotsubo Cardiomyopathy with a V fib arrest AICD placement subsequent return of ED, Renal Artery stenosis, HLD PSH Appendectomy, Cataract SX presents accompanied by family with a concern regarding her AICD. Pt states she believed that her AICD required an external component and was not aware the device has been below her skin. Pt's family states that her cognition has been in gradual decline over the past 2-3 years, states they live with the pt. Pt denies CP, SOB, abdominal pain or any other acute sx or complaints.

## 2023-09-06 NOTE — ED PROVIDER NOTE - PATIENT PORTAL LINK FT
You can access the FollowMyHealth Patient Portal offered by [facility] by registering at the following website: [email or web address]. By joining [employer]’s FollowMyHealth portal, you will also be able to view your health information using other applications (apps) compatible with our system.

## 2023-09-06 NOTE — ED PROVIDER NOTE - NSICDXPASTMEDICALHX_GEN_ALL_CORE_FT
Negative PAST MEDICAL HISTORY:  Cardiomyopathy, unspecified type H/o Takasubo cardiomyopathy c/b vfib arrest s/p AICD implantation. CMP resolved with EF 66% (from 30%) in Jan 2019    History of renal artery stenosis     HTN (hypertension)     Hyperlipidemia     Osteoporosis

## 2023-09-06 NOTE — ED PROVIDER NOTE - NSFOLLOWUPINSTRUCTIONS_ED_ALL_ED_FT
Follow up with your primary doctor and see a Neurologist for screening. You can see Dr. Michael Nissenbaum [address] [facility] [id number] [phone].  Advance activity as tolerated.  Continue all previously prescribed medications as directed.  Follow up with your primary care physician in 48-72 hours- bring copies of your results.  Return to the ER for worsening or persistent symptoms, and/or ANY NEW OR CONCERNING SYMPTOMS. THIS INCLUDES BUT IS NOT LIMITED TO NUMBNESS, WEAKNESS, SLURRED SPEECH OR FOR ANY OTHER SYMPTOMS THAT CONCERN YOU. If you have issues obtaining follow up, please call: [phone] to obtain a doctor or specialist who takes your insurance in your area.  You may call [phone] to make an appointment with the internal medicine clinic.

## 2023-09-06 NOTE — ED PROVIDER NOTE - NS ED ATTENDING STATEMENT MOD
This was a shared visit with the SHARATH. I reviewed and verified the documentation and independently performed the documented:

## 2023-09-09 LAB
CULTURE RESULTS: SIGNIFICANT CHANGE UP
SPECIMEN SOURCE: SIGNIFICANT CHANGE UP

## 2023-09-13 ENCOUNTER — TRANSCRIPTION ENCOUNTER (OUTPATIENT)
Age: 88
End: 2023-09-13

## 2023-09-28 ENCOUNTER — APPOINTMENT (OUTPATIENT)
Dept: ELECTROPHYSIOLOGY | Facility: CLINIC | Age: 88
End: 2023-09-28

## 2023-10-19 ENCOUNTER — TRANSCRIPTION ENCOUNTER (OUTPATIENT)
Age: 88
End: 2023-10-19

## 2023-10-31 ENCOUNTER — APPOINTMENT (OUTPATIENT)
Dept: ELECTROPHYSIOLOGY | Facility: CLINIC | Age: 88
End: 2023-10-31
Payer: MEDICARE

## 2023-10-31 ENCOUNTER — NON-APPOINTMENT (OUTPATIENT)
Age: 88
End: 2023-10-31

## 2023-10-31 PROCEDURE — 93295 DEV INTERROG REMOTE 1/2/MLT: CPT

## 2023-10-31 PROCEDURE — 93296 REM INTERROG EVL PM/IDS: CPT

## 2024-01-19 ENCOUNTER — APPOINTMENT (OUTPATIENT)
Dept: HEART FAILURE | Facility: CLINIC | Age: 89
End: 2024-01-19
Payer: MEDICARE

## 2024-01-19 ENCOUNTER — NON-APPOINTMENT (OUTPATIENT)
Age: 89
End: 2024-01-19

## 2024-01-19 VITALS
HEART RATE: 94 BPM | DIASTOLIC BLOOD PRESSURE: 103 MMHG | SYSTOLIC BLOOD PRESSURE: 182 MMHG | WEIGHT: 108 LBS | HEIGHT: 60 IN | BODY MASS INDEX: 21.2 KG/M2 | OXYGEN SATURATION: 99 %

## 2024-01-19 PROCEDURE — 99214 OFFICE O/P EST MOD 30 MIN: CPT | Mod: 25

## 2024-01-19 PROCEDURE — 93000 ELECTROCARDIOGRAM COMPLETE: CPT

## 2024-01-19 NOTE — CARDIOLOGY SUMMARY
[de-identified] : 1/19/24 - unchanged 7/26/23 - unchanged 4/22/22 - sinus bradycardia, Q waves septal  8/25/21 Sinus bradycardia 50 , NSST 5/19/21 Sinus bradycardia 54, NSST 2/5/21 - NSR, HR 62, nl axis, good RWP  9/30/20 - NSR, HR 64, nl axis, good RWP 5/17/19 - NSR, nl axis, PRWP 12/26/18 - NSR, no ST changes (ECG abnormalities resolved) 8/14/18 - NSR, 1st deg AV block (), TWI diffusely, good RWP [de-identified] : \par  9/4/20 - EF nl, nl RV function\par  1/17/19 - EF 66%, LVEDD 3.1 cm\par  \par  8/2/18 - LVEDD 4 cm, mild MR, severe segmental LV dysfunction (HK of apex, mid-distal septum and mid-distal anterior wall); EF 28%\par   [de-identified] : \par  8/10/18 - mild non-obstructive CAD; LVEDP 9\par  \par

## 2024-01-19 NOTE — PHYSICAL EXAM
[Well Developed] : well developed [Well Nourished] : well nourished [No Acute Distress] : no acute distress [Normal Conjunctiva] : normal conjunctiva [Normal Venous Pressure] : normal venous pressure [No Carotid Bruit] : no carotid bruit [Normal S1, S2] : normal S1, S2 [No Murmur] : no murmur [No Rub] : no rub [No Gallop] : no gallop [Good Air Entry] : good air entry [Clear Lung Fields] : clear lung fields [No Respiratory Distress] : no respiratory distress  [Non Tender] : non-tender [Soft] : abdomen soft [No Masses/organomegaly] : no masses/organomegaly [Normal Bowel Sounds] : normal bowel sounds [Normal Gait] : normal gait [No Edema] : no edema [No Cyanosis] : no cyanosis [No Clubbing] : no clubbing [No Varicosities] : no varicosities [No Rash] : no rash [No Skin Lesions] : no skin lesions [Moves all extremities] : moves all extremities [No Focal Deficits] : no focal deficits [Normal Speech] : normal speech [Alert and Oriented] : alert and oriented [Normal memory] : normal memory [de-identified] : slender

## 2024-01-19 NOTE — HISTORY OF PRESENT ILLNESS
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 Accompanied by granddaughter, Ngoc.  Since last visit, has been reportedly doing well. BP was elevated but was to monitor at home. BP has been 130s at home. Today 180/100. Per granddaughter, she is inconsistent with taking her medication. Does take hydralazine once/day.   Has chronic fatigue despite reduction in Toprol 50 mg daily.   She is able to do light chores and walk with a cane without shortness of breath. Has been able to walk up the stairs w/o difficulty. States she stays active during the day. Denies orthopnea/PND/LE edema. Tolerating medications.   Usually takes them (per grandduaghter) but does miss occasionally (2x/week). Denies visual disturbances, headaches.   Has been reporting constipation (having BM every 2 weeks). Has tried dulcolax, senna, prune juice.   Repeat /93

## 2024-01-19 NOTE — ASSESSMENT
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 Currently endorses class I-II symptoms and appears euvolemic and is notably hypertensive in office to 180s with possibly inconsistent usage of medications (per vaughn).   1. HFrecEF - resolved Takotsubo - pt with some fatigue with HR 50-54 bpm, c/w toprol 50 mg daily  -will keep a log of B/P and HR with goal HR > 55 and B/P < 130/80  2. HTN - elevated in office - on lisinopril 40 mg daily - on christina 25 mg daily - increase hydral to 25 mg twice/day - keep log of BP; inform us if BP >140  3. Non-obstructive CAD - on ASA 81 mg daily and Lipitor 40 mg daily - no active chest pain or EKG changes  4. VT/ S/P ICD - Dual chamber [employer] ICD - discussed deactivating defibrillator as her atach is nearing the VF threshold and her risk of VT/VF is low given recovery of her LV function  RTC 6 months or sooner prn.

## 2024-01-22 ENCOUNTER — NON-APPOINTMENT (OUTPATIENT)
Age: 89
End: 2024-01-22

## 2024-02-01 ENCOUNTER — APPOINTMENT (OUTPATIENT)
Dept: ELECTROPHYSIOLOGY | Facility: CLINIC | Age: 89
End: 2024-02-01
Payer: MEDICARE

## 2024-02-01 ENCOUNTER — NON-APPOINTMENT (OUTPATIENT)
Age: 89
End: 2024-02-01

## 2024-02-01 PROCEDURE — 93296 REM INTERROG EVL PM/IDS: CPT

## 2024-02-01 PROCEDURE — 93295 DEV INTERROG REMOTE 1/2/MLT: CPT

## 2024-02-12 ENCOUNTER — RX CHANGE (OUTPATIENT)
Age: 89
End: 2024-02-12

## 2024-02-12 RX ORDER — HYDRALAZINE HYDROCHLORIDE 25 MG/1
25 TABLET ORAL
Qty: 90 | Refills: 1 | Status: DISCONTINUED | COMMUNITY
Start: 2024-01-19 | End: 2024-02-12

## 2024-02-22 ENCOUNTER — TRANSCRIPTION ENCOUNTER (OUTPATIENT)
Age: 89
End: 2024-02-22

## 2024-03-04 NOTE — DISCHARGE NOTE ADULT - MEDICATION SUMMARY - MEDICATIONS TO TAKE
Airway patent, Nasal mucosa clear. Mouth with normal mucosa. Throat has no vesicles, no oropharyngeal exudates and uvula is midline. I will START or STAY ON the medications listed below when I get home from the hospital:    acetaminophen 325 mg oral tablet  -- 1 tab(s) by mouth every 4 hours, As needed, Moderate Pain (4 - 6)  -- Indication: For pain    aspirin 325 mg oral tablet  -- 1 tab(s) by mouth once a day  -- Indication: For Cad    Catapres 0.2 mg oral tablet  -- 1 tab(s) by mouth 2 times a day   -- It is very important that you take or use this exactly as directed.  Do not skip doses or discontinue unless directed by your doctor.  May cause drowsiness.  Alcohol may intensify this effect.  Use care when operating dangerous machinery.  Some non-prescription drugs may aggravate your condition.  Read all labels carefully.  If a warning appears, check with your doctor before taking.    -- Indication: For Cad    Crestor 10 mg oral tablet  -- 1 tab(s) by mouth once a day (at bedtime)  -- Indication: For Cad    metoprolol succinate 50 mg oral tablet, extended release  -- 1 tab(s) by mouth once a day   -- It is very important that you take or use this exactly as directed.  Do not skip doses or discontinue unless directed by your doctor.  May cause drowsiness.  Alcohol may intensify this effect.  Use care when operating dangerous machinery.  Some non-prescription drugs may aggravate your condition.  Read all labels carefully.  If a warning appears, check with your doctor before taking.  Swallow whole.  Do not crush.  Take with food or milk.  This drug may impair the ability to drive or operate machinery.  Use care until you become familiar with its effects.    -- Indication: For Cad I will START or STAY ON the medications listed below when I get home from the hospital:    acetaminophen 325 mg oral tablet  -- 1 tab(s) by mouth every 4 hours, As needed, Moderate Pain  -- Indication: For RA (rheumatoid arthritis)    Catapres 0.1 mg oral tablet  -- 1 tab(s) by mouth 2 times a day  hospital  -- Indication: For STOP    Crestor 10 mg oral tablet  -- 1 tab(s) by mouth once a day (at bedtime)  hospital and home  -- Indication: For Cad    Metoprolol Tartrate 25 mg oral tablet  -- 1 tab(s) by mouth 2 times a day  hospital  -- Indication: For HTN (hypertension)    NIFEdipine 60 mg oral tablet, extended release  -- 1 tab(s) by mouth once a day  -- Indication: For HTN (hypertension)

## 2024-03-05 NOTE — H&P ADULT - NSHPLABSRESULTS_GEN_ALL_CORE
12.1   7.55  )-----------( 185      ( 01 Aug 2018 17:01 )             37.8     08-01    138  |  104  |  14  ----------------------------<  113<H>  4.7   |  18<L>  |  1.07    Ca    8.4      01 Aug 2018 17:00    TPro  6.7  /  Alb  3.4  /  TBili  0.3  /  DBili  x   /  AST  589<H>  /  ALT  560<H>  /  AlkPhos  121<H>  08-01        ABG - ( 01 Aug 2018 19:10 )  pH, Arterial: 7.38  pH, Blood: x     /  pCO2: 36    /  pO2: 80    / HCO3: 22    / Base Excess: -3.7  /  SaO2: 96.2              LIVER FUNCTIONS - ( 01 Aug 2018 17:00 )  Alb: 3.4 g/dL / Pro: 6.7 g/dL / ALK PHOS: 121 u/L / ALT: 560 u/L / AST: 589 u/L / GGT: x             PT/INR - ( 01 Aug 2018 17:01 )   PT: 11.5 SEC;   INR: 1.00          PTT - ( 01 Aug 2018 17:01 )  PTT:30.3 SEC    < from: CT Abdomen and Pelvis w/ IV Cont (08.01.18 @ 18:02) >    FINDINGS:    IMPRESSION:     No pulmonary embolism.    Patchy bilateral parenchymal opacities in the dependent portions of the   lungs suggestive of aspiration.    Severe atherosclerotic disease of the aorta and major branch vessels with   partially imaged loss of the normal flow within the right common femoral   artery.    Indeterminate lesions within the left lobe of the liver which could be   better characterized with contrast-enhanced MRI as clinically warranted.    < end of copied text >    < from: CT Head No Cont (08.01.18 @ 18:01) >    IMPRESSION: Volume loss, microvascular disease, no acute hemorrhage or   midline shift. If the patient's clinical symptoms persist or progress,   consider short interval follow-up head CT and/or brain MRI for further   evaluation.        < end of copied text > yes

## 2024-03-20 NOTE — PROGRESS NOTE ADULT - ASSESSMENT
The patient is a 82 year old woman with a history of HTN, Peripheral artery disease on ASA, renal artery stenosis admitted for V-Fib arrest complicated by acute HFrEF, multifocal atrial tachycardia and delirium. Cardiac cath showed patent coronaries, s/p AICD placement. "I have been feeling dizzy and have cramps"

## 2024-05-02 ENCOUNTER — APPOINTMENT (OUTPATIENT)
Dept: ELECTROPHYSIOLOGY | Facility: CLINIC | Age: 89
End: 2024-05-02
Payer: MEDICARE

## 2024-05-02 ENCOUNTER — NON-APPOINTMENT (OUTPATIENT)
Age: 89
End: 2024-05-02

## 2024-05-02 PROCEDURE — 93295 DEV INTERROG REMOTE 1/2/MLT: CPT

## 2024-05-02 PROCEDURE — 93296 REM INTERROG EVL PM/IDS: CPT

## 2024-06-11 ENCOUNTER — APPOINTMENT (OUTPATIENT)
Dept: GERIATRICS | Facility: CLINIC | Age: 89
End: 2024-06-11
Payer: MEDICARE

## 2024-06-11 ENCOUNTER — NON-APPOINTMENT (OUTPATIENT)
Age: 89
End: 2024-06-11

## 2024-06-11 VITALS — SYSTOLIC BLOOD PRESSURE: 162 MMHG | DIASTOLIC BLOOD PRESSURE: 88 MMHG

## 2024-06-11 VITALS
BODY MASS INDEX: 20.9 KG/M2 | WEIGHT: 107 LBS | OXYGEN SATURATION: 97 % | DIASTOLIC BLOOD PRESSURE: 85 MMHG | SYSTOLIC BLOOD PRESSURE: 185 MMHG | RESPIRATION RATE: 15 BRPM | HEART RATE: 75 BPM | TEMPERATURE: 97.6 F

## 2024-06-11 DIAGNOSIS — F02.80 ALZHEIMER'S DISEASE, UNSPECIFIED: ICD-10-CM

## 2024-06-11 DIAGNOSIS — E78.00 PURE HYPERCHOLESTEROLEMIA, UNSPECIFIED: ICD-10-CM

## 2024-06-11 DIAGNOSIS — R73.09 OTHER ABNORMAL GLUCOSE: ICD-10-CM

## 2024-06-11 DIAGNOSIS — I50.22 CHRONIC SYSTOLIC (CONGESTIVE) HEART FAILURE: ICD-10-CM

## 2024-06-11 DIAGNOSIS — I15.0 RENOVASCULAR HYPERTENSION: ICD-10-CM

## 2024-06-11 DIAGNOSIS — F01.50 ALZHEIMER'S DISEASE, UNSPECIFIED: ICD-10-CM

## 2024-06-11 DIAGNOSIS — I25.10 ATHEROSCLEROTIC HEART DISEASE OF NATIVE CORONARY ARTERY W/OUT ANGINA PECTORIS: ICD-10-CM

## 2024-06-11 DIAGNOSIS — D64.9 ANEMIA, UNSPECIFIED: ICD-10-CM

## 2024-06-11 DIAGNOSIS — I70.1 ATHEROSCLEROSIS OF RENAL ARTERY: ICD-10-CM

## 2024-06-11 DIAGNOSIS — G30.9 ALZHEIMER'S DISEASE, UNSPECIFIED: ICD-10-CM

## 2024-06-11 PROCEDURE — G2211 COMPLEX E/M VISIT ADD ON: CPT | Mod: NC,1L

## 2024-06-11 PROCEDURE — G0439: CPT

## 2024-06-11 RX ORDER — SPIRONOLACTONE 25 MG/1
25 TABLET ORAL
Qty: 90 | Refills: 1 | Status: ACTIVE | COMMUNITY
Start: 2023-06-06 | End: 1900-01-01

## 2024-06-11 RX ORDER — ATORVASTATIN CALCIUM 40 MG/1
40 TABLET, FILM COATED ORAL
Qty: 90 | Refills: 1 | Status: ACTIVE | COMMUNITY
Start: 2018-08-16 | End: 1900-01-01

## 2024-06-11 RX ORDER — LISINOPRIL 40 MG/1
40 TABLET ORAL DAILY
Qty: 90 | Refills: 1 | Status: ACTIVE | COMMUNITY
Start: 2020-01-21 | End: 1900-01-01

## 2024-06-11 RX ORDER — HYDRALAZINE HYDROCHLORIDE 25 MG/1
25 TABLET ORAL
Qty: 270 | Refills: 1 | Status: ACTIVE | COMMUNITY
Start: 2024-02-12 | End: 1900-01-01

## 2024-06-11 RX ORDER — METOPROLOL SUCCINATE 50 MG/1
50 TABLET, EXTENDED RELEASE ORAL
Qty: 90 | Refills: 1 | Status: ACTIVE | COMMUNITY
Start: 2020-09-30 | End: 1900-01-01

## 2024-06-11 NOTE — REVIEW OF SYSTEMS
[As Noted in HPI] : as noted in HPI [Negative] : Heme/Lymph [Chest Pain] : no chest pain [SOB on Exertion] : no shortness of breath during exertion [Dysuria] : no dysuria [Difficulty Walking] : no difficulty walking

## 2024-06-11 NOTE — HISTORY OF PRESENT ILLNESS
[PMH Reviewed and Updated] : past medical history reviewed and updated [PSH Reviewed and Updated] : past surgical history reviewed and updated [Family History Reviewed and Updated] : family history reviewed and updated [Medication and Allergies Reconciled] : medication and allergies reconciled [___ Daughters] : [unfilled] daughter(s) [Retired] : retired from work [Low Salt Diet] : low salt [General Adherence] : and is generally adherent [None] : The patient does not exercise [Compliant with medications] : compliant with medications [Needs some help with ADLs] : need some help with ADLs [Does not drive] : does not drive [No history of falls] : no history of falls [Seatbelts] : seatbelts [Smoke Detectors] : smoke detectors [Carbon Monoxide Detector] : carbon monoxide detector [Hot Water Temp <120F] : hot water temp <120F [Bathroom Grab Bars] : bathroom grab bars [Fall Prevention Measures] : fall prevention measures [Advanced Directives Discussed] : discussed at today's visit [No falls in past year] : Patient reported no falls in the past year [Patient is independent with] : bathing [Independent] : transferring/mobility [] : Patient is continent. [Completely Dependent] : Completely dependent. [Cane] : cane [NO] : No [Little interest or pleasure doing things] : 1) Little interest or pleasure doing things [Feeling down, depressed, or hopeless] : 2) Feeling down, depressed, or hopeless [0] : 2) Feeling down, depressed, or hopeless: Not at all (0) [PHQ-2 Negative - No further assessment needed] : PHQ-2 Negative - No further assessment needed [Designated Healthcare Proxy] : Designated healthcare proxy [Over the Past 2 Weeks, Have You Felt Down, Depressed, or Hopeless?] : 1.) Over the past 2 weeks, have you felt down, depressed, or hopeless? No [Over the Past 2 Weeks, Have You Felt Little Interest or Pleasure Doing Things?] : 2.) Over the past 2 weeks, have you felt little interest or pleasure doing things? No [de-identified] : Patient has healthcare proxy, granddaughter will fax to office [FreeTextEntry1] : Mrs. Zabala is an 88-year-old woman presenting for her Medicare annual visit, accompanied by granddaughter Ngoc who assists with history.  The patient herself offers no complaints.  Ngoc states that the patient's major issue is constipation.  She requires encouragement to take stool softeners/laxatives and generally has a bowel movement twice to 3 times a week. Ngoc describes her grandmother as feisty at times but overall the patient's mood is good.  She is able to take her own medications but they are supervised by her granddaughter who makes sure that she has taken them, this morning the patient did not take her meds for unclear reasons. The patient lives with her 2 granddaughters.  She is independent in basic ADLs.  She can prepare a light meal with supervision.  Patient ambulates with a cane [[id number]] : 0

## 2024-06-12 LAB
ALBUMIN SERPL ELPH-MCNC: 3.9 G/DL
ALP BLD-CCNC: 123 U/L
ALT SERPL-CCNC: 17 U/L
ANION GAP SERPL CALC-SCNC: 15 MMOL/L
AST SERPL-CCNC: 23 U/L
BASOPHILS # BLD AUTO: 0.03 K/UL
BASOPHILS NFR BLD AUTO: 0.6 %
BILIRUB SERPL-MCNC: 0.7 MG/DL
BUN SERPL-MCNC: 17 MG/DL
CALCIUM SERPL-MCNC: 9.3 MG/DL
CHLORIDE SERPL-SCNC: 104 MMOL/L
CHOLEST SERPL-MCNC: 177 MG/DL
CO2 SERPL-SCNC: 20 MMOL/L
CREAT SERPL-MCNC: 1.29 MG/DL
EGFR: 40 ML/MIN/1.73M2
EOSINOPHIL # BLD AUTO: 0.1 K/UL
EOSINOPHIL NFR BLD AUTO: 2.1 %
ESTIMATED AVERAGE GLUCOSE: 123 MG/DL
FERRITIN SERPL-MCNC: 117 NG/ML
GLUCOSE SERPL-MCNC: 114 MG/DL
HBA1C MFR BLD HPLC: 5.9 %
HCT VFR BLD CALC: 36.9 %
HDLC SERPL-MCNC: 63 MG/DL
HGB BLD-MCNC: 11.4 G/DL
IMM GRANULOCYTES NFR BLD AUTO: 0 %
IRON SATN MFR SERPL: 40 %
IRON SERPL-MCNC: 104 UG/DL
LDLC SERPL CALC-MCNC: 102 MG/DL
LYMPHOCYTES # BLD AUTO: 1.55 K/UL
LYMPHOCYTES NFR BLD AUTO: 31.9 %
MAN DIFF?: NORMAL
MCHC RBC-ENTMCNC: 22.6 PG
MCHC RBC-ENTMCNC: 30.9 GM/DL
MCV RBC AUTO: 73.1 FL
MONOCYTES # BLD AUTO: 0.47 K/UL
MONOCYTES NFR BLD AUTO: 9.7 %
NEUTROPHILS # BLD AUTO: 2.71 K/UL
NEUTROPHILS NFR BLD AUTO: 55.7 %
NONHDLC SERPL-MCNC: 114 MG/DL
PLATELET # BLD AUTO: 157 K/UL
POTASSIUM SERPL-SCNC: 5.1 MMOL/L
PROT SERPL-MCNC: 7.3 G/DL
RBC # BLD: 5.05 M/UL
RBC # FLD: 17.1 %
SODIUM SERPL-SCNC: 139 MMOL/L
TIBC SERPL-MCNC: 258 UG/DL
TRIGL SERPL-MCNC: 62 MG/DL
UIBC SERPL-MCNC: 154 UG/DL
WBC # FLD AUTO: 4.86 K/UL

## 2024-07-01 NOTE — PHYSICAL THERAPY INITIAL EVALUATION ADULT - LEVEL OF INDEPENDENCE: GAIT, REHAB EVAL
Device Clinic pre-procedure medication holds/changes:    Anticoagulation: warfarin for permanent AF   [id number] Score: 5. Hold per protocol.  -INR check needed?: yes, day of procedure    Diuretic: hold maxzide AM of procedure    Contrast allergy: no        Checklist:  - entered orders for H&P and procedure  - entered orders for device checks post procedure  - cancelled any old device check appts and orders  - sent message to scheduling (Luma) to set up appointments (H&P, procedure, 1 week check)     moderate assist (50% patients effort)

## 2024-07-31 ENCOUNTER — TRANSCRIPTION ENCOUNTER (OUTPATIENT)
Age: 89
End: 2024-07-31

## 2024-08-01 ENCOUNTER — NON-APPOINTMENT (OUTPATIENT)
Age: 89
End: 2024-08-01

## 2024-08-01 ENCOUNTER — APPOINTMENT (OUTPATIENT)
Dept: ELECTROPHYSIOLOGY | Facility: CLINIC | Age: 89
End: 2024-08-01
Payer: MEDICARE

## 2024-08-01 PROCEDURE — 93295 DEV INTERROG REMOTE 1/2/MLT: CPT

## 2024-08-01 PROCEDURE — 93296 REM INTERROG EVL PM/IDS: CPT

## 2024-08-02 ENCOUNTER — NON-APPOINTMENT (OUTPATIENT)
Age: 89
End: 2024-08-02

## 2024-08-02 ENCOUNTER — APPOINTMENT (OUTPATIENT)
Dept: HEART FAILURE | Facility: CLINIC | Age: 89
End: 2024-08-02

## 2024-08-02 VITALS
SYSTOLIC BLOOD PRESSURE: 136 MMHG | HEIGHT: 60 IN | OXYGEN SATURATION: 97 % | WEIGHT: 102 LBS | DIASTOLIC BLOOD PRESSURE: 84 MMHG | BODY MASS INDEX: 20.03 KG/M2 | HEART RATE: 85 BPM

## 2024-08-02 PROCEDURE — G2211 COMPLEX E/M VISIT ADD ON: CPT

## 2024-08-02 PROCEDURE — 99215 OFFICE O/P EST HI 40 MIN: CPT

## 2024-08-02 PROCEDURE — 93000 ELECTROCARDIOGRAM COMPLETE: CPT

## 2024-08-10 NOTE — CARDIOLOGY SUMMARY
[de-identified] : 8/2/2024 - sinus rhythm, PVCs 1/19/24 - unchanged 7/26/23 - unchanged 4/22/22 - sinus bradycardia, Q waves septal  8/25/21 Sinus bradycardia 50 , NSST 5/19/21 Sinus bradycardia 54, NSST 2/5/21 - NSR, HR 62, nl axis, good RWP  9/30/20 - NSR, HR 64, nl axis, good RWP 5/17/19 - NSR, nl axis, PRWP 12/26/18 - NSR, no ST changes (ECG abnormalities resolved) 8/14/18 - NSR, 1st deg AV block (), TWI diffusely, good RWP [de-identified] : 9/4/20 - normal biventricular function 1/17/19 - EF 66%, LVEDD 3.1 cm  8/2/18 - LVEDD 4 cm, mild MR, severe segmental LV dysfunction (HK of apex, mid-distal septum and mid-distal anterior wall); EF 28%  [de-identified] : \par  8/10/18 - mild non-obstructive CAD; LVEDP 9\par  \par

## 2024-08-10 NOTE — PHYSICAL EXAM

## 2024-08-10 NOTE — PHYSICAL EXAM

## 2024-08-10 NOTE — HISTORY OF PRESENT ILLNESS
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 Accompanied by granddaughter, Ngoc.  She is able to do light chores and walk with a cane without shortness of breath. Has been able to walk up the stairs w/o difficulty. States she stays active during the day.   Better med compliance with granddaughter helping with med management- home -155  HR 80 -90's  Has been reporting constipation (having BM every 2 weeks). Has tried dulcolax, senna, prune juice.   Denies orthopnea/PND/LE edema. visual disturbances, headaches.

## 2024-08-10 NOTE — CARDIOLOGY SUMMARY
[de-identified] : 8/2/2024 - sinus rhythm, PVCs 1/19/24 - unchanged 7/26/23 - unchanged 4/22/22 - sinus bradycardia, Q waves septal  8/25/21 Sinus bradycardia 50 , NSST 5/19/21 Sinus bradycardia 54, NSST 2/5/21 - NSR, HR 62, nl axis, good RWP  9/30/20 - NSR, HR 64, nl axis, good RWP 5/17/19 - NSR, nl axis, PRWP 12/26/18 - NSR, no ST changes (ECG abnormalities resolved) 8/14/18 - NSR, 1st deg AV block (), TWI diffusely, good RWP [de-identified] : 9/4/20 - normal biventricular function 1/17/19 - EF 66%, LVEDD 3.1 cm  8/2/18 - LVEDD 4 cm, mild MR, severe segmental LV dysfunction (HK of apex, mid-distal septum and mid-distal anterior wall); EF 28%  [de-identified] : \par  8/10/18 - mild non-obstructive CAD; LVEDP 9\par  \par

## 2024-08-10 NOTE — ASSESSMENT
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 Currently endorses class I-II symptoms and appears euvolemic.   1. HFrecEF - resolved Takotsubo -Continue with toprol 50 mg daily  -will keep a log of B/P and HR with goal HR > 55 and B/P < 130/80  2. HTN -  - on lisinopril 40 mg daily - on christina 25 mg daily - hydral to 25 mg twice/day - keep log of BP; inform us if BP >140 -counseled on low potassium diet  3. Non-obstructive CAD - on ASA 81 mg daily and Lipitor 40 mg daily - no active chest pain or EKG changes  4. VT/ S/P ICD - Dual chamber [employer] ICD - discussed deactivating defibrillator as her atach is nearing the VF threshold and her risk of VT/VF is low given recovery of her LV function  5. Constipation reviewed diet, fiber intake, fluids -has tried dulcolax, senna in past  RTC 1 year or earlier prn

## 2024-09-10 ENCOUNTER — TRANSCRIPTION ENCOUNTER (OUTPATIENT)
Age: 89
End: 2024-09-10

## 2024-10-15 ENCOUNTER — APPOINTMENT (OUTPATIENT)
Dept: ULTRASOUND IMAGING | Facility: CLINIC | Age: 89
End: 2024-10-15

## 2024-10-15 DIAGNOSIS — I10 ESSENTIAL (PRIMARY) HYPERTENSION: ICD-10-CM

## 2024-10-22 ENCOUNTER — APPOINTMENT (OUTPATIENT)
Dept: NEPHROLOGY | Facility: CLINIC | Age: 89
End: 2024-10-22

## 2024-11-07 ENCOUNTER — NON-APPOINTMENT (OUTPATIENT)
Age: 89
End: 2024-11-07

## 2024-11-07 ENCOUNTER — APPOINTMENT (OUTPATIENT)
Dept: ELECTROPHYSIOLOGY | Facility: CLINIC | Age: 89
End: 2024-11-07
Payer: MEDICARE

## 2024-11-07 PROCEDURE — 93295 DEV INTERROG REMOTE 1/2/MLT: CPT

## 2024-11-07 PROCEDURE — 93296 REM INTERROG EVL PM/IDS: CPT

## 2024-12-27 ENCOUNTER — APPOINTMENT (OUTPATIENT)
Dept: ULTRASOUND IMAGING | Facility: IMAGING CENTER | Age: 88
End: 2024-12-27
Payer: MEDICARE

## 2024-12-27 ENCOUNTER — OUTPATIENT (OUTPATIENT)
Dept: OUTPATIENT SERVICES | Facility: HOSPITAL | Age: 88
LOS: 1 days | End: 2024-12-27
Payer: MEDICARE

## 2024-12-27 DIAGNOSIS — I10 ESSENTIAL (PRIMARY) HYPERTENSION: ICD-10-CM

## 2024-12-27 DIAGNOSIS — Z90.49 ACQUIRED ABSENCE OF OTHER SPECIFIED PARTS OF DIGESTIVE TRACT: Chronic | ICD-10-CM

## 2024-12-27 DIAGNOSIS — Z98.49 CATARACT EXTRACTION STATUS, UNSPECIFIED EYE: Chronic | ICD-10-CM

## 2024-12-27 PROCEDURE — 93975 VASCULAR STUDY: CPT

## 2024-12-27 PROCEDURE — 93975 VASCULAR STUDY: CPT | Mod: 26

## 2024-12-27 NOTE — PHYSICAL EXAM
Updated prescription resent to pharmacy.    [Normal] : the sclera and conjunctiva were normal, extraocular movements were intact, pupils were equal in size, round, and reactive to light [Normal Outer Ear/Nose] : the ears and nose were normal in appearance [Both Tympanic Membranes Were Examined] : both tympanic membranes were normal [Supple] : the neck was supple [Auscultation Breath Sounds / Voice Sounds] : lungs were clear to auscultation bilaterally [Normal S1, S2] : normal S1 and S2 [Edema] : edema was not present [Pedal Pulses Normal] : the pedal pulses are present [Bowel Sounds] : normal bowel sounds [Abdomen Tenderness] : non-tender [Abdomen Soft] : soft [Cervical Lymph Nodes Enlarged Posterior Bilaterally] : posterior cervical [Cervical Lymph Nodes Enlarged Anterior Bilaterally] : anterior cervical, supraclavicular [No Spinal Tenderness] : no spinal tenderness [Involuntary Movements] : no involuntary movements were seen [Normal Color / Pigmentation] : normal skin color and pigmentation [No Focal Deficits] : no focal deficits [Normal Affect] : the affect was normal [de-identified] : 2/6 precordial systolic ejection murmur

## 2025-02-06 ENCOUNTER — APPOINTMENT (OUTPATIENT)
Dept: ELECTROPHYSIOLOGY | Facility: CLINIC | Age: 89
End: 2025-02-06
Payer: MEDICARE

## 2025-02-06 ENCOUNTER — NON-APPOINTMENT (OUTPATIENT)
Age: 89
End: 2025-02-06

## 2025-02-06 PROCEDURE — 93295 DEV INTERROG REMOTE 1/2/MLT: CPT

## 2025-02-06 PROCEDURE — 93296 REM INTERROG EVL PM/IDS: CPT

## 2025-05-08 ENCOUNTER — APPOINTMENT (OUTPATIENT)
Dept: ELECTROPHYSIOLOGY | Facility: CLINIC | Age: 89
End: 2025-05-08
Payer: MEDICARE

## 2025-05-08 ENCOUNTER — NON-APPOINTMENT (OUTPATIENT)
Age: 89
End: 2025-05-08

## 2025-05-08 PROCEDURE — 93295 DEV INTERROG REMOTE 1/2/MLT: CPT

## 2025-05-08 PROCEDURE — 93296 REM INTERROG EVL PM/IDS: CPT

## 2025-06-11 NOTE — PATIENT PROFILE ADULT. - NS PRO CONTRA FLU 1
Plan of care as above    # Cirrohsis/Ascites  # Pleural effusion  - SP Thoracentesis/Para  - Blood pressure soft  - Worsening ascites/Recurrent effusion  - Lasix and Albumin  - Exudate for PE, Trans ? for Ascites  - May need pleural bx   - Midodrine for BP support  - Start lactulose for ammonemia   - transfer for advanced Gastro    Continue care as above. no

## 2025-07-15 ENCOUNTER — TRANSCRIPTION ENCOUNTER (OUTPATIENT)
Age: 89
End: 2025-07-15

## 2025-08-01 ENCOUNTER — APPOINTMENT (OUTPATIENT)
Dept: HEART FAILURE | Facility: CLINIC | Age: 89
End: 2025-08-01

## 2025-08-08 ENCOUNTER — APPOINTMENT (OUTPATIENT)
Dept: ELECTROPHYSIOLOGY | Facility: CLINIC | Age: 89
End: 2025-08-08

## 2025-08-15 ENCOUNTER — NON-APPOINTMENT (OUTPATIENT)
Age: 89
End: 2025-08-15

## 2025-08-15 ENCOUNTER — APPOINTMENT (OUTPATIENT)
Dept: ELECTROPHYSIOLOGY | Facility: CLINIC | Age: 89
End: 2025-08-15
Payer: MEDICARE

## 2025-08-15 PROCEDURE — 93295 DEV INTERROG REMOTE 1/2/MLT: CPT

## 2025-08-15 PROCEDURE — 93296 REM INTERROG EVL PM/IDS: CPT

## 2025-08-26 ENCOUNTER — NON-APPOINTMENT (OUTPATIENT)
Age: 89
End: 2025-08-26

## 2025-08-26 ENCOUNTER — APPOINTMENT (OUTPATIENT)
Dept: HEART FAILURE | Facility: CLINIC | Age: 89
End: 2025-08-26
Payer: MEDICARE

## 2025-08-26 VITALS — SYSTOLIC BLOOD PRESSURE: 197 MMHG | DIASTOLIC BLOOD PRESSURE: 103 MMHG

## 2025-08-26 VITALS — WEIGHT: 105 LBS | HEIGHT: 60 IN | BODY MASS INDEX: 20.62 KG/M2

## 2025-08-26 VITALS
TEMPERATURE: 97.9 F | SYSTOLIC BLOOD PRESSURE: 190 MMHG | HEART RATE: 78 BPM | DIASTOLIC BLOOD PRESSURE: 114 MMHG | OXYGEN SATURATION: 99 %

## 2025-08-26 DIAGNOSIS — I15.0 RENOVASCULAR HYPERTENSION: ICD-10-CM

## 2025-08-26 DIAGNOSIS — R01.1 CARDIAC MURMUR, UNSPECIFIED: ICD-10-CM

## 2025-08-26 DIAGNOSIS — I50.20 UNSPECIFIED SYSTOLIC (CONGESTIVE) HEART FAILURE: ICD-10-CM

## 2025-08-26 PROCEDURE — 99214 OFFICE O/P EST MOD 30 MIN: CPT

## 2025-08-26 PROCEDURE — G2211 COMPLEX E/M VISIT ADD ON: CPT

## 2025-08-26 PROCEDURE — 93000 ELECTROCARDIOGRAM COMPLETE: CPT

## 2025-09-04 ENCOUNTER — APPOINTMENT (OUTPATIENT)
Dept: CARDIOLOGY | Facility: CLINIC | Age: 89
End: 2025-09-04